# Patient Record
Sex: FEMALE | Race: WHITE | Employment: OTHER | URBAN - METROPOLITAN AREA
[De-identification: names, ages, dates, MRNs, and addresses within clinical notes are randomized per-mention and may not be internally consistent; named-entity substitution may affect disease eponyms.]

---

## 2017-02-27 ENCOUNTER — TELEPHONE (OUTPATIENT)
Dept: BEHAVIORAL/MENTAL HEALTH CLINIC | Age: 67
End: 2017-02-27

## 2017-02-27 ENCOUNTER — HOSPITAL ENCOUNTER (EMERGENCY)
Age: 67
Discharge: HOME | End: 2017-02-28
Attending: EMERGENCY MEDICINE | Admitting: EMERGENCY MEDICINE
Payer: MEDICARE

## 2017-02-27 VITALS
DIASTOLIC BLOOD PRESSURE: 78 MMHG | HEIGHT: 62 IN | WEIGHT: 196.21 LBS | BODY MASS INDEX: 36.11 KG/M2 | RESPIRATION RATE: 18 BRPM | SYSTOLIC BLOOD PRESSURE: 153 MMHG | TEMPERATURE: 98 F | OXYGEN SATURATION: 98 %

## 2017-02-27 DIAGNOSIS — F41.8 DEPRESSION WITH ANXIETY: Primary | ICD-10-CM

## 2017-02-27 DIAGNOSIS — F43.9 STRESS: ICD-10-CM

## 2017-02-27 LAB
ALBUMIN SERPL BCP-MCNC: 4 G/DL (ref 3.5–5)
ALBUMIN/GLOB SERPL: 1.3 {RATIO} (ref 1.1–2.2)
ALP SERPL-CCNC: 70 U/L (ref 45–117)
ALT SERPL-CCNC: 21 U/L (ref 12–78)
AMPHET UR QL SCN: NEGATIVE
ANION GAP BLD CALC-SCNC: 8 MMOL/L (ref 5–15)
APAP SERPL-MCNC: <2 UG/ML (ref 10–30)
APPEARANCE UR: CLEAR
AST SERPL W P-5'-P-CCNC: 16 U/L (ref 15–37)
BARBITURATES UR QL SCN: NEGATIVE
BASOPHILS # BLD AUTO: 0 K/UL (ref 0–0.1)
BASOPHILS # BLD: 0 % (ref 0–1)
BENZODIAZ UR QL: POSITIVE
BILIRUB SERPL-MCNC: 0.2 MG/DL (ref 0.2–1)
BILIRUB UR QL: NEGATIVE
BUN SERPL-MCNC: 23 MG/DL (ref 6–20)
BUN/CREAT SERPL: 27 (ref 12–20)
CALCIUM SERPL-MCNC: 9.1 MG/DL (ref 8.5–10.1)
CANNABINOIDS UR QL SCN: NEGATIVE
CHLORIDE SERPL-SCNC: 106 MMOL/L (ref 97–108)
CO2 SERPL-SCNC: 28 MMOL/L (ref 21–32)
COCAINE UR QL SCN: NEGATIVE
COLOR UR: NORMAL
CREAT SERPL-MCNC: 0.86 MG/DL (ref 0.55–1.02)
DRUG SCRN COMMENT,DRGCM: ABNORMAL
EOSINOPHIL # BLD: 0 K/UL (ref 0–0.4)
EOSINOPHIL NFR BLD: 0 % (ref 0–7)
ERYTHROCYTE [DISTWIDTH] IN BLOOD BY AUTOMATED COUNT: 13.5 % (ref 11.5–14.5)
ETHANOL SERPL-MCNC: <10 MG/DL
GLOBULIN SER CALC-MCNC: 3.1 G/DL (ref 2–4)
GLUCOSE SERPL-MCNC: 106 MG/DL (ref 65–100)
GLUCOSE UR STRIP.AUTO-MCNC: NEGATIVE MG/DL
HCT VFR BLD AUTO: 41.5 % (ref 35–47)
HGB BLD-MCNC: 13.6 G/DL (ref 11.5–16)
HGB UR QL STRIP: NEGATIVE
KETONES UR QL STRIP.AUTO: NEGATIVE MG/DL
LEUKOCYTE ESTERASE UR QL STRIP.AUTO: NEGATIVE
LYMPHOCYTES # BLD AUTO: 26 % (ref 12–49)
LYMPHOCYTES # BLD: 2.5 K/UL (ref 0.8–3.5)
MCH RBC QN AUTO: 30.2 PG (ref 26–34)
MCHC RBC AUTO-ENTMCNC: 32.8 G/DL (ref 30–36.5)
MCV RBC AUTO: 92 FL (ref 80–99)
METHADONE UR QL: NEGATIVE
MONOCYTES # BLD: 0.8 K/UL (ref 0–1)
MONOCYTES NFR BLD AUTO: 8 % (ref 5–13)
NEUTS SEG # BLD: 6.1 K/UL (ref 1.8–8)
NEUTS SEG NFR BLD AUTO: 66 % (ref 32–75)
NITRITE UR QL STRIP.AUTO: NEGATIVE
OPIATES UR QL: NEGATIVE
PCP UR QL: POSITIVE
PH UR STRIP: 7 [PH] (ref 5–8)
PLATELET # BLD AUTO: 209 K/UL (ref 150–400)
POTASSIUM SERPL-SCNC: 4.1 MMOL/L (ref 3.5–5.1)
PROT SERPL-MCNC: 7.1 G/DL (ref 6.4–8.2)
PROT UR STRIP-MCNC: NEGATIVE MG/DL
RBC # BLD AUTO: 4.51 M/UL (ref 3.8–5.2)
SALICYLATES SERPL-MCNC: <1.7 MG/DL (ref 2.8–20)
SODIUM SERPL-SCNC: 142 MMOL/L (ref 136–145)
SP GR UR REFRACTOMETRY: 1.02 (ref 1–1.03)
UROBILINOGEN UR QL STRIP.AUTO: 0.2 EU/DL (ref 0.2–1)
WBC # BLD AUTO: 9.4 K/UL (ref 3.6–11)

## 2017-02-27 PROCEDURE — 80307 DRUG TEST PRSMV CHEM ANLYZR: CPT | Performed by: EMERGENCY MEDICINE

## 2017-02-27 PROCEDURE — 80053 COMPREHEN METABOLIC PANEL: CPT | Performed by: EMERGENCY MEDICINE

## 2017-02-27 PROCEDURE — 90791 PSYCH DIAGNOSTIC EVALUATION: CPT

## 2017-02-27 PROCEDURE — 36415 COLL VENOUS BLD VENIPUNCTURE: CPT | Performed by: EMERGENCY MEDICINE

## 2017-02-27 PROCEDURE — 99284 EMERGENCY DEPT VISIT MOD MDM: CPT

## 2017-02-27 PROCEDURE — 85025 COMPLETE CBC W/AUTO DIFF WBC: CPT | Performed by: EMERGENCY MEDICINE

## 2017-02-27 PROCEDURE — 81003 URINALYSIS AUTO W/O SCOPE: CPT | Performed by: EMERGENCY MEDICINE

## 2017-02-27 NOTE — ED NOTES
Pt brought in ED voluntarily escorted by Panda Keyes from [employer]. Per Kati [place] staff at [employer] called for [employer]. Pt denies any SI upon arrival but agreed to come to ED to speak with [facility]. Pt states she said \"I'm worth more dead than alive. \" but states she didn't verbalize that to anyone. Pt states she had some SI yesterday but denies any today. Panda Keyes states they spoke with Monique Arroyo with [employer] and states [facility]

## 2017-02-27 NOTE — TELEPHONE ENCOUNTER
Pt called and her message was very hard to hear. Please call pt, all i could  Make out was depression.

## 2017-02-28 NOTE — ED NOTES
Pt undressed. Clothes placed in a bag. Door open and pt visible form the nursing station. Patient resting comfortably, side rails up, call bell w/in reach, no further needs expressed at this time, aware of POC.

## 2017-02-28 NOTE — ED NOTES
Patient discharged and given discharge instructions by Jewel Hanson MD. Patient had an opportunity to ask questions. Patient verbalized understanding of discharge instructions. Patient ambulatory from ED, discharge instructions and prescriptions in hand. Patient accompanied by yellow cab.
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
## 2017-02-28 NOTE — ED NOTES
Patient resting comfortably, side rails up, call bell w/in reach, no further needs expressed at this time, aware of POC. Tech at the bedside for one to one.

## 2017-02-28 NOTE — DISCHARGE INSTRUCTIONS
Anxiety Disorder: Care Instructions  Your Care Instructions  Anxiety is a normal reaction to stress. Difficult situations can cause you to have symptoms such as sweaty palms and a nervous feeling. In an anxiety disorder, the symptoms are far more severe. Constant worry, muscle tension, trouble sleeping, nausea and diarrhea, and other symptoms can make normal daily activities difficult or impossible. These symptoms may occur for no reason, and they can affect your work, school, or social life. Medicines, counseling, and self-care can all help. Follow-up care is a key part of your treatment and safety. Be sure to make and go to all appointments, and call your doctor if you are having problems. It's also a good idea to know your test results and keep a list of the medicines you take. How can you care for yourself at home? · Take medicines exactly as directed. Call your doctor if you think you are having a problem with your medicine. · Go to your counseling sessions and follow-up appointments. · Recognize and accept your anxiety. Then, when you are in a situation that makes you anxious, say to yourself, \"This is not an emergency. I feel uncomfortable, but I am not in danger. I can keep going even if I feel anxious. \"  · Be kind to your body:  ¨ Relieve tension with exercise or a massage. ¨ Get enough rest.  ¨ Avoid alcohol, caffeine, nicotine, and illegal drugs. They can increase your anxiety level and cause sleep problems. ¨ Learn and do relaxation techniques. See below for more about these techniques. · Engage your mind. Get out and do something you enjoy. Go to a funny movie, or take a walk or hike. Plan your day. Having too much or too little to do can make you anxious. · Keep a record of your symptoms. Discuss your fears with a good friend or family member, or join a support group for people with similar problems. Talking to others sometimes relieves stress.   · Get involved in social groups, or volunteer to help others. Being alone sometimes makes things seem worse than they are. · Get at least 30 minutes of exercise on most days of the week to relieve stress. Walking is a good choice. You also may want to do other activities, such as running, swimming, cycling, or playing tennis or team sports. Relaxation techniques  Do relaxation exercises 10 to 20 minutes a day. You can play soothing, relaxing music while you do them, if you wish. · Tell others in your house that you are going to do your relaxation exercises. Ask them not to disturb you. · Find a comfortable place, away from all distractions and noise. · Lie down on your back, or sit with your back straight. · Focus on your breathing. Make it slow and steady. · Breathe in through your nose. Breathe out through either your nose or mouth. · Breathe deeply, filling up the area between your navel and your rib cage. Breathe so that your belly goes up and down. · Do not hold your breath. · Breathe like this for 5 to 10 minutes. Notice the feeling of calmness throughout your whole body. As you continue to breathe slowly and deeply, relax by doing the following for another 5 to 10 minutes:  · Tighten and relax each muscle group in your body. You can begin at your toes and work your way up to your head. · Imagine your muscle groups relaxing and becoming heavy. · Empty your mind of all thoughts. · Let yourself relax more and more deeply. · Become aware of the state of calmness that surrounds you. · When your relaxation time is over, you can bring yourself back to alertness by moving your fingers and toes and then your hands and feet and then stretching and moving your entire body. Sometimes people fall asleep during relaxation, but they usually wake up shortly afterward. · Always give yourself time to return to full alertness before you drive a car or do anything that might cause an accident if you are not fully alert.  Never play a relaxation tape while you drive a car. When should you call for help? Call 911 anytime you think you may need emergency care. For example, call if:  · You feel you cannot stop from hurting yourself or someone else. Keep the numbers for these national suicide hotlines: 2-133-491-TALK (4-146.347.8721) and 3-993-MCYSQUK (4-239.435.4396). If you or someone you know talks about suicide or feeling hopeless, get help right away. Watch closely for changes in your health, and be sure to contact your doctor if:  · You have anxiety or fear that affects your life. · You have symptoms of anxiety that are new or different from those you had before. Where can you learn more? Go to http://wichoSocialcamben.info/. Enter P754 in the search box to learn more about \"Anxiety Disorder: Care Instructions. \"  Current as of: July 26, 2016  Content Version: 11.1  © 0462-8226 [employer]. Care instructions adapted under license by [employer] (which disclaims liability or warranty for this information). If you have questions about a medical condition or this instruction, always ask your healthcare professional. [address] any warranty or liability for your use of this information. Recovering From Depression: Care Instructions  Your Care Instructions  Taking good care of yourself is important as you recover from depression. In time, your symptoms will fade as your treatment takes hold. Do not give up. Instead, focus your energy on getting better. Your mood will improve. It just takes some time. Focus on things that can help you feel better, such as being with friends and family, eating well, and getting enough rest. But take things slowly. Do not do too much too soon. You will begin to feel better gradually. Follow-up care is a key part of your treatment and safety. Be sure to make and go to all appointments, and call your doctor if you are having problems.  It's also a good idea to know your test results and keep a list of the medicines you take. How can you care for yourself at home? Be realistic  · If you have a large task to do, break it up into smaller steps you can handle, and just do what you can. · You may want to put off important decisions until your depression has lifted. If you have plans that will have a major impact on your life, such as marriage, divorce, or a job change, try to wait a bit. Talk it over with friends and loved ones who can help you look at the overall picture first.  · Reaching out to people for help is important. Do not isolate yourself. Let your family and friends help you. Find someone you can trust and confide in, and talk to that person. · Be patient, and be kind to yourself. Remember that depression is not your fault and is not something you can overcome with willpower alone. Treatment is necessary for depression, just like for any other illness. Feeling better takes time, and your mood will improve little by little. Stay active  · Stay busy and get outside. Take a walk, or try some other light exercise. · Talk with your doctor about an exercise program. Exercise can help with mild depression. · Go to a movie or concert. Take part in a Confucianism activity or other social gathering. Go to a ball game. · Ask a friend to have dinner with you. Take care of yourself  · Eat a balanced diet with plenty of fresh fruits and vegetables, whole grains, and lean protein. If you have lost your appetite, eat small snacks rather than large meals. · Avoid drinking alcohol or using illegal drugs. Do not take medicines that have not been prescribed for you. They may interfere with medicines you may be taking for depression, or they may make your depression worse. · Take your medicines exactly as they are prescribed. You may start to feel better within 1 to 3 weeks of taking antidepressant medicine. But it can take as many as 6 to 8 weeks to see more improvement.  If you have questions or concerns about your medicines, or if you do not notice any improvement by 3 weeks, talk to your doctor. · If you have any side effects from your medicine, tell your doctor. Antidepressants can make you feel tired, dizzy, or nervous. Some people have dry mouth, constipation, headaches, sexual problems, or diarrhea. Many of these side effects are mild and will go away on their own after you have been taking the medicine for a few weeks. Some may last longer. Talk to your doctor if side effects are bothering you too much. You might be able to try a different medicine. · Get enough sleep. If you have problems sleeping:  ¨ Go to bed at the same time every night, and get up at the same time every morning. ¨ Keep your bedroom dark and quiet. ¨ Do not exercise after 5:00 p.m. ¨ Avoid drinks with caffeine after 5:00 p.m. · Avoid sleeping pills unless they are prescribed by the doctor treating your depression. Sleeping pills may make you groggy during the day, and they may interact with other medicine you are taking. · If you have any other illnesses, such as diabetes, heart disease, or high blood pressure, make sure to continue with your treatment. Tell your doctor about all of the medicines you take, including those with or without a prescription. · Keep the numbers for these national suicide hotlines: 7-377-283-TALK (3-890.382.7621) and 8-588-VRSIYKI (2-723.292.2891). If you or someone you know talks about suicide or feeling hopeless, get help right away. When should you call for help? Call 911 anytime you think you may need emergency care. For example, call if:  · You feel like hurting yourself or someone else. · Someone you know has depression and is about to attempt or is attempting suicide. Call your doctor now or seek immediate medical care if:  · You hear voices. · Someone you know has depression and:  ¨ Starts to give away his or her possessions.   ¨ Uses illegal drugs or drinks alcohol heavily. ¨ Talks or writes about death, including writing suicide notes or talking about guns, knives, or pills. ¨ Starts to spend a lot of time alone. ¨ Acts very aggressively or suddenly appears calm. Watch closely for changes in your health, and be sure to contact your doctor if:  · You do not get better as expected. Where can you learn more? Go to http://wicho-ben.info/. Enter X916 in the search box to learn more about \"Recovering From Depression: Care Instructions. \"  Current as of: July 26, 2016  Content Version: 11.1  © 9705-8972 [employer]. Care instructions adapted under license by [employer] (which disclaims liability or warranty for this information). If you have questions about a medical condition or this instruction, always ask your healthcare professional. [address] any warranty or liability for your use of this information.

## 2017-02-28 NOTE — ED NOTES
Patient presents to ED with C/O SI that started yesterday. The pt stated she told Cherellejose d Rons that [employer] of the world is on my shoulders\". The pt stated she \"just wants to see her mother and  who is in heaven\". The pt stated \"I swear on a stack of Bibles that I would never do anything to hurt myself\". The pt denies SI, HI, delusions and hallucionations. Patient is A&Ox3, side rails up, call bell w/in reach, and aware of plan of care. The patient is in NAD.

## 2017-03-02 ENCOUNTER — OFFICE VISIT (OUTPATIENT)
Dept: BEHAVIORAL/MENTAL HEALTH CLINIC | Age: 67
End: 2017-03-02

## 2017-03-02 VITALS
WEIGHT: 196 LBS | DIASTOLIC BLOOD PRESSURE: 93 MMHG | HEART RATE: 114 BPM | BODY MASS INDEX: 36.07 KG/M2 | HEIGHT: 62 IN | SYSTOLIC BLOOD PRESSURE: 130 MMHG

## 2017-03-02 DIAGNOSIS — F41.8 DEPRESSION WITH ANXIETY: Primary | ICD-10-CM

## 2017-03-02 DIAGNOSIS — F60.9 PERSONALITY DISORDER (HCC): ICD-10-CM

## 2017-03-02 RX ORDER — TRAZODONE HYDROCHLORIDE 150 MG/1
TABLET ORAL
Qty: 90 TAB | Refills: 1 | Status: SHIPPED | OUTPATIENT
Start: 2017-03-02 | End: 2017-09-10 | Stop reason: SDUPTHER

## 2017-03-02 RX ORDER — OXCARBAZEPINE 300 MG/1
TABLET, FILM COATED ORAL
Qty: 90 TAB | Refills: 1 | Status: SHIPPED | OUTPATIENT
Start: 2017-03-02 | End: 2017-08-01 | Stop reason: SDUPTHER

## 2017-03-02 RX ORDER — CLONAZEPAM 2 MG/1
TABLET ORAL
Qty: 180 TAB | Refills: 1 | Status: SHIPPED | OUTPATIENT
Start: 2017-03-02 | End: 2017-05-17 | Stop reason: SDUPTHER

## 2017-03-02 RX ORDER — VENLAFAXINE HYDROCHLORIDE 150 MG/1
CAPSULE, EXTENDED RELEASE ORAL
Qty: 270 CAP | Refills: 1 | Status: SHIPPED | OUTPATIENT
Start: 2017-03-02 | End: 2017-08-01 | Stop reason: SDUPTHER

## 2017-03-02 RX ORDER — CHOLECALCIFEROL (VITAMIN D3) 125 MCG
10 CAPSULE ORAL
COMMUNITY
End: 2019-04-16 | Stop reason: SDUPTHER

## 2017-03-02 NOTE — PROGRESS NOTES
Psychiatric Outpatient Progress Note    Account Number:  [id number]  Name: Melissa Porras    SUBJECTIVE:   CHIEF COMPLAINT:  Reza Razo is a 77 y.o. , White female and was seen today for follow-up of psychiatric condition and psychotropic medication management. HPI:    Judi Wyatt reports the following psychiatric symptoms:  depression, anxiety and poor sleep. The symptoms have been present for years and are of moderate severity. The symptoms occur daily. Pt reports that she feeling depressed and anxious. Acknowledged her visit to ED last month with increased anxiety but denies any SI or attempt. Continues to dwell on her financial problems. Very labile and anxious. She has increased cardiac issues and is recommended by her cardiologist to rest more. ED Note:( 2/27/17)  Reza Razo is a 77 y.o. female with pmhx significant for HTN, depression, mood swings, anxiety who presents with police to the ED for suicidal ideations. Pt states she was talking to a RN for [employer] last night and said \"I'm worth more dead than alive. \" Per pt, the RN wanted her to come in to the hospital last night, but she refused. She states she does not have any savings, was recently scammed online and had her credit card information stolen, and is now disputing several of these fraudulent cases to get her money back. Pt reports \"scammers can make up paperwork. I don't have any, but they can make it all up. \" Pt admits she has previously thought about suicide, noting a plan to \"step in front of a truck\" but \"it's too painful. \" She states \"I couldn't do it if I wanted to. \" Pt notes she has a follow-up appointment with her psychiatrist on 03/02/2017 and is followed by a psychologist. Pt states \"I am scared of the dark and a night owl. \" She states her  passed away in 2010 and gave any firearms in her home to the police. Pt also reports all of her family has passed away, she does not have any siblings or kids.  Pt lives alone at "[employer]. Per pt, \"Don't tell "[employer] I don't have any money-they'll throw me out. \" Per pt, family h/o cancer and cardiac problems. She specifically denies any SI, difficulty sleeping, appetite change or other physical symptoms. Contributing factors include: finances. Patient denies SI/HI/SIB. Side Effects:  none      Fam/Soc Hx (from [place] with updates): REVIEW OF SYSTEMS:  Psychiatric:  depression, anxiety  Appetite:good   Sleep: good       OBJECTIVE:                 Mental Status exam: WNL except for      Sensorium  oriented to time, place and person   Relations cooperative and passive    Eye Contact    appropriate   Appearance:  age appropriate and casually dressed   Motor Behavior/Gait:  gait unsteady and within normal limits   Speech:  normal pitch and normal volume   Thought Process: goal directed and logical   Thought Content free of delusions and free of hallucinations   Suicidal ideations none   Homicidal ideations none   Mood:  depressed   Affect:  anxious   Memory recent  adequate   Memory remote:  adequate   Concentration:  adequate   Abstraction:  concrete   Insight:  fair   Reliability fair   Judgment:  fair       MEDICAL DECISION MAKING  Data: pertinent labs, imaging, medical records and diagnostic tests reviewed and incorporated in diagnosis and treatment plan    Allergies   Allergen Reactions    Epinephrine Palpitations     \"Makes my heart  ' shudder'. \" Allergic to epinephrine with novocain. Plain epinephrine is ok.  Novocain [Procaine] Palpitations    Nsaids (Non-Steroidal Anti-Inflammatory Drug) Other (comments)     GASTRIC DISTRESS.  Pravastatin Myalgia    Aspirin Other (comments)     Gastric irritation    Ibuprofen Other (comments)     Gastric irritation        Current Outpatient Prescriptions   Medication Sig Dispense Refill    melatonin tab tablet Take 10 mg by mouth nightly.       venlafaxine-SR (EFFEXOR-XR) 150 mg capsule TAKE 2 CAPSULE AM,  AND 1 PO at 6 PM  Indications: major depressive disorder 270 Cap 1    clonazePAM (KLONOPIN) 2 mg tablet TAKE 1 TABLET BY MOUTH TWICE DAILY 180 Tab 1    traZODone (DESYREL) 150 mg tablet TAKE 1 TABLET NIGHTLY  Indications: major depressive disorder 90 Tab 1    OXcarbazepine (TRILEPTAL) 300 mg tablet TAKE 1 TABLET DAILY  Indications: mood stabilizer 90 Tab 1    multivitamin, tx-iron-ca-min (THERA-M W/ IRON) 9 mg iron-400 mcg tab tablet Take 1 Tab by mouth daily.  DEXILANT 60 mg CpDB Take 60 mg by mouth daily.  rosuvastatin (CRESTOR) 10 mg tablet Take 1 Tab by mouth nightly. 30 Tab 2    traMADol (ULTRAM) 50 mg tablet Take 50 mg by mouth every eight (8) hours as needed. 3    fluticasone (FLONASE) 50 mcg/actuation nasal spray       fexofenadine (ALLEGRA) 180 mg tablet Take 180 mg by mouth daily.  CRANBERRY EXTRACT (CRANBERRY PO) Take 2,000 mg by mouth daily. Triple strength.  GUAIFENESIN/DEXTROMETHORPHAN (MUCINEX DM PO) Take 1 Tab by mouth two (2) times daily as needed.  acetaminophen (ACETAMINOPHEN EXTRA STRENGTH) 500 mg tablet Take 1,500 mg by mouth two (2) times a day.  celecoxib (CELEBREX) 100 mg capsule Take 100 mg by mouth two (2) times a day.  Diclofenac Sodium (VOLTAREN) 1 % topical gel Apply  to affected area daily as needed.  cholecalciferol, vitamin D3, (VITAMIN D-3) 2,000 unit Tab Take 5,000 Units by mouth daily. Soft gel.  CYANOCOBALAMIN, VITAMIN B-12, (VITAMIN B-12 PO) Take 2,000 mcg by mouth daily. 2000MCG DAILY      CALCIUM CARBONATE/VITAMIN D3 (CALCIUM 500 WITH D PO) Take 2 Tabs by mouth daily.  Potassium Gluconate 595 (99) mg tablet Take 595 mg by mouth daily.  zinc 50 mg Tab Take 1 Tab by mouth daily.  almotriptan (AXERT) 12.5 mg tablet Take 12.5 mg by mouth once as needed.       CONTOUR NEXT STRIPS strip USE TO TEST BLOOD SUGAR BID  0    MICROLET LANCET misc USE BID  11    ascorbic acid (VITAMIN C) 500 mg tablet Take 500 mg by mouth daily. Visit Vitals    BP (!) 130/93    Pulse (!) 114    Ht 5' 2\" (1.575 m)    Wt 88.9 kg (196 lb)    BMI 35.85 kg/m2         Problems addressed today:    ICD-10-CM ICD-9-CM    1. Depression with anxiety F41.8 300.4    2. Personality disorder F60.9 301.9        Assessment:   Stevan Morales  is a 77 y.o.  female  is partially responding to treatment. Symptoms are unstable. Patient denies SI/HI/SIB. No evidence of AH/VH or delusions. Risk Scoring- chronic illnesses and prescription drug management    Treatment Plan:  1. Medications:          Medication Changes/Adjustments: increase Effxor, continue clonazepam, trazodone, trileptal and melatonin    Current Outpatient Prescriptions   Medication Sig Dispense Refill    melatonin tab tablet Take 10 mg by mouth nightly.  venlafaxine-SR (EFFEXOR-XR) 150 mg capsule TAKE 2 CAPSULE AM,  AND 1 PO at 6 PM  Indications: major depressive disorder 270 Cap 1    clonazePAM (KLONOPIN) 2 mg tablet TAKE 1 TABLET BY MOUTH TWICE DAILY 180 Tab 1    traZODone (DESYREL) 150 mg tablet TAKE 1 TABLET NIGHTLY  Indications: major depressive disorder 90 Tab 1    OXcarbazepine (TRILEPTAL) 300 mg tablet TAKE 1 TABLET DAILY  Indications: mood stabilizer 90 Tab 1    multivitamin, tx-iron-ca-min (THERA-M W/ IRON) 9 mg iron-400 mcg tab tablet Take 1 Tab by mouth daily.  DEXILANT 60 mg CpDB Take 60 mg by mouth daily.  rosuvastatin (CRESTOR) 10 mg tablet Take 1 Tab by mouth nightly. 30 Tab 2    traMADol (ULTRAM) 50 mg tablet Take 50 mg by mouth every eight (8) hours as needed. 3    fluticasone (FLONASE) 50 mcg/actuation nasal spray       fexofenadine (ALLEGRA) 180 mg tablet Take 180 mg by mouth daily.  CRANBERRY EXTRACT (CRANBERRY PO) Take 2,000 mg by mouth daily. Triple strength.  GUAIFENESIN/DEXTROMETHORPHAN (MUCINEX DM PO) Take 1 Tab by mouth two (2) times daily as needed.       acetaminophen (ACETAMINOPHEN EXTRA STRENGTH) 500 mg tablet Take 1,500 mg by mouth two (2) times a day.  celecoxib (CELEBREX) 100 mg capsule Take 100 mg by mouth two (2) times a day.  Diclofenac Sodium (VOLTAREN) 1 % topical gel Apply  to affected area daily as needed.  cholecalciferol, vitamin D3, (VITAMIN D-3) 2,000 unit Tab Take 5,000 Units by mouth daily. Soft gel.  CYANOCOBALAMIN, VITAMIN B-12, (VITAMIN B-12 PO) Take 2,000 mcg by mouth daily. 2000MCG DAILY      CALCIUM CARBONATE/VITAMIN D3 (CALCIUM 500 WITH D PO) Take 2 Tabs by mouth daily.  Potassium Gluconate 595 (99) mg tablet Take 595 mg by mouth daily.  zinc 50 mg Tab Take 1 Tab by mouth daily.  almotriptan (AXERT) 12.5 mg tablet Take 12.5 mg by mouth once as needed.  CONTOUR NEXT STRIPS strip USE TO TEST BLOOD SUGAR BID  0    MICROLET LANCET misc USE BID  11    ascorbic acid (VITAMIN C) 500 mg tablet Take 500 mg by mouth daily. The following regarding medications was addressed:    (The risks and benefits of the proposed medication; the potential medication side effects ie    dry mouth, weight gain, GI upset, headache; patient given opportunity to ask questions)       2. Counseling and coordination of care including instructions for treatment, risks/benefits, risk factor reduction and patient/family education. She agrees with the plan. Patient instructed to call with any side effects, questions or issues. 3.  Follow-up Disposition:  Return in about 4 weeks (around 3/30/2017). PSYCHOTHERAPY:  approx 20 minutes  Type:  Supportive/Solution Focused psychotherapy provided  Focus:     Current problems:   Housing issues- [facility]   Economic issues              Polypharmacy    Psychoeducation provided on polypharmacy    Treatment plan reviewed with patient-including diagnosis and medications      Jose Mai is not progressing.     Anabelle Shaffer MD  3/2/2017

## 2017-03-02 NOTE — MR AVS SNAPSHOT
Visit Information Date & Time Provider Department Dept. Phone Encounter #  
 3/2/2017  1:20 PM Juanita Chiang [phone] [id number] Follow-up Instructions Return in about 4 weeks (around 3/30/2017). Your Appointments 4/11/2017  2:00 PM  
ESTABLISHED PATIENT with MD Juanita Chiang [address] [facility]) Appt Note: 4 month f/u  
 [facility] [address] [address] [address] [place] [address] [facility] Upcoming Health Maintenance Date Due Hepatitis C Screening 1950 LIPID PANEL Q1 1950 FOOT EXAM Q1 11/2/1960 MICROALBUMIN Q1 11/2/1960 EYE EXAM RETINAL OR DILATED Q1 11/2/1960 DTaP/Tdap/Td series (1 - Tdap) 11/2/1971 FOBT Q 1 YEAR AGE 50-75 11/2/2000 ZOSTER VACCINE AGE 60> 11/2/2010 HEMOGLOBIN A1C Q6M 8/16/2012 GLAUCOMA SCREENING Q2Y 11/2/2015 INFLUENZA AGE 9 TO ADULT 8/1/2016 Pneumococcal 65+ Low/Medium Risk (2 of 2 - PPSV23) 9/1/2016 MEDICARE YEARLY EXAM 11/19/2016 BREAST CANCER SCRN MAMMOGRAM 8/1/2018 Allergies as of 3/2/2017  Review Complete On: 3/2/2017 By: Fuentes Levy MD  
  
 Severity Noted Reaction Type Reactions Epinephrine  07/27/2011   Systemic Palpitations \"Makes my heart  ' shudder'. \" Allergic to epinephrine with novocain. Plain epinephrine is ok. Novocain [Procaine]  05/01/2013    Palpitations Nsaids (Non-steroidal Anti-inflammatory Drug)  02/16/2012    Other (comments) GASTRIC DISTRESS. Pravastatin  12/02/2015    Myalgia Aspirin Low 07/27/2011   Side Effect Other (comments) Gastric irritation Ibuprofen Low 07/27/2011   Side Effect Other (comments) Gastric irritation Current Immunizations  Reviewed on 2/22/2012 Name Date Influenza Vaccine Whole 9/1/2011 Pneumococcal Vaccine (Unspecified Type) 9/1/2011 Not reviewed this visit You Were Diagnosed With   
  
 Codes Comments Depression with anxiety    -  Primary ICD-10-CM: F41.8 ICD-9-CM: 300.4 Personality disorder     ICD-10-CM: F60.9 ICD-9-CM: 301.9 Vitals BP  
  
  
  
  
  
 (!) 130/93 BMI and BSA Data Body Mass Index Body Surface Area  
 35.85 kg/m 2 1.97 m 2 Preferred Pharmacy Pharmacy Name Phone [address] [facility] [phone] Your Updated Medication List  
  
   
This list is accurate as of: 3/2/17  1:52 PM.  Always use your most recent med list.  
  
  
  
  
 ACETAMINOPHEN EXTRA STRENGTH 500 mg tablet Generic drug:  acetaminophen Take 1,500 mg by mouth two (2) times a day. ALLEGRA 180 mg tablet Generic drug:  fexofenadine Take 180 mg by mouth daily. AXERT 12.5 mg tablet Generic drug:  almotriptan Take 12.5 mg by mouth once as needed. CALCIUM 500 WITH D PO Take 2 Tabs by mouth daily. CeleBREX 100 mg capsule Generic drug:  celecoxib Take 100 mg by mouth two (2) times a day. clonazePAM 2 mg tablet Commonly known as:  KlonoPIN  
TAKE 1 TABLET BY MOUTH TWICE DAILY CONTOUR NEXT STRIPS strip Generic drug:  glucose blood VI test strips USE TO TEST BLOOD SUGAR BID CRANBERRY PO Take 2,000 mg by mouth daily. Triple strength. DEXILANT 60 mg Cpdb Generic drug:  Dexlansoprazole Take 60 mg by mouth daily. fluticasone 50 mcg/actuation nasal spray Commonly known as:  FLONASE  
  
 melatonin Tab tablet Take 10 mg by mouth nightly. [facility] Generic drug:  Lancets USE BID  
  
 MUCINEX DM PO Take 1 Tab by mouth two (2) times daily as needed. multivitamin, tx-iron-ca-min 9 mg iron-400 mcg Tab tablet Commonly known as:  THERA-M w/ IRON Take 1 Tab by mouth daily. OXcarbazepine 300 mg tablet Commonly known as:  TRILEPTAL  
 TAKE 1 TABLET DAILY  Indications: mood stabilizer Potassium Gluconate 595 mg (99 mg) tablet Take 595 mg by mouth daily. rosuvastatin 10 mg tablet Commonly known as:  CRESTOR Take 1 Tab by mouth nightly. traMADol 50 mg tablet Commonly known as:  ULTRAM  
Take 50 mg by mouth every eight (8) hours as needed. traZODone 150 mg tablet Commonly known as:  DESYREL  
TAKE 1 TABLET NIGHTLY  Indications: major depressive disorder  
  
 venlafaxine- mg capsule Commonly known as:  EFFEXOR-XR  
TAKE 2 CAPSULE AM,  AND 1 PO at 6 PM  Indications: major depressive disorder VITAMIN B-12 PO Take 2,000 mcg by mouth daily. 2000MCG DAILY  
  
 VITAMIN C 500 mg tablet Generic drug:  ascorbic acid (vitamin C) Take 500 mg by mouth daily. VITAMIN D3 2,000 unit Tab Generic drug:  cholecalciferol (vitamin D3) Take 5,000 Units by mouth daily. Soft gel. VOLTAREN 1 % Gel Generic drug:  diclofenac Apply  to affected area daily as needed. zinc 50 mg Tab tablet Take 1 Tab by mouth daily. Prescriptions Printed Refills  
 clonazePAM (KLONOPIN) 2 mg tablet 1 Sig: TAKE 1 TABLET BY MOUTH TWICE DAILY Class: Print Prescriptions Sent to Pharmacy Refills  
 venlafaxine-SR (EFFEXOR-XR) 150 mg capsule 1 Sig: TAKE 2 CAPSULE AM,  AND 1 PO at 6 PM  Indications: major depressive disorder Class: Normal  
 Pharmacy: [address], [address] Ph #: [phone]  
 traZODone (DESYREL) 150 mg tablet 1 Sig: TAKE 1 TABLET NIGHTLY  Indications: major depressive disorder Class: Normal  
 Pharmacy: [address], [address] Ph #: [phone] OXcarbazepine (TRILEPTAL) 300 mg tablet 1 Sig: TAKE 1 TABLET DAILY  Indications: mood stabilizer  Class: Normal  
 Pharmacy: [address], [address] [place] [address] #: [phone] Follow-up Instructions Return in about 4 weeks (around 3/30/2017). To-Do List   
 03/30/2017 1:00 PM  
  Appointment with DIABETES CLASS #[id number] at [facility] DIABETIC TREATMENT ([phone]) 08/01/2017 1:00 PM  
  Appointment with [facility] CATERINA 3 at [address] [place] [address] ([phone]) Shower or bathe using soap and water. Do not use deodorant, powder, perfumes, or lotion the day of your exam.  If your prior mammograms were not performed at a [facility] facility please bring films with you or forward prior images 2 days before your procedure. Please bring script from your physician on the day of the mammogram or have scripts faxed to: [facility]:  [phone] Introducing [facility]! [facility] introduces giftee patient portal. Now you can access parts of your medical record, email your doctor's office, and request medication refills online. 1. In your internet browser, go to [email or web address] 2. Click on the First Time User? Click Here link in the Sign In box. You will see the New Member Sign Up page. 3. Enter your giftee Access Code exactly as it appears below. You will not need to use this code after youve completed the sign-up process. If you do not sign up before the expiration date, you must request a new code. · giftee Access Code: [email or web address] Expires: 3/13/2017  1:39 PM 
 
4. Enter the last four digits of your Social Security Number (xxxx) and Date of Birth (mm/dd/yyyy) as indicated and click Submit. You will be taken to the next sign-up page. 5. Create a [employer] ID. This will be your giftee login ID and cannot be changed, so think of one that is secure and easy to remember. 6. Create a [employer] password. You can change your password at any time. 7. Enter your Password Reset Question and Answer. This can be used at a later time if you forget your password. 8. Enter your e-mail address. You will receive e-mail notification when new information is available in [address] 9. Click Sign Up. You can now view and download portions of your medical record. 10. Click the Download Summary menu link to download a portable copy of your medical information. If you have questions, please visit the Frequently Asked Questions section of the [employer] website. Remember, [employer] is NOT to be used for urgent needs. For medical emergencies, dial 911. Now available from your iPhone and Android! Please provide this summary of care documentation to your next provider. Your primary care clinician is listed as Asad Rm. If you have any questions after today's visit, please call [phone].

## 2017-03-15 ENCOUNTER — TELEPHONE (OUTPATIENT)
Dept: BEHAVIORAL/MENTAL HEALTH CLINIC | Age: 67
End: 2017-03-15

## 2017-03-15 NOTE — TELEPHONE ENCOUNTER
Pt called and said she is having bad effects to the medication increase. Hands shaking, developing stutter, and general shaking. Her sleeping is becoming effected also she has only been able to get atleast 2 hours of sleep and she has also tried reading to get her to bed. Please call her when you get a moment. I believe the effextor was giving her these problems. [phone] please call this number.

## 2017-03-30 ENCOUNTER — OFFICE VISIT (OUTPATIENT)
Dept: BEHAVIORAL/MENTAL HEALTH CLINIC | Age: 67
End: 2017-03-30

## 2017-03-30 VITALS
DIASTOLIC BLOOD PRESSURE: 74 MMHG | BODY MASS INDEX: 35.51 KG/M2 | HEART RATE: 98 BPM | SYSTOLIC BLOOD PRESSURE: 151 MMHG | WEIGHT: 193 LBS | HEIGHT: 62 IN

## 2017-03-30 DIAGNOSIS — F60.9 PERSONALITY DISORDER (HCC): ICD-10-CM

## 2017-03-30 DIAGNOSIS — F41.8 DEPRESSION WITH ANXIETY: Primary | ICD-10-CM

## 2017-03-30 NOTE — MR AVS SNAPSHOT
Visit Information Date & Time Provider Department Dept. Phone Encounter #  
 3/30/2017  2:00 PM Garrett Carlin, [address] [facility] [phone] [id number] Follow-up Instructions Return in about 2 months (around 5/30/2017). Your Appointments 4/11/2017  2:00 PM  
ESTABLISHED PATIENT with Garrett Cralin MD  
[address] [facility]) Appt Note: 4 month f/u  
 [facility] [address] [address]  
[address] [facility] [address] [facility] Upcoming Health Maintenance Date Due Hepatitis C Screening 1950 LIPID PANEL Q1 1950 FOOT EXAM Q1 11/2/1960 MICROALBUMIN Q1 11/2/1960 EYE EXAM RETINAL OR DILATED Q1 11/2/1960 DTaP/Tdap/Td series (1 - Tdap) 11/2/1971 FOBT Q 1 YEAR AGE 50-75 11/2/2000 ZOSTER VACCINE AGE 60> 11/2/2010 HEMOGLOBIN A1C Q6M 8/16/2012 GLAUCOMA SCREENING Q2Y 11/2/2015 INFLUENZA AGE 9 TO ADULT 8/1/2016 Pneumococcal 65+ Low/Medium Risk (2 of 2 - PPSV23) 9/1/2016 MEDICARE YEARLY EXAM 11/19/2016 BREAST CANCER SCRN MAMMOGRAM 8/1/2018 Allergies as of 3/30/2017  Review Complete On: 3/30/2017 By: Garrett Carlin MD  
  
 Severity Noted Reaction Type Reactions Epinephrine  07/27/2011   Systemic Palpitations \"Makes my heart  ' shudder'. \" Allergic to epinephrine with novocain. Plain epinephrine is ok. Novocain [Procaine]  05/01/2013    Palpitations Nsaids (Non-steroidal Anti-inflammatory Drug)  02/16/2012    Other (comments) GASTRIC DISTRESS. Pravastatin  12/02/2015    Myalgia Aspirin Low 07/27/2011   Side Effect Other (comments) Gastric irritation Ibuprofen Low 07/27/2011   Side Effect Other (comments) Gastric irritation Current Immunizations  Reviewed on 2/22/2012 Name Date Influenza Vaccine Whole 9/1/2011 Pneumococcal Vaccine (Unspecified Type) 9/1/2011 Not reviewed this visit You Were Diagnosed With   
  
 Codes Comments Depression with anxiety    -  Primary ICD-10-CM: F41.8 ICD-9-CM: 300.4 Personality disorder     ICD-10-CM: F60.9 ICD-9-CM: 301.9 Vitals BP Pulse Height(growth percentile) Weight(growth percentile) BMI OB Status 151/74 98 5' 2\" (1.575 m) 193 lb (87.5 kg) 35.3 kg/m2 Postmenopausal  
 Smoking Status Never Smoker BMI and BSA Data Body Mass Index Body Surface Area  
 35.3 kg/m 2 1.96 m 2 Preferred Pharmacy Pharmacy Name Phone [address] [facility] [phone] Your Updated Medication List  
  
   
This list is accurate as of: 3/30/17  2:33 PM.  Always use your most recent med list.  
  
  
  
  
 ACETAMINOPHEN EXTRA STRENGTH 500 mg tablet Generic drug:  acetaminophen Take 1,500 mg by mouth two (2) times a day. ALLEGRA 180 mg tablet Generic drug:  fexofenadine Take 180 mg by mouth daily. AXERT 12.5 mg tablet Generic drug:  almotriptan Take 12.5 mg by mouth once as needed. CALCIUM 500 WITH D PO Take 2 Tabs by mouth daily. CeleBREX 100 mg capsule Generic drug:  celecoxib Take 100 mg by mouth two (2) times a day. clonazePAM 2 mg tablet Commonly known as:  KlonoPIN  
TAKE 1 TABLET BY MOUTH TWICE DAILY CONTOUR NEXT STRIPS strip Generic drug:  glucose blood VI test strips USE TO TEST BLOOD SUGAR BID CRANBERRY PO Take 2,000 mg by mouth daily. Triple strength. DEXILANT 60 mg Cpdb Generic drug:  Dexlansoprazole Take 60 mg by mouth daily. fluticasone 50 mcg/actuation nasal spray Commonly known as:  FLONASE  
  
 melatonin Tab tablet Take 10 mg by mouth nightly. [facility] [place] [facility] Generic drug:  Lancets USE BID  
  
 MUCINEX DM PO Take 1 Tab by mouth two (2) times daily as needed. multivitamin, tx-iron-ca-min 9 mg iron-400 mcg Tab tablet Commonly known as:  THERA-M w/ IRON Take 1 Tab by mouth daily. OXcarbazepine 300 mg tablet Commonly known as:  TRILEPTAL  
TAKE 1 TABLET DAILY  Indications: mood stabilizer Potassium Gluconate 595 mg (99 mg) tablet Take 595 mg by mouth daily. rosuvastatin 10 mg tablet Commonly known as:  CRESTOR Take 1 Tab by mouth nightly. traMADol 50 mg tablet Commonly known as:  ULTRAM  
Take 50 mg by mouth every eight (8) hours as needed. traZODone 150 mg tablet Commonly known as:  DESYREL  
TAKE 1 TABLET NIGHTLY  Indications: major depressive disorder  
  
 venlafaxine- mg capsule Commonly known as:  EFFEXOR-XR  
TAKE 2 CAPSULE AM,  AND 1 PO at 6 PM  Indications: major depressive disorder VITAMIN B-12 PO Take 2,000 mcg by mouth daily. 2000MCG DAILY  
  
 VITAMIN C 500 mg tablet Generic drug:  ascorbic acid (vitamin C) Take 500 mg by mouth daily. VITAMIN D3 2,000 unit Tab Generic drug:  cholecalciferol (vitamin D3) Take 5,000 Units by mouth daily. Soft gel. VOLTAREN 1 % Gel Generic drug:  diclofenac Apply  to affected area daily as needed. zinc 50 mg Tab tablet Take 1 Tab by mouth daily. Follow-up Instructions Return in about 2 months (around 5/30/2017). To-Do List   
 06/22/2017 1:00 PM  
  Appointment with DIABETES CLASS #4MRM at [facility] DIABETIC TREATMENT ([phone]) 08/01/2017 1:00 PM  
  Appointment with [facility] CATERINA 3 at [address] [place] [address] ([phone]) Shower or bathe using soap and water. Do not use deodorant, powder, perfumes, or lotion the day of your exam.  If your prior mammograms were not performed at a [facility] please bring films with you or forward prior images 2 days before your procedure. Please bring script from your physician on the day of the mammogram or have scripts faxed to: [facility]:  [phone] Introducing [facility]! [employer] [facility] introduces [employer] patient portal. Now you can access parts of your medical record, email your doctor's office, and request medication refills online. 1. In your internet browser, go to [email or web address] 2. Click on the First Time User? Click Here link in the Sign In box. You will see the New Member Sign Up page. 3. Enter your [employer] Access Code exactly as it appears below. You will not need to use this code after youve completed the sign-up process. If you do not sign up before the expiration date, you must request a new code. · [employer] Access Code: [email or web address] Expires: 6/28/2017  2:33 PM 
 
4. Enter the last four digits of your Social Security Number (xxxx) and Date of Birth (mm/dd/yyyy) as indicated and click Submit. You will be taken to the next sign-up page. 5. Create a [employer] ID. This will be your [employer] login ID and cannot be changed, so think of one that is secure and easy to remember. 6. Create a [employer] password. You can change your password at any time. 7. Enter your Password Reset Question and Answer. This can be used at a later time if you forget your password. 8. Enter your e-mail address. You will receive e-mail notification when new information is available in [address] 9. Click Sign Up. You can now view and download portions of your medical record. 10. Click the Download Summary menu link to download a portable copy of your medical information. If you have questions, please visit the Frequently Asked Questions section of the [employer] website. Remember, [employer] is NOT to be used for urgent needs. For medical emergencies, dial 911. Now available from your iPhone and Android! Please provide this summary of care documentation to your next provider. Your primary care clinician is listed as Skyler Caruso. If you have any questions after today's visit, please call [phone].

## 2017-04-01 NOTE — PROGRESS NOTES
Psychiatric Outpatient Progress Note    Account Number:  [id number]  Name: Roman Porras    SUBJECTIVE:   CHIEF COMPLAINT:  Josiah Brooke is a 77 y. o. , White female and was seen today for follow-up of psychiatric condition and psychotropic medication management. HPI:    Flavio Chou reports the following psychiatric symptoms:  depression and anxiety. The symptoms have been present for years and are of mild severity. The symptoms occur daily. Pt reports that she is very happy about her living situation as she has found an apartment in the [facility] [employer] as half the cost of [facility]. She will be moving in there next week. Compliant with medications. This the most happy I have seen here so far. Contributing factors include: finances. Patient denies SI/HI/SIB. Side Effects:  none      Fam/Soc Hx (from [place] with updates): REVIEW OF SYSTEMS:  Psychiatric:  normal  Appetite:good   Sleep: good       OBJECTIVE:                 Mental Status exam: WNL except for      Sensorium  oriented to time, place and person   Relations cooperative    Eye Contact    appropriate   Appearance:  age appropriate and casually dressed   Motor Behavior/Gait:  within normal limits   Speech:  normal pitch and normal volume   Thought Process: goal directed and logical   Thought Content free of delusions and free of hallucinations   Suicidal ideations none   Homicidal ideations none   Mood:  euthymic   Affect:  anxious   Memory recent  adequate   Memory remote:  adequate   Concentration:  impaired   Abstraction:  concrete   Insight:  fair   Reliability fair   Judgment:  fair       MEDICAL DECISION MAKING  Data: pertinent labs, imaging, medical records and diagnostic tests reviewed and incorporated in diagnosis and treatment plan    Allergies   Allergen Reactions    Epinephrine Palpitations     \"Makes my heart  ' shudder'. \" Allergic to epinephrine with novocain. Plain epinephrine is ok.  Novocain [Procaine] Palpitations    Nsaids (Non-Steroidal Anti-Inflammatory Drug) Other (comments)     GASTRIC DISTRESS.  Pravastatin Myalgia    Aspirin Other (comments)     Gastric irritation    Ibuprofen Other (comments)     Gastric irritation        Current Outpatient Prescriptions   Medication Sig Dispense Refill    melatonin tab tablet Take 10 mg by mouth nightly.  venlafaxine-SR (EFFEXOR-XR) 150 mg capsule TAKE 2 CAPSULE AM,  AND 1 PO at 6 PM  Indications: major depressive disorder 270 Cap 1    clonazePAM (KLONOPIN) 2 mg tablet TAKE 1 TABLET BY MOUTH TWICE DAILY 180 Tab 1    traZODone (DESYREL) 150 mg tablet TAKE 1 TABLET NIGHTLY  Indications: major depressive disorder 90 Tab 1    OXcarbazepine (TRILEPTAL) 300 mg tablet TAKE 1 TABLET DAILY  Indications: mood stabilizer 90 Tab 1    multivitamin, tx-iron-ca-min (THERA-M W/ IRON) 9 mg iron-400 mcg tab tablet Take 1 Tab by mouth daily.  DEXILANT 60 mg CpDB Take 60 mg by mouth daily.  rosuvastatin (CRESTOR) 10 mg tablet Take 1 Tab by mouth nightly. 30 Tab 2    traMADol (ULTRAM) 50 mg tablet Take 50 mg by mouth every eight (8) hours as needed. 3    fluticasone (FLONASE) 50 mcg/actuation nasal spray       fexofenadine (ALLEGRA) 180 mg tablet Take 180 mg by mouth daily.  CRANBERRY EXTRACT (CRANBERRY PO) Take 2,000 mg by mouth daily. Triple strength.  GUAIFENESIN/DEXTROMETHORPHAN (MUCINEX DM PO) Take 1 Tab by mouth two (2) times daily as needed.  acetaminophen (ACETAMINOPHEN EXTRA STRENGTH) 500 mg tablet Take 1,500 mg by mouth two (2) times a day.  celecoxib (CELEBREX) 100 mg capsule Take 100 mg by mouth two (2) times a day.  Diclofenac Sodium (VOLTAREN) 1 % topical gel Apply  to affected area daily as needed.  cholecalciferol, vitamin D3, (VITAMIN D-3) 2,000 unit Tab Take 5,000 Units by mouth daily. Soft gel.  CYANOCOBALAMIN, VITAMIN B-12, (VITAMIN B-12 PO) Take 2,000 mcg by mouth daily.  2000MCG DAILY      ascorbic acid (VITAMIN C) 500 mg tablet Take 500 mg by mouth daily.  CALCIUM CARBONATE/VITAMIN D3 (CALCIUM 500 WITH D PO) Take 2 Tabs by mouth daily.  Potassium Gluconate 595 (99) mg tablet Take 595 mg by mouth daily.  zinc 50 mg Tab Take 1 Tab by mouth daily.  almotriptan (AXERT) 12.5 mg tablet Take 12.5 mg by mouth once as needed.  CONTOUR NEXT STRIPS strip USE TO TEST BLOOD SUGAR BID  0    MICROLET LANCET misc USE BID  11        Visit Vitals    /74    Pulse 98    Ht 5' 2\" (1.575 m)    Wt 87.5 kg (193 lb)    BMI 35.3 kg/m2         Problems addressed today:    ICD-10-CM ICD-9-CM    1. Depression with anxiety F41.8 300.4    2. Personality disorder F60.9 301.9        Assessment:   Aron Abarca  is a 77 y.o.  female  is responding to treatment. Symptoms are stable. Patient denies SI/HI/SIB. No evidence of AH/VH or delusions. Risk Scoring- chronic illnesses and prescription drug management    Treatment Plan:  1. Medications:          Medication Changes/Adjustments: Continue current combination of clonazepam, Trileptal, trazodone, Effexor and melatonin    Current Outpatient Prescriptions   Medication Sig Dispense Refill    melatonin tab tablet Take 10 mg by mouth nightly.  venlafaxine-SR (EFFEXOR-XR) 150 mg capsule TAKE 2 CAPSULE AM,  AND 1 PO at 6 PM  Indications: major depressive disorder 270 Cap 1    clonazePAM (KLONOPIN) 2 mg tablet TAKE 1 TABLET BY MOUTH TWICE DAILY 180 Tab 1    traZODone (DESYREL) 150 mg tablet TAKE 1 TABLET NIGHTLY  Indications: major depressive disorder 90 Tab 1    OXcarbazepine (TRILEPTAL) 300 mg tablet TAKE 1 TABLET DAILY  Indications: mood stabilizer 90 Tab 1    multivitamin, tx-iron-ca-min (THERA-M W/ IRON) 9 mg iron-400 mcg tab tablet Take 1 Tab by mouth daily.  DEXILANT 60 mg CpDB Take 60 mg by mouth daily.  rosuvastatin (CRESTOR) 10 mg tablet Take 1 Tab by mouth nightly.  30 Tab 2    traMADol (ULTRAM) 50 mg tablet Take 50 mg by mouth every eight (8) hours as needed. 3    fluticasone (FLONASE) 50 mcg/actuation nasal spray       fexofenadine (ALLEGRA) 180 mg tablet Take 180 mg by mouth daily.  CRANBERRY EXTRACT (CRANBERRY PO) Take 2,000 mg by mouth daily. Triple strength.  GUAIFENESIN/DEXTROMETHORPHAN (MUCINEX DM PO) Take 1 Tab by mouth two (2) times daily as needed.  acetaminophen (ACETAMINOPHEN EXTRA STRENGTH) 500 mg tablet Take 1,500 mg by mouth two (2) times a day.  celecoxib (CELEBREX) 100 mg capsule Take 100 mg by mouth two (2) times a day.  Diclofenac Sodium (VOLTAREN) 1 % topical gel Apply  to affected area daily as needed.  cholecalciferol, vitamin D3, (VITAMIN D-3) 2,000 unit Tab Take 5,000 Units by mouth daily. Soft gel.  CYANOCOBALAMIN, VITAMIN B-12, (VITAMIN B-12 PO) Take 2,000 mcg by mouth daily. 2000MCG DAILY      ascorbic acid (VITAMIN C) 500 mg tablet Take 500 mg by mouth daily.  CALCIUM CARBONATE/VITAMIN D3 (CALCIUM 500 WITH D PO) Take 2 Tabs by mouth daily.  Potassium Gluconate 595 (99) mg tablet Take 595 mg by mouth daily.  zinc 50 mg Tab Take 1 Tab by mouth daily.  almotriptan (AXERT) 12.5 mg tablet Take 12.5 mg by mouth once as needed.  CONTOUR NEXT STRIPS strip USE TO TEST BLOOD SUGAR BID  0    MICROLET LANCET misc USE BID  11                  The following regarding medications was addressed:    (The risks and benefits of the proposed medication; the potential medication side effects ie    dry mouth, weight gain, GI upset, headache; patient given opportunity to ask questions)       2. Counseling and coordination of care including instructions for treatment, risks/benefits, risk factor reduction and patient/family education. She agrees with the plan. Patient instructed to call with any side effects, questions or issues. 3.  Follow-up Disposition:  Return in about 2 months (around 5/30/2017).     PSYCHOTHERAPY:  approx 20 minutes  Type:  Supportive/Cognitive Behavioral/Solution Focused psychotherapy provided  Focus:     Current problems   Housing issues   Medical issues   Interpersonal conflicts    Psychoeducation provided on psych medications    Treatment plan reviewed with patient-including diagnosis and medications    Claudine Perea is progressing.     Briana Espana MD  4/1/2017

## 2017-05-18 RX ORDER — CLONAZEPAM 2 MG/1
TABLET ORAL
Qty: 180 TAB | Refills: 1 | Status: SHIPPED | OUTPATIENT
Start: 2017-05-18 | End: 2017-05-20 | Stop reason: SDUPTHER

## 2017-05-19 ENCOUNTER — APPOINTMENT (OUTPATIENT)
Dept: GENERAL RADIOLOGY | Age: 67
End: 2017-05-19
Attending: EMERGENCY MEDICINE
Payer: MEDICARE

## 2017-05-19 ENCOUNTER — HOSPITAL ENCOUNTER (EMERGENCY)
Age: 67
Discharge: HOME | End: 2017-05-19
Attending: EMERGENCY MEDICINE
Payer: MEDICARE

## 2017-05-19 VITALS
BODY MASS INDEX: 35.88 KG/M2 | TEMPERATURE: 98.1 F | OXYGEN SATURATION: 97 % | DIASTOLIC BLOOD PRESSURE: 79 MMHG | HEIGHT: 62 IN | HEART RATE: 87 BPM | SYSTOLIC BLOOD PRESSURE: 148 MMHG | WEIGHT: 195 LBS | RESPIRATION RATE: 16 BRPM

## 2017-05-19 DIAGNOSIS — F13.20 BENZODIAZEPINE DEPENDENCE (HCC): Primary | ICD-10-CM

## 2017-05-19 DIAGNOSIS — R07.89 ATYPICAL CHEST PAIN: ICD-10-CM

## 2017-05-19 LAB — TROPONIN I BLD-MCNC: <0.04 NG/ML (ref 0–0.08)

## 2017-05-19 PROCEDURE — 93005 ELECTROCARDIOGRAM TRACING: CPT

## 2017-05-19 PROCEDURE — 84484 ASSAY OF TROPONIN QUANT: CPT

## 2017-05-19 PROCEDURE — 99284 EMERGENCY DEPT VISIT MOD MDM: CPT

## 2017-05-19 PROCEDURE — 71020 XR CHEST PA LAT: CPT

## 2017-05-19 NOTE — ED PROVIDER NOTES
HPI Comments: 77 y.o. female with past medical history significant for HTN, chronic pain, arthritis, GERD, irregular heart beat, migraines, neuropathy, and left breast CA who presents from home via EMS for medication refill. Pt reports that she noted having only 4 clonazepam left 7 days ago, when she normally takes them BID. She called Dr. Hayder Bloom office and a new Rx was sent to the pharmacy and was ready yesterday. But has not been able to  the medication as she was told to not drive due. Pt. Says that she was frustrated that it was taking so long to wait on the cab. She said that she felt, \"instant\" diaphoresis, sore throat, abdominal pain, and her \"heart hurting\". She reports that she is currently experiencing this chest discomfort. She notes that these complaints are more severe than any previous presentation when she has been off of the clonazepam in the past, which she takes for anxiety. + Decreased appetite. Pt denies any fever, chills, rhinorrhea, cough or changes to urine or bowels. There are no other acute medical concerns at this time. Social hx: Never smoker. No alcohol use. PCP: Wyatt Sorto MD  Behavioral Health: Torrey Mendoza MD    Note written by Fransisco Villalobos, as dictated by Jairo Amador MD 7:10 PM     The history is provided by the patient. Past Medical History:   Diagnosis Date    Arthritis     GENERALIZED IN JOINTS.  Cancer ([facility] [address]     left breast - surgery/radiation    Chronic pain     RIGHT KNEE    GERD (gastroesophageal reflux disease)     Hypertension     CONTROLLED BY MEDS.  Irregular heart beat     Other ill-defined conditions     MIGRAINES    Other ill-defined conditions     neuropathy of lower legs and feet    Other ill-defined conditions     increased cholesterol    Other ill-defined conditions     bronchitis    Unspecified adverse effect of anesthesia     HEART PALPITATION.        Past Surgical History:   Procedure Laterality Date    BREAST SURGERY PROCEDURE UNLISTED  7/1995    DCIS /BIOPSIES MULTIPLE.  BREAST SURGERY PROCEDURE UNLISTED  1995    LUMPECTOMY WITH RADIATION INSITU    HX ORTHOPAEDIC  2006    LEFT KNEE ARTHROSCOPY    HX ORTHOPAEDIC  4/2010    RIGHT KNEE ARTHROSCOPY. Family History:   Problem Relation Age of Onset    Cancer Mother      BLADDER CA    Heart Disease Father     Lung Disease Father        Social History     Social History    Marital status:      Spouse name: N/A    Number of children: N/A    Years of education: N/A     Occupational History    Not on file. Social History Main Topics    Smoking status: Never Smoker    Smokeless tobacco: Never Used    Alcohol use No    Drug use: No    Sexual activity: No     Other Topics Concern    Not on file     Social History Narrative         ALLERGIES: Epinephrine; Novocain [procaine]; Nsaids (non-steroidal anti-inflammatory drug); Pravastatin; Aspirin; and Ibuprofen    Review of Systems   Constitutional: Positive for diaphoresis. Negative for chills and fever. HENT: Positive for sore throat. Negative for rhinorrhea. Respiratory: Negative for cough and shortness of breath. Cardiovascular: Positive for chest pain. Gastrointestinal: Positive for abdominal pain. Negative for diarrhea, nausea and vomiting. Genitourinary: Negative for dysuria and urgency. Musculoskeletal: Negative for arthralgias and back pain. Skin: Negative for rash. Neurological: Positive for dizziness. Negative for weakness and light-headedness. All other systems reviewed and are negative.       Vitals:    05/19/17 1835   BP: 155/86   Pulse: 99   Resp: 16   Temp: 98.5 °F (36.9 °C)   SpO2: 95%   Weight: 88.5 kg (195 lb)   Height: 5' 2\" (1.575 m)            Physical Exam     Const:  No acute distress, well developed, well nourished  Head:  Atraumatic, normocephalic  Eyes:  PERRL, conjunctiva normal, no scleral icterus  Neck:  Supple, trachea midline  Cardiovascular:  RRR, no murmurs, no gallops, no rubs  Resp:  No resp distress, no increased work of breathing, no wheezes, no rhonchi, no rales,  Abd:  Soft, non-tender, non-distended, no rebound, no guarding, no CVA tenderness  :  Deferred  MSK:  No pedal edema, normal ROM  Neuro:  Alert and oriented x3, no cranial nerve defect  Skin:  Warm, dry, intact  Psych: normal mood and affect, behavior is normal, judgement and thought content is normal    Note written by Fransisco Bain, as dictated by Antonia Aleman MD 7:18 PM      MDM  Number of Diagnoses or Management Options  Atypical chest pain:   Benzodiazepine dependence [facility]):      Amount and/or Complexity of Data Reviewed  Clinical lab tests: ordered and reviewed  Tests in the radiology section of CPT®: ordered and reviewed  Review and summarize past medical records: yes    Patient Progress  Patient progress: stable    ED Course     Pt. Presents to the ER with sx that are consistent with her previous clonazepam withdrawal.  Pt. Is well appearing in the ER. Negative cardiac enzymes. Normal xray. Pt. Has a script waiting for her at the pharmacy. Pt. To go to the pharmacy or return to the ER with worsening sx.       Procedures

## 2017-05-19 NOTE — ED TRIAGE NOTES
Per EMS pt was waiting on a cab to take her to the pharmacy. Pt stated to this RN that she didn't want to wait any longer for the cab so she called 911. Pt is here to get her Clonazepam refilled.

## 2017-05-20 NOTE — DISCHARGE INSTRUCTIONS
Chest Pain: Care Instructions  Your Care Instructions  There are many things that can cause chest pain. Some are not serious and will get better on their own in a few days. But some kinds of chest pain need more testing and treatment. Your doctor may have recommended a follow-up visit in the next 8 to 12 hours. If you are not getting better, you may need more tests or treatment. Even though your doctor has released you, you still need to watch for any problems. The doctor carefully checked you, but sometimes problems can develop later. If you have new symptoms or if your symptoms do not get better, get medical care right away. If you have worse or different chest pain or pressure that lasts more than 5 minutes or you passed out (lost consciousness), call 911 or seek other emergency help right away. A medical visit is only one step in your treatment. Even if you feel better, you still need to do what your doctor recommends, such as going to all suggested follow-up appointments and taking medicines exactly as directed. This will help you recover and help prevent future problems. How can you care for yourself at home? · Rest until you feel better. · Take your medicine exactly as prescribed. Call your doctor if you think you are having a problem with your medicine. · Do not drive after taking a prescription pain medicine. When should you call for help? Call 911 if:  · You passed out (lost consciousness). · You have severe difficulty breathing. · You have symptoms of a heart attack. These may include:  ¨ Chest pain or pressure, or a strange feeling in your chest.  ¨ Sweating. ¨ Shortness of breath. ¨ Nausea or vomiting. ¨ Pain, pressure, or a strange feeling in your back, neck, jaw, or upper belly or in one or both shoulders or arms. ¨ Lightheadedness or sudden weakness. ¨ A fast or irregular heartbeat.   After you call 911, the  may tell you to chew 1 adult-strength or 2 to 4 low-dose aspirin. Wait for an ambulance. Do not try to drive yourself. Call your doctor today if:  · You have any trouble breathing. · Your chest pain gets worse. · You are dizzy or lightheaded, or you feel like you may faint. · You are not getting better as expected. · You are having new or different chest pain. Where can you learn more? Go to http://wicho-ben.info/. Enter A120 in the search box to learn more about \"Chest Pain: Care Instructions. \"  Current as of: May 27, 2016  Content Version: 11.2  © 6230-7871 [employer]. Care instructions adapted under license by [employer] (which disclaims liability or warranty for this information). If you have questions about a medical condition or this instruction, always ask your healthcare professional. [address] any warranty or liability for your use of this information. We hope that we have addressed all of your medical concerns. The examination and treatment you received in the Emergency Department were for an emergent problem and were not intended as complete care. It is important that you follow up with your healthcare provider(s) for ongoing care. If your symptoms worsen or do not improve as expected, and you are unable to reach your usual health care provider(s), you should return to the Emergency Department. Today's healthcare is undergoing tremendous change, and patient satisfaction surveys are one of the many tools to assess the quality of medical care. You may receive a survey from the [employer] organization regarding your experience in the Emergency Department. I hope that your experience has been completely positive, particularly the medical care that I provided. As such, please participate in the survey; anything less than excellent does not meet my expectations or intentions.         [facility] and [facility] participate in nationally recognized quality of care measures. If your blood pressure is greater than 120/80, as reported below, we urge that you seek medical care to address the potential of high blood pressure, commonly known as hypertension. Hypertension can be hereditary or can be caused by certain medical conditions, pain, stress, or \"white coat syndrome. \"       Please make an appointment with your health care provider(s) for follow up of your Emergency Department visit. VITALS:   Patient Vitals for the past 8 hrs:   Temp Pulse Resp BP SpO2   05/19/17 2032 98.1 °F (36.7 °C) 87 16 148/79 97 %   05/19/17 1835 98.5 °F (36.9 °C) 99 16 155/86 95 %          Thank you for allowing us to provide you with medical care today. We realize that you have many choices for your emergency care needs. Please choose us in the future for any continued health care needs. Jackelyn Hyman Haylee Scale, [address] [facility] [address].   Office: [phone]            Recent Results (from the past 24 hour(s))   EKG, 12 LEAD, INITIAL    Collection Time: 05/19/17  7:32 PM   Result Value Ref Range    Ventricular Rate 90 BPM    Atrial Rate 90 BPM    P-R Interval 138 ms    QRS Duration 72 ms    Q-T Interval 342 ms    QTC Calculation (Bezet) 418 ms    Calculated P Axis 47 degrees    Calculated R Axis -11 degrees    Calculated T Axis 60 degrees    Diagnosis       ** Poor data quality, interpretation may be adversely affected  Normal sinus rhythm  Nonspecific ST abnormality  When compared with ECG of 16-FEB-2012 14:36,  No significant change was found     POC TROPONIN-I    Collection Time: 05/19/17  7:47 PM   Result Value Ref Range    Troponin-I (POC) <0.04 0.00 - 0.08 ng/mL       Xr Chest Pa Lat    Result Date: 5/19/2017  CLINICAL HISTORY: Chest pain INDICATION: Chest pain, palpitations, hypertension COMPARISON: 2005 FINDINGS: PA and lateral views of the chest are obtained.  The cardiopericardial silhouette is within normal limits. There is no pleural effusion, pneumothorax or focal consolidation present. IMPRESSION: No acute intrathoracic disease.

## 2017-05-21 LAB
ATRIAL RATE: 90 BPM
CALCULATED P AXIS, ECG09: 47 DEGREES
CALCULATED R AXIS, ECG10: -11 DEGREES
CALCULATED T AXIS, ECG11: 60 DEGREES
DIAGNOSIS, 93000: NORMAL
P-R INTERVAL, ECG05: 138 MS
Q-T INTERVAL, ECG07: 342 MS
QRS DURATION, ECG06: 72 MS
QTC CALCULATION (BEZET), ECG08: 418 MS
VENTRICULAR RATE, ECG03: 90 BPM

## 2017-05-21 RX ORDER — CLONAZEPAM 2 MG/1
TABLET ORAL
Qty: 28 TAB | Refills: 0 | Status: SHIPPED | OUTPATIENT
Start: 2017-05-21 | End: 2017-08-01 | Stop reason: SDUPTHER

## 2017-06-08 ENCOUNTER — OFFICE VISIT (OUTPATIENT)
Dept: BEHAVIORAL/MENTAL HEALTH CLINIC | Age: 67
End: 2017-06-08

## 2017-06-08 VITALS
DIASTOLIC BLOOD PRESSURE: 84 MMHG | BODY MASS INDEX: 33.49 KG/M2 | HEIGHT: 62 IN | SYSTOLIC BLOOD PRESSURE: 154 MMHG | WEIGHT: 182 LBS | HEART RATE: 107 BPM

## 2017-06-08 DIAGNOSIS — F41.8 DEPRESSION WITH ANXIETY: Primary | ICD-10-CM

## 2017-06-08 DIAGNOSIS — Z13.31 SCREENING FOR DEPRESSION: ICD-10-CM

## 2017-06-08 DIAGNOSIS — F60.9 PERSONALITY DISORDER (HCC): ICD-10-CM

## 2017-06-08 NOTE — MR AVS SNAPSHOT
Visit Information Date & Time Provider Department Dept. Phone Encounter #  
 6/8/2017  3:00 PM Juanita Farah [facility] [phone] [id number] Upcoming Health Maintenance Date Due Hepatitis C Screening 1950 LIPID PANEL Q1 1950 FOOT EXAM Q1 11/2/1960 MICROALBUMIN Q1 11/2/1960 EYE EXAM RETINAL OR DILATED Q1 11/2/1960 DTaP/Tdap/Td series (1 - Tdap) 11/2/1971 FOBT Q 1 YEAR AGE 50-75 11/2/2000 ZOSTER VACCINE AGE 60> 11/2/2010 HEMOGLOBIN A1C Q6M 8/16/2012 GLAUCOMA SCREENING Q2Y 11/2/2015 Pneumococcal 65+ Low/Medium Risk (2 of 2 - PPSV23) 9/1/2016 MEDICARE YEARLY EXAM 11/19/2016 INFLUENZA AGE 9 TO ADULT 8/1/2017 BREAST CANCER SCRN MAMMOGRAM 8/1/2018 Allergies as of 6/8/2017  Review Complete On: 6/8/2017 By: Leon Love Severity Noted Reaction Type Reactions Epinephrine  07/27/2011   Systemic Palpitations \"Makes my heart  ' shudder'. \" Allergic to epinephrine with novocain. Plain epinephrine is ok. Novocain [Procaine]  05/01/2013    Palpitations Nsaids (Non-steroidal Anti-inflammatory Drug)  02/16/2012    Other (comments) GASTRIC DISTRESS. Pravastatin  12/02/2015    Myalgia Aspirin Low 07/27/2011   Side Effect Other (comments) Gastric irritation Ibuprofen Low 07/27/2011   Side Effect Other (comments) Gastric irritation Current Immunizations  Reviewed on 2/22/2012 Name Date Influenza Vaccine Whole 9/1/2011 Pneumococcal Vaccine (Unspecified Type) 9/1/2011 Not reviewed this visit You Were Diagnosed With   
  
 Codes Comments Depression with anxiety    -  Primary ICD-10-CM: F41.8 ICD-9-CM: 300.4 Personality disorder     ICD-10-CM: F60.9 ICD-9-CM: 301.9 Screening for depression     ICD-10-CM: Z13.89 ICD-9-CM: V79.0 Vitals BP Pulse Height(growth percentile) Weight(growth percentile) BMI OB Status 154/84 (!) 107 5' 2\" (1.575 m) 182 lb (82.6 kg) 33.29 kg/m2 Postmenopausal  
 Smoking Status Never Smoker Vitals History BMI and BSA Data Body Mass Index Body Surface Area  
 33.29 kg/m 2 1.9 m 2 Preferred Pharmacy Pharmacy Name Phone [facility] [phone] - [address], [address] AT [place] [address] [phone] Your Updated Medication List  
  
   
This list is accurate as of: 6/8/17  3:27 PM.  Always use your most recent med list.  
  
  
  
  
 ACETAMINOPHEN EXTRA STRENGTH 500 mg tablet Generic drug:  acetaminophen Take 1,500 mg by mouth two (2) times a day. ALLEGRA 180 mg tablet Generic drug:  fexofenadine Take 180 mg by mouth daily. AXERT 12.5 mg tablet Generic drug:  almotriptan Take 12.5 mg by mouth once as needed. CALCIUM 500 WITH D PO Take 2 Tabs by mouth daily. CeleBREX 100 mg capsule Generic drug:  celecoxib Take 100 mg by mouth two (2) times a day. clonazePAM 2 mg tablet Commonly known as:  KlonoPIN  
TAKE 1 TABLET BY MOUTH TWICE DAILY CONTOUR NEXT STRIPS strip Generic drug:  glucose blood VI test strips USE TO TEST BLOOD SUGAR BID CRANBERRY PO Take 2,000 mg by mouth daily. Triple strength. DEXILANT 60 mg Cpdb Generic drug:  Dexlansoprazole Take 60 mg by mouth daily. fluticasone 50 mcg/actuation nasal spray Commonly known as:  FLONASE  
  
 melatonin Tab tablet Take 10 mg by mouth nightly. [facility] Generic drug:  Lancets USE BID  
  
 MUCINEX DM PO Take 1 Tab by mouth two (2) times daily as needed. multivitamin, tx-iron-ca-min 9 mg iron-400 mcg Tab tablet Commonly known as:  THERA-M w/ IRON Take 1 Tab by mouth daily. OXcarbazepine 300 mg tablet Commonly known as:  TRILEPTAL  
TAKE 1 TABLET DAILY  Indications: mood stabilizer Potassium Gluconate 595 mg (99 mg) tablet Take 595 mg by mouth daily. rosuvastatin 10 mg tablet Commonly known as:  CRESTOR Take 1 Tab by mouth nightly. traMADol 50 mg tablet Commonly known as:  ULTRAM  
Take 50 mg by mouth every eight (8) hours as needed. traZODone 150 mg tablet Commonly known as:  DESYREL  
TAKE 1 TABLET NIGHTLY  Indications: major depressive disorder  
  
 venlafaxine- mg capsule Commonly known as:  EFFEXOR-XR  
TAKE 2 CAPSULE AM,  AND 1 PO at 6 PM  Indications: major depressive disorder VITAMIN B-12 PO Take 2,000 mcg by mouth daily. 2000MCG DAILY  
  
 VITAMIN C 500 mg tablet Generic drug:  ascorbic acid (vitamin C) Take 500 mg by mouth daily. VITAMIN D3 2,000 unit Tab Generic drug:  cholecalciferol (vitamin D3) Take 5,000 Units by mouth daily. Soft gel. VOLTAREN 1 % Gel Generic drug:  diclofenac Apply  to affected area daily as needed. zinc 50 mg Tab tablet Take 1 Tab by mouth daily. We Performed the Following BEHAV ASSMT W/SCORE & DOCD/STAND INSTRUMENT [id number] CPT(R)] To-Do List   
 06/22/2017 1:00 PM  
  Appointment with DIABETES CLASS #[id number] at [facility] ([phone]) 08/01/2017 1:00 PM  
  Appointment with [facility] [address] at [address] [place] [address] ([phone]) Shower or bathe using soap and water. Do not use deodorant, powder, perfumes, or lotion the day of your exam.  If your prior mammograms were not performed at a [facility] facility please bring films with you or forward prior images 2 days before your procedure. Please bring script from your physician on the day of the mammogram or have scripts faxed to: [facility]:  [phone] Introducing [facility]! [employer] introduces [employer] patient portal. Now you can access parts of your medical record, email your doctor's office, and request medication refills online. 1. In your internet browser, go to [email or web address] 2. Click on the First Time User? Click Here link in the Sign In box. You will see the New Member Sign Up page. 3. Enter your [employer] Access Code exactly as it appears below. You will not need to use this code after youve completed the sign-up process. If you do not sign up before the expiration date, you must request a new code. · [employer] Access Code: [email or web address] Expires: 6/28/2017  2:33 PM 
 
4. Enter the last four digits of your Social Security Number (xxxx) and Date of Birth (mm/dd/yyyy) as indicated and click Submit. You will be taken to the next sign-up page. 5. Create a [employer] ID. This will be your [employer] login ID and cannot be changed, so think of one that is secure and easy to remember. 6. Create a [employer] password. You can change your password at any time. 7. Enter your Password Reset Question and Answer. This can be used at a later time if you forget your password. 8. Enter your e-mail address. You will receive e-mail notification when new information is available in [address] 9. Click Sign Up. You can now view and download portions of your medical record. 10. Click the Download Summary menu link to download a portable copy of your medical information. If you have questions, please visit the Frequently Asked Questions section of the [employer] website. Remember, [employer] is NOT to be used for urgent needs. For medical emergencies, dial 911. Now available from your iPhone and Android! Please provide this summary of care documentation to your next provider. Your primary care clinician is listed as Skyler Caruso. If you have any questions after today's visit, please call [phone].

## 2017-06-16 NOTE — PROGRESS NOTES
Psychiatric Outpatient Progress Note    Account Number:  [id number]  Name: Shiela Porras    SUBJECTIVE:   CHIEF COMPLAINT:  Christoper Scheuermann is a 77 y.o. , White female and was seen today for 3 month follow-up of psychiatric condition and psychotropic medication management. HPI:    Nadiya Cowan reports the following psychiatric symptoms:  depression and anxiety. The symptoms have been present for years and are of moderate severity. The symptoms occur daily. Pt reports that she is not doing that well and has been very anxious due to her paperwork for her driving license and [id number] not releasing her remaining money from her terminated contract. She was in ED on 5/19/17 with increase anxiety and CP. She has lost weight and has lost weight. BP is high. Reports compliance with medications. Mildly paranoid and irritable. Reports erratic sleep. GDS: 12/15    Contributing factors include: driving license, finances. Patient denies SI/HI/SIB. Side Effects:  none      Fam/Soc Hx (from [place] with updates):       REVIEW OF SYSTEMS:  Psychiatric:  depression, anxiety, paranoia  Appetite:poor - lost 11 lbs  Sleep: does not feel rested       OBJECTIVE:                 Mental Status exam: WNL except for      Sensorium  oriented to time, place and person   Relations cooperative and vague    Eye Contact    appropriate   Appearance:  age appropriate, casually dressed and overweight   Motor Behavior/Gait:  within normal limits   Speech:  normal pitch and normal volume   Thought Process: circumstantial and tangential   Thought Content delusions   Suicidal ideations none   Homicidal ideations none   Mood:  anxious and irritable   Affect:  anxious, constricted and labile   Memory recent  adequate   Memory remote:  adequate   Concentration:  impaired   Abstraction:  concrete   Insight:  fair   Reliability fair   Judgment:  fair       MEDICAL DECISION MAKING  Data: pertinent labs, imaging, medical records and diagnostic tests reviewed and incorporated in diagnosis and treatment plan    Allergies   Allergen Reactions    Epinephrine Palpitations     \"Makes my heart  ' shudder'. \" Allergic to epinephrine with novocain. Plain epinephrine is ok.  Novocain [Procaine] Palpitations    Nsaids (Non-Steroidal Anti-Inflammatory Drug) Other (comments)     GASTRIC DISTRESS.  Pravastatin Myalgia    Aspirin Other (comments)     Gastric irritation    Ibuprofen Other (comments)     Gastric irritation        Current Outpatient Prescriptions   Medication Sig Dispense Refill    clonazePAM (KLONOPIN) 2 mg tablet TAKE 1 TABLET BY MOUTH TWICE DAILY 28 Tab 0    melatonin tab tablet Take 10 mg by mouth nightly.  venlafaxine-SR (EFFEXOR-XR) 150 mg capsule TAKE 2 CAPSULE AM,  AND 1 PO at 6 PM  Indications: major depressive disorder 270 Cap 1    traZODone (DESYREL) 150 mg tablet TAKE 1 TABLET NIGHTLY  Indications: major depressive disorder 90 Tab 1    OXcarbazepine (TRILEPTAL) 300 mg tablet TAKE 1 TABLET DAILY  Indications: mood stabilizer 90 Tab 1    multivitamin, tx-iron-ca-min (THERA-M W/ IRON) 9 mg iron-400 mcg tab tablet Take 1 Tab by mouth daily.  DEXILANT 60 mg CpDB Take 60 mg by mouth daily.  rosuvastatin (CRESTOR) 10 mg tablet Take 1 Tab by mouth nightly. 30 Tab 2    traMADol (ULTRAM) 50 mg tablet Take 50 mg by mouth every eight (8) hours as needed. 3    fluticasone (FLONASE) 50 mcg/actuation nasal spray       fexofenadine (ALLEGRA) 180 mg tablet Take 180 mg by mouth daily.  CRANBERRY EXTRACT (CRANBERRY PO) Take 2,000 mg by mouth daily. Triple strength.  GUAIFENESIN/DEXTROMETHORPHAN (MUCINEX DM PO) Take 1 Tab by mouth two (2) times daily as needed.  acetaminophen (ACETAMINOPHEN EXTRA STRENGTH) 500 mg tablet Take 1,500 mg by mouth two (2) times a day.  celecoxib (CELEBREX) 100 mg capsule Take 100 mg by mouth two (2) times a day.       Diclofenac Sodium (VOLTAREN) 1 % topical gel Apply  to affected area daily as needed.  cholecalciferol, vitamin D3, (VITAMIN D-3) 2,000 unit Tab Take 5,000 Units by mouth daily. Soft gel.  CYANOCOBALAMIN, VITAMIN B-12, (VITAMIN B-12 PO) Take 2,000 mcg by mouth daily. 2000MCG DAILY      ascorbic acid (VITAMIN C) 500 mg tablet Take 500 mg by mouth daily.  CALCIUM CARBONATE/VITAMIN D3 (CALCIUM 500 WITH D PO) Take 2 Tabs by mouth daily.  Potassium Gluconate 595 (99) mg tablet Take 595 mg by mouth daily.  zinc 50 mg Tab Take 1 Tab by mouth daily.  almotriptan (AXERT) 12.5 mg tablet Take 12.5 mg by mouth once as needed.  CONTOUR NEXT STRIPS strip USE TO TEST BLOOD SUGAR BID  0    MICROLET LANCET misc USE BID  11        Visit Vitals    /84    Pulse (!) 107    Ht 5' 2\" (1.575 m)    Wt 82.6 kg (182 lb)    BMI 33.29 kg/m2         Problems addressed today:    ICD-10-CM ICD-9-CM    1. Depression with anxiety F41.8 300.4 BEHAV ASSMT W/SCORE & DOCD/STAND INSTRUMENT   2. Personality disorder F60.9 301.9 BEHAV ASSMT W/SCORE & DOCD/STAND INSTRUMENT   3. Screening for depression Z13.89 V79.0 BEHAV ASSMT W/SCORE & DOCD/STAND INSTRUMENT       Assessment:   Christoper Scheuermann  is a 77 y.o.  female  Is partially  responding to treatment. Symptoms are unstable. Patient denies SI/HI/SIB. No evidence of AH/VH or delusions. Risk Scoring- chronic illnesses and prescription drug management    Treatment Plan:  1. Medications:          Medication Changes/Adjustments: May take additional clonazepam in the day for increase anxiety and physical sx, continue trazodone, Effexor, Trileptal and Melatonin in the current dosages. Current Outpatient Prescriptions   Medication Sig Dispense Refill    clonazePAM (KLONOPIN) 2 mg tablet TAKE 1 TABLET BY MOUTH TWICE DAILY 28 Tab 0    melatonin tab tablet Take 10 mg by mouth nightly.       venlafaxine-SR (EFFEXOR-XR) 150 mg capsule TAKE 2 CAPSULE AM,  AND 1 PO at 6 PM  Indications: major depressive disorder 270 Cap 1  traZODone (DESYREL) 150 mg tablet TAKE 1 TABLET NIGHTLY  Indications: major depressive disorder 90 Tab 1    OXcarbazepine (TRILEPTAL) 300 mg tablet TAKE 1 TABLET DAILY  Indications: mood stabilizer 90 Tab 1    multivitamin, tx-iron-ca-min (THERA-M W/ IRON) 9 mg iron-400 mcg tab tablet Take 1 Tab by mouth daily.  DEXILANT 60 mg CpDB Take 60 mg by mouth daily.  rosuvastatin (CRESTOR) 10 mg tablet Take 1 Tab by mouth nightly. 30 Tab 2    traMADol (ULTRAM) 50 mg tablet Take 50 mg by mouth every eight (8) hours as needed. 3    fluticasone (FLONASE) 50 mcg/actuation nasal spray       fexofenadine (ALLEGRA) 180 mg tablet Take 180 mg by mouth daily.  CRANBERRY EXTRACT (CRANBERRY PO) Take 2,000 mg by mouth daily. Triple strength.  GUAIFENESIN/DEXTROMETHORPHAN (MUCINEX DM PO) Take 1 Tab by mouth two (2) times daily as needed.  acetaminophen (ACETAMINOPHEN EXTRA STRENGTH) 500 mg tablet Take 1,500 mg by mouth two (2) times a day.  celecoxib (CELEBREX) 100 mg capsule Take 100 mg by mouth two (2) times a day.  Diclofenac Sodium (VOLTAREN) 1 % topical gel Apply  to affected area daily as needed.  cholecalciferol, vitamin D3, (VITAMIN D-3) 2,000 unit Tab Take 5,000 Units by mouth daily. Soft gel.  CYANOCOBALAMIN, VITAMIN B-12, (VITAMIN B-12 PO) Take 2,000 mcg by mouth daily. 2000MCG DAILY      ascorbic acid (VITAMIN C) 500 mg tablet Take 500 mg by mouth daily.  CALCIUM CARBONATE/VITAMIN D3 (CALCIUM 500 WITH D PO) Take 2 Tabs by mouth daily.  Potassium Gluconate 595 (99) mg tablet Take 595 mg by mouth daily.  zinc 50 mg Tab Take 1 Tab by mouth daily.  almotriptan (AXERT) 12.5 mg tablet Take 12.5 mg by mouth once as needed.       CONTOUR NEXT STRIPS strip USE TO TEST BLOOD SUGAR BID  0    MICROLET LANCET misc USE BID  11                  The following regarding medications was addressed:    (The risks and benefits of the proposed medication; the potential medication side effects ie    dry mouth, weight gain, GI upset, headache; patient given opportunity to ask questions)       2. Counseling and coordination of care including instructions for treatment, risks/benefits, risk factor reduction and patient/family education. She agrees with the plan. Patient instructed to call with any side effects, questions or issues. 3.  Follow up : 3 months    4. DMV paperwork filled out. 5. Non pharmacological techniques of dealing with anxiety discussed. PSYCHOTHERAPY:  approx 20 minutes  Type:  Supportive/Cognitive Behavioral/Solution Focused psychotherapy provided  Focus:     Current problems:              Financial   Housing issues   Medical issues- weight loss     Psychoeducation provided on psych medications    Treatment plan reviewed with patient-including diagnosis and medications      Robyn Lilo is not progressing.     Abilio Craig MD  6/16/2017

## 2017-08-01 ENCOUNTER — OFFICE VISIT (OUTPATIENT)
Dept: BEHAVIORAL/MENTAL HEALTH CLINIC | Age: 67
End: 2017-08-01

## 2017-08-01 VITALS
WEIGHT: 182 LBS | HEIGHT: 62 IN | DIASTOLIC BLOOD PRESSURE: 83 MMHG | SYSTOLIC BLOOD PRESSURE: 141 MMHG | HEART RATE: 101 BPM | BODY MASS INDEX: 33.49 KG/M2

## 2017-08-01 DIAGNOSIS — F41.8 DEPRESSION WITH ANXIETY: Primary | ICD-10-CM

## 2017-08-01 DIAGNOSIS — F60.9 PERSONALITY DISORDER (HCC): ICD-10-CM

## 2017-08-01 RX ORDER — VENLAFAXINE HYDROCHLORIDE 150 MG/1
CAPSULE, EXTENDED RELEASE ORAL
Qty: 270 CAP | Refills: 1 | Status: SHIPPED | OUTPATIENT
Start: 2017-08-01 | End: 2017-08-31 | Stop reason: SDUPTHER

## 2017-08-01 RX ORDER — CLONAZEPAM 2 MG/1
TABLET ORAL
Qty: 60 TAB | Refills: 2 | Status: SHIPPED | OUTPATIENT
Start: 2017-08-01 | End: 2017-10-31 | Stop reason: SDUPTHER

## 2017-08-01 RX ORDER — OXCARBAZEPINE 300 MG/1
TABLET, FILM COATED ORAL
Qty: 90 TAB | Refills: 1 | Status: SHIPPED | OUTPATIENT
Start: 2017-08-01 | End: 2017-10-31 | Stop reason: SDUPTHER

## 2017-08-01 NOTE — PROGRESS NOTES
Psychiatric Outpatient Progress Note    Account Number:  [id number]  Name: Anabel Phoenix Pyron    SUBJECTIVE:    CHIEF COMPLAINT:  Gema Garcia is a 77 y.o. , White female and was seen today for 2 month follow-up of psychiatric condition and psychotropic medication management.      HPI:    Arin Willard reports the following psychiatric symptoms:  depression and anxiety. The symptoms have been present for years and are of mild severity. The symptoms occur daily. Pt reports that she is  doing well and has been less anxious as her money was released by  [employer]. She continues to loose weight and is happy abut it. BP is better. Reports compliance with medications. Today, she is well dressed with good make up on.      GDS: 12/15     Contributing factors include: driving license, finances. Patient denies SI/HI/SIB.      Side Effects:  none       Fam/Soc Hx (from [place] with updates):       REVIEW OF SYSTEMS:  Psychiatric:  depression, anxiety, paranoia  Appetite: good  Sleep: improved      OBJECTIVE:                 Mental Status exam: WNL except for      Sensorium  oriented to time, place and person   Relations cooperative    Eye Contact    appropriate   Appearance:  age appropriate, casually dressed and overweight   Motor Behavior/Gait:  within normal limits   Speech:  normal pitch and normal volume   Thought Process: goal directed and logical   Thought Content free of delusions and free of hallucinations   Suicidal ideations none   Homicidal ideations none   Mood:  euthymic   Affect:  full range   Memory recent  adequate   Memory remote:  adequate   Concentration:  adequate   Abstraction:  concrete   Insight:  fair   Reliability fair   Judgment:  fair       MEDICAL DECISION MAKING  Data: pertinent labs, imaging, medical records and diagnostic tests reviewed and incorporated in diagnosis and treatment plan    Allergies   Allergen Reactions    Epinephrine Palpitations     \"Makes my heart  ' shudder'. \" Allergic to epinephrine with novocain. Plain epinephrine is ok.  Novocain [Procaine] Palpitations    Nsaids (Non-Steroidal Anti-Inflammatory Drug) Other (comments)     GASTRIC DISTRESS.  Pravastatin Myalgia    Aspirin Other (comments)     Gastric irritation    Ibuprofen Other (comments)     Gastric irritation        Current Outpatient Prescriptions   Medication Sig Dispense Refill    clonazePAM (KLONOPIN) 2 mg tablet TAKE 1 TABLET BY MOUTH TWICE DAILY 60 Tab 2    OXcarbazepine (TRILEPTAL) 300 mg tablet TAKE 1 TABLET DAILY  Indications: mood stabilizer 90 Tab 1    venlafaxine-SR (EFFEXOR-XR) 150 mg capsule TAKE 2 CAPSULE AM,  AND 1 PO at 6 PM  Indications: major depressive disorder 270 Cap 1    melatonin tab tablet Take 10 mg by mouth nightly.  traZODone (DESYREL) 150 mg tablet TAKE 1 TABLET NIGHTLY  Indications: major depressive disorder 90 Tab 1    multivitamin, tx-iron-ca-min (THERA-M W/ IRON) 9 mg iron-400 mcg tab tablet Take 1 Tab by mouth daily.  CONTOUR NEXT STRIPS strip USE TO TEST BLOOD SUGAR BID  0    MICROLET LANCET misc USE BID  11    DEXILANT 60 mg CpDB Take 60 mg by mouth daily.  rosuvastatin (CRESTOR) 10 mg tablet Take 1 Tab by mouth nightly. 30 Tab 2    traMADol (ULTRAM) 50 mg tablet Take 50 mg by mouth every eight (8) hours as needed. 3    fluticasone (FLONASE) 50 mcg/actuation nasal spray       fexofenadine (ALLEGRA) 180 mg tablet Take 180 mg by mouth daily.  CRANBERRY EXTRACT (CRANBERRY PO) Take 2,000 mg by mouth daily. Triple strength.  GUAIFENESIN/DEXTROMETHORPHAN (MUCINEX DM PO) Take 1 Tab by mouth two (2) times daily as needed.  acetaminophen (ACETAMINOPHEN EXTRA STRENGTH) 500 mg tablet Take 1,500 mg by mouth two (2) times a day.  celecoxib (CELEBREX) 100 mg capsule Take 100 mg by mouth two (2) times a day.  Diclofenac Sodium (VOLTAREN) 1 % topical gel Apply  to affected area daily as needed.       cholecalciferol, vitamin D3, (VITAMIN D-3) 2,000 unit Tab Take 5,000 Units by mouth daily. Soft gel.  CYANOCOBALAMIN, VITAMIN B-12, (VITAMIN B-12 PO) Take 2,000 mcg by mouth daily. 2000MCG DAILY      ascorbic acid (VITAMIN C) 500 mg tablet Take 500 mg by mouth daily.  CALCIUM CARBONATE/VITAMIN D3 (CALCIUM 500 WITH D PO) Take 2 Tabs by mouth daily.  Potassium Gluconate 595 (99) mg tablet Take 595 mg by mouth daily.  zinc 50 mg Tab Take 1 Tab by mouth daily.  almotriptan (AXERT) 12.5 mg tablet Take 12.5 mg by mouth once as needed. Visit Vitals    /83    Pulse (!) 101    Ht 5' 2\" (1.575 m)    Wt 82.6 kg (182 lb)    BMI 33.29 kg/m2         Problems addressed today:    ICD-10-CM ICD-9-CM    1. Depression with anxiety F41.8 300.4    2. Personality disorder F60.9 301.9        Assessment:   Rosio Payton  is a 77 y.o.  female  is responding to treatment. Symptoms are improving. Patient denies SI/HI/SIB. No evidence of AH/VH or delusions. Risk Scoring- chronic illnesses and prescription drug management    Treatment Plan:  1. Medications:          Medication Changes/Adjustments: Continue current combination of Trileptal, clonazepam, Effexor, trazodone and melatonin. Current Outpatient Prescriptions   Medication Sig Dispense Refill    clonazePAM (KLONOPIN) 2 mg tablet TAKE 1 TABLET BY MOUTH TWICE DAILY 60 Tab 2    OXcarbazepine (TRILEPTAL) 300 mg tablet TAKE 1 TABLET DAILY  Indications: mood stabilizer 90 Tab 1    venlafaxine-SR (EFFEXOR-XR) 150 mg capsule TAKE 2 CAPSULE AM,  AND 1 PO at 6 PM  Indications: major depressive disorder 270 Cap 1    melatonin tab tablet Take 10 mg by mouth nightly.  traZODone (DESYREL) 150 mg tablet TAKE 1 TABLET NIGHTLY  Indications: major depressive disorder 90 Tab 1    multivitamin, tx-iron-ca-min (THERA-M W/ IRON) 9 mg iron-400 mcg tab tablet Take 1 Tab by mouth daily.       CONTOUR NEXT STRIPS strip USE TO TEST BLOOD SUGAR BID  0    MICROLET LANCET misc USE BID  11    DEXILANT 60 mg CpDB Take 60 mg by mouth daily.  rosuvastatin (CRESTOR) 10 mg tablet Take 1 Tab by mouth nightly. 30 Tab 2    traMADol (ULTRAM) 50 mg tablet Take 50 mg by mouth every eight (8) hours as needed. 3    fluticasone (FLONASE) 50 mcg/actuation nasal spray       fexofenadine (ALLEGRA) 180 mg tablet Take 180 mg by mouth daily.  CRANBERRY EXTRACT (CRANBERRY PO) Take 2,000 mg by mouth daily. Triple strength.  GUAIFENESIN/DEXTROMETHORPHAN (MUCINEX DM PO) Take 1 Tab by mouth two (2) times daily as needed.  acetaminophen (ACETAMINOPHEN EXTRA STRENGTH) 500 mg tablet Take 1,500 mg by mouth two (2) times a day.  celecoxib (CELEBREX) 100 mg capsule Take 100 mg by mouth two (2) times a day.  Diclofenac Sodium (VOLTAREN) 1 % topical gel Apply  to affected area daily as needed.  cholecalciferol, vitamin D3, (VITAMIN D-3) 2,000 unit Tab Take 5,000 Units by mouth daily. Soft gel.  CYANOCOBALAMIN, VITAMIN B-12, (VITAMIN B-12 PO) Take 2,000 mcg by mouth daily. 2000MCG DAILY      ascorbic acid (VITAMIN C) 500 mg tablet Take 500 mg by mouth daily.  CALCIUM CARBONATE/VITAMIN D3 (CALCIUM 500 WITH D PO) Take 2 Tabs by mouth daily.  Potassium Gluconate 595 (99) mg tablet Take 595 mg by mouth daily.  zinc 50 mg Tab Take 1 Tab by mouth daily.  almotriptan (AXERT) 12.5 mg tablet Take 12.5 mg by mouth once as needed. The following regarding medications was addressed:    (The risks and benefits of the proposed medication; the potential medication side effects ie    dry mouth, weight gain, GI upset, headache; patient given opportunity to ask questions)       2. Counseling and coordination of care including instructions for treatment, risks/benefits, risk factor reduction and patient/family education. She agrees with the plan. Patient instructed to call with any side effects, questions or issues.      3.  Follow-up Disposition:  Return in about 2 months (around 10/1/2017). 4. Pt was counseled on the complications of obesity. She was commended on loosing weight. PSYCHOTHERAPY:  approx 20 minutes  Type:  Supportive/Solution Focused psychotherapy provided  Focus:     Current problems:              Polypharmacy   Medical issues     Psychoeducation provided on her psych polypharmacy    Treatment plan reviewed with patient-including diagnosis and medications      Arin Willard is progressing.     Henrietta Mayer MD  8/1/2017

## 2017-08-01 NOTE — MR AVS SNAPSHOT
Visit Information Date & Time Provider Department Dept. Phone Encounter #  
 8/1/2017 11:00 AM Fouzia Peoples, [facility] 178 [phone] [id number] Follow-up Instructions Return in about 2 months (around 10/1/2017). Upcoming Health Maintenance Date Due Hepatitis C Screening 1950 LIPID PANEL Q1 1950 FOOT EXAM Q1 11/2/1960 MICROALBUMIN Q1 11/2/1960 EYE EXAM RETINAL OR DILATED Q1 11/2/1960 DTaP/Tdap/Td series (1 - Tdap) 11/2/1971 FOBT Q 1 YEAR AGE 50-75 11/2/2000 ZOSTER VACCINE AGE 60> 9/2/2010 HEMOGLOBIN A1C Q6M 8/16/2012 GLAUCOMA SCREENING Q2Y 11/2/2015 Pneumococcal 65+ Low/Medium Risk (2 of 2 - PPSV23) 9/1/2016 MEDICARE YEARLY EXAM 11/19/2016 INFLUENZA AGE 9 TO ADULT 8/1/2017 BREAST CANCER SCRN MAMMOGRAM 8/1/2018 Allergies as of 8/1/2017  Review Complete On: 8/1/2017 By: Fouzia Peoples MD  
  
 Severity Noted Reaction Type Reactions Epinephrine  07/27/2011   Systemic Palpitations \"Makes my heart  ' shudder'. \" Allergic to epinephrine with novocain. Plain epinephrine is ok. Novocain [Procaine]  05/01/2013    Palpitations Nsaids (Non-steroidal Anti-inflammatory Drug)  02/16/2012    Other (comments) GASTRIC DISTRESS. Pravastatin  12/02/2015    Myalgia Aspirin Low 07/27/2011   Side Effect Other (comments) Gastric irritation Ibuprofen Low 07/27/2011   Side Effect Other (comments) Gastric irritation Current Immunizations  Reviewed on 2/22/2012 Name Date Influenza Vaccine Whole 9/1/2011 ZZZ-RETIRED (DO NOT USE) Pneumococcal Vaccine (Unspecified Type) 9/1/2011 Not reviewed this visit You Were Diagnosed With   
  
 Codes Comments Depression with anxiety    -  Primary ICD-10-CM: F41.8 ICD-9-CM: 300.4 Personality disorder     ICD-10-CM: F60.9 ICD-9-CM: 301.9 Vitals BP Pulse Height(growth percentile) Weight(growth percentile) BMI OB Status 141/83 (!) 101 5' 2\" (1.575 m) 182 lb (82.6 kg) 33.29 kg/m2 Postmenopausal  
 Smoking Status Never Smoker Vitals History BMI and BSA Data Body Mass Index Body Surface Area  
 33.29 kg/m 2 1.9 m 2 Preferred Pharmacy Pharmacy Name Phone [facility] [phone] - [address], [address] AT [place] [address] [phone] Your Updated Medication List  
  
   
This list is accurate as of: 8/1/17 11:26 AM.  Always use your most recent med list.  
  
  
  
  
 ACETAMINOPHEN EXTRA STRENGTH 500 mg tablet Generic drug:  acetaminophen Take 1,500 mg by mouth two (2) times a day. ALLEGRA 180 mg tablet Generic drug:  fexofenadine Take 180 mg by mouth daily. AXERT 12.5 mg tablet Generic drug:  almotriptan Take 12.5 mg by mouth once as needed. CALCIUM 500 WITH D PO Take 2 Tabs by mouth daily. CeleBREX 100 mg capsule Generic drug:  celecoxib Take 100 mg by mouth two (2) times a day. clonazePAM 2 mg tablet Commonly known as:  KlonoPIN  
TAKE 1 TABLET BY MOUTH TWICE DAILY CONTOUR NEXT STRIPS strip Generic drug:  glucose blood VI test strips USE TO TEST BLOOD SUGAR BID CRANBERRY PO Take 2,000 mg by mouth daily. Triple strength. DEXILANT 60 mg Cpdb Generic drug:  Dexlansoprazole Take 60 mg by mouth daily. fluticasone 50 mcg/actuation nasal spray Commonly known as:  FLONASE  
  
 melatonin Tab tablet Take 10 mg by mouth nightly. [facility] Generic drug:  Lancets USE BID  
  
 MUCINEX DM PO Take 1 Tab by mouth two (2) times daily as needed. multivitamin, tx-iron-ca-min 9 mg iron-400 mcg Tab tablet Commonly known as:  THERA-M w/ IRON Take 1 Tab by mouth daily. OXcarbazepine 300 mg tablet Commonly known as:  TRILEPTAL  
TAKE 1 TABLET DAILY  Indications: mood stabilizer Potassium Gluconate 595 mg (99 mg) tablet Take 595 mg by mouth daily. rosuvastatin 10 mg tablet Commonly known as:  CRESTOR Take 1 Tab by mouth nightly. traMADol 50 mg tablet Commonly known as:  ULTRAM  
Take 50 mg by mouth every eight (8) hours as needed. traZODone 150 mg tablet Commonly known as:  DESYREL  
TAKE 1 TABLET NIGHTLY  Indications: major depressive disorder  
  
 venlafaxine- mg capsule Commonly known as:  EFFEXOR-XR  
TAKE 2 CAPSULE AM,  AND 1 PO at 6 PM  Indications: major depressive disorder VITAMIN B-12 PO Take 2,000 mcg by mouth daily. 2000MCG DAILY  
  
 VITAMIN C 500 mg tablet Generic drug:  ascorbic acid (vitamin C) Take 500 mg by mouth daily. VITAMIN D3 2,000 unit Tab Generic drug:  cholecalciferol (vitamin D3) Take 5,000 Units by mouth daily. Soft gel. VOLTAREN 1 % Gel Generic drug:  diclofenac Apply  to affected area daily as needed. zinc 50 mg Tab tablet Take 1 Tab by mouth daily. Prescriptions Printed Refills  
 clonazePAM (KLONOPIN) 2 mg tablet 2 Sig: TAKE 1 TABLET BY MOUTH TWICE DAILY Class: Print OXcarbazepine (TRILEPTAL) 300 mg tablet 1 Sig: TAKE 1 TABLET DAILY  Indications: mood stabilizer Class: Print  
 venlafaxine-SR (EFFEXOR-XR) 150 mg capsule 1 Sig: TAKE 2 CAPSULE AM,  AND 1 PO at 6 PM  Indications: major depressive disorder Class: Print Follow-up Instructions Return in about 2 months (around 10/1/2017). To-Do List   
 08/02/2017 2:00 PM  
  Appointment with [facility] CTAERINA 1 at [address] [place] [address] ([phone]) Shower or bathe using soap and water. Do not use deodorant, powder, perfumes, or lotion the day of your exam.  If your prior mammograms were not performed at [facility] 6 please bring films with you or forward prior images 2 days before your procedure.   Check in at registration 15min before your appointment time unless you were instructed to do otherwise. A script is not necessary, but if you have one, please bring it on the day of the mammogram or have it faxed to the department. [facility] [phone] [facility]  [phone] [facility] Rianna [address]  [phone] [facility] [phone] [place] [id number] [facility] Marcia Karen [phone] Introducing [facility] SERVICES! Avinash Zamora introduces [employer] patient portal. Now you can access parts of your medical record, email your doctor's office, and request medication refills online. 1. In your internet browser, go to [email or web address] 2. Click on the First Time User? Click Here link in the Sign In box. You will see the New Member Sign Up page. 3. Enter your Explorra Access Code exactly as it appears below. You will not need to use this code after youve completed the sign-up process. If you do not sign up before the expiration date, you must request a new code. · [employer] Access Code: [email or web address] Expires: 10/30/2017 11:26 AM 
 
4. Enter the last four digits of your Social Security Number (xxxx) and Date of Birth (mm/dd/yyyy) as indicated and click Submit. You will be taken to the next sign-up page. 5. Create a Explorra ID. This will be your Explorra login ID and cannot be changed, so think of one that is secure and easy to remember. 6. Create a Expl[employer]ra password. You can change your password at any time. 7. Enter your Password Reset Question and Answer. This can be used at a later time if you forget your password. 8. Enter your e-mail address. You will receive e-mail notification when new information is available in [address] 9. Click Sign Up. You can now view and download portions of your medical record. 10. Click the Download Summary menu link to download a portable copy of your medical information.  
 
If you have questions, please visit the Frequently Asked Questions section of the [employer] App Store. Remember, [employer] is NOT to be used for urgent needs. For medical emergencies, dial 911. Now available from your iPhone and Android! Please provide this summary of care documentation to your next provider. Your primary care clinician is listed as Faraz Amos. If you have any questions after today's visit, please call [phone].

## 2017-08-31 RX ORDER — VENLAFAXINE HYDROCHLORIDE 150 MG/1
CAPSULE, EXTENDED RELEASE ORAL
Qty: 270 CAP | Refills: 1 | Status: SHIPPED | OUTPATIENT
Start: 2017-08-31 | End: 2018-03-07 | Stop reason: SDUPTHER

## 2017-09-11 RX ORDER — TRAZODONE HYDROCHLORIDE 150 MG/1
TABLET ORAL
Qty: 90 TAB | Refills: 1 | Status: SHIPPED | OUTPATIENT
Start: 2017-09-11 | End: 2018-03-10 | Stop reason: SDUPTHER

## 2017-09-13 ENCOUNTER — HOSPITAL ENCOUNTER (OUTPATIENT)
Dept: MAMMOGRAPHY | Age: 67
Discharge: HOME | End: 2017-09-13
Attending: OBSTETRICS & GYNECOLOGY
Payer: MEDICARE

## 2017-09-13 DIAGNOSIS — Z12.31 VISIT FOR SCREENING MAMMOGRAM: ICD-10-CM

## 2017-09-13 PROCEDURE — 77063 BREAST TOMOSYNTHESIS BI: CPT

## 2017-10-31 ENCOUNTER — OFFICE VISIT (OUTPATIENT)
Dept: BEHAVIORAL/MENTAL HEALTH CLINIC | Age: 67
End: 2017-10-31

## 2017-10-31 VITALS
BODY MASS INDEX: 34.23 KG/M2 | WEIGHT: 186 LBS | DIASTOLIC BLOOD PRESSURE: 96 MMHG | SYSTOLIC BLOOD PRESSURE: 155 MMHG | HEART RATE: 97 BPM | HEIGHT: 62 IN

## 2017-10-31 DIAGNOSIS — F41.8 DEPRESSION WITH ANXIETY: Primary | ICD-10-CM

## 2017-10-31 DIAGNOSIS — F60.9 PERSONALITY DISORDER (HCC): ICD-10-CM

## 2017-10-31 RX ORDER — CLONAZEPAM 2 MG/1
TABLET ORAL
Qty: 60 TAB | Refills: 2 | Status: SHIPPED | OUTPATIENT
Start: 2017-10-31 | End: 2017-12-11 | Stop reason: SDUPTHER

## 2017-10-31 RX ORDER — OXCARBAZEPINE 300 MG/1
TABLET, FILM COATED ORAL
Qty: 90 TAB | Refills: 2 | Status: SHIPPED | OUTPATIENT
Start: 2017-10-31 | End: 2018-04-17 | Stop reason: SDUPTHER

## 2017-10-31 NOTE — MR AVS SNAPSHOT
Visit Information Date & Time Provider Department Dept. Phone Encounter #  
 10/31/2017 12:00 PM Quintin Muir, [address] [phone] [id number] Follow-up Instructions Return in about 3 months (around 1/31/2018). Your Appointments 11/14/2017 11:40 AM  
ESTABLISHED PATIENT with Ariane Looney MD  
[facility], [facility] ([address]) Appt Note: breast f/u; r/s from 10/10/17; per pcp, insurance is ppo; r/s from 10/17/17  
 [address], [address] [facility] [address] [phone]  
[address], [address] [facility] [address] [phone] Upcoming Health Maintenance Date Due Hepatitis C Screening 1950 DTaP/Tdap/Td series (1 - Tdap) 11/2/1971 FOBT Q 1 YEAR AGE 50-75 11/2/2000 ZOSTER VACCINE AGE 60> 9/2/2010 GLAUCOMA SCREENING Q2Y 11/2/2015 Pneumococcal 65+ Low/Medium Risk (2 of 2 - PPSV23) 9/1/2016 MEDICARE YEARLY EXAM 11/19/2016 INFLUENZA AGE 9 TO ADULT 8/1/2017 BREAST CANCER SCRN MAMMOGRAM 9/13/2019 Allergies as of 10/31/2017  Review Complete On: 10/31/2017 By: Quintin Muir MD  
  
 Severity Noted Reaction Type Reactions Epinephrine  07/27/2011   Systemic Palpitations \"Makes my heart  ' shudder'. \" Allergic to epinephrine with novocain. Plain epinephrine is ok. Novocain [Procaine]  05/01/2013    Palpitations Nsaids (Non-steroidal Anti-inflammatory Drug)  02/16/2012    Other (comments) GASTRIC DISTRESS. Pravastatin  12/02/2015    Myalgia Aspirin Low 07/27/2011   Side Effect Other (comments) Gastric irritation Ibuprofen Low 07/27/2011   Side Effect Other (comments) Gastric irritation Current Immunizations  Reviewed on 2/22/2012 Name Date Influenza Vaccine Whole 9/1/2011 ZZZ-RETIRED (DO NOT USE) Pneumococcal Vaccine (Unspecified Type) 9/1/2011 Not reviewed this visit You Were Diagnosed With   
  
 Codes Comments Depression with anxiety    -  Primary ICD-10-CM: F41.8 ICD-9-CM: 300.4 Personality disorder     ICD-10-CM: F60.9 ICD-9-CM: 301.9 Vitals BP Pulse Height(growth percentile) Weight(growth percentile) BMI OB Status (!) 155/96 97 5' 2\" (1.575 m) 186 lb (84.4 kg) 34.02 kg/m2 Postmenopausal  
 Smoking Status Never Smoker Vitals History BMI and BSA Data Body Mass Index Body Surface Area 34.02 kg/m 2 1.92 m 2 Preferred Pharmacy Pharmacy Name Phone [address], [facility] [phone] Your Updated Medication List  
  
   
This list is accurate as of: 10/31/17 12:22 PM.  Always use your most recent med list.  
  
  
  
  
 ACETAMINOPHEN EXTRA STRENGTH 500 mg tablet Generic drug:  acetaminophen Take 1,500 mg by mouth two (2) times a day. ALLEGRA 180 mg tablet Generic drug:  fexofenadine Take 180 mg by mouth daily. AXERT 12.5 mg tablet Generic drug:  almotriptan Take 12.5 mg by mouth once as needed. CALCIUM 500 WITH D PO Take 2 Tabs by mouth daily. CeleBREX 100 mg capsule Generic drug:  celecoxib Take 100 mg by mouth two (2) times a day. clonazePAM 2 mg tablet Commonly known as:  KlonoPIN  
TAKE 1 TABLET BY MOUTH TWICE DAILY CONTOUR NEXT STRIPS strip Generic drug:  glucose blood VI test strips USE TO TEST BLOOD SUGAR BID CRANBERRY PO Take 2,000 mg by mouth daily. Triple strength. DEXILANT 60 mg Cpdb Generic drug:  Dexlansoprazole Take 60 mg by mouth daily. fluticasone 50 mcg/actuation nasal spray Commonly known as:  FLONASE  
  
 melatonin Tab tablet Take 10 mg by mouth nightly. [facility] Generic drug:  Lancets USE BID  
  
 MUCINEX DM PO Take 1 Tab by mouth two (2) times daily as needed. multivitamin, tx-iron-ca-min 9 mg iron-400 mcg Tab tablet Commonly known as:  THERA-M w/ IRON Take 1 Tab by mouth daily. OXcarbazepine 300 mg tablet Commonly known as:  TRILEPTAL  
TAKE 1 TABLET DAILY  Indications: mood stabilizer Potassium Gluconate 595 mg (99 mg) tablet Take 595 mg by mouth daily. rosuvastatin 10 mg tablet Commonly known as:  CRESTOR Take 1 Tab by mouth nightly. traMADol 50 mg tablet Commonly known as:  ULTRAM  
Take 50 mg by mouth every eight (8) hours as needed. traZODone 150 mg tablet Commonly known as:  DESYREL  
TAKE 1 TABLET NIGHTLY FOR MAJOR DEPRESSIVE DISORDER  
  
 venlafaxine- mg capsule Commonly known as:  EFFEXOR-XR  
TAKE 2 CAPSULE AM,  AND 1 PO at 6 PM  Indications: major depressive disorder VITAMIN B-12 PO Take 2,000 mcg by mouth daily. 2000MCG DAILY  
  
 VITAMIN C 500 mg tablet Generic drug:  ascorbic acid (vitamin C) Take 500 mg by mouth daily. VITAMIN D3 2,000 unit Tab Generic drug:  cholecalciferol (vitamin D3) Take 5,000 Units by mouth daily. Soft gel. VITAMIN K1  
by Does Not Apply route. VOLTAREN 1 % Gel Generic drug:  diclofenac Apply  to affected area daily as needed. zinc 50 mg Tab tablet Take 1 Tab by mouth daily. Prescriptions Printed Refills  
 clonazePAM (KLONOPIN) 2 mg tablet 2 Sig: TAKE 1 TABLET BY MOUTH TWICE DAILY Class: Print Prescriptions Sent to Pharmacy Refills OXcarbazepine (TRILEPTAL) 300 mg tablet 2 Sig: TAKE 1 TABLET DAILY  Indications: mood stabilizer Class: Normal  
 Pharmacy: [address], [address] [place] [address] #: [phone] Follow-up Instructions Return in about 3 months (around 1/31/2018). Introducing [facility]!    
 [facility] introduces [employer] patient portal. Now you can access parts of your medical record, email your doctor's office, and request medication refills online. 1. In your internet browser, go to [email or web address] 2. Click on the First Time User? Click Here link in the Sign In box. You will see the New Member Sign Up page. 3. Enter your HealthLoop Access Code exactly as it appears below. You will not need to use this code after youve completed the sign-up process. If you do not sign up before the expiration date, you must request a new code. · HealthLoop Access Code: [email or web address] Expires: 1/29/2018 12:22 PM 
 
4. Enter the last four digits of your Social Security Number (xxxx) and Date of Birth (mm/dd/yyyy) as indicated and click Submit. You will be taken to the next sign-up page. 5. Create a HealthLoop ID. This will be your HealthLoop login ID and cannot be changed, so think of one that is secure and easy to remember. 6. Create a HealthLoop password. You can change your password at any time. 7. Enter your Password Reset Question and Answer. This can be used at a later time if you forget your password. 8. Enter your e-mail address. You will receive e-mail notification when new information is available in [address] 9. Click Sign Up. You can now view and download portions of your medical record. 10. Click the Download Summary menu link to download a portable copy of your medical information. If you have questions, please visit the Frequently Asked Questions section of the HealthLoop website. Remember, HealthLoop is NOT to be used for urgent needs. For medical emergencies, dial 911. Now available from your iPhone and Android! Please provide this summary of care documentation to your next provider. Your primary care clinician is listed as Otoniel Cohn. If you have any questions after today's visit, please call [phone].

## 2017-11-02 NOTE — PROGRESS NOTES
Psychiatric Outpatient Progress Note    Account Number:  [id number]  Name: Mary Jane Mayberry    SUBJECTIVE:   CHIEF COMPLAINT:  Stanley Porras is a 77 y.o. , White female and was seen today for 2 month follow-up of psychiatric condition and psychotropic medication management.       HPI:    Saba reports the following psychiatric symptoms:  depression and anxiety.  The symptoms have been present for years and are of mild severity. The symptoms occur daily.      Pt reports that she is not doing well and has been more anxious as she lost $ 19, 000.00, 3 months ago to a scam ?? She is very vague about all the money lost and scam she gets involved in. Reported that her therapist, Dr. Carolina Espinoza was very upset with her last visit about her impulsive behaviors and loosing money to all the scams. He has not sent any message to me with his concerns. She has gained weight and is now eating more. BMI = 34. BP is better. Reports compliance with medications. Today,her self care was marginal.        GDS: 12/15      Contributing factors include: driving license, finances.    Patient denies SI/HI/SIB.        Side Effects:  none        Fam/Soc Hx (from [facility] Eval with updates):        REVIEW OF SYSTEMS:  Psychiatric:  depression, anxiety, paranoia  Appetite: good  Sleep: improved      OBJECTIVE:                 Mental Status exam: WNL except for      Sensorium  oriented to time, place and person   Relations cooperative and passive    Eye Contact    appropriate   Appearance:  age appropriate, casually dressed and disheveled   Motor Behavior/Gait:  within normal limits   Speech:  normal pitch and normal volume   Thought Process: circumstantial and tangential   Thought Content free of delusions and free of hallucinations   Suicidal ideations none   Homicidal ideations none   Mood:  anxious and depressed   Affect:  anxious and labile   Memory recent  adequate   Memory remote:  adequate   Concentration:  adequate   Abstraction:  concrete Insight:  fair   Reliability fair   Judgment:  fair       MEDICAL DECISION MAKING  Data: pertinent labs, imaging, medical records and diagnostic tests reviewed and incorporated in diagnosis and treatment plan    Allergies   Allergen Reactions    Epinephrine Palpitations     \"Makes my heart  ' shudder'. \" Allergic to epinephrine with novocain. Plain epinephrine is ok.  Novocain [Procaine] Palpitations    Nsaids (Non-Steroidal Anti-Inflammatory Drug) Other (comments)     GASTRIC DISTRESS.  Pravastatin Myalgia    Aspirin Other (comments)     Gastric irritation    Ibuprofen Other (comments)     Gastric irritation        Current Outpatient Prescriptions   Medication Sig Dispense Refill    PHYTONADIONE, VIT K1, (VITAMIN K1) by Does Not Apply route.  clonazePAM (KLONOPIN) 2 mg tablet TAKE 1 TABLET BY MOUTH TWICE DAILY 60 Tab 2    OXcarbazepine (TRILEPTAL) 300 mg tablet TAKE 1 TABLET DAILY  Indications: mood stabilizer 90 Tab 2    traZODone (DESYREL) 150 mg tablet TAKE 1 TABLET NIGHTLY FOR MAJOR DEPRESSIVE DISORDER 90 Tab 1    venlafaxine-SR (EFFEXOR-XR) 150 mg capsule TAKE 2 CAPSULE AM,  AND 1 PO at 6 PM  Indications: major depressive disorder 270 Cap 1    multivitamin, tx-iron-ca-min (THERA-M W/ IRON) 9 mg iron-400 mcg tab tablet Take 1 Tab by mouth daily.  DEXILANT 60 mg CpDB Take 60 mg by mouth daily.  rosuvastatin (CRESTOR) 10 mg tablet Take 1 Tab by mouth nightly. 30 Tab 2    traMADol (ULTRAM) 50 mg tablet Take 50 mg by mouth every eight (8) hours as needed. 3    fluticasone (FLONASE) 50 mcg/actuation nasal spray       fexofenadine (ALLEGRA) 180 mg tablet Take 180 mg by mouth daily.  CRANBERRY EXTRACT (CRANBERRY PO) Take 2,000 mg by mouth daily. Triple strength.  GUAIFENESIN/DEXTROMETHORPHAN (MUCINEX DM PO) Take 1 Tab by mouth two (2) times daily as needed.  acetaminophen (ACETAMINOPHEN EXTRA STRENGTH) 500 mg tablet Take 1,500 mg by mouth two (2) times a day.       celecoxib (CELEBREX) 100 mg capsule Take 100 mg by mouth two (2) times a day.  Diclofenac Sodium (VOLTAREN) 1 % topical gel Apply  to affected area daily as needed.  cholecalciferol, vitamin D3, (VITAMIN D-3) 2,000 unit Tab Take 5,000 Units by mouth daily. Soft gel.  CYANOCOBALAMIN, VITAMIN B-12, (VITAMIN B-12 PO) Take 2,000 mcg by mouth daily. 2000MCG DAILY      ascorbic acid (VITAMIN C) 500 mg tablet Take 500 mg by mouth daily.  CALCIUM CARBONATE/VITAMIN D3 (CALCIUM 500 WITH D PO) Take 2 Tabs by mouth daily.  Potassium Gluconate 595 (99) mg tablet Take 595 mg by mouth daily.  zinc 50 mg Tab Take 1 Tab by mouth daily.  almotriptan (AXERT) 12.5 mg tablet Take 12.5 mg by mouth once as needed.  melatonin tab tablet Take 10 mg by mouth nightly.  CONTOUR NEXT STRIPS strip USE TO TEST BLOOD SUGAR BID  0    MICROLET LANCET misc USE BID  11        Visit Vitals    BP (!) 155/96    Pulse 97    Ht 5' 2\" (1.575 m)    Wt 84.4 kg (186 lb)    BMI 34.02 kg/m2         Problems addressed today:    ICD-10-CM ICD-9-CM    1. Depression with anxiety F41.8 300.4    2. Personality disorder F60.9 301.9        Assessment:   Matilda Spaer  is a 79 y.o.  female  is not responding to treatment. Symptoms are unstable. Patient denies SI/HI/SIB. No evidence of AH/VH or delusions. Risk Scoring- chronic illnesses and prescription drug management    Treatment Plan:  1. Medications:          Medication Changes/Adjustments: Pt is not willing to change her medications. Continue current medication combination of Trileptal, clonazepam, Effexor, trazodone, and melatonin. Current Outpatient Prescriptions   Medication Sig Dispense Refill    PHYTONADIONE, VIT K1, (VITAMIN K1) by Does Not Apply route.       clonazePAM (KLONOPIN) 2 mg tablet TAKE 1 TABLET BY MOUTH TWICE DAILY 60 Tab 2    OXcarbazepine (TRILEPTAL) 300 mg tablet TAKE 1 TABLET DAILY  Indications: mood stabilizer 90 Tab 2    traZODone (DESYREL) 150 mg tablet TAKE 1 TABLET NIGHTLY FOR MAJOR DEPRESSIVE DISORDER 90 Tab 1    venlafaxine-SR (EFFEXOR-XR) 150 mg capsule TAKE 2 CAPSULE AM,  AND 1 PO at 6 PM  Indications: major depressive disorder 270 Cap 1    multivitamin, tx-iron-ca-min (THERA-M W/ IRON) 9 mg iron-400 mcg tab tablet Take 1 Tab by mouth daily.  DEXILANT 60 mg CpDB Take 60 mg by mouth daily.  rosuvastatin (CRESTOR) 10 mg tablet Take 1 Tab by mouth nightly. 30 Tab 2    traMADol (ULTRAM) 50 mg tablet Take 50 mg by mouth every eight (8) hours as needed. 3    fluticasone (FLONASE) 50 mcg/actuation nasal spray       fexofenadine (ALLEGRA) 180 mg tablet Take 180 mg by mouth daily.  CRANBERRY EXTRACT (CRANBERRY PO) Take 2,000 mg by mouth daily. Triple strength.  GUAIFENESIN/DEXTROMETHORPHAN (MUCINEX DM PO) Take 1 Tab by mouth two (2) times daily as needed.  acetaminophen (ACETAMINOPHEN EXTRA STRENGTH) 500 mg tablet Take 1,500 mg by mouth two (2) times a day.  celecoxib (CELEBREX) 100 mg capsule Take 100 mg by mouth two (2) times a day.  Diclofenac Sodium (VOLTAREN) 1 % topical gel Apply  to affected area daily as needed.  cholecalciferol, vitamin D3, (VITAMIN D-3) 2,000 unit Tab Take 5,000 Units by mouth daily. Soft gel.  CYANOCOBALAMIN, VITAMIN B-12, (VITAMIN B-12 PO) Take 2,000 mcg by mouth daily. 2000MCG DAILY      ascorbic acid (VITAMIN C) 500 mg tablet Take 500 mg by mouth daily.  CALCIUM CARBONATE/VITAMIN D3 (CALCIUM 500 WITH D PO) Take 2 Tabs by mouth daily.  Potassium Gluconate 595 (99) mg tablet Take 595 mg by mouth daily.  zinc 50 mg Tab Take 1 Tab by mouth daily.  almotriptan (AXERT) 12.5 mg tablet Take 12.5 mg by mouth once as needed.  melatonin tab tablet Take 10 mg by mouth nightly.       CONTOUR NEXT STRIPS strip USE TO TEST BLOOD SUGAR BID  0    MICROLET LANCET misc USE BID  11                  The following regarding medications was addressed:    (The risks and benefits of the proposed medication; the potential medication side effects ie    dry mouth, weight gain, GI upset, headache; patient given opportunity to ask questions)       2. Counseling and coordination of care including instructions for treatment, risks/benefits, risk factor reduction and patient/family education. She agrees with the plan. Patient instructed to call with any side effects, questions or issues. 3.  Follow-up Disposition:  Return in about 3 months (around 1/31/2018). 4. Pt was provided supportive/ CBT on her stress of loosing so much money to the scam. She was gently confronted on her impulsive behaviors/ poor decision making,  on getting involved in quick getting rich scams. 5. Continue psychotherapy with Dr. Satish Martinez     PSYCHOTHERAPY:  approx 20 minutes  Type:  Supportive/Cognitive Behavioral/Solution Focused psychotherapy provided  Focus:     Current problems:   Economic issues   Medical issues     Psychoeducation provided on psych medications    Treatment plan reviewed with patient-including diagnosis and medications    Shelli Fee is not progressing.     Vivek Alvarez MD  11/2/2017

## 2017-12-11 RX ORDER — CLONAZEPAM 2 MG/1
TABLET ORAL
Qty: 60 TAB | Refills: 2 | Status: SHIPPED | OUTPATIENT
Start: 2017-12-11 | End: 2018-04-16 | Stop reason: SDUPTHER

## 2018-01-15 ENCOUNTER — TELEPHONE (OUTPATIENT)
Dept: SURGERY | Age: 68
End: 2018-01-15

## 2018-01-16 ENCOUNTER — OFFICE VISIT (OUTPATIENT)
Dept: BEHAVIORAL/MENTAL HEALTH CLINIC | Age: 68
End: 2018-01-16

## 2018-01-16 VITALS
RESPIRATION RATE: 18 BRPM | WEIGHT: 186 LBS | SYSTOLIC BLOOD PRESSURE: 133 MMHG | BODY MASS INDEX: 34.23 KG/M2 | DIASTOLIC BLOOD PRESSURE: 79 MMHG | HEART RATE: 94 BPM | HEIGHT: 62 IN

## 2018-01-16 DIAGNOSIS — F60.9 PERSONALITY DISORDER (HCC): ICD-10-CM

## 2018-01-16 DIAGNOSIS — F41.8 DEPRESSION WITH ANXIETY: Primary | ICD-10-CM

## 2018-01-16 NOTE — PROGRESS NOTES
Chief Complaint   Patient presents with    Medication Management     Visit Vitals    /79 (BP 1 Location: Left arm, BP Patient Position: Sitting)    Pulse 94    Resp 18    Ht 5' 2\" (1.575 m)    Wt 84.4 kg (186 lb)    BMI 34.02 kg/m2     1. Have you been to the ER, urgent care clinic since your last visit? Hospitalized since your last visit? No    2. Have you seen or consulted any other health care providers outside of the [address] [place] [address] since your last visit? Include any pap smears or colon screening.  No

## 2018-01-16 NOTE — PROGRESS NOTES
Psychiatric Outpatient Progress Note    Account Number:  [id number]  Name: Charline Najera    SUBJECTIVE:   CHIEF COMPLAINT:  Tera Porras is a 77 y.o. , White female and was seen today for 2 month follow-up of psychiatric condition and psychotropic medication management.       HPI:    Saba reports the following psychiatric symptoms:  depression and anxiety.  The symptoms have been present for years and are of mild severity. The symptoms occur daily.       Pt reports that she is doing [id number] Her Keila and New Year was lonely. She still does not have her license back as she has not paid her fines yet. Reported that she has no savings. She continues to cohen on line with this fantasy of winning big !!! She has fallen for many scams in the past with no lessons learnt.      Her weight is stably high. Frankie Amador BMI = 34.  BP is better. Reports compliance with medications. Today,her self care was marginal.        GDS: 12/15      Contributing factors include: driving license, finances.    Patient denies SI/HI/SIB.        Side Effects:  none        Fam/Soc Hx (from [facility] with updates):      REVIEW OF SYSTEMS:  Constitutional: positive for fatigue  Eyes: positive for contacts/glasses and visual disturbance  Ears, nose, mouth, throat, and face: positive for hearing loss  Musculoskeletal:positive for myalgias and arthralgias  Neurological: positive for memory problems  Behavioral/Psych: positive for anxiety and depression, negative for SI or HI  Appetite: good  Sleep: improved    Visit Vitals    /79 (BP 1 Location: Left arm, BP Patient Position: Sitting)    Pulse 94    Resp 18    Ht 5' 2\" (1.575 m)    Wt 84.4 kg (186 lb)    BMI 34.02 kg/m2       OBJECTIVE:                 Mental Status exam: WNL except for      Sensorium  oriented to time, place and person   Relations cooperative and passive    Eye Contact    appropriate   Appearance:  age appropriate, casually dressed and overweight   Motor Behavior/Gait:  within normal limits   Speech:  normal pitch and normal volume   Thought Process: goal directed and logical   Thought Content free of delusions and free of hallucinations   Suicidal ideations none   Homicidal ideations none   Mood:  euthymic   Affect:  anxious   Memory recent  adequate   Memory remote:  adequate   Concentration:  impaired   Abstraction:  concrete   Insight:  fair   Reliability fair   Judgment:  fair       MEDICAL DECISION MAKING  Data: pertinent labs, imaging, medical records and diagnostic tests reviewed and incorporated in diagnosis and treatment plan    Allergies   Allergen Reactions    Epinephrine Palpitations     \"Makes my heart  ' shudder'. \" Allergic to epinephrine with novocain. Plain epinephrine is ok.  Novocain [Procaine] Palpitations    Nsaids (Non-Steroidal Anti-Inflammatory Drug) Other (comments)     GASTRIC DISTRESS.  Pravastatin Myalgia    Aspirin Other (comments)     Gastric irritation    Ibuprofen Other (comments)     Gastric irritation        Current Outpatient Prescriptions   Medication Sig Dispense Refill    clonazePAM (KLONOPIN) 2 mg tablet TAKE 1 TABLET BY MOUTH TWICE DAILY 60 Tab 2    PHYTONADIONE, VIT K1, (VITAMIN K1) by Does Not Apply route.  OXcarbazepine (TRILEPTAL) 300 mg tablet TAKE 1 TABLET DAILY  Indications: mood stabilizer 90 Tab 2    traZODone (DESYREL) 150 mg tablet TAKE 1 TABLET NIGHTLY FOR MAJOR DEPRESSIVE DISORDER 90 Tab 1    venlafaxine-SR (EFFEXOR-XR) 150 mg capsule TAKE 2 CAPSULE AM,  AND 1 PO at 6 PM  Indications: major depressive disorder 270 Cap 1    melatonin tab tablet Take 10 mg by mouth nightly.  multivitamin, tx-iron-ca-min (THERA-M W/ IRON) 9 mg iron-400 mcg tab tablet Take 1 Tab by mouth daily.  CONTOUR NEXT STRIPS strip USE TO TEST BLOOD SUGAR BID  0    MICROLET LANCET misc USE BID  11    DEXILANT 60 mg CpDB Take 60 mg by mouth daily.  rosuvastatin (CRESTOR) 10 mg tablet Take 1 Tab by mouth nightly.  30 Tab 2    traMADol (ULTRAM) 50 mg tablet Take 50 mg by mouth every eight (8) hours as needed. 3    fluticasone (FLONASE) 50 mcg/actuation nasal spray       fexofenadine (ALLEGRA) 180 mg tablet Take 180 mg by mouth daily.  CRANBERRY EXTRACT (CRANBERRY PO) Take 2,000 mg by mouth daily. Triple strength.  GUAIFENESIN/DEXTROMETHORPHAN (MUCINEX DM PO) Take 1 Tab by mouth two (2) times daily as needed.  acetaminophen (ACETAMINOPHEN EXTRA STRENGTH) 500 mg tablet Take 1,500 mg by mouth two (2) times a day.  celecoxib (CELEBREX) 100 mg capsule Take 100 mg by mouth two (2) times a day.  Diclofenac Sodium (VOLTAREN) 1 % topical gel Apply  to affected area daily as needed.  CYANOCOBALAMIN, VITAMIN B-12, (VITAMIN B-12 PO) Take 2,000 mcg by mouth daily. 2000MCG DAILY      ascorbic acid (VITAMIN C) 500 mg tablet Take 500 mg by mouth daily.  CALCIUM CARBONATE/VITAMIN D3 (CALCIUM 500 WITH D PO) Take 2 Tabs by mouth daily.  Potassium Gluconate 595 (99) mg tablet Take 595 mg by mouth daily.  zinc 50 mg Tab Take 1 Tab by mouth daily.  almotriptan (AXERT) 12.5 mg tablet Take 12.5 mg by mouth once as needed.  cholecalciferol, vitamin D3, (VITAMIN D-3) 2,000 unit Tab Take 5,000 Units by mouth daily. Soft gel. Problems addressed today:    ICD-10-CM ICD-9-CM    1. Depression with anxiety F41.8 300.4    2. Personality disorder F60.9 301.9        Assessment:   Miley Alcala  is a 79 y.o.  female  is responding to treatment. Symptoms are stable. Patient denies SI/HI/SIB. No evidence of AH/VH or delusions. Risk Scoring- chronic illnesses and prescription drug management    Treatment Plan:  1. Medications:          Medication Changes/Adjustments: Continue current combination of Effexor, Trileptal, clonazepam, trazodone and melatonin.      Current Outpatient Prescriptions   Medication Sig Dispense Refill    clonazePAM (KLONOPIN) 2 mg tablet TAKE 1 TABLET BY MOUTH TWICE DAILY 60 Tab 2    PHYTONADIONE, VIT K1, (VITAMIN K1) by Does Not Apply route.  OXcarbazepine (TRILEPTAL) 300 mg tablet TAKE 1 TABLET DAILY  Indications: mood stabilizer 90 Tab 2    traZODone (DESYREL) 150 mg tablet TAKE 1 TABLET NIGHTLY FOR MAJOR DEPRESSIVE DISORDER 90 Tab 1    venlafaxine-SR (EFFEXOR-XR) 150 mg capsule TAKE 2 CAPSULE AM,  AND 1 PO at 6 PM  Indications: major depressive disorder 270 Cap 1    melatonin tab tablet Take 10 mg by mouth nightly.  multivitamin, tx-iron-ca-min (THERA-M W/ IRON) 9 mg iron-400 mcg tab tablet Take 1 Tab by mouth daily.  CONTOUR NEXT STRIPS strip USE TO TEST BLOOD SUGAR BID  0    MICROLET LANCET misc USE BID  11    DEXILANT 60 mg CpDB Take 60 mg by mouth daily.  rosuvastatin (CRESTOR) 10 mg tablet Take 1 Tab by mouth nightly. 30 Tab 2    traMADol (ULTRAM) 50 mg tablet Take 50 mg by mouth every eight (8) hours as needed. 3    fluticasone (FLONASE) 50 mcg/actuation nasal spray       fexofenadine (ALLEGRA) 180 mg tablet Take 180 mg by mouth daily.  CRANBERRY EXTRACT (CRANBERRY PO) Take 2,000 mg by mouth daily. Triple strength.  GUAIFENESIN/DEXTROMETHORPHAN (MUCINEX DM PO) Take 1 Tab by mouth two (2) times daily as needed.  acetaminophen (ACETAMINOPHEN EXTRA STRENGTH) 500 mg tablet Take 1,500 mg by mouth two (2) times a day.  celecoxib (CELEBREX) 100 mg capsule Take 100 mg by mouth two (2) times a day.  Diclofenac Sodium (VOLTAREN) 1 % topical gel Apply  to affected area daily as needed.  CYANOCOBALAMIN, VITAMIN B-12, (VITAMIN B-12 PO) Take 2,000 mcg by mouth daily. 2000MCG DAILY      ascorbic acid (VITAMIN C) 500 mg tablet Take 500 mg by mouth daily.  CALCIUM CARBONATE/VITAMIN D3 (CALCIUM 500 WITH D PO) Take 2 Tabs by mouth daily.  Potassium Gluconate 595 (99) mg tablet Take 595 mg by mouth daily.  zinc 50 mg Tab Take 1 Tab by mouth daily.  almotriptan (AXERT) 12.5 mg tablet Take 12.5 mg by mouth once as needed.  cholecalciferol, vitamin D3, (VITAMIN D-3) 2,000 unit Tab Take 5,000 Units by mouth daily. Soft gel. The following regarding medications was addressed:    (The risks and benefits of the proposed medication; the potential medication side effects ie    dry mouth, weight gain, GI upset, headache; patient given opportunity to ask questions)       2. Counseling and coordination of care including instructions for treatment, risks/benefits, risk factor reduction and patient/family education. She agrees with the plan. Patient instructed to call with any side effects, questions or issues. 3. Pt was counseled and challenged on her on line gambling activities and not paying her fines to get her driving license back. She was cautioned about the financial ruins she can get into if she chooses to continue these activities. PSYCHOTHERAPY:  approx 20 minutes  Type:  Supportive/Cognitive Behavioral/Solution Focused psychotherapy provided  Focus:     Current problems:              Financial issues   Housing issues   Medical issues    Psychoeducation provided:  Psych medications    Treatment plan reviewed with patient-including diagnosis and medications    Lelo Subramanian is stable. Follow-up Disposition:  Return in about 3 months (around 4/16/2018).       Cheng Nettles MD  1/16/2018

## 2018-03-07 RX ORDER — VENLAFAXINE HYDROCHLORIDE 150 MG/1
CAPSULE, EXTENDED RELEASE ORAL
Qty: 270 CAP | Refills: 1 | Status: SHIPPED | OUTPATIENT
Start: 2018-03-07 | End: 2018-04-17 | Stop reason: SDUPTHER

## 2018-03-08 ENCOUNTER — TELEPHONE (OUTPATIENT)
Dept: BEHAVIORAL/MENTAL HEALTH CLINIC | Age: 68
End: 2018-03-08

## 2018-03-08 NOTE — TELEPHONE ENCOUNTER
Letter printed to excuse patient from jury duty.     Letter will be sent to [address] [facility]    Phone: [phone]  Fax: [phone]

## 2018-04-09 ENCOUNTER — OFFICE VISIT (OUTPATIENT)
Dept: SURGERY | Age: 68
End: 2018-04-09

## 2018-04-09 VITALS
HEART RATE: 92 BPM | SYSTOLIC BLOOD PRESSURE: 132 MMHG | HEIGHT: 62 IN | OXYGEN SATURATION: 96 % | BODY MASS INDEX: 33.86 KG/M2 | DIASTOLIC BLOOD PRESSURE: 74 MMHG | WEIGHT: 184 LBS

## 2018-04-09 DIAGNOSIS — Z86.000 HX OF CARCINOMA IN SITU OF BREAST: Primary | ICD-10-CM
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
## 2018-04-09 NOTE — MR AVS SNAPSHOT
[address] [address], [address] [place] [address], [address] [place] [address] [facility] 
[phone] Patient: Pauline Olson MRN: [id number] FDH:[birth year] Visit Information Date & Time Provider Department Dept. Phone Encounter #  
 4/9/2018 11:20 AM Judit Cruz MD [facility] [address] [phone] [id number] Your Appointments 4/17/2018 11:30 AM  
ESTABLISHED PATIENT with Darren Llanes MD  
[address] [facility] Note: 3 MONTH F/U  
 [address] [facility] [address] [phone] [address] [facility] Upcoming Health Maintenance Date Due Hepatitis C Screening 1950 DTaP/Tdap/Td series (1 - Tdap) 11/2/1971 FOBT Q 1 YEAR AGE 50-75 11/2/2000 ZOSTER VACCINE AGE 60> 9/2/2010 GLAUCOMA SCREENING Q2Y 11/2/2015 Pneumococcal 65+ Low/Medium Risk (2 of 2 - PPSV23) 9/1/2016 Influenza Age 5 to Adult 8/1/2017 MEDICARE YEARLY EXAM 3/28/2018 BREAST CANCER SCRN MAMMOGRAM 9/13/2019 Allergies as of 4/9/2018  Review Complete On: 4/9/2018 By: Judit Cruz MD  
  
 Severity Noted Reaction Type Reactions Epinephrine  07/27/2011   Systemic Palpitations \"Makes my heart  ' shudder'. \" Allergic to epinephrine with novocain. Plain epinephrine is ok. Novocain [Procaine]  05/01/2013    Palpitations Nsaids (Non-steroidal Anti-inflammatory Drug)  02/16/2012    Other (comments) GASTRIC DISTRESS. Pravastatin  12/02/2015    Myalgia Aspirin Low 07/27/2011   Side Effect Other (comments) Gastric irritation Ibuprofen Low 07/27/2011   Side Effect Other (comments) Gastric irritation Current Immunizations  Reviewed on 2/22/2012 Name Date Influenza Vaccine Whole 9/1/2011 ZZZ-RETIRED (DO NOT USE) Pneumococcal Vaccine (Unspecified Type) 9/1/2011 Not reviewed this visit You Were Diagnosed With   
  
 Codes Comments Hx of carcinoma in situ of breast    -  Primary ICD-10-CM: Z86.000 ICD-9-CM: V13.89 Vitals BP Pulse Height(growth percentile) Weight(growth percentile) SpO2 BMI  
 132/74 (BP 1 Location: Right arm, BP Patient Position: Sitting) 92 5' 2\" (1.575 m) 184 lb (83.5 kg) 96% 33.65 kg/m2 OB Status Smoking Status Postmenopausal Never Smoker Vitals History BMI and BSA Data Body Mass Index Body Surface Area  
 33.65 kg/m 2 1.91 m 2 Preferred Pharmacy Pharmacy Name Phone Diane Joy, [facility] [phone] Your Updated Medication List  
  
   
This list is accurate as of 4/9/18 11:43 AM.  Always use your most recent med list.  
  
  
  
  
 ACETAMINOPHEN EXTRA STRENGTH 500 mg tablet Generic drug:  acetaminophen Take 1,500 mg by mouth two (2) times a day. ALLEGRA 180 mg tablet Generic drug:  fexofenadine Take 180 mg by mouth daily. AXERT 12.5 mg tablet Generic drug:  almotriptan Take 12.5 mg by mouth once as needed. CALCIUM 500 WITH D PO Take 2 Tabs by mouth daily. CeleBREX 100 mg capsule Generic drug:  celecoxib Take 100 mg by mouth two (2) times a day. clonazePAM 2 mg tablet Commonly known as:  KlonoPIN  
TAKE 1 TABLET BY MOUTH TWICE DAILY CONTOUR NEXT STRIPS strip Generic drug:  glucose blood VI test strips USE TO TEST BLOOD SUGAR BID CRANBERRY PO Take 2,000 mg by mouth daily. Triple strength. DEXILANT 60 mg Cpdb Generic drug:  Dexlansoprazole Take 60 mg by mouth daily. fluticasone 50 mcg/actuation nasal spray Commonly known as:  FLONASE  
  
 melatonin Tab tablet Take 10 mg by mouth nightly. [facility] [place] Generic drug:  Lancets USE BID  
  
 MUCINEX DM PO Take 1 Tab by mouth two (2) times daily as needed. multivitamin, tx-iron-ca-min 9 mg iron-400 mcg Tab tablet Commonly known as:  THERA-M w/ IRON Take 1 Tab by mouth daily. OXcarbazepine 300 mg tablet Commonly known as:  TRILEPTAL  
TAKE 1 TABLET DAILY  Indications: mood stabilizer Potassium Gluconate 595 mg (99 mg) tablet Take 595 mg by mouth daily. rosuvastatin 10 mg tablet Commonly known as:  CRESTOR Take 1 Tab by mouth nightly. traMADol 50 mg tablet Commonly known as:  ULTRAM  
Take 50 mg by mouth every eight (8) hours as needed. traZODone 150 mg tablet Commonly known as:  DESYREL  
TAKE 1 TABLET NIGHTLY FOR MAJOR DEPRESSIVE DISORDER  
  
 venlafaxine- mg capsule Commonly known as:  EFFEXOR-XR  
TAKE 2 CAPSULES IN THE MORNING AND TAKE 1 CAPSULE AT 6PM. VITAMIN B-12 PO Take 2,000 mcg by mouth daily. 2000MCG DAILY  
  
 VITAMIN C 500 mg tablet Generic drug:  ascorbic acid (vitamin C) Take 500 mg by mouth daily. VITAMIN D3 2,000 unit Tab Generic drug:  cholecalciferol (vitamin D3) Take 5,000 Units by mouth daily. Soft gel. VITAMIN K1  
by Does Not Apply route. VOLTAREN 1 % Gel Generic drug:  diclofenac Apply  to affected area daily as needed. zinc 50 mg Tab tablet Take 1 Tab by mouth daily. Introducing [facility] & HEALTH SERVICES! Stanley Garcia introduces WikiBrains patient portal. Now you can access parts of your medical record, email your doctor's office, and request medication refills online. 1. In your internet browser, go to [email or web address] 2. Click on the First Time User? Click Here link in the Sign In box. You will see the New Member Sign Up page. 3. Enter your WikiBrains Access Code exactly as it appears below. You will not need to use this code after youve completed the sign-up process. If you do not sign up before the expiration date, you must request a new code.  
 
· WikiBrains Access Code: [email or web address] 
 Expires: 7/8/2018 11:43 AM 
 
4. Enter the last four digits of your Social Security Number (xxxx) and Date of Birth (mm/dd/yyyy) as indicated and click Submit. You will be taken to the next sign-up page. 5. Create a WheresTheBus ID. This will be your WheresTheBus login ID and cannot be changed, so think of one that is secure and easy to remember. 6. Create a WheresTheBus password. You can change your password at any time. 7. Enter your Password Reset Question and Answer. This can be used at a later time if you forget your password. 8. Enter your e-mail address. You will receive e-mail notification when new information is available in [address] 9. Click Sign Up. You can now view and download portions of your medical record. 10. Click the Download Summary menu link to download a portable copy of your medical information. If you have questions, please visit the Frequently Asked Questions section of the WheresTheBus website. Remember, WheresTheBus is NOT to be used for urgent needs. For medical emergencies, dial 911. Now available from your iPhone and Android! Please provide this summary of care documentation to your next provider. Your primary care clinician is listed as Alisha Mott. If you have any questions after today's visit, please call [phone].

## 2018-04-16 ENCOUNTER — TELEPHONE (OUTPATIENT)
Dept: BEHAVIORAL/MENTAL HEALTH CLINIC | Age: 68
End: 2018-04-16

## 2018-04-16 DIAGNOSIS — F41.9 ANXIETY: Primary | ICD-10-CM

## 2018-04-16 RX ORDER — CLONAZEPAM 2 MG/1
TABLET ORAL
Qty: 60 TAB | Refills: 2 | Status: SHIPPED | OUTPATIENT
Start: 2018-04-16 | End: 2018-04-17 | Stop reason: SDUPTHER

## 2018-04-16 NOTE — TELEPHONE ENCOUNTER
Patient calls to report she has been out of clonazepam for 3 days. Reports N/V, clails she may have other possible side effects of withdrawal? Per , she fills #60 about every 6 weeks. Patient did not request refills. Follow up scheduled 4/17. Patient says she \"may or may not\" attend due to driving restrictions. Suggested if N/V continues, she go to the emergency room.

## 2018-04-17 ENCOUNTER — OFFICE VISIT (OUTPATIENT)
Dept: BEHAVIORAL/MENTAL HEALTH CLINIC | Age: 68
End: 2018-04-17

## 2018-04-17 VITALS
DIASTOLIC BLOOD PRESSURE: 85 MMHG | SYSTOLIC BLOOD PRESSURE: 146 MMHG | WEIGHT: 187 LBS | BODY MASS INDEX: 34.41 KG/M2 | HEIGHT: 62 IN | HEART RATE: 104 BPM

## 2018-04-17 DIAGNOSIS — F41.9 ANXIETY: ICD-10-CM

## 2018-04-17 DIAGNOSIS — F60.9 PERSONALITY DISORDER (HCC): ICD-10-CM

## 2018-04-17 DIAGNOSIS — F41.8 DEPRESSION WITH ANXIETY: Primary | ICD-10-CM

## 2018-04-17 RX ORDER — CLONAZEPAM 2 MG/1
2 TABLET ORAL 2 TIMES DAILY
Qty: 180 TAB | Refills: 2 | Status: SHIPPED | OUTPATIENT
Start: 2018-05-16 | End: 2018-07-03 | Stop reason: SDUPTHER

## 2018-04-17 RX ORDER — TRAZODONE HYDROCHLORIDE 150 MG/1
150 TABLET ORAL
Qty: 90 TAB | Refills: 1 | Status: SHIPPED | OUTPATIENT
Start: 2018-06-12 | End: 2018-07-03 | Stop reason: SDUPTHER

## 2018-04-17 RX ORDER — OXCARBAZEPINE 300 MG/1
TABLET, FILM COATED ORAL
Qty: 90 TAB | Refills: 2 | Status: SHIPPED | OUTPATIENT
Start: 2018-04-17 | End: 2018-07-03 | Stop reason: SDUPTHER

## 2018-04-17 RX ORDER — VENLAFAXINE HYDROCHLORIDE 150 MG/1
150 CAPSULE, EXTENDED RELEASE ORAL DAILY
Qty: 270 CAP | Refills: 1 | Status: SHIPPED | OUTPATIENT
Start: 2018-06-07 | End: 2018-04-19 | Stop reason: SDUPTHER

## 2018-04-17 NOTE — MR AVS SNAPSHOT
[address] [facility]. 
[phone] Patient: Geeta Payne MRN: [id number] XBB:[birth year] Visit Information Date & Time Provider Department Dept. Phone Encounter #  
 4/17/2018 11:30 AM Arnlodo Mclean, [address] [facility] [phone] [id number] Follow-up Instructions Return in about 3 months (around 7/17/2018). Your Appointments 4/9/2019 11:40 AM  
ESTABLISHED PATIENT with Mak Marques MD  
[facility] [address] [facility] ([address]) Appt Note: 1 yr breast f/u  
 [address] [facility] [address], [address] [facility], [address] [address]  
[address] [facility], [address] [facility] [address] [phone] Upcoming Health Maintenance Date Due Hepatitis C Screening 1950 DTaP/Tdap/Td series (1 - Tdap) 11/2/1971 FOBT Q 1 YEAR AGE 50-75 11/2/2000 ZOSTER VACCINE AGE 60> 9/2/2010 GLAUCOMA SCREENING Q2Y 11/2/2015 Pneumococcal 65+ Low/Medium Risk (2 of 2 - PPSV23) 9/1/2016 Influenza Age 5 to Adult 8/1/2017 MEDICARE YEARLY EXAM 3/28/2018 BREAST CANCER SCRN MAMMOGRAM 9/13/2019 Allergies as of 4/17/2018  Review Complete On: 4/17/2018 By: Arnoldo Mclean MD  
  
 Severity Noted Reaction Type Reactions Epinephrine  07/27/2011   Systemic Palpitations \"Makes my heart  ' shudder'. \" Allergic to epinephrine with novocain. Plain epinephrine is ok. Novocain [Procaine]  05/01/2013    Palpitations Nsaids (Non-steroidal Anti-inflammatory Drug)  02/16/2012    Other (comments) GASTRIC DISTRESS. Pravastatin  12/02/2015    Myalgia Aspirin Low 07/27/2011   Side Effect Other (comments) Gastric irritation Ibuprofen Low 07/27/2011   Side Effect Other (comments) Gastric irritation Current Immunizations  Reviewed on 2/22/2012 Name Date Influenza Vaccine Whole 9/1/2011 ZZZ-RETIRED (DO NOT USE) Pneumococcal Vaccine (Unspecified Type) 9/1/2011 Not reviewed this visit You Were Diagnosed With   
  
 Codes Comments Depression with anxiety    -  Primary ICD-10-CM: F41.8 ICD-9-CM: 300.4 Personality disorder     ICD-10-CM: F60.9 ICD-9-CM: 301.9 Anxiety     ICD-10-CM: F41.9 ICD-9-CM: 300.00 Vitals BP Pulse Height(growth percentile) Weight(growth percentile) BMI OB Status 146/85 (!) 104 5' 2\" (1.575 m) 187 lb (84.8 kg) 34.2 kg/m2 Postmenopausal  
 Smoking Status Never Smoker BMI and BSA Data Body Mass Index Body Surface Area  
 34.2 kg/m 2 1.93 m 2 Preferred Pharmacy Pharmacy Name Phone [address], [facility] [phone] Your Updated Medication List  
  
   
This list is accurate as of 4/17/18 11:33 AM.  Always use your most recent med list.  
  
  
  
  
 ACETAMINOPHEN EXTRA STRENGTH 500 mg tablet Generic drug:  acetaminophen Take 1,500 mg by mouth two (2) times a day. ALLEGRA 180 mg tablet Generic drug:  fexofenadine Take 180 mg by mouth daily. AXERT 12.5 mg tablet Generic drug:  almotriptan Take 12.5 mg by mouth once as needed. CALCIUM 500 WITH D PO Take 2 Tabs by mouth daily. CeleBREX 100 mg capsule Generic drug:  celecoxib Take 100 mg by mouth two (2) times a day. clonazePAM 2 mg tablet Commonly known as:  KlonoPIN  
TAKE 1 TABLET BY MOUTH TWICE A DAY. (SAME AS KLONOPIN) CONTOUR NEXT STRIPS strip Generic drug:  glucose blood VI test strips USE TO TEST BLOOD SUGAR BID CRANBERRY PO Take 2,000 mg by mouth daily. Triple strength. DEXILANT 60 mg Cpdb Generic drug:  Dexlansoprazole Take 60 mg by mouth daily. fluticasone 50 mcg/actuation nasal spray Commonly known as:  FLONASE  
  
 melatonin Tab tablet Take 10 mg by mouth nightly. [facility] Generic drug:  Lancets USE BID  
  
 MUCINEX DM PO Take 1 Tab by mouth two (2) times daily as needed. multivitamin, tx-iron-ca-min 9 mg iron-400 mcg Tab tablet Commonly known as:  THERA-M w/ IRON Take 1 Tab by mouth daily. OXcarbazepine 300 mg tablet Commonly known as:  TRILEPTAL  
TAKE 1 TABLET DAILY  Indications: mood stabilizer Potassium Gluconate 595 mg (99 mg) tablet Take 595 mg by mouth daily. rosuvastatin 10 mg tablet Commonly known as:  CRESTOR Take 1 Tab by mouth nightly. traMADol 50 mg tablet Commonly known as:  ULTRAM  
Take 50 mg by mouth every eight (8) hours as needed. traZODone 150 mg tablet Commonly known as:  DESYREL  
TAKE 1 TABLET NIGHTLY FOR MAJOR DEPRESSIVE DISORDER  
  
 venlafaxine- mg capsule Commonly known as:  EFFEXOR-XR  
TAKE 2 CAPSULES IN THE MORNING AND TAKE 1 CAPSULE AT 6PM. VITAMIN B-12 PO Take 2,000 mcg by mouth daily. 2000MCG DAILY  
  
 VITAMIN C 500 mg tablet Generic drug:  ascorbic acid (vitamin C) Take 500 mg by mouth daily. VITAMIN D3 2,000 unit Tab Generic drug:  cholecalciferol (vitamin D3) Take 5,000 Units by mouth daily. Soft gel. VITAMIN K1  
by Does Not Apply route. VOLTAREN 1 % Gel Generic drug:  diclofenac Apply  to affected area daily as needed. zinc 50 mg Tab tablet Take 1 Tab by mouth daily. Follow-up Instructions Return in about 3 months (around 7/17/2018). Introducing [facility] & HEALTH SERVICES! [facility] introduces Neul patient portal. Now you can access parts of your medical record, email your doctor's office, and request medication refills online. 1. In your internet browser, go to [email or web address] 2. Click on the First Time User? Click Here link in the Sign In box. You will see the New Member Sign Up page. 3. Enter your Neul Access Code exactly as it appears below.  You will not need to use this code after youve completed the sign-up process. If you do not sign up before the expiration date, you must request a new code. · Waveborn Access Code: [email or web address] Expires: 7/8/2018 11:43 AM 
 
4. Enter the last four digits of your Social Security Number (xxxx) and Date of Birth (mm/dd/yyyy) as indicated and click Submit. You will be taken to the next sign-up page. 5. Create a Waveborn ID. This will be your Waveborn login ID and cannot be changed, so think of one that is secure and easy to remember. 6. Create a Waveborn password. You can change your password at any time. 7. Enter your Password Reset Question and Answer. This can be used at a later time if you forget your password. 8. Enter your e-mail address. You will receive e-mail notification when new information is available in [address] 9. Click Sign Up. You can now view and download portions of your medical record. 10. Click the Download Summary menu link to download a portable copy of your medical information. If you have questions, please visit the Frequently Asked Questions section of the Waveborn website. Remember, Waveborn is NOT to be used for urgent needs. For medical emergencies, dial 911. Now available from your iPhone and Android! Please provide this summary of care documentation to your next provider. Your primary care clinician is listed as Chris Robledo. If you have any questions after today's visit, please call [phone].

## 2018-04-17 NOTE — PROGRESS NOTES
Psychiatric Outpatient Progress Note    Account Number:  [id number]  Name: Geeta Payne    SUBJECTIVE:   CHIEF COMPLAINT:  Tru Porras is a 79 y.o. , White female and was seen today for 3 month follow-up of psychiatric condition and psychotropic medication management.       HPI:    Saba reports the following psychiatric symptoms:  depression and anxiety.  The symptoms have been present for years and are of moderate severity. The symptoms occur daily. Patient called [facility]  to report she has been out of clonazepam for 3 days. Reports N/V, clails she may have other possible side effects of withdrawal? Per , she fills #60 about every 6 weeks. Patient did not request refills. Follow up scheduled 4/17. Patient says she \"may or may not\" attend due to driving restrictions. Suggested if N/V continues, she go to the emergency room.      Today, she reports that she is doing okay. When continued to give multiple somatic complaints including muscle jerking of her legs at night which interferes with her sleep. She also reported that she feels very weak and is stays in pain all the time. Patient is on multiple pain medication including tramadol and high doses, Celebrex and high doses and also gets Voltron gel to apply locally. She reported that this is giving her minimal relief. She still does not have her license back as she has not paid her fines yet. Reported that she has no savings. She denies gambling  on line as she is broke !! She has fallen for many scams in the past with no lessons learnt. She denies any psychosis or sergey. Reports compliance with her medications. She was requesting to sign all her prescriptions to express a prescription as she is changing her pharmacy.     Her weight is stably high. Deepti Kim BMI = 34.  BP is better. Reports compliance with medications. Today,her self care is better.      Patient has a stopped seeing her therapist, Dr. Beck Hicks, as he was putting a lot of pressure on her to stop gambling. She has not seen him since August of last year. She was requesting a referral to another therapist today.      Contributing factors include: driving license, finances.      Patient denies SI/HI/SIB.     Side Effects:  none        Fam/Soc Hx (from [facility] with updates):       REVIEW OF SYSTEMS:  Constitutional: positive for fatigue  Eyes: positive for contacts/glasses and visual disturbance  Ears, nose, mouth, throat, and face: positive for hearing loss  Musculoskeletal:positive for myalgias and arthralgias  Neurological: positive for memory problems  Behavioral/Psych: positive for anxiety and depression, negative for SI or HI  Appetite: good  Sleep: fair    Visit Vitals    /85    Pulse (!) 104    Ht 5' 2\" (1.575 m)    Wt 84.8 kg (187 lb)    BMI 34.2 kg/m2       OBJECTIVE:                 Mental Status exam: WNL except for      Sensorium  oriented to time, place and person   Relations cooperative and passive    Eye Contact    appropriate   Appearance:  age appropriate, casually dressed and overweight   Motor Behavior/Gait:  within normal limits   Speech:  normal pitch and normal volume   Thought Process: goal directed and logical   Thought Content free of delusions and free of hallucinations   Suicidal ideations none   Homicidal ideations none   Mood:  anxious and depressed   Affect:  constricted   Memory recent  impaired   Memory remote:  adequate   Concentration:  adequate   Abstraction:  concrete   Insight:  fair   Reliability poor   Judgment:  limited       MEDICAL DECISION MAKING  Data: pertinent labs, imaging, medical records and diagnostic tests reviewed and incorporated in diagnosis and treatment plan    Allergies   Allergen Reactions    Epinephrine Palpitations     \"Makes my heart  ' shudder'. \" Allergic to epinephrine with novocain. Plain epinephrine is ok.     Novocain [Procaine] Palpitations    Nsaids (Non-Steroidal Anti-Inflammatory Drug) Other (comments)     GASTRIC DISTRESS.  Pravastatin Myalgia    Aspirin Other (comments)     Gastric irritation    Ibuprofen Other (comments)     Gastric irritation        Current Outpatient Prescriptions   Medication Sig Dispense Refill    [START ON 5/16/2018] clonazePAM (KLONOPIN) 2 mg tablet Take 1 Tab by mouth two (2) times a day. Max Daily Amount: 4 mg. 180 Tab 2    [START ON 6/12/2018] traZODone (DESYREL) 150 mg tablet Take 1 Tab by mouth nightly. 90 Tab 1    [START ON 6/7/2018] venlafaxine-SR (EFFEXOR-XR) 150 mg capsule Take 1 Cap by mouth daily. 270 Cap 1    OXcarbazepine (TRILEPTAL) 300 mg tablet TAKE 1 TABLET DAILY  Indications: mood stabilizer 90 Tab 2    melatonin tab tablet Take 10 mg by mouth nightly.  DEXILANT 60 mg CpDB Take 60 mg by mouth daily.  rosuvastatin (CRESTOR) 10 mg tablet Take 1 Tab by mouth nightly. 30 Tab 2    traMADol (ULTRAM) 50 mg tablet Take 50 mg by mouth every eight (8) hours as needed. 3    fluticasone (FLONASE) 50 mcg/actuation nasal spray       fexofenadine (ALLEGRA) 180 mg tablet Take 180 mg by mouth daily.  GUAIFENESIN/DEXTROMETHORPHAN (MUCINEX DM PO) Take 1 Tab by mouth two (2) times daily as needed.  acetaminophen (ACETAMINOPHEN EXTRA STRENGTH) 500 mg tablet Take 1,500 mg by mouth two (2) times a day.  celecoxib (CELEBREX) 100 mg capsule Take 100 mg by mouth two (2) times a day.  Diclofenac Sodium (VOLTAREN) 1 % topical gel Apply  to affected area daily as needed.  ascorbic acid (VITAMIN C) 500 mg tablet Take 500 mg by mouth daily.  CALCIUM CARBONATE/VITAMIN D3 (CALCIUM 500 WITH D PO) Take 2 Tabs by mouth daily.  zinc 50 mg Tab Take 1 Tab by mouth daily.  almotriptan (AXERT) 12.5 mg tablet Take 12.5 mg by mouth once as needed.  PHYTONADIONE, VIT K1, (VITAMIN K1) by Does Not Apply route.  multivitamin, tx-iron-ca-min (THERA-M W/ IRON) 9 mg iron-400 mcg tab tablet Take 1 Tab by mouth daily.       CONTOUR NEXT STRIPS strip USE TO TEST BLOOD SUGAR BID  0    MICROLET LANCET misc USE BID  11    CRANBERRY EXTRACT (CRANBERRY PO) Take 2,000 mg by mouth daily. Triple strength.  cholecalciferol, vitamin D3, (VITAMIN D-3) 2,000 unit Tab Take 5,000 Units by mouth daily. Soft gel.  CYANOCOBALAMIN, VITAMIN B-12, (VITAMIN B-12 PO) Take 2,000 mcg by mouth daily. 2000MCG DAILY      Potassium Gluconate 595 (99) mg tablet Take 595 mg by mouth daily. Problems addressed today:    ICD-10-CM ICD-9-CM    1. Depression with anxiety F41.8 300.4    2. Personality disorder F60.9 301.9    3. Anxiety F41.9 300.00 clonazePAM (KLONOPIN) 2 mg tablet       Assessment:   Sheryle Moccasin  is a 79 y.o.  female  Is partially responding to treatment. Symptoms are fairly stable. Patient denies SI/HI/SIB. No evidence of AH/VH or delusions. Risk Scoring- chronic illnesses and prescription drug management    Treatment Plan:  1. Medications:          Medication Changes/Adjustments: Continue current combination of Trileptal, Effexor, clonazepam, trazodone and melatonin in the current dosages. Patient was educated on her polypharmacy and was strongly recommended to consider decreasing the dose of clonazepam to help her with declining memory function. Patient is not ready for that yet. Current Outpatient Prescriptions   Medication Sig Dispense Refill    [START ON 5/16/2018] clonazePAM (KLONOPIN) 2 mg tablet Take 1 Tab by mouth two (2) times a day. Max Daily Amount: 4 mg. 180 Tab 2    [START ON 6/12/2018] traZODone (DESYREL) 150 mg tablet Take 1 Tab by mouth nightly. 90 Tab 1    [START ON 6/7/2018] venlafaxine-SR (EFFEXOR-XR) 150 mg capsule Take 1 Cap by mouth daily. 270 Cap 1    OXcarbazepine (TRILEPTAL) 300 mg tablet TAKE 1 TABLET DAILY  Indications: mood stabilizer 90 Tab 2    melatonin tab tablet Take 10 mg by mouth nightly.  DEXILANT 60 mg CpDB Take 60 mg by mouth daily.  rosuvastatin (CRESTOR) 10 mg tablet Take 1 Tab by mouth nightly.  27 Tab 2    traMADol (ULTRAM) 50 mg tablet Take 50 mg by mouth every eight (8) hours as needed. 3    fluticasone (FLONASE) 50 mcg/actuation nasal spray       fexofenadine (ALLEGRA) 180 mg tablet Take 180 mg by mouth daily.  GUAIFENESIN/DEXTROMETHORPHAN (MUCINEX DM PO) Take 1 Tab by mouth two (2) times daily as needed.  acetaminophen (ACETAMINOPHEN EXTRA STRENGTH) 500 mg tablet Take 1,500 mg by mouth two (2) times a day.  celecoxib (CELEBREX) 100 mg capsule Take 100 mg by mouth two (2) times a day.  Diclofenac Sodium (VOLTAREN) 1 % topical gel Apply  to affected area daily as needed.  ascorbic acid (VITAMIN C) 500 mg tablet Take 500 mg by mouth daily.  CALCIUM CARBONATE/VITAMIN D3 (CALCIUM 500 WITH D PO) Take 2 Tabs by mouth daily.  zinc 50 mg Tab Take 1 Tab by mouth daily.  almotriptan (AXERT) 12.5 mg tablet Take 12.5 mg by mouth once as needed.  PHYTONADIONE, VIT K1, (VITAMIN K1) by Does Not Apply route.  multivitamin, tx-iron-ca-min (THERA-M W/ IRON) 9 mg iron-400 mcg tab tablet Take 1 Tab by mouth daily.  CONTOUR NEXT STRIPS strip USE TO TEST BLOOD SUGAR BID  0    MICROLET LANCET misc USE BID  11    CRANBERRY EXTRACT (CRANBERRY PO) Take 2,000 mg by mouth daily. Triple strength.  cholecalciferol, vitamin D3, (VITAMIN D-3) 2,000 unit Tab Take 5,000 Units by mouth daily. Soft gel.  CYANOCOBALAMIN, VITAMIN B-12, (VITAMIN B-12 PO) Take 2,000 mcg by mouth daily. 2000MCG DAILY      Potassium Gluconate 595 (99) mg tablet Take 595 mg by mouth daily. The following regarding medications was addressed:    (The risks and benefits of the proposed medication; the potential medication side effects ie    dry mouth, weight gain, GI upset, headache; patient given opportunity to ask questions)       2. Counseling and coordination of care including instructions for treatment, risks/benefits, risk factor reduction and patient/family education.  She agrees with the plan. Patient instructed to call with any side effects, questions or issues. 3.  Patient was provided supportive counseling for her stress of poor financial condition, polypharmacy and chronic medical illnesses with her chronic mental illness. Patient has a poor understanding of her polypharmacy and continues to dwell on getting more medications for anxiety and chronic pain. PSYCHOTHERAPY:  approx 20 minutes  Type:  Supportive/Cognitive Behavioral/Solution Focused psychotherapy provided  Focus:     Current problems:   Housing issues   Occupational issues   Academic issues   Legal issues   Medical issues   Interpersonal conflicts    Psychoeducation provided: psych medications    Treatment plan reviewed with patient-including diagnosis and medications    Worked on issues of denial & effects of medications dependency/use    Avinash Pacheco is not progressing. Follow-up Disposition:  Return in about 3 months (around 7/17/2018).       Berkeley Kussmaul, MD  4/17/2018

## 2018-04-19 RX ORDER — VENLAFAXINE HYDROCHLORIDE 150 MG/1
CAPSULE, EXTENDED RELEASE ORAL
Qty: 270 CAP | Refills: 1 | Status: SHIPPED | OUTPATIENT
Start: 2018-06-06 | End: 2018-07-03 | Stop reason: SDUPTHER

## 2018-05-29 ENCOUNTER — OFFICE VISIT (OUTPATIENT)
Dept: SURGERY | Age: 68
End: 2018-05-29

## 2018-05-29 VITALS
SYSTOLIC BLOOD PRESSURE: 146 MMHG | DIASTOLIC BLOOD PRESSURE: 81 MMHG | OXYGEN SATURATION: 94 % | HEART RATE: 93 BPM | BODY MASS INDEX: 34.04 KG/M2 | WEIGHT: 185 LBS | HEIGHT: 62 IN

## 2018-05-29 DIAGNOSIS — N63.12 MASS OF UPPER INNER QUADRANT OF RIGHT BREAST: Primary | ICD-10-CM
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
## 2018-05-29 NOTE — MR AVS SNAPSHOT
[address], [address] [facility], [address] [place] [address] [facility] 
[phone] Patient: Nicole Crowe MRN: [id number] VWZ:30/7/2042 Visit Information Date & Time Provider Department Dept. Phone Encounter #  
 5/29/2018 11:20 AM Shwetha Muir MD [facility] [place] [address] [id number] Your Appointments 7/3/2018 11:30 AM  
ESTABLISHED PATIENT with Perla Ward MD  
[address] [facility] [address] [place] [address]) Appt Note: 3 mo fu  
 [address] [address]  
[address] [place] [address] Drive [address] [address]  
  
    
 7/17/2018 11:40 AM  
ESTABLISHED PATIENT with Shwetha Muir MD  
[facility] ([address] [facility]) Appt Note: 6 week follow up  
 200 [facility], [address] [facility], [address] [address]  
[address], [address] [facility], [address] [place] [address] [address] [address]  
  
    
 4/9/2019 11:40 AM  
ESTABLISHED PATIENT with Shwetha Muir MD  
[facility] ([address]) Appt Note: 1 yr breast f/u  
 200 [facility], [address] [facility], [address] 2305 [facility]  
[phone] Upcoming Health Maintenance Date Due Hepatitis C Screening 1950 DTaP/Tdap/Td series (1 - Tdap) 11/2/1971 FOBT Q 1 YEAR AGE 50-75 11/2/2000 ZOSTER VACCINE AGE 60> 9/2/2010 GLAUCOMA SCREENING Q2Y 11/2/2015 Pneumococcal 65+ Low/Medium Risk (2 of 2 - PPSV23) 9/1/2016 MEDICARE YEARLY EXAM 3/28/2018 Influenza Age 5 to Adult 8/1/2018 BREAST CANCER SCRN MAMMOGRAM 9/13/2019 Allergies as of 5/29/2018  Review Complete On: 5/29/2018 By: Shwetha Muir MD  
  
 Severity Noted Reaction Type Reactions Epinephrine  07/27/2011   Systemic Palpitations \"Makes my heart  ' shudder'. \" Allergic to epinephrine with novocain. Plain epinephrine is ok. Novocain [Procaine]  05/01/2013    Palpitations Nsaids (Non-steroidal Anti-inflammatory Drug)  02/16/2012    Other (comments) GASTRIC DISTRESS. Pravastatin  12/02/2015    Myalgia Aspirin Low 07/27/2011   Side Effect Other (comments) Gastric irritation Ibuprofen Low 07/27/2011   Side Effect Other (comments) Gastric irritation Current Immunizations  Reviewed on 2/22/2012 Name Date Influenza Vaccine Whole 9/1/2011 ZZZ-RETIRED (DO NOT USE) Pneumococcal Vaccine (Unspecified Type) 9/1/2011 Not reviewed this visit You Were Diagnosed With   
  
 Codes Comments Mass of upper inner quadrant of right breast    -  Primary ICD-10-CM: W24.15 
ICD-9-CM: 611.72 Vitals BP Pulse Height(growth percentile) Weight(growth percentile) SpO2 BMI  
 146/81 (BP 1 Location: Right arm, BP Patient Position: Sitting) 93 5' 2\" (1.575 m) 185 lb (83.9 kg) 94% 33.84 kg/m2 OB Status Smoking Status Postmenopausal Never Smoker Vitals History BMI and BSA Data Body Mass Index Body Surface Area  
 33.84 kg/m 2 1.92 m 2 Preferred Pharmacy Pharmacy Name Phone [address], [facility] [phone] Your Updated Medication List  
  
   
This list is accurate as of 5/29/18 11:48 AM.  Always use your most recent med list.  
  
  
  
  
 ACETAMINOPHEN EXTRA STRENGTH 500 mg tablet Generic drug:  acetaminophen Take 1,500 mg by mouth two (2) times a day. ALLEGRA 180 mg tablet Generic drug:  fexofenadine Take 180 mg by mouth daily. AXERT 12.5 mg tablet Generic drug:  almotriptan Take 12.5 mg by mouth once as needed. CALCIUM 500 WITH D PO Take 2 Tabs by mouth daily. CeleBREX 100 mg capsule Generic drug:  celecoxib Take 100 mg by mouth two (2) times a day. clonazePAM 2 mg tablet Commonly known as:  Monique Adler  
 Take 1 Tab by mouth two (2) times a day. Max Daily Amount: 4 mg. CONTOUR NEXT TEST STRIPS strip Generic drug:  glucose blood VI test strips USE TO TEST BLOOD SUGAR BID CRANBERRY PO Take 2,000 mg by mouth daily. Triple strength. DEXILANT 60 mg Cpdb Generic drug:  Dexlansoprazole Take 60 mg by mouth daily. fluticasone 50 mcg/actuation nasal spray Commonly known as:  FLONASE  
  
 melatonin Tab tablet Take 10 mg by mouth nightly. [facility] Generic drug:  Lancets USE BID  
  
 MUCINEX DM PO Take 1 Tab by mouth two (2) times daily as needed. multivitamin, tx-iron-ca-min 9 mg iron-400 mcg Tab tablet Commonly known as:  THERA-M w/ IRON Take 1 Tab by mouth daily. OXcarbazepine 300 mg tablet Commonly known as:  TRILEPTAL  
TAKE 1 TABLET DAILY  Indications: mood stabilizer Potassium Gluconate 595 mg (99 mg) tablet Take 595 mg by mouth daily. rosuvastatin 10 mg tablet Commonly known as:  CRESTOR Take 1 Tab by mouth nightly. traMADol 50 mg tablet Commonly known as:  ULTRAM  
Take 50 mg by mouth every eight (8) hours as needed. traZODone 150 mg tablet Commonly known as:  Kim Norah Take 1 Tab by mouth nightly. Start taking on:  6/12/2018  
  
 venlafaxine- mg capsule Commonly known as:  EFFEXOR-XR  
2 caps qam, 1 cap qhs  
Start taking on:  6/6/2018 VITAMIN B-12 PO Take 2,000 mcg by mouth daily. 2000MCG DAILY  
  
 VITAMIN C 500 mg tablet Generic drug:  ascorbic acid (vitamin C) Take 500 mg by mouth daily. VITAMIN D3 2,000 unit Tab Generic drug:  cholecalciferol (vitamin D3) Take 5,000 Units by mouth daily. Soft gel. VITAMIN K1  
by Does Not Apply route. VOLTAREN 1 % Gel Generic drug:  diclofenac Apply  to affected area daily as needed. zinc 50 mg Tab tablet Take 1 Tab by mouth daily. To-Do List   
 05/29/2018 Imaging:  [facility] 3D MARY W MAMMO RT DX INCL CAD   
  
 05/29/2018 Imaging:  US BREAST RT LIMITED=<3 QUAD   
  
 06/05/2018 1:00 PM  
  Appointment with [address] [address] at [address] [place] [address] ([phone]) Shower or bathe using soap and water. Do not use deodorant, powder, perfumes, or lotion the day of your exam.  If your prior mammograms were not performed at [place] [address] please bring films with you or forward prior images 2 days before your procedure. Check in at registration 15min before your appointment time unless you were instructed to do otherwise. A script is not necessary for screening. If you have one, please bring it on the day of the mammogram or have it faxed to the department. [facility]  [phone] [facility] [phone] [facility] [phone] [facility] [phone]  
  
 06/05/2018 1:30 PM  
  Appointment with Precious Jones 1 at [facility] ([phone]) Shower or bathe using soap and water. Do not use deodorant, powder, perfumes, or lotion. If your prior films were not performed at a local [facility] facility please bring or forward prior images 2 days before procedure. Introducing \[facility] [place] [facility]\"" & HEALTH SERVICES! [facility] introduces [employer] patient portal. Now you can access parts of your medical record, email your doctor's office, and request medication refills online. 1. In your internet browser, go to [email or web address] 2. Click on the First Time User? Click Here link in the Sign In box. You will see the New Member Sign Up page. 3. Enter your [employer] Access Code exactly as it appears below. You will not need to use this code after youve completed the sign-up process. If you do not sign up before the expiration date, you must request a new code. · [employer] Access Code: [email or web address] Expires: 7/8/2018 11:43 AM 
 
4.  Enter the last four digits of your Social Security Number (xxxx) and Date of Birth (mm/dd/yyyy) as indicated and click Submit. You will be taken to the next sign-up page. 5. Create a "[employer] ID. This will be your "[employer] login ID and cannot be changed, so think of one that is secure and easy to remember. 6. Create a "[employer] password. You can change your password at any time. 7. Enter your Password Reset Question and Answer. This can be used at a later time if you forget your password. 8. Enter your e-mail address. You will receive e-mail notification when new information is available in [address] 9. Click Sign Up. You can now view and download portions of your medical record. 10. Click the Download Summary menu link to download a portable copy of your medical information. If you have questions, please visit the Frequently Asked Questions section of the "[employer] website. Remember, "[employer] is NOT to be used for urgent needs. For medical emergencies, dial 911. Now available from your iPhone and Android! Please provide this summary of care documentation to your next provider. Your primary care clinician is listed as Tim Pritchard. If you have any questions after today's visit, please call [phone].

## 2018-06-05 ENCOUNTER — HOSPITAL ENCOUNTER (OUTPATIENT)
Dept: MAMMOGRAPHY | Age: 68
Discharge: HOME | End: 2018-06-05
Attending: SURGERY
Payer: MEDICARE

## 2018-06-05 ENCOUNTER — HOSPITAL ENCOUNTER (OUTPATIENT)
Dept: ULTRASOUND IMAGING | Age: 68
Discharge: HOME | End: 2018-06-05
Attending: SURGERY
Payer: MEDICARE

## 2018-06-05 DIAGNOSIS — N63.12 MASS OF UPPER INNER QUADRANT OF RIGHT BREAST: ICD-10-CM

## 2018-06-05 PROCEDURE — 76642 ULTRASOUND BREAST LIMITED: CPT

## 2018-06-05 PROCEDURE — 77061 BREAST TOMOSYNTHESIS UNI: CPT

## 2018-07-03 ENCOUNTER — OFFICE VISIT (OUTPATIENT)
Dept: BEHAVIORAL/MENTAL HEALTH CLINIC | Age: 68
End: 2018-07-03

## 2018-07-03 VITALS
WEIGHT: 192 LBS | BODY MASS INDEX: 35.33 KG/M2 | SYSTOLIC BLOOD PRESSURE: 118 MMHG | HEIGHT: 62 IN | HEART RATE: 108 BPM | DIASTOLIC BLOOD PRESSURE: 90 MMHG

## 2018-07-03 DIAGNOSIS — F60.9 PERSONALITY DISORDER (HCC): ICD-10-CM

## 2018-07-03 DIAGNOSIS — F41.8 DEPRESSION WITH ANXIETY: Primary | ICD-10-CM

## 2018-07-03 DIAGNOSIS — F41.9 ANXIETY: ICD-10-CM

## 2018-07-03 PROBLEM — E66.01 SEVERE OBESITY (BMI 35.0-39.9): Status: ACTIVE | Noted: 2018-07-03

## 2018-07-03 RX ORDER — TRAZODONE HYDROCHLORIDE 150 MG/1
150 TABLET ORAL
Qty: 90 TAB | Refills: 1 | Status: SHIPPED | OUTPATIENT
Start: 2018-07-03 | End: 2019-01-08 | Stop reason: SDUPTHER

## 2018-07-03 RX ORDER — VENLAFAXINE HYDROCHLORIDE 150 MG/1
CAPSULE, EXTENDED RELEASE ORAL
Qty: 270 CAP | Refills: 1 | Status: SHIPPED | OUTPATIENT
Start: 2018-07-03 | End: 2019-01-08 | Stop reason: SDUPTHER

## 2018-07-03 RX ORDER — OXCARBAZEPINE 300 MG/1
TABLET, FILM COATED ORAL
Qty: 90 TAB | Refills: 2 | Status: SHIPPED | OUTPATIENT
Start: 2018-07-03 | End: 2019-01-08 | Stop reason: SDUPTHER

## 2018-07-03 RX ORDER — RANITIDINE 300 MG/1
300 TABLET ORAL DAILY
COMMUNITY
End: 2019-08-06

## 2018-07-03 RX ORDER — CLONAZEPAM 2 MG/1
2 TABLET ORAL 2 TIMES DAILY
Qty: 180 TAB | Refills: 2 | Status: SHIPPED | OUTPATIENT
Start: 2018-07-03 | End: 2018-10-26 | Stop reason: SDUPTHER

## 2018-07-03 NOTE — MR AVS SNAPSHOT
[address] [facility] 
[phone] Patient: Briana Pope MRN: [id number] TNM:55/6/[birth year] Visit Information Date & Time Provider Department Dept. Phone Encounter #  
 7/3/2018 11:30 AM Eben Duffy, [facility] [facility] [phone] [id number] Follow-up Instructions Return in about 3 months (around 10/3/2018). Your Appointments 7/3/2018 11:30 AM  
ESTABLISHED PATIENT with Eben Duffy MD  
75[address] [facility]) Appt Note: 3 mo fu; 6/29 lm2con [address] [facility] [address] [address]  
[address] [place] [address] [address] [address]  
  
    
 7/17/2018 11:40 AM  
ESTABLISHED PATIENT with Humberto Gonzalez MD  
[facility] [address] [facility] S[facility] ([facility]) Appt Note: 6 week follow up  
 [address], [address] [facility], [address] [address]  
[address], [address] [facility], 280 [facility] [address] [address]  
  
    
 7/23/2018  2:00 PM  
New Patient with Gilberto Webber MD  
[facility]) Appt Note: neuropathy in legs and feet cluster headaches $15CP yd 05/29/18 pt declined \"first avaialble appt\"; neuropathy in legs and feet cluster headaches $15CP yd 05/29/18 pt declined \"first available appt\"; pt confirmed sth 6/15/18; r/s from 6/18, AB, 06/18/18; R/s  
 [address], 
[id number], [address] [facility]  
[phone]  
  
   
 [address], [address] [facility], [address] [place] [address] [address] [address]  
  
    
 4/9/2019 11:40 AM  
ESTABLISHED PATIENT with Humberto Gonzalez MD  
[facility] [address] ([facility]) Appt Note: 1 yr breast f/u  
 [address], [address] [facility], [address] [facility]  
[phone] Upcoming Health Maintenance Date Due Hepatitis C Screening 1950 DTaP/Tdap/Td series (1 - Tdap) 11/2/1971 FOBT Q 1 YEAR AGE 50-75 11/2/2000 ZOSTER VACCINE AGE 60> 9/2/2010 GLAUCOMA SCREENING Q2Y 11/2/2015 Pneumococcal 65+ Low/Medium Risk (2 of 2 - PPSV23) 9/1/2016 Influenza Age 5 to Adult 8/1/2018 BREAST CANCER SCRN MAMMOGRAM 6/5/2020 Allergies as of 7/3/2018  Review Complete On: 7/3/2018 By: Kaitlynn Cruz Severity Noted Reaction Type Reactions Epinephrine  07/27/2011   Systemic Palpitations \"Makes my heart  ' shudder'. \" Allergic to epinephrine with novocain. Plain epinephrine is ok. Novocain [Procaine]  05/01/2013    Palpitations Nsaids (Non-steroidal Anti-inflammatory Drug)  02/16/2012    Other (comments) GASTRIC DISTRESS. Pravastatin  12/02/2015    Myalgia Aspirin Low 07/27/2011   Side Effect Other (comments) Gastric irritation Ibuprofen Low 07/27/2011   Side Effect Other (comments) Gastric irritation Current Immunizations  Reviewed on 2/22/2012 Name Date Influenza Vaccine Whole 9/1/2011 ZZZ-RETIRED (DO NOT USE) Pneumococcal Vaccine (Unspecified Type) 9/1/2011 Not reviewed this visit You Were Diagnosed With   
  
 Codes Comments Depression with anxiety    -  Primary ICD-10-CM: F41.8 ICD-9-CM: 300.4 Personality disorder     ICD-10-CM: F60.9 ICD-9-CM: 301.9 Anxiety     ICD-10-CM: F41.9 ICD-9-CM: 300.00 Vitals BP Pulse Height(growth percentile) Weight(growth percentile) BMI OB Status 118/90 (!) 108 5' 2\" (1.575 m) 192 lb (87.1 kg) 35.12 kg/m2 Postmenopausal  
 Smoking Status Never Smoker Vitals History BMI and BSA Data Body Mass Index Body Surface Area  
 35.12 kg/m 2 1.95 m 2 Preferred Pharmacy Pharmacy Name Phone [facility], [facility] [phone] Your Updated Medication List  
  
 This list is accurate as of 7/3/18 11:08 AM.  Always use your most recent med list.  
  
  
  
  
 ACETAMINOPHEN EXTRA STRENGTH 500 mg tablet Generic drug:  acetaminophen Take 1,500 mg by mouth two (2) times a day. ALLEGRA 180 mg tablet Generic drug:  fexofenadine Take 180 mg by mouth daily. AXERT 12.5 mg tablet Generic drug:  almotriptan Take 12.5 mg by mouth once as needed. CALCIUM 500 WITH D PO Take 2 Tabs by mouth daily. CeleBREX 100 mg capsule Generic drug:  celecoxib Take 100 mg by mouth two (2) times a day. clonazePAM 2 mg tablet Commonly known as:  Willeen Narrow Take 1 Tab by mouth two (2) times a day. Max Daily Amount: 4 mg. CONTOUR NEXT TEST STRIPS strip Generic drug:  glucose blood VI test strips USE TO TEST BLOOD SUGAR BID CRANBERRY PO Take 2,000 mg by mouth daily. Triple strength. DEXILANT 60 mg Cpdb Generic drug:  Dexlansoprazole Take 60 mg by mouth daily. fluticasone 50 mcg/actuation nasal spray Commonly known as:  FLONASE  
  
 melatonin Tab tablet Take 10 mg by mouth nightly. [facility] Generic drug:  Lancets USE BID  
  
 MUCINEX DM PO Take 1 Tab by mouth two (2) times daily as needed. multivitamin, tx-iron-ca-min 9 mg iron-400 mcg Tab tablet Commonly known as:  THERA-M w/ IRON Take 1 Tab by mouth daily. OXcarbazepine 300 mg tablet Commonly known as:  TRILEPTAL  
TAKE 1 TABLET DAILY  Indications: mood stabilizer Potassium Gluconate 595 mg (99 mg) tablet Take 595 mg by mouth daily. raNITIdine 300 mg tablet Commonly known as:  ZANTAC Take 300 mg by mouth daily. rosuvastatin 10 mg tablet Commonly known as:  CRESTOR Take 1 Tab by mouth nightly. traMADol 50 mg tablet Commonly known as:  ULTRAM  
Take 50 mg by mouth every eight (8) hours as needed. traZODone 150 mg tablet Commonly known as:  Tk Kellerton Take 1 Tab by mouth nightly. venlafaxine- mg capsule Commonly known as:  EFFEXOR-XR  
2 caps qam, 1 cap qhs  
  
 VITAMIN B-12 PO Take 2,000 mcg by mouth daily. 2000MCG DAILY  
  
 VITAMIN C 500 mg tablet Generic drug:  ascorbic acid (vitamin C) Take 500 mg by mouth daily. VITAMIN D3 2,000 unit Tab Generic drug:  cholecalciferol (vitamin D3) Take 5,000 Units by mouth daily. Soft gel. VITAMIN K1  
by Does Not Apply route. VOLTAREN 1 % Gel Generic drug:  diclofenac Apply  to affected area daily as needed. zinc 50 mg Tab tablet Take 1 Tab by mouth daily. Prescriptions Printed Refills  
 clonazePAM (KLONOPIN) 2 mg tablet 2 Sig: Take 1 Tab by mouth two (2) times a day. Max Daily Amount: 4 mg. Class: Print Route: Oral  
  
Prescriptions Sent to Pharmacy Refills  
 venlafaxine-SR (EFFEXOR-XR) 150 mg capsule 1 Si caps qam, 1 cap qhs  
 Class: Normal  
 Pharmacy: [address] [place] [address] Ph #: [phone]  
 traZODone (DESYREL) 150 mg tablet 1 Sig: Take 1 Tab by mouth nightly. Class: Normal  
 Pharmacy: [address] [place] [address] Ph #: [phone] Route: Oral  
 OXcarbazepine (TRILEPTAL) 300 mg tablet 2 Sig: TAKE 1 TABLET DAILY  Indications: mood stabilizer Class: Normal  
 Pharmacy: [address] [place] [address] Ph #: [phone] Follow-up Instructions Return in about 3 months (around 10/3/2018). Introducing [facility] & HEALTH SERVICES! Romayne Duster introduces [employer] patient portal. Now you can access parts of your medical record, email your doctor's office, and request medication refills online. 1. In your internet browser, go to [email or web address] 2. Click on the First Time User? Click Here link in the Sign In box. You will see the New Member Sign Up page. 3. Enter your [employer] Access Code exactly as it appears below. You will not need to use this code after youve completed the sign-up process. If you do not sign up before the expiration date, you must request a new code. · [employer] Access Code: [email or web address] Expires: 9/30/2018  5:19 AM 
 
4. Enter the last four digits of your Social Security Number (xxxx) and Date of Birth (mm/dd/yyyy) as indicated and click Submit. You will be taken to the next sign-up page. 5. Create a [employer] ID. This will be your [employer] login ID and cannot be changed, so think of one that is secure and easy to remember. 6. Create a [employer] password. You can change your password at any time. 7. Enter your Password Reset Question and Answer. This can be used at a later time if you forget your password. 8. Enter your e-mail address. You will receive e-mail notification when new information is available in [address] 9. Click Sign Up. You can now view and download portions of your medical record. 10. Click the Download Summary menu link to download a portable copy of your medical information. If you have questions, please visit the Frequently Asked Questions section of the [employer] website. Remember, [employer] is NOT to be used for urgent needs. For medical emergencies, dial 911. Now available from your iPhone and Android! Please provide this summary of care documentation to your next provider. Your primary care clinician is listed as Bharath Smith. If you have any questions after today's visit, please call [phone].

## 2018-07-03 NOTE — PROGRESS NOTES
Psychiatric Outpatient Progress Note    Account Number:  [id number]  Name: Charlie Reece    SUBJECTIVE:   CHIEF COMPLAINT:  Magdalene Porras is a 79 y.o. , White female and was seen today for 3 month follow-up of psychiatric condition and psychotropic medication management.       HPI:    Saba reports the following psychiatric symptoms:  depression and anxiety.  The symptoms have been present for years and are of moderate severity. The symptoms occur daily.          Today, patient reported that she dropped a 15year-old dog on the floor few days ago in the poor dog had a pelvic fracture at 2 places. Dog is getting treated by the vet currently in the hospital.  She misses her dog a lot and is worried about the expenses associated with treating the dog at this time. She continues to complain about her poor finances and not able to get good meals and continues to eat sandwiches and soup. She denies any psychosis or sergey. Reports compliance with her medications. She continues to dwell on her family not calling or visiting her and plays victim role pretty well.       Patient has gained 5 more pounds. Pegdonnaann Ganlenny BMI = 35.  BP is better. Reports compliance with medications. Today,her self care is better. She has gained weight and continues to be  inactive physically. She continued to give multiple somatic complaints including muscle jerking of her legs at night which interferes with her sleep. She also reported that she feels very weak and is stays in pain all the time. Patient is on multiple pain medication including tramadol and high doses, Celebrex and high doses and also gets Voltron gel to apply locally.     Patient has a stopped seeing her therapist, Dr. Andrews July last August  as he was putting a lot of pressure on her to stop gambling. She has not seen him since August of last year. She is looking for another therapist.      Contributing factors include: dog with #, finances.       Patient denies SI/HI/SIB.        Side Effects:  none        Fam/Soc Hx (from [facility] Eval with updates):        REVIEW OF SYSTEMS:  Constitutional: positive for fatigue, weight gain  Eyes: positive for contacts/glasses and visual disturbance  Ears, nose, mouth, throat, and face: positive for hearing loss  Musculoskeletal:positive for myalgias and arthralgias, left knee pain  Neurological: positive for memory problems  Behavioral/Psych: positive for anxiety and depression, negative for SI or HI  Appetite: good  Sleep: fair    Visit Vitals    /90    Pulse (!) 108    Ht 5' 2\" (1.575 m)    Wt 87.1 kg (192 lb)    BMI 35.12 kg/m2       OBJECTIVE:                 Mental Status exam: WNL except for      Sensorium  oriented to time, place and person   Relations cooperative and passive    Eye Contact    appropriate   Appearance:  age appropriate, casually dressed and overweight   Motor Behavior/Gait:  within normal limits   Speech:  normal pitch and normal volume   Thought Process: circumstantial and tangential   Thought Content free of delusions and free of hallucinations   Suicidal ideations none   Homicidal ideations none   Mood:  euthymic   Affect:  anxious   Memory recent  adequate   Memory remote:  adequate   Concentration:  adequate   Abstraction:  concrete   Insight:  fair   Reliability fair   Judgment:  fair       MEDICAL DECISION MAKING  Data: pertinent labs, imaging, medical records and diagnostic tests reviewed and incorporated in diagnosis and treatment plan    Allergies   Allergen Reactions    Epinephrine Palpitations     \"Makes my heart  ' shudder'. \" Allergic to epinephrine with novocain. Plain epinephrine is ok.  Novocain [Procaine] Palpitations    Nsaids (Non-Steroidal Anti-Inflammatory Drug) Other (comments)     GASTRIC DISTRESS.     Pravastatin Myalgia    Aspirin Other (comments)     Gastric irritation    Ibuprofen Other (comments)     Gastric irritation        Current Outpatient Prescriptions   Medication Sig Dispense Refill  raNITIdine (ZANTAC) 300 mg tablet Take 300 mg by mouth daily.  venlafaxine-SR (EFFEXOR-XR) 150 mg capsule 2 caps qam, 1 cap qhs 270 Cap 1    clonazePAM (KLONOPIN) 2 mg tablet Take 1 Tab by mouth two (2) times a day. Max Daily Amount: 4 mg. 180 Tab 2    traZODone (DESYREL) 150 mg tablet Take 1 Tab by mouth nightly. 90 Tab 1    OXcarbazepine (TRILEPTAL) 300 mg tablet TAKE 1 TABLET DAILY  Indications: mood stabilizer 90 Tab 2    PHYTONADIONE, VIT K1, (VITAMIN K1) by Does Not Apply route.  melatonin tab tablet Take 10 mg by mouth nightly.  multivitamin, tx-iron-ca-min (THERA-M W/ IRON) 9 mg iron-400 mcg tab tablet Take 1 Tab by mouth daily.  CONTOUR NEXT STRIPS strip USE TO TEST BLOOD SUGAR BID  0    MICROLET LANCET misc USE BID  11    DEXILANT 60 mg CpDB Take 60 mg by mouth daily.  rosuvastatin (CRESTOR) 10 mg tablet Take 1 Tab by mouth nightly. 30 Tab 2    traMADol (ULTRAM) 50 mg tablet Take 50 mg by mouth every eight (8) hours as needed. 3    fluticasone (FLONASE) 50 mcg/actuation nasal spray       CRANBERRY EXTRACT (CRANBERRY PO) Take 2,000 mg by mouth daily. Triple strength.  GUAIFENESIN/DEXTROMETHORPHAN (MUCINEX DM PO) Take 1 Tab by mouth two (2) times daily as needed.  acetaminophen (ACETAMINOPHEN EXTRA STRENGTH) 500 mg tablet Take 1,500 mg by mouth two (2) times a day.  celecoxib (CELEBREX) 100 mg capsule Take 100 mg by mouth two (2) times a day.  cholecalciferol, vitamin D3, (VITAMIN D-3) 2,000 unit Tab Take 5,000 Units by mouth daily. Soft gel.  CYANOCOBALAMIN, VITAMIN B-12, (VITAMIN B-12 PO) Take 2,000 mcg by mouth daily. 2000MCG DAILY      ascorbic acid (VITAMIN C) 500 mg tablet Take 500 mg by mouth daily.  CALCIUM CARBONATE/VITAMIN D3 (CALCIUM 500 WITH D PO) Take 2 Tabs by mouth daily.  Potassium Gluconate 595 (99) mg tablet Take 595 mg by mouth daily.  zinc 50 mg Tab Take 1 Tab by mouth daily.       almotriptan (AXERT) 12.5 mg tablet Take 12.5 mg by mouth once as needed.  fexofenadine (ALLEGRA) 180 mg tablet Take 180 mg by mouth daily.  Diclofenac Sodium (VOLTAREN) 1 % topical gel Apply  to affected area daily as needed. Problems addressed today:    ICD-10-CM ICD-9-CM    1. Depression with anxiety F41.8 300.4    2. Personality disorder F60.9 301.9    3. Anxiety F41.9 300.00 clonazePAM (KLONOPIN) 2 mg tablet       Assessment:   Val Zacarias  is a 79 y.o.  female  is responding to treatment. Symptoms are fairly stable. Patient denies SI/HI/SIB. No evidence of AH/VH or delusions. Risk Scoring- chronic illnesses and prescription drug management    Treatment Plan:  1. Medications:          Medication Changes/Adjustments: Continue combination of Effexor, Trileptal, trazodone and clonazepam in the current dosages. Current Outpatient Prescriptions   Medication Sig Dispense Refill    raNITIdine (ZANTAC) 300 mg tablet Take 300 mg by mouth daily.  venlafaxine-SR (EFFEXOR-XR) 150 mg capsule 2 caps qam, 1 cap qhs 270 Cap 1    clonazePAM (KLONOPIN) 2 mg tablet Take 1 Tab by mouth two (2) times a day. Max Daily Amount: 4 mg. 180 Tab 2    traZODone (DESYREL) 150 mg tablet Take 1 Tab by mouth nightly. 90 Tab 1    OXcarbazepine (TRILEPTAL) 300 mg tablet TAKE 1 TABLET DAILY  Indications: mood stabilizer 90 Tab 2    PHYTONADIONE, VIT K1, (VITAMIN K1) by Does Not Apply route.  melatonin tab tablet Take 10 mg by mouth nightly.  multivitamin, tx-iron-ca-min (THERA-M W/ IRON) 9 mg iron-400 mcg tab tablet Take 1 Tab by mouth daily.  CONTOUR NEXT STRIPS strip USE TO TEST BLOOD SUGAR BID  0    MICROLET LANCET misc USE BID  11    DEXILANT 60 mg CpDB Take 60 mg by mouth daily.  rosuvastatin (CRESTOR) 10 mg tablet Take 1 Tab by mouth nightly. 30 Tab 2    traMADol (ULTRAM) 50 mg tablet Take 50 mg by mouth every eight (8) hours as needed.   3    fluticasone (FLONASE) 50 mcg/actuation nasal spray       CRANBERRY EXTRACT (CRANBERRY PO) Take 2,000 mg by mouth daily. Triple strength.  GUAIFENESIN/DEXTROMETHORPHAN (MUCINEX DM PO) Take 1 Tab by mouth two (2) times daily as needed.  acetaminophen (ACETAMINOPHEN EXTRA STRENGTH) 500 mg tablet Take 1,500 mg by mouth two (2) times a day.  celecoxib (CELEBREX) 100 mg capsule Take 100 mg by mouth two (2) times a day.  cholecalciferol, vitamin D3, (VITAMIN D-3) 2,000 unit Tab Take 5,000 Units by mouth daily. Soft gel.  CYANOCOBALAMIN, VITAMIN B-12, (VITAMIN B-12 PO) Take 2,000 mcg by mouth daily. 2000MCG DAILY      ascorbic acid (VITAMIN C) 500 mg tablet Take 500 mg by mouth daily.  CALCIUM CARBONATE/VITAMIN D3 (CALCIUM 500 WITH D PO) Take 2 Tabs by mouth daily.  Potassium Gluconate 595 (99) mg tablet Take 595 mg by mouth daily.  zinc 50 mg Tab Take 1 Tab by mouth daily.  almotriptan (AXERT) 12.5 mg tablet Take 12.5 mg by mouth once as needed.  fexofenadine (ALLEGRA) 180 mg tablet Take 180 mg by mouth daily.  Diclofenac Sodium (VOLTAREN) 1 % topical gel Apply  to affected area daily as needed. The following regarding medications was addressed:    (The risks and benefits of the proposed medication; the potential medication side effects ie    dry mouth, weight gain, GI upset, headache; patient given opportunity to ask questions)       2. Counseling and coordination of care including instructions for treatment, risks/benefits, risk factor reduction and patient/family education. She agrees with the plan. Patient instructed to call with any side effects, questions or issues. 3.  Patient was provided supportive counseling for her stress of poor financial condition, polypharmacy and chronic medical illnesses with her chronic mental illness. Patient has a poor understanding of her polypharmacy and continues to dwell on getting more medications for anxiety and chronic pain.     PSYCHOTHERAPY:  approx 20 minutes  Type:  Supportive/Cognitive Behavioral/Solution Focused psychotherapy provided  Focus:     Current problems:   Financial issues   Medical issues   Interpersonal conflicts- family    Psychoeducation provided:  Psych medications    Treatment plan reviewed with patient-including diagnosis and medications    Worked on issues of denial & effects of substance dependency/use    Valdez Presume is stable. Follow-up Disposition:  Return in about 3 months (around 10/3/2018).       Juan M Pacheco MD  7/3/2018

## 2018-10-02 ENCOUNTER — OFFICE VISIT (OUTPATIENT)
Dept: BEHAVIORAL/MENTAL HEALTH CLINIC | Age: 68
End: 2018-10-02

## 2018-10-02 VITALS
HEART RATE: 103 BPM | BODY MASS INDEX: 36.25 KG/M2 | HEIGHT: 62 IN | SYSTOLIC BLOOD PRESSURE: 146 MMHG | WEIGHT: 197 LBS | DIASTOLIC BLOOD PRESSURE: 89 MMHG

## 2018-10-02 DIAGNOSIS — E66.01 SEVERE OBESITY WITH BODY MASS INDEX (BMI) OF 35.0 TO 39.9 WITH SERIOUS COMORBIDITY (HCC): ICD-10-CM

## 2018-10-02 DIAGNOSIS — F41.8 DEPRESSION WITH ANXIETY: Primary | ICD-10-CM

## 2018-10-02 DIAGNOSIS — F60.9 PERSONALITY DISORDER (HCC): ICD-10-CM

## 2018-10-02 NOTE — MR AVS SNAPSHOT
[address] [facility] 
[phone] Patient: Stepan Tinajero MRN: [id number] QWP:[birth year] Visit Information Date & Time Provider Department Dept. Phone Encounter #  
 10/2/2018 11:30 AM Baron Hayes, [facility] [phone] [id number] Follow-up Instructions Return in about 3 months (around 1/2/2019). Your Appointments 10/2/2018 11:30 AM  
ESTABLISHED PATIENT with Baron Abigail MD  
[facility]) Appt Note: 3 month f/u; left vm to confirm 9/28/18/LJ  
 [facility] [address] [phone]  
[address] [place] [address] [address] [phone]  
  
    
 4/9/2019 11:40 AM  
ESTABLISHED PATIENT with Mykel Londono MD  
[facility] ([address]) Appt Note: 1 yr breast f/u  
 [address], [address] [facility], [address] [phone]  
[address] [facility], [address] [facility] [address] [phone] Upcoming Health Maintenance Date Due Hepatitis C Screening 1950 DTaP/Tdap/Td series (1 - Tdap) 11/2/1971 Shingrix Vaccine Age 50> (1 of 2) 11/2/2000 FOBT Q 1 YEAR AGE 50-75 11/2/2000 GLAUCOMA SCREENING Q2Y 11/2/2015 Pneumococcal 65+ Low/Medium Risk (2 of 2 - PPSV23) 9/1/2016 Influenza Age 5 to Adult 8/1/2018 MEDICARE YEARLY EXAM 9/27/2018 BREAST CANCER SCRN MAMMOGRAM 6/5/2020 Allergies as of 10/2/2018  Review Complete On: 10/2/2018 By: Baron Abigail MD  
  
 Severity Noted Reaction Type Reactions Epinephrine  07/27/2011   Systemic Palpitations \"Makes my heart  ' shudder'. \" Allergic to epinephrine with novocain. Plain epinephrine is ok. Novocain [Procaine]  05/01/2013    Palpitations Nsaids (Non-steroidal Anti-inflammatory Drug)  02/16/2012    Other (comments) GASTRIC DISTRESS. Pravastatin  12/02/2015    Myalgia Aspirin Low 07/27/2011   Side Effect Other (comments) Gastric irritation Ibuprofen Low 07/27/2011   Side Effect Other (comments) Gastric irritation Current Immunizations  Reviewed on 2/22/2012 Name Date Influenza Vaccine Whole 9/1/2011 ZZZ-RETIRED (DO NOT USE) Pneumococcal Vaccine (Unspecified Type) 9/1/2011 Not reviewed this visit You Were Diagnosed With   
  
 Codes Comments Depression with anxiety    -  Primary ICD-10-CM: F41.8 ICD-9-CM: 300.4 Personality disorder ([facility] [address]     ICD-10-CM: F60.9 ICD-9-CM: 301.9 Severe obesity with body mass index (BMI) of 35.0 to 39.9 with serious comorbidity (HCC)     ICD-10-CM: E66.01 
ICD-9-CM: 278.01 Vitals BP Pulse Height(growth percentile) Weight(growth percentile) BMI OB Status 146/89 (!) 103 5' 2\" (1.575 m) 197 lb (89.4 kg) 36.03 kg/m2 Postmenopausal  
 Smoking Status Never Smoker Vitals History BMI and BSA Data Body Mass Index Body Surface Area 36.03 kg/m 2 1.98 m 2 Preferred Pharmacy Pharmacy Name Phone Diane Joy, [facility] [phone] Your Updated Medication List  
  
   
This list is accurate as of 10/2/18 11:21 AM.  Always use your most recent med list.  
  
  
  
  
 ACETAMINOPHEN EXTRA STRENGTH 500 mg tablet Generic drug:  acetaminophen Take 1,500 mg by mouth two (2) times a day. ALLEGRA 180 mg tablet Generic drug:  fexofenadine Take 180 mg by mouth daily. AXERT 12.5 mg tablet Generic drug:  almotriptan Take 12.5 mg by mouth once as needed. CALCIUM 500 WITH D PO Take 2 Tabs by mouth daily. CeleBREX 100 mg capsule Generic drug:  celecoxib Take 100 mg by mouth two (2) times a day. clonazePAM 2 mg tablet Commonly known as:  Yee Aguilar  
 Take 1 Tab by mouth two (2) times a day. Max Daily Amount: 4 mg. CONTOUR NEXT TEST STRIPS strip Generic drug:  glucose blood VI test strips USE TO TEST BLOOD SUGAR BID CRANBERRY PO Take 2,000 mg by mouth daily. Triple strength. DEXILANT 60 mg Cpdb Generic drug:  Dexlansoprazole Take 60 mg by mouth daily. fluticasone 50 mcg/actuation nasal spray Commonly known as:  FLONASE  
  
 melatonin Tab tablet Take 10 mg by mouth nightly. [facility] Generic drug:  Lancets USE BID  
  
 MUCINEX DM PO Take 1 Tab by mouth two (2) times daily as needed. multivitamin, tx-iron-ca-min 9 mg iron-400 mcg Tab tablet Commonly known as:  THERA-M w/ IRON Take 1 Tab by mouth daily. OXcarbazepine 300 mg tablet Commonly known as:  TRILEPTAL  
TAKE 1 TABLET DAILY  Indications: mood stabilizer Potassium Gluconate 595 mg (99 mg) tablet Take 595 mg by mouth daily. raNITIdine 300 mg Tab Commonly known as:  ZANTAC Take 300 mg by mouth daily. rosuvastatin 10 mg tablet Commonly known as:  CRESTOR Take 1 Tab by mouth nightly. traMADol 50 mg tablet Commonly known as:  ULTRAM  
Take 50 mg by mouth every eight (8) hours as needed. traZODone 150 mg tablet Commonly known as:  [place] Poet Take 1 Tab by mouth nightly. venlafaxine- mg capsule Commonly known as:  EFFEXOR-XR  
2 caps qam, 1 cap qhs  
  
 VITAMIN B-12 PO Take 2,000 mcg by mouth daily. 2000MCG DAILY  
  
 VITAMIN C 500 mg tablet Generic drug:  ascorbic acid (vitamin C) Take 500 mg by mouth daily. VITAMIN D3 2,000 unit Tab Generic drug:  cholecalciferol (vitamin D3) Take 5,000 Units by mouth daily. Soft gel. VITAMIN K1  
by Does Not Apply route. VOLTAREN 1 % Gel Generic drug:  diclofenac Apply  to affected area daily as needed. zinc 50 mg Tab tablet Take 1 Tab by mouth daily. Follow-up Instructions Return in about 3 months (around 1/2/2019). Introducing [facility]! [employer] introduces First Coverage patient portal. Now you can access parts of your medical record, email your doctor's office, and request medication refills online. 1. In your internet browser, go to [email or web address] 2. Click on the First Time User? Click Here link in the Sign In box. You will see the New Member Sign Up page. 3. Enter your First Coverage Access Code exactly as it appears below. You will not need to use this code after youve completed the sign-up process. If you do not sign up before the expiration date, you must request a new code. · First Coverage Access Code: [email or web address] Expires: 12/31/2018 11:21 AM 
 
4. Enter the last four digits of your Social Security Number (xxxx) and Date of Birth (mm/dd/yyyy) as indicated and click Submit. You will be taken to the next sign-up page. 5. Create a First Coverage ID. This will be your First Coverage login ID and cannot be changed, so think of one that is secure and easy to remember. 6. Create a First Coverage password. You can change your password at any time. 7. Enter your Password Reset Question and Answer. This can be used at a later time if you forget your password. 8. Enter your e-mail address. You will receive e-mail notification when new information is available in [address] 9. Click Sign Up. You can now view and download portions of your medical record. 10. Click the Download Summary menu link to download a portable copy of your medical information. If you have questions, please visit the Frequently Asked Questions section of the First Coverage website. Remember, First Coverage is NOT to be used for urgent needs. For medical emergencies, dial 911. Now available from your iPhone and Android! Please provide this summary of care documentation to your next provider. Your primary care clinician is listed as Leona Peralta.  If you have any questions after today's visit, please call [phone].

## 2018-10-02 NOTE — PROGRESS NOTES
Psychiatric Outpatient Progress Note    Account Number:  [id number]  Name: Jamee Mcgee    SUBJECTIVE:   CHIEF COMPLAINT:  Martha Porras is a 67 y.o. , White female and was seen today for 3 month follow-up of psychiatric condition and psychotropic medication management.       HPI:    Saba reports the following psychiatric symptoms:  depression and anxiety.  The symptoms have been present for years and are of moderate severity. The symptoms occur daily.         Today, patient was seen alone. She is pretty well dressed and has good jewlary and make up on. Reported feeling Ok. Continues to choose to be isolative and not trusting people at [place]. She denies any psychosis or sergey.  Reports compliance with her medications. She continues to dwell on her family not calling or visiting her and plays victim role pretty well.       Patient has gained 5 more pounds. Fredy Lucks BMI = 36.  BP is better. HR is high- asymptomatic.  Reports compliance with medications. She is  inactive physically. She continued to be somatic. She also reported that she feels very weak and is stays in pain all the time.  Patient is on multiple pain medications.       Contributing factors include: dog with #, finances.        Patient denies SI/HI/SIB.        Side Effects:  none        Fam/Soc Hx (from [facility] Eval with updates):        REVIEW OF SYSTEMS:  Constitutional: positive for fatigue, weight gain  Eyes: positive for contacts/glasses and visual disturbance  Ears, nose, mouth, throat, and face: positive for hearing loss  Musculoskeletal:positive for myalgias and arthralgias, left knee pain  Neurological: positive for memory problems  Behavioral/Psych: positive for anxiety and depression, negative for SI or HI  Appetite: good  Sleep: fair    Visit Vitals    /89    Pulse (!) 103    Ht 5' 2\" (1.575 m)    Wt 89.4 kg (197 lb)    BMI 36.03 kg/m2       OBJECTIVE:                 Mental Status exam: WNL except for      Sensorium  oriented to time, place and person   Relations cooperative and passive    Eye Contact    appropriate   Appearance:  age appropriate, casually dressed and overweight   Motor Behavior/Gait:  within normal limits   Speech:  normal pitch and normal volume   Thought Process: goal directed and logical   Thought Content free of delusions and free of hallucinations   Suicidal ideations none   Homicidal ideations none   Mood:  euthymic   Affect:  anxious   Memory recent  adequate   Memory remote:  adequate   Concentration:  adequate   Abstraction:  concrete   Insight:  fair   Reliability fair   Judgment:  fair       MEDICAL DECISION MAKING  Data: pertinent labs, imaging, medical records and diagnostic tests reviewed and incorporated in diagnosis and treatment plan    Allergies   Allergen Reactions    Epinephrine Palpitations     \"Makes my heart  ' shudder'. \" Allergic to epinephrine with novocain. Plain epinephrine is ok.  Novocain [Procaine] Palpitations    Nsaids (Non-Steroidal Anti-Inflammatory Drug) Other (comments)     GASTRIC DISTRESS.  Pravastatin Myalgia    Aspirin Other (comments)     Gastric irritation    Ibuprofen Other (comments)     Gastric irritation        Current Outpatient Prescriptions   Medication Sig Dispense Refill    raNITIdine (ZANTAC) 300 mg tablet Take 300 mg by mouth daily.  venlafaxine-SR (EFFEXOR-XR) 150 mg capsule 2 caps qam, 1 cap qhs 270 Cap 1    clonazePAM (KLONOPIN) 2 mg tablet Take 1 Tab by mouth two (2) times a day. Max Daily Amount: 4 mg. 180 Tab 2    traZODone (DESYREL) 150 mg tablet Take 1 Tab by mouth nightly. 90 Tab 1    OXcarbazepine (TRILEPTAL) 300 mg tablet TAKE 1 TABLET DAILY  Indications: mood stabilizer 90 Tab 2    PHYTONADIONE, VIT K1, (VITAMIN K1) by Does Not Apply route.  melatonin tab tablet Take 10 mg by mouth nightly.  multivitamin, tx-iron-ca-min (THERA-M W/ IRON) 9 mg iron-400 mcg tab tablet Take 1 Tab by mouth daily.       CONTOUR NEXT STRIPS strip USE TO TEST BLOOD SUGAR BID  0    MICROLET LANCET misc USE BID  11    DEXILANT 60 mg CpDB Take 60 mg by mouth daily.  rosuvastatin (CRESTOR) 10 mg tablet Take 1 Tab by mouth nightly. 30 Tab 2    traMADol (ULTRAM) 50 mg tablet Take 50 mg by mouth every eight (8) hours as needed. 3    fluticasone (FLONASE) 50 mcg/actuation nasal spray       fexofenadine (ALLEGRA) 180 mg tablet Take 180 mg by mouth daily.  CRANBERRY EXTRACT (CRANBERRY PO) Take 2,000 mg by mouth daily. Triple strength.  GUAIFENESIN/DEXTROMETHORPHAN (MUCINEX DM PO) Take 1 Tab by mouth two (2) times daily as needed.  acetaminophen (ACETAMINOPHEN EXTRA STRENGTH) 500 mg tablet Take 1,500 mg by mouth two (2) times a day.  celecoxib (CELEBREX) 100 mg capsule Take 100 mg by mouth two (2) times a day.  Diclofenac Sodium (VOLTAREN) 1 % topical gel Apply  to affected area daily as needed.  cholecalciferol, vitamin D3, (VITAMIN D-3) 2,000 unit Tab Take 5,000 Units by mouth daily. Soft gel.  CYANOCOBALAMIN, VITAMIN B-12, (VITAMIN B-12 PO) Take 2,000 mcg by mouth daily. 2000MCG DAILY      ascorbic acid (VITAMIN C) 500 mg tablet Take 500 mg by mouth daily.  CALCIUM CARBONATE/VITAMIN D3 (CALCIUM 500 WITH D PO) Take 2 Tabs by mouth daily.  Potassium Gluconate 595 (99) mg tablet Take 595 mg by mouth daily.  zinc 50 mg Tab Take 1 Tab by mouth daily.  almotriptan (AXERT) 12.5 mg tablet Take 12.5 mg by mouth once as needed. Problems addressed today:    ICD-10-CM ICD-9-CM    1. Depression with anxiety F41.8 300.4    2. Personality disorder ([facility] [address] F60.9 301.9    3. Severe obesity with body mass index (BMI) of 35.0 to 39.9 with serious comorbidity (HCC) E66.01 278.01        Assessment:   Medina Armando  is a 79 y.o.  female  is responding to treatment. Symptoms are stable. Patient denies SI/HI/SIB. No evidence of AH/VH or delusions.     Risk Scoring- chronic illnesses and prescription drug management    Treatment Plan:  1. Medications:          Medication Changes/Adjustments: Continue combination of Effexor, Trileptal, trazodone and clonazepam in the current dosages. Current Outpatient Prescriptions   Medication Sig Dispense Refill    raNITIdine (ZANTAC) 300 mg tablet Take 300 mg by mouth daily.  venlafaxine-SR (EFFEXOR-XR) 150 mg capsule 2 caps qam, 1 cap qhs 270 Cap 1    clonazePAM (KLONOPIN) 2 mg tablet Take 1 Tab by mouth two (2) times a day. Max Daily Amount: 4 mg. 180 Tab 2    traZODone (DESYREL) 150 mg tablet Take 1 Tab by mouth nightly. 90 Tab 1    OXcarbazepine (TRILEPTAL) 300 mg tablet TAKE 1 TABLET DAILY  Indications: mood stabilizer 90 Tab 2    PHYTONADIONE, VIT K1, (VITAMIN K1) by Does Not Apply route.  melatonin tab tablet Take 10 mg by mouth nightly.  multivitamin, tx-iron-ca-min (THERA-M W/ IRON) 9 mg iron-400 mcg tab tablet Take 1 Tab by mouth daily.  CONTOUR NEXT STRIPS strip USE TO TEST BLOOD SUGAR BID  0    MICROLET LANCET misc USE BID  11    DEXILANT 60 mg CpDB Take 60 mg by mouth daily.  rosuvastatin (CRESTOR) 10 mg tablet Take 1 Tab by mouth nightly. 30 Tab 2    traMADol (ULTRAM) 50 mg tablet Take 50 mg by mouth every eight (8) hours as needed. 3    fluticasone (FLONASE) 50 mcg/actuation nasal spray       fexofenadine (ALLEGRA) 180 mg tablet Take 180 mg by mouth daily.  CRANBERRY EXTRACT (CRANBERRY PO) Take 2,000 mg by mouth daily. Triple strength.  GUAIFENESIN/DEXTROMETHORPHAN (MUCINEX DM PO) Take 1 Tab by mouth two (2) times daily as needed.  acetaminophen (ACETAMINOPHEN EXTRA STRENGTH) 500 mg tablet Take 1,500 mg by mouth two (2) times a day.  celecoxib (CELEBREX) 100 mg capsule Take 100 mg by mouth two (2) times a day.  Diclofenac Sodium (VOLTAREN) 1 % topical gel Apply  to affected area daily as needed.  cholecalciferol, vitamin D3, (VITAMIN D-3) 2,000 unit Tab Take 5,000 Units by mouth daily. Soft gel.  CYANOCOBALAMIN, VITAMIN B-12, (VITAMIN B-12 PO) Take 2,000 mcg by mouth daily. 2000MCG DAILY      ascorbic acid (VITAMIN C) 500 mg tablet Take 500 mg by mouth daily.  CALCIUM CARBONATE/VITAMIN D3 (CALCIUM 500 WITH D PO) Take 2 Tabs by mouth daily.  Potassium Gluconate 595 (99) mg tablet Take 595 mg by mouth daily.  zinc 50 mg Tab Take 1 Tab by mouth daily.  almotriptan (AXERT) 12.5 mg tablet Take 12.5 mg by mouth once as needed. The following regarding medications was addressed:    (The risks and benefits of the proposed medication; the potential medication side effects ie    dry mouth, weight gain, GI upset, headache; patient given opportunity to ask questions)       2. Counseling and coordination of care including instructions for treatment, risks/benefits, risk factor reduction and patient/family education. She agrees with the plan. Patient instructed to call with any side effects, questions or issues. 3.  Pt was provides supportive counseling for her stress of loneliness, polypharmacy and chronic pain. PSYCHOTHERAPY:  approx 20 minutes  Type:  Supportive/Solution Focused psychotherapy provided  Focus:     Current problems:              Loneliness   Housing issues   Financial issues   Medical issues     Psychoeducation provided: psych medications    Treatment plan reviewed with patient-including diagnosis and medications    Hany Lomeli is stable. Follow-up Disposition:  Return in about 3 months (around 1/2/2019).       Brunilda Boateng MD  10/2/2018

## 2018-10-26 DIAGNOSIS — F41.9 ANXIETY: ICD-10-CM

## 2018-10-27 RX ORDER — CLONAZEPAM 2 MG/1
2 TABLET ORAL 2 TIMES DAILY
Qty: 180 TAB | Refills: 2 | Status: SHIPPED | OUTPATIENT
Start: 2018-10-27 | End: 2018-11-27 | Stop reason: SDUPTHER

## 2018-10-30 ENCOUNTER — HOSPITAL ENCOUNTER (OUTPATIENT)
Dept: PREADMISSION TESTING | Age: 68
Discharge: HOME | End: 2018-10-30
Attending: OBSTETRICS & GYNECOLOGY
Payer: MEDICARE

## 2018-10-30 ENCOUNTER — HOSPITAL ENCOUNTER (OUTPATIENT)
Dept: GENERAL RADIOLOGY | Age: 68
Discharge: HOME | End: 2018-10-30
Attending: OBSTETRICS & GYNECOLOGY
Payer: MEDICARE

## 2018-10-30 VITALS
BODY MASS INDEX: 36.8 KG/M2 | HEIGHT: 62 IN | DIASTOLIC BLOOD PRESSURE: 77 MMHG | SYSTOLIC BLOOD PRESSURE: 157 MMHG | TEMPERATURE: 98.5 F | HEART RATE: 106 BPM | OXYGEN SATURATION: 95 % | WEIGHT: 199.96 LBS | RESPIRATION RATE: 18 BRPM

## 2018-10-30 LAB
ANION GAP SERPL CALC-SCNC: 7 MMOL/L (ref 5–15)
APPEARANCE UR: CLEAR
ATRIAL RATE: 101 BPM
BACTERIA URNS QL MICRO: ABNORMAL /HPF
BILIRUB UR QL: NEGATIVE
BUN SERPL-MCNC: 25 MG/DL (ref 6–20)
BUN/CREAT SERPL: 24 (ref 12–20)
CALCIUM SERPL-MCNC: 8.9 MG/DL (ref 8.5–10.1)
CALCULATED P AXIS, ECG09: 27 DEGREES
CALCULATED R AXIS, ECG10: -11 DEGREES
CALCULATED T AXIS, ECG11: 89 DEGREES
CHLORIDE SERPL-SCNC: 105 MMOL/L (ref 97–108)
CO2 SERPL-SCNC: 27 MMOL/L (ref 21–32)
COLOR UR: ABNORMAL
CREAT SERPL-MCNC: 1.04 MG/DL (ref 0.55–1.02)
DIAGNOSIS, 93000: NORMAL
EPITH CASTS URNS QL MICRO: ABNORMAL /LPF
ERYTHROCYTE [DISTWIDTH] IN BLOOD BY AUTOMATED COUNT: 13 % (ref 11.5–14.5)
GLUCOSE SERPL-MCNC: 102 MG/DL (ref 65–100)
GLUCOSE UR STRIP.AUTO-MCNC: NEGATIVE MG/DL
HCT VFR BLD AUTO: 41.3 % (ref 35–47)
HGB BLD-MCNC: 13.4 G/DL (ref 11.5–16)
HGB UR QL STRIP: NEGATIVE
KETONES UR QL STRIP.AUTO: NEGATIVE MG/DL
LEUKOCYTE ESTERASE UR QL STRIP.AUTO: ABNORMAL
MCH RBC QN AUTO: 30.2 PG (ref 26–34)
MCHC RBC AUTO-ENTMCNC: 32.4 G/DL (ref 30–36.5)
MCV RBC AUTO: 93 FL (ref 80–99)
NITRITE UR QL STRIP.AUTO: NEGATIVE
NRBC # BLD: 0 K/UL (ref 0–0.01)
NRBC BLD-RTO: 0 PER 100 WBC
P-R INTERVAL, ECG05: 140 MS
PH UR STRIP: 7 [PH] (ref 5–8)
PLATELET # BLD AUTO: 230 K/UL (ref 150–400)
PMV BLD AUTO: 10.2 FL (ref 8.9–12.9)
POTASSIUM SERPL-SCNC: 4.5 MMOL/L (ref 3.5–5.1)
PROT UR STRIP-MCNC: NEGATIVE MG/DL
Q-T INTERVAL, ECG07: 328 MS
QRS DURATION, ECG06: 76 MS
QTC CALCULATION (BEZET), ECG08: 425 MS
RBC # BLD AUTO: 4.44 M/UL (ref 3.8–5.2)
RBC #/AREA URNS HPF: ABNORMAL /HPF (ref 0–5)
SODIUM SERPL-SCNC: 139 MMOL/L (ref 136–145)
SP GR UR REFRACTOMETRY: 1.03 (ref 1–1.03)
UA: UC IF INDICATED,UAUC: ABNORMAL
UROBILINOGEN UR QL STRIP.AUTO: 0.2 EU/DL (ref 0.2–1)
VENTRICULAR RATE, ECG03: 101 BPM
WBC # BLD AUTO: 10.9 K/UL (ref 3.6–11)
WBC URNS QL MICRO: ABNORMAL /HPF (ref 0–4)

## 2018-10-30 PROCEDURE — 81001 URINALYSIS AUTO W/SCOPE: CPT | Performed by: OBSTETRICS & GYNECOLOGY

## 2018-10-30 PROCEDURE — 85027 COMPLETE CBC AUTOMATED: CPT | Performed by: OBSTETRICS & GYNECOLOGY

## 2018-10-30 PROCEDURE — 71046 X-RAY EXAM CHEST 2 VIEWS: CPT

## 2018-10-30 PROCEDURE — 93005 ELECTROCARDIOGRAM TRACING: CPT

## 2018-10-30 PROCEDURE — 87086 URINE CULTURE/COLONY COUNT: CPT | Performed by: OBSTETRICS & GYNECOLOGY

## 2018-10-30 PROCEDURE — 36415 COLL VENOUS BLD VENIPUNCTURE: CPT | Performed by: OBSTETRICS & GYNECOLOGY

## 2018-10-30 PROCEDURE — 80048 BASIC METABOLIC PNL TOTAL CA: CPT | Performed by: OBSTETRICS & GYNECOLOGY

## 2018-10-30 RX ORDER — DIPHENHYDRAMINE HCL 25 MG
25 CAPSULE ORAL
COMMUNITY
End: 2019-08-23

## 2018-10-30 RX ORDER — ASPIRIN 81 MG/1
81 TABLET ORAL EVERY EVENING
Status: ON HOLD | COMMUNITY
End: 2019-08-23 | Stop reason: SDUPTHER

## 2018-10-30 RX ORDER — LOPERAMIDE HYDROCHLORIDE 2 MG/1
CAPSULE ORAL AS NEEDED
COMMUNITY
End: 2019-08-23

## 2018-10-30 RX ORDER — BISACODYL 5 MG
5 TABLET, DELAYED RELEASE (ENTERIC COATED) ORAL DAILY PRN
COMMUNITY
End: 2019-08-23

## 2018-10-30 RX ORDER — BACLOFEN 20 MG
TABLET ORAL EVERY EVENING
Status: ON HOLD | COMMUNITY
End: 2019-08-01 | Stop reason: CLARIF

## 2018-10-30 NOTE — PERIOP NOTES
[facility] Preoperative Instructions Surgery Date 11/5/2018         Time of Arrival 7:30 AM 
 
1. On the day of your surgery, please report to the Surgical Services Registration Desk and sign in at your designated time. The Surgery Center is located to the right of the Emergency Room. 2. You must have someone with you to drive you home. You should not drive a car for 24 hours following surgery. Please make arrangements for a friend or family member to stay with you for the first 24 hours after your surgery. 3. Do not have anything to eat or drink (including water, gum, mints, coffee, juice) after midnight 11/4/2018?? Loverobertoa Blaze ? This may not apply to medications prescribed by your physician. ?(Please note below the special instructions with medications to take the morning of your procedure.) 4. We recommend you do not drink any alcoholic beverages for 24 hours before and after your surgery. 5. Contact your surgeons office for instructions on the following medications: non-steroidal anti-inflammatory drugs (i.e. Advil, Aleve), vitamins, and supplements. (Some surgeons will want you to stop these medications prior to surgery and others may allow you to take them) **If you are currently taking Plavix, Coumadin, Aspirin and/or other blood-thinning agents, contact your surgeon for instructions. ** Your surgeon will partner with the physician prescribing these medications to determine if it is safe to stop or if you need to continue taking. Please do not stop taking these medications without instructions from your surgeon 6. Wear comfortable clothes. Wear glasses instead of contacts. Do not bring any money or jewelry. Please bring picture ID, insurance card, and any prearranged co-payment or hospital payment. Do not wear make-up, particularly mascara the morning of your surgery. Do not wear nail polish, particularly if you are having foot /hand surgery.   Wear your hair loose or down, no ponytails, buns, anival pins or clips. All body piercings must be removed. Please shower with antibacterial soap for three consecutive days before and on the morning of surgery, but do not apply any lotions, powders or deodorants after the shower on the day of surgery. Please use a fresh towels after each shower. Please sleep in clean clothes and change bed linens the night before surgery. Please do not shave for 48 hours prior to surgery. Shaving of the face is acceptable. 7. You should understand that if you do not follow these instructions your surgery may be cancelled. If your physical condition changes (I.e. fever, cold or flu) please contact your surgeon as soon as possible. 8. It is important that you be on time. If a situation occurs where you may be late, please call [phone] (OR Holding Area). 9. If you have any questions and or problems, please call [phone] (Pre-admission Testing). 10. Your surgery time may be subject to change. You will receive a phone call the evening prior if your time changes. 11.  If having outpatient surgery, you must have someone to drive you here, stay with you during the duration of your stay, and to drive you home at time of discharge. 12.   In an effort to improve the efficiency, privacy, and safety for all of our Pre-op patients visitors are not allowed in the Holding area. Once you arrive and are registered your family/visitors will be asked to remain in the waiting room. The Pre-op staff will get you from the Surgical Waiting Area and will explain to you and your family/visitors that the Pre-op phase is beginning. The staff will answer any questions and provide instructions for tracking of the patient, by use of the existing tracking number and color-coded status board in the waiting room.   At this time the staff will also ask for your designated spokesperson information in the event that the physician or staff need to provide an update or obtain any pertinent information. The designated spokesperson will be notified if the physician needs to speak to family during the pre-operative phase. If at any time your family/visitors has questions or concerns they may approach the volunteer desk in the waiting area for assistance. Special Instructions:Confirm with surgeon when to stop Vitamin Supplements, Aspirin and Celebrex and Diclofenac gel MEDICATIONS TO TAKE THE MORNING OF SURGERY WITH A SIP OF WATER: 
None I understand a pre-operative phone call will be made to verify my surgery time. In the event that I am not available, I give permission for a message to be left on my answering service and/or with another person? Yes [phone] 
 
 
 
 ___________________      __________   _________ 
  (Signature of Patient)             (Witness)                (Date and Time)

## 2018-11-01 LAB
BACTERIA SPEC CULT: NORMAL
CC UR VC: NORMAL
SERVICE CMNT-IMP: NORMAL

## 2018-11-01 NOTE — PERIOP NOTES
Pre-op labs faxed to Dr Fabby Jenkins for review. Confirmation fax received.  Violette Petersen RN

## 2018-11-15 DIAGNOSIS — F41.9 ANXIETY: ICD-10-CM

## 2018-11-15 NOTE — TELEPHONE ENCOUNTER
Pt left  saying we refused the refill req for clonazepam.    She said she only has enough to last her til Monday and she doesn't want to go through withdrawls. Pt called from [phone] and said she \"wants it done TODAY and to not wait til Monday when shes out\"    She said she now needs a local supply to [facility] her.

## 2018-11-15 NOTE — TELEPHONE ENCOUNTER
Attempted to call patient, no answer. Left detailed message: no request has been received so there was no opportunity to deny. What local pharmacy does she want the medication called into? [facility] 10/[facility] .. Did she request a fill?

## 2018-11-20 NOTE — ADVANCED PRACTICE NURSE
EKG from 10/30/18 reviewed by Dr. Howard Liu, anesthesiologist and compared with previous EKGs from 5/19/17, 2/16/12. Planned procedure, PMHx reviewed. OK to proceed with planned surgery per Dr. Howard Liu.

## 2018-11-21 NOTE — PERIOP NOTES
LM for Emely / Dr Emily Santamaria nurse advising PAT has not been able to contact pt to review new pre-op instructions for new surgery date. Office does not have any additional phone numbers for pt as well. Emely called PAT and advised she has not been able to reach pt either and if pt calls office they will give pt a message to call PAT. Pt called, LVM with final time of arrival. LVM reminding pt to be NPO after midnight and requirement for caregiver/ride to bring, stay and take home.

## 2018-11-26 ENCOUNTER — HOSPITAL ENCOUNTER (OUTPATIENT)
Age: 68
Setting detail: OUTPATIENT SURGERY
Discharge: HOME | End: 2018-11-26
Attending: OBSTETRICS & GYNECOLOGY | Admitting: OBSTETRICS & GYNECOLOGY
Payer: MEDICARE

## 2018-11-26 ENCOUNTER — ANESTHESIA EVENT (OUTPATIENT)
Dept: SURGERY | Age: 68
End: 2018-11-26
Payer: MEDICARE

## 2018-11-26 ENCOUNTER — ANESTHESIA (OUTPATIENT)
Dept: SURGERY | Age: 68
End: 2018-11-26
Payer: MEDICARE

## 2018-11-26 VITALS
RESPIRATION RATE: 16 BRPM | OXYGEN SATURATION: 100 % | DIASTOLIC BLOOD PRESSURE: 64 MMHG | BODY MASS INDEX: 36.59 KG/M2 | TEMPERATURE: 98.1 F | WEIGHT: 198.85 LBS | HEART RATE: 95 BPM | HEIGHT: 62 IN | SYSTOLIC BLOOD PRESSURE: 142 MMHG

## 2018-11-26 DIAGNOSIS — R87.619 ATYPICAL GLANDULAR CELLS OF UNDETERMINED SIGNIFICANCE (AGUS) ON CERVICAL PAP SMEAR: Primary | ICD-10-CM

## 2018-11-26 LAB
GLUCOSE BLD STRIP.AUTO-MCNC: 124 MG/DL (ref 65–100)
SERVICE CMNT-IMP: ABNORMAL

## 2018-11-26 PROCEDURE — 77030003666 HC NDL SPINAL BD -A: Performed by: OBSTETRICS & GYNECOLOGY

## 2018-11-26 PROCEDURE — 74011250636 HC RX REV CODE- 250/636: Performed by: ANESTHESIOLOGY

## 2018-11-26 PROCEDURE — 74011250636 HC RX REV CODE- 250/636: Performed by: OBSTETRICS & GYNECOLOGY

## 2018-11-26 PROCEDURE — 82962 GLUCOSE BLOOD TEST: CPT

## 2018-11-26 PROCEDURE — 74011250636 HC RX REV CODE- 250/636

## 2018-11-26 PROCEDURE — 77030020143 HC AIRWY LARYN INTUB CGAS -A: Performed by: ANESTHESIOLOGY

## 2018-11-26 PROCEDURE — 77030033137 HC TBNG OUTFLO AQUILEX ST HOLO -B: Performed by: OBSTETRICS & GYNECOLOGY

## 2018-11-26 PROCEDURE — 88305 TISSUE EXAM BY PATHOLOGIST: CPT

## 2018-11-26 PROCEDURE — 76060000032 HC ANESTHESIA 0.5 TO 1 HR: Performed by: OBSTETRICS & GYNECOLOGY

## 2018-11-26 PROCEDURE — 77030033650 HC DEV TISS RMVL MYOSUR HOLO -F: Performed by: OBSTETRICS & GYNECOLOGY

## 2018-11-26 PROCEDURE — 77030005537 HC CATH URETH BARD -A: Performed by: OBSTETRICS & GYNECOLOGY

## 2018-11-26 PROCEDURE — 76010000138 HC OR TIME 0.5 TO 1 HR: Performed by: OBSTETRICS & GYNECOLOGY

## 2018-11-26 PROCEDURE — 77030033136 HC TBNG INFLO AQUILEX ST HOLO -C: Performed by: OBSTETRICS & GYNECOLOGY

## 2018-11-26 PROCEDURE — 77030020782 HC GWN BAIR PAWS FLX 3M -B

## 2018-11-26 PROCEDURE — 74011000250 HC RX REV CODE- 250: Performed by: OBSTETRICS & GYNECOLOGY

## 2018-11-26 PROCEDURE — 77030018836 HC SOL IRR NACL ICUM -A: Performed by: OBSTETRICS & GYNECOLOGY

## 2018-11-26 PROCEDURE — 76210000006 HC OR PH I REC 0.5 TO 1 HR: Performed by: OBSTETRICS & GYNECOLOGY

## 2018-11-26 PROCEDURE — 76210000020 HC REC RM PH II FIRST 0.5 HR: Performed by: OBSTETRICS & GYNECOLOGY

## 2018-11-26 RX ORDER — HYDROCODONE BITARTRATE AND ACETAMINOPHEN 5; 325 MG/1; MG/1
1 TABLET ORAL
Qty: 20 TAB | Refills: 0 | Status: ON HOLD | OUTPATIENT
Start: 2018-11-26 | End: 2019-08-01 | Stop reason: CLARIF

## 2018-11-26 RX ORDER — DEXAMETHASONE SODIUM PHOSPHATE 4 MG/ML
INJECTION, SOLUTION INTRA-ARTICULAR; INTRALESIONAL; INTRAMUSCULAR; INTRAVENOUS; SOFT TISSUE AS NEEDED
Status: DISCONTINUED | OUTPATIENT
Start: 2018-11-26 | End: 2018-11-26 | Stop reason: HOSPADM

## 2018-11-26 RX ORDER — PROPOFOL 10 MG/ML
INJECTION, EMULSION INTRAVENOUS AS NEEDED
Status: DISCONTINUED | OUTPATIENT
Start: 2018-11-26 | End: 2018-11-26 | Stop reason: HOSPADM

## 2018-11-26 RX ORDER — LIDOCAINE HYDROCHLORIDE 20 MG/ML
INJECTION, SOLUTION EPIDURAL; INFILTRATION; INTRACAUDAL; PERINEURAL AS NEEDED
Status: DISCONTINUED | OUTPATIENT
Start: 2018-11-26 | End: 2018-11-26 | Stop reason: HOSPADM

## 2018-11-26 RX ORDER — MIDAZOLAM HYDROCHLORIDE 1 MG/ML
INJECTION, SOLUTION INTRAMUSCULAR; INTRAVENOUS AS NEEDED
Status: DISCONTINUED | OUTPATIENT
Start: 2018-11-26 | End: 2018-11-26 | Stop reason: HOSPADM

## 2018-11-26 RX ORDER — ONDANSETRON 2 MG/ML
INJECTION INTRAMUSCULAR; INTRAVENOUS AS NEEDED
Status: DISCONTINUED | OUTPATIENT
Start: 2018-11-26 | End: 2018-11-26 | Stop reason: HOSPADM

## 2018-11-26 RX ORDER — LIDOCAINE HYDROCHLORIDE 10 MG/ML
0.1 INJECTION, SOLUTION EPIDURAL; INFILTRATION; INTRACAUDAL; PERINEURAL AS NEEDED
Status: DISCONTINUED | OUTPATIENT
Start: 2018-11-26 | End: 2018-11-26 | Stop reason: HOSPADM

## 2018-11-26 RX ORDER — ONDANSETRON 2 MG/ML
4 INJECTION INTRAMUSCULAR; INTRAVENOUS AS NEEDED
Status: DISCONTINUED | OUTPATIENT
Start: 2018-11-26 | End: 2018-11-26 | Stop reason: HOSPADM

## 2018-11-26 RX ORDER — SODIUM CHLORIDE 0.9 % (FLUSH) 0.9 %
5-10 SYRINGE (ML) INJECTION AS NEEDED
Status: DISCONTINUED | OUTPATIENT
Start: 2018-11-26 | End: 2018-11-26 | Stop reason: HOSPADM

## 2018-11-26 RX ORDER — ACETAMINOPHEN 10 MG/ML
INJECTION, SOLUTION INTRAVENOUS AS NEEDED
Status: DISCONTINUED | OUTPATIENT
Start: 2018-11-26 | End: 2018-11-26 | Stop reason: HOSPADM

## 2018-11-26 RX ORDER — FENTANYL CITRATE 50 UG/ML
INJECTION, SOLUTION INTRAMUSCULAR; INTRAVENOUS AS NEEDED
Status: DISCONTINUED | OUTPATIENT
Start: 2018-11-26 | End: 2018-11-26 | Stop reason: HOSPADM

## 2018-11-26 RX ORDER — BUPIVACAINE HYDROCHLORIDE 5 MG/ML
INJECTION, SOLUTION EPIDURAL; INTRACAUDAL AS NEEDED
Status: DISCONTINUED | OUTPATIENT
Start: 2018-11-26 | End: 2018-11-26 | Stop reason: HOSPADM

## 2018-11-26 RX ORDER — FENTANYL CITRATE 50 UG/ML
25 INJECTION, SOLUTION INTRAMUSCULAR; INTRAVENOUS
Status: DISCONTINUED | OUTPATIENT
Start: 2018-11-26 | End: 2018-11-26 | Stop reason: HOSPADM

## 2018-11-26 RX ORDER — CEFAZOLIN SODIUM/WATER 2 G/20 ML
2 SYRINGE (ML) INTRAVENOUS ONCE
Status: COMPLETED | OUTPATIENT
Start: 2018-11-26 | End: 2018-11-26

## 2018-11-26 RX ORDER — DIPHENHYDRAMINE HYDROCHLORIDE 50 MG/ML
12.5 INJECTION, SOLUTION INTRAMUSCULAR; INTRAVENOUS AS NEEDED
Status: DISCONTINUED | OUTPATIENT
Start: 2018-11-26 | End: 2018-11-26 | Stop reason: HOSPADM

## 2018-11-26 RX ORDER — HYDROMORPHONE HYDROCHLORIDE 1 MG/ML
.2-.5 INJECTION, SOLUTION INTRAMUSCULAR; INTRAVENOUS; SUBCUTANEOUS
Status: DISCONTINUED | OUTPATIENT
Start: 2018-11-26 | End: 2018-11-26 | Stop reason: HOSPADM

## 2018-11-26 RX ORDER — PHENYLEPHRINE HCL IN 0.9% NACL 0.4MG/10ML
SYRINGE (ML) INTRAVENOUS AS NEEDED
Status: DISCONTINUED | OUTPATIENT
Start: 2018-11-26 | End: 2018-11-26 | Stop reason: HOSPADM

## 2018-11-26 RX ORDER — MIDAZOLAM HYDROCHLORIDE 1 MG/ML
0.5 INJECTION, SOLUTION INTRAMUSCULAR; INTRAVENOUS
Status: DISCONTINUED | OUTPATIENT
Start: 2018-11-26 | End: 2018-11-26 | Stop reason: HOSPADM

## 2018-11-26 RX ORDER — SODIUM CHLORIDE, SODIUM LACTATE, POTASSIUM CHLORIDE, CALCIUM CHLORIDE 600; 310; 30; 20 MG/100ML; MG/100ML; MG/100ML; MG/100ML
25 INJECTION, SOLUTION INTRAVENOUS CONTINUOUS
Status: DISCONTINUED | OUTPATIENT
Start: 2018-11-26 | End: 2018-11-26 | Stop reason: HOSPADM

## 2018-11-26 RX ORDER — MORPHINE SULFATE 10 MG/ML
2 INJECTION, SOLUTION INTRAMUSCULAR; INTRAVENOUS
Status: DISCONTINUED | OUTPATIENT
Start: 2018-11-26 | End: 2018-11-26 | Stop reason: HOSPADM

## 2018-11-26 RX ORDER — FENTANYL CITRATE 50 UG/ML
50 INJECTION, SOLUTION INTRAMUSCULAR; INTRAVENOUS AS NEEDED
Status: DISCONTINUED | OUTPATIENT
Start: 2018-11-26 | End: 2018-11-26 | Stop reason: HOSPADM

## 2018-11-26 RX ADMIN — Medication 80 MCG: at 09:10

## 2018-11-26 RX ADMIN — MIDAZOLAM HYDROCHLORIDE 2 MG: 1 INJECTION, SOLUTION INTRAMUSCULAR; INTRAVENOUS at 08:42

## 2018-11-26 RX ADMIN — FENTANYL CITRATE 25 MCG: 50 INJECTION, SOLUTION INTRAMUSCULAR; INTRAVENOUS at 08:45

## 2018-11-26 RX ADMIN — Medication 2 G: at 08:55

## 2018-11-26 RX ADMIN — ACETAMINOPHEN 1000 MG: 10 INJECTION, SOLUTION INTRAVENOUS at 09:02

## 2018-11-26 RX ADMIN — LIDOCAINE HYDROCHLORIDE 30 MG: 20 INJECTION, SOLUTION EPIDURAL; INFILTRATION; INTRACAUDAL; PERINEURAL at 09:01

## 2018-11-26 RX ADMIN — MIDAZOLAM HYDROCHLORIDE 1 MG: 1 INJECTION, SOLUTION INTRAMUSCULAR; INTRAVENOUS at 08:51

## 2018-11-26 RX ADMIN — SODIUM CHLORIDE, SODIUM LACTATE, POTASSIUM CHLORIDE, AND CALCIUM CHLORIDE 25 ML/HR: 600; 310; 30; 20 INJECTION, SOLUTION INTRAVENOUS at 08:15

## 2018-11-26 RX ADMIN — FENTANYL CITRATE 25 MCG: 50 INJECTION, SOLUTION INTRAMUSCULAR; INTRAVENOUS at 08:47

## 2018-11-26 RX ADMIN — ONDANSETRON 4 MG: 2 INJECTION INTRAMUSCULAR; INTRAVENOUS at 09:08

## 2018-11-26 RX ADMIN — PROPOFOL 20 MG: 10 INJECTION, EMULSION INTRAVENOUS at 09:08

## 2018-11-26 RX ADMIN — MIDAZOLAM HYDROCHLORIDE 2 MG: 1 INJECTION, SOLUTION INTRAMUSCULAR; INTRAVENOUS at 08:45

## 2018-11-26 RX ADMIN — PROPOFOL 10 MG: 10 INJECTION, EMULSION INTRAVENOUS at 09:18

## 2018-11-26 RX ADMIN — Medication 80 MCG: at 09:15

## 2018-11-26 RX ADMIN — DEXAMETHASONE SODIUM PHOSPHATE 8 MG: 4 INJECTION, SOLUTION INTRA-ARTICULAR; INTRALESIONAL; INTRAMUSCULAR; INTRAVENOUS; SOFT TISSUE at 09:08

## 2018-11-26 RX ADMIN — PROPOFOL 10 MG: 10 INJECTION, EMULSION INTRAVENOUS at 09:16

## 2018-11-26 RX ADMIN — PROPOFOL 200 MG: 10 INJECTION, EMULSION INTRAVENOUS at 08:51

## 2018-11-26 NOTE — DISCHARGE INSTRUCTIONS
General Discharge Instructions    Patient ID:  Daisy Roche  [id number]  76 y.o.  1950    Patient Instructions    Nothing in vagina    The following personal items were collected during your admission and were returned to you. Take Home Medications           What to do at Home    Recommended diet: Clear liquids, advance as tolerated,     Recommended activity: Activity as tolerated and no driving for today,     If you experience any of the following symptoms Fever, please follow up with Dr Ludy Ritchie. Follow-up with Dr Ludy Ritchie in 2 weeks. DISCHARGE SUMMARY from Nurse    PATIENT INSTRUCTIONS:    After general anesthesia or intravenous sedation, for 24 hours or while taking prescription Narcotics:  · Limit your activities  · Do not drive and operate hazardous machinery  · Do not make important personal or business decisions  · Do  not drink alcoholic beverages  · If you have not urinated within 8 hours after discharge, please contact your surgeon on call. Report the following to your surgeon:  · Excessive pain, swelling, redness or odor of or around the surgical area  · Temperature over 100.5  · Nausea and vomiting lasting longer than 4 hours or if unable to take medications  · Any signs of decreased circulation or nerve impairment to extremity: change in color, persistent  numbness, tingling, coldness or increase pain  · Any questions    What to do at Home:    *  Please give a list of your current medications to your Primary Care Provider. *  Please update this list whenever your medications are discontinued, doses are      changed, or new medications (including over-the-counter products) are added. *  Please carry medication information at all times in case of emergency situations.     These are general instructions for a healthy lifestyle:    No smoking/ No tobacco products/ Avoid exposure to second hand smoke  Surgeon General's Warning:  Quitting smoking now greatly reduces serious risk to your health. Obesity, smoking, and sedentary lifestyle greatly increases your risk for illness    A healthy diet, regular physical exercise & weight monitoring are important for maintaining a healthy lifestyle    You may be retaining fluid if you have a history of heart failure or if you experience any of the following symptoms:  Weight gain of 3 pounds or more overnight or 5 pounds in a week, increased swelling in our hands or feet or shortness of breath while lying flat in bed. Please call your doctor as soon as you notice any of these symptoms; do not wait until your next office visit. Recognize signs and symptoms of STROKE:    F-face looks uneven    A-arms unable to move or move unevenly    S-speech slurred or non-existent    T-time-call 911 as soon as signs and symptoms begin-DO NOT go       Back to bed or wait to see if you get better-TIME IS BRAIN. Warning Signs of HEART ATTACK     Call 911 if you have these symptoms:   Chest discomfort. Most heart attacks involve discomfort in the center of the chest that lasts more than a few minutes, or that goes away and comes back. It can feel like uncomfortable pressure, squeezing, fullness, or pain.  Discomfort in other areas of the upper body. Symptoms can include pain or discomfort in one or both arms, the back, neck, jaw, or stomach.  Shortness of breath with or without chest discomfort.  Other signs may include breaking out in a cold sweat, nausea, or lightheadedness. Don't wait more than five minutes to call 911 - MINUTES MATTER! Fast action can save your life. Calling 911 is almost always the fastest way to get lifesaving treatment. Emergency Medical Services staff can begin treatment when they arrive -- up to an hour sooner than if someone gets to the hospital by car. The discharge information has been reviewed with the patient and caregiver. The patient and caregiver verbalized understanding.   Discharge medications reviewed with the patient and caregiver and appropriate educational materials and side effects teaching were provided. ___________________________________________________________________________________________________________________________________    A common side effect of anesthesia following surgery is nausea and/or vomiting. In order to decrease symptoms, it is wise to avoid foods that are high in fat, greasy foods, milk products, and spicy foods for the first 24 hours. Acceptable foods for the first 24 hours following surgery include but are not limited to:     soup   broth    toast    crackers    applesauce    bananas    mashed potatoes,   soft or scrambled eggs   oatmeal    jello    It is important to eat when taking your pain medication. This will help to prevent nausea. If possible, please try to time your meals with your medications. It is very important to stay hydrated following surgery. Sip fluids frequently while awake. Avoid acidic drinks such as citrus juices and soda for 24 hours. Carbonated beverages may cause bloating and gas. Acceptable fluids include:    - water (flavor packets may add variety)  - coffee or tea (in moderation)  - Gatorade  - Иван-aid  - apple juice  - cranberry juice    You are encouraged to cough and deep breathe every hour when awake. This will help to prevent respiratory complications following anesthesia. You may want to hug a pillow when coughing and sneezing to add additional support to the surgical area and to decrease discomfort if you had abdominal or chest surgery. If you are discharged home with support stockings, you may remove them after 24 hours. Support stockings are used to help prevent blood clots in the legs following surgery. Please take time to review all of your Home Care Instructions and Medication Information sheets provided in your discharge packet. If you have any questions, please contact your surgeons office. Thank you. Narcotic-Analgesic/Acetaminophen (Percocet, Norco, Lorcet HD, Lortab 10/325) - (By mouth)   Why this medicine is used:   Relieves pain. Contact a nurse or doctor right away if you have:  · Extreme weakness, shallow breathing, slow heartbeat  · Severe confusion, lightheadedness, dizziness, fainting  · Yellow skin or eyes, dark urine or pale stools  · Severe constipation, severe stomach pain, nausea, vomiting, loss of appetite  · Sweating or cold, clammy skin     Common side effects:  · Mild constipation, nausea, vomiting  · Sleepiness, tiredness  · Itching, rash  © 2017 [employer] Information is for End User's use only and may not be sold, redistributed or otherwise used for commercial purposes.

## 2018-11-26 NOTE — BRIEF OP NOTE
BRIEF OPERATIVE NOTE Date of Procedure: 11/26/2018 Preoperative Diagnosis: ABNORMAL PAP Postoperative Diagnosis: ABNORMAL PAP Procedure(s): HYSTEROSCOPY, DILATION AND CURETTAGE HYSTEROSCOPY, DILATION AND CURETTAGE Surgeon(s) and Role: Kasandra Covington MD - Primary Surgical Assistant: staff Surgical Staff: 
Circ-1: Tex Lima RN Scrub Tech-1: Ilir Loaiza Float Staff: Marco Gowers, RN Event Time In Time Out Incision Start 4373 Incision Close 6806 Anesthesia: General  
Estimated Blood Loss: 20cc Specimens:  
ID Type Source Tests Collected by Time Destination 1 : Endometiral [place]attage Hoa Lewis MD 11/26/2018 0308 Pathology 2 : cervical curettings Preservative Uterus  Maribel Thompson MD 11/26/2018 0142 Pathology Findings:endometrial polyps Complications: none known Implants: * No implants in log *

## 2018-11-26 NOTE — OP NOTES
[facility]  OPERATIVE REPORT    Jocelyn Pierce  MR#: [id number]  : 1950  ACCOUNT #: [de-identified]   DATE OF SERVICE: 2018    PREOPERATIVE DIAGNOSIS:  Atypical Pap smear with glandular atypia. POSTOPERATIVE DIAGNOSIS:  Atypical Pap smear with glandular atypia. PROCEDURE PERFORMED:  A 2-part MyoSure sampling of glandular tissue with hysteroscopy and D and C.    SURGEON:  Tanvir Madden MD    ASSISTANT:  staff    ANESTHESIA:  General with LMA. SPECIMENS REMOVED:    1.  Endometrial curettings. 2.  Endocervical curettings. COMPLICATIONS:  None. IMPLANTS:  None. ESTIMATED BLOOD LOSS:   20 mL. FLUID DEFICIT:  250. PRESENTATION:  The patient is a 70-year-old with a Pap smear suggesting glandular atypia. She was taken to the operating room where she was prepped and draped in a sterile fashion. The cervix was dilated  and MyoSure system inserted, seeing some polypoid tissue in the lower uterine segment of the anterior portion. This was removed with the MyoSure system and samples of the other endometrium throughout were also taken. The scope was withdrawn to the endocervix and circumferential sampling for endocervical glandular tissue was obtained. She tolerated the procedure well and returned to the recovery in good condition. These 2 separate specimens were sent to pathology.       Elvira Barrett MD       BCR / LN  D: 2018 11:08     T: 2018 14:44  JOB #: [id number]

## 2018-11-26 NOTE — PERIOP NOTES
Handoff Report from Operating Room to PACU Report received from [address] [facility] and [address] [place] [address] regarding Albino Chase. Surgeon(s): 
Rafael Infante MD  And Procedure(s) (LRB): 
HYSTEROSCOPY, DILATION AND CURETTAGE (N/A) HYSTEROSCOPY, DILATION AND CURETTAGE (N/A)  confirmed  
with allergies discussed. Anesthesia type, drugs, patient history, complications, estimated blood loss, vital signs, intake and output, and lines and temperature were reviewed.

## 2018-11-26 NOTE — H&P
Gynecology History and Physical 
 
Name: Doc Officer MRN: [id number] SSN: [id number] YOB: 1950  Age: 76 y.o. Sex: female Subjective: Chief complaint:  BASILIO Griggs is a 76 y.o.  female with a history of BASILIO. No previous workup. Previous treatment measures included none. She is admitted for Procedure(s) (LRB): LOOP ELECTRODE EXCISION PROCEDURE CONE, HYSTEROSCOPY, DILATION AND CURETTAGE (N/A) HYSTEROSCOPY, DILATION AND CURETTAGE (N/A). The current method of family planning is post menopausal status. OB History No data available Past Medical History:  
Diagnosis Date  Arthritis GENERALIZED IN JOINTS.  Breast cancer ([facility] [address] Left Breast Cancer 1995  Cancer [facility])   
 left breast - surgery/radiation  Chronic pain Back pain  Depression Mood Swings  GERD (gastroesophageal reflux disease) Hiatal Hernia  Headache  Hypercholesterolemia  Hypertension CONTROLLED BY MEDS., Pt states she is no liong er on BP med's  Ill-defined condition Neuropathy bilat feet  Irregular heart beat  Other ill-defined conditions(799.89) MIGRAINES  
 Other ill-defined conditions(799.89)   
 neuropathy of lower legs and feet  Other ill-defined conditions(799.89)   
 increased cholesterol  Other ill-defined conditions(799.89) bronchitis  Psychotic disorder ([facility] [address]  S/P radiation therapy 1995 Left Breast  
 Unspecified adverse effect of anesthesia HEART PALPITATION. Past Surgical History:  
Procedure Laterality Date  BREAST SURGERY PROCEDURE UNLISTED  7/1995 DCIS /BIOPSIES MULTIPLE. [facility] LUMPECTOMY WITH RADIATION INSITU  
 HX BREAST LUMPECTOMY Left   
 1995 - POSITIVE  
 HX ORTHOPAEDIC  2006 LEFT KNEE ARTHROSCOPY  
 HX ORTHOPAEDIC  4/2010 RIGHT KNEE ARTHROSCOPY, concrete nail placed  HX ORTHOPAEDIC Olvin , doesnt know which foot  HX ORTHOPAEDIC Small growth removed from between toes but doesnt know which foot Social History Occupational History  Not on file Tobacco Use  Smoking status: Never Smoker  Smokeless tobacco: Never Used Substance and Sexual Activity  Alcohol use: No  
 Drug use: No  
 Sexual activity: No  
 
Family History Problem Relation Age of Onset  Cancer Mother BLADDER CA  
 Heart Disease Father  Lung Disease Father Allergies Allergen Reactions  Epinephrine Palpitations \"Makes my heart  ' shudder'. \" Allergic to epinephrine with novocain. Plain epinephrine is ok.  Novocain [Procaine] Palpitations  Nsaids (Non-Steroidal Anti-Inflammatory Drug) Other (comments) GASTRIC DISTRESS.  Pravastatin Myalgia  Aspirin Other (comments) Gastric irritation  Ibuprofen Other (comments) Gastric irritation Prior to Admission medications Medication Sig Start Date End Date Taking? Authorizing Provider  
krill/om-3/dha/epa/phospho/ast (MEGARED OMEGA-3 KRILL OIL PO) Take  by mouth every evening. Yes Provider, Historical  
fluticasone (FLONASE) 50 mcg/actuation nasal spray  2/16/15  Yes Provider, Historical  
acetaminophen (ACETAMINOPHEN EXTRA STRENGTH) 500 mg tablet Take 1,500 mg by mouth every six (6) hours as needed. Yes Provider, Historical  
Diclofenac Sodium (VOLTAREN) 1 % topical gel Apply  to affected area daily as needed. Yes Provider, Historical  
magnesium oxide 500 mg tab Take  by mouth every evening. Provider, Historical  
aspirin delayed-release 81 mg tablet Take 81 mg by mouth every evening. Provider, Historical  
loperamide (IMODIUM) 2 mg capsule Take  by mouth as needed for Diarrhea. Provider, Historical  
bisacodyl (DULCOLAX, BISACODYL,) 5 mg EC tablet Take 5 mg by mouth daily as needed for Constipation.     Provider, Historical  
diphenhydrAMINE (BENADRYL) 25 mg capsule Take 25 mg by mouth every six (6) hours as needed. Provider, Historical  
clonazePAM (KLONOPIN) 2 mg tablet Take 1 Tab by mouth two (2) times a day. Max Daily Amount: 4 mg. Patient taking differently: Take 2 mg by mouth two (2) times a day. 10/27/18   Kurtis Burch MD  
raNITIdine (ZANTAC) 300 mg tablet Take 300 mg by mouth daily. Provider, Historical  
venlafaxine-SR (EFFEXOR-XR) 150 mg capsule 2 caps qam, 1 cap qhs 
Patient taking differently: Take 150 mg by mouth two (2) times a day. 2 caps qam, 1 cap qhs 7/3/18   Kurtis Burch MD  
traZODone (DESYREL) 150 mg tablet Take 1 Tab by mouth nightly. 7/3/18   Kurtis Burch MD  
OXcarbazepine (TRILEPTAL) 300 mg tablet TAKE 1 TABLET DAILY  Indications: mood stabilizer Patient taking differently: Take 300 mg by mouth nightly as needed. TAKE 1 TABLET DAILY 7/3/18   Kurtis Burch MD  
PHYTONADIONE, VIT K1, (VITAMIN K1) by Does Not Apply route. Provider, Historical  
melatonin tab tablet Take 10 mg by mouth nightly. Provider, Historical  
multivitamin, tx-iron-ca-min (THERA-M W/ IRON) 9 mg iron-400 mcg tab tablet Take 1 Tab by mouth daily. Other, MD Liliana  
CONTOUR NEXT STRIPS strip USE TO TEST BLOOD SUGAR BID 8/22/16   Provider, Historical  
MICROLET LANCET misc USE BID 8/16/16   Provider, Historical  
DEXILANT 60 mg CpDB Take 60 mg by mouth every evening. 8/12/16   Provider, Historical  
rosuvastatin (CRESTOR) 10 mg tablet Take 1 Tab by mouth nightly. 12/2/15   Chris Mayen MD  
traMADol Evins Darlene) 50 mg tablet Take 50 mg by mouth every eight (8) hours as needed. 6/8/15   Provider, Historical  
CRANBERRY EXTRACT (CRANBERRY PO) Take 2,000 mg by mouth every evening. Triple strength. Provider, Historical  
GUAIFENESIN/DEXTROMETHORPHAN (MUCINEX DM PO) Take 1 Tab by mouth two (2) times daily as needed. Provider, Historical  
celecoxib (CELEBREX) 100 mg capsule Take 100 mg by mouth two (2) times a day.     Provider, Historical  
 cholecalciferol, vitamin D3, (VITAMIN D-3) 2,000 unit Tab Take 5,000 Units by mouth daily. Soft gel. Provider, Historical  
CYANOCOBALAMIN, VITAMIN B-12, (VITAMIN B-12 PO) Take 2,000 mcg by mouth daily. 2000MCG DAILY    Provider, Historical  
CALCIUM CARBONATE/VITAMIN D3 (CALCIUM 500 WITH D PO) Take 2 Tabs by mouth daily. Provider, Historical  
Potassium Gluconate 595 (99) mg tablet Take 595 mg by mouth daily. Provider, Historical  
almotriptan (AXERT) 12.5 mg tablet Take 12.5 mg by mouth once as needed. Provider, Historical  
  
 
Review of Systems: A comprehensive review of systems was negative except for that written in the History of Present Illness. Objective:  
 
Vitals:  
 11/26/18 0746 BP: 156/79 Pulse: (!) 110 Resp: 22 Temp: 98.3 °F (36.8 °C) SpO2: 97% Weight: 90.2 kg (198 lb 13.7 oz) Height: 5' 2\" (1.575 m) Physical Exam: 
Heart: Regular rate and rhythm Lung: clear to auscultation throughout lung fields, no wheezes, no rales, no rhonchi and normal respiratory effort Back: costovertebral angle tenderness absent Abdomen: soft, nontender External Genitalia: normal general appearance Urinary system: urethral meatus normal 
Vagina: normal mucosa without prolapse or lesions Cervix: normal appearance Adnexa: normal bimanual exam 
Uterus: normal single, nontender Assessment:  
 
Active Problems: * No active hospital problems. * 
  
BASILIO with BASILIO Plan:  
 
Procedure(s) (LRB): LOOP ELECTRODE EXCISION PROCEDURE CONE, HYSTEROSCOPY, DILATION AND CURETTAGE (N/A) HYSTEROSCOPY, DILATION AND CURETTAGE (N/A) Discussed the risks of surgery including the risks of bleeding, infection, deep vein thrombosis, and surgical injuries to internal organs including but not limited to the bowels, bladder, rectum, and female reproductive organs. The patient understands the risks; any and all questions were answered to the patient's satisfaction.  
 
Signed By:  Julia Schmidt MD   
 November 26, 2018

## 2018-11-26 NOTE — ANESTHESIA PREPROCEDURE EVALUATION
Anesthetic History No history of anesthetic complications Review of Systems / Medical History Patient summary reviewed, nursing notes reviewed and pertinent labs reviewed Pulmonary Comments: Hx bronchitis Neuro/Psych Headaches and psychiatric history Comments: neuropathy of lower legs and feet Personality disorder Cardiovascular Hypertension Hyperlipidemia Exercise tolerance: >4 METS Comments: Hx PALPITATIONS 
  
GI/Hepatic/Renal 
  
GERD Hiatal hernia Endo/Other Obesity and arthritis Other Findings Comments: Hx Left Breast Cancer Chronic back pain Physical Exam 
 
Airway Mallampati: III Neck ROM: normal range of motion Mouth opening: Normal 
 
 Cardiovascular Regular rate and rhythm,  S1 and S2 normal,  no murmur, click, rub, or gallop Dental 
 
Dentition: Caps/crowns Pulmonary Breath sounds clear to auscultation Abdominal 
GI exam deferred Other Findings Anesthetic Plan ASA: 3 Anesthesia type: general 
 
Monitoring Plan: BIS Induction: Intravenous Anesthetic plan and risks discussed with: Patient

## 2018-11-26 NOTE — PERIOP NOTES
TRANSFER - OUT REPORT: 
 
Verbal report given to PREMA Valles(name) on Harrie Litten  being transferred to Phase II(unit) for routine post - op Report consisted of patients Situation, Background, Assessment and  
Recommendations(SBAR). Information from the following report(s) SBAR, Kardex, OR Summary, Procedure Summary and MAR was reviewed with the receiving nurse. Lines:  
Peripheral IV 11/26/18 Anterior; Inferior; Lower;Right Arm (Active) Site Assessment Clean, dry, & intact 11/26/2018 10:11 AM  
Phlebitis Assessment 0 11/26/2018 10:11 AM  
Infiltration Assessment 0 11/26/2018 10:11 AM  
Dressing Status Clean, dry, & intact 11/26/2018 10:11 AM  
Dressing Type Transparent 11/26/2018 10:11 AM  
Hub Color/Line Status Pink;Capped 11/26/2018 10:11 AM  
  
 
Opportunity for questions and clarification was provided. Patient transported with: 
 Registered Nurse

## 2018-11-26 NOTE — ANESTHESIA POSTPROCEDURE EVALUATION
Procedure(s): HYSTEROSCOPY, DILATION AND CURETTAGE HYSTEROSCOPY, DILATION AND CURETTAGE. Anesthesia Post Evaluation Patient location during evaluation: PACU Note status: Adequate. Level of consciousness: responsive to verbal stimuli and sleepy but conscious Pain management: satisfactory to patient Airway patency: patent Anesthetic complications: no 
Cardiovascular status: acceptable Respiratory status: acceptable Hydration status: acceptable Comments: +Post-Anesthesia Evaluation and Assessment Patient: Armani Briscoe MRN: [id number]  SSN: [id number] YOB: 1950  Age: 76 y.o. Sex: female Cardiovascular Function/Vital Signs /68   Pulse 100   Temp 36.9 °C (98.5 °F)   Resp 23   Ht 5' 2\" (1.575 m)   Wt 90.2 kg (198 lb 13.7 oz)   SpO2 100%   BMI 36.37 kg/m² Patient is status post Procedure(s): HYSTEROSCOPY, DILATION AND CURETTAGE HYSTEROSCOPY, DILATION AND CURETTAGE. Nausea/Vomiting: Controlled. Postoperative hydration reviewed and adequate. Pain: 
Pain Scale 1: Numeric (0 - 10) (11/26/18 1005) Pain Intensity 1: 0 (11/26/18 1005) Managed. Neurological Status:  
Neuro (WDL): Exceptions to WDL (11/26/18 0935) At baseline. Mental Status and Level of Consciousness: Arousable. Pulmonary Status:  
O2 Device: Nasal cannula (11/26/18 1005) Adequate oxygenation and airway patent. Complications related to anesthesia: None Post-anesthesia assessment completed. No concerns. Signed By: Kortney Cordero MD  
 11/26/2018 Post anesthesia nausea and vomiting:  controlled Visit Vitals /68 Pulse 100 Temp 36.9 °C (98.5 °F) Resp 23 Ht 5' 2\" (1.575 m) Wt 90.2 kg (198 lb 13.7 oz) SpO2 100% BMI 36.37 kg/m²

## 2018-11-26 NOTE — PERIOP NOTES
Pt. For d/c today. Vss, pt. denies pain & nausea. Ambulates well & sitting up in chair. Reviewed d/c instructions, Rx., & F /U care. IV d/c'd and pt. wheeled to car for d/c home at [facility].

## 2018-11-26 NOTE — PERIOP NOTES
Updated patient's friend Eliana Hagan via phone as he is not currently in the hospital, he has no questions at this time and will return to the hospital soon. He can be reached at [phone]

## 2018-11-27 RX ORDER — CLONAZEPAM 2 MG/1
2 TABLET ORAL 2 TIMES DAILY
Qty: 60 TAB | Refills: 0 | Status: SHIPPED | OUTPATIENT
Start: 2018-11-27 | End: 2019-01-08 | Stop reason: SDUPTHER

## 2018-11-27 NOTE — TELEPHONE ENCOUNTER
Patient called back upset because she hadn't heard anything. Office explained we had tried to reach her and had been in contact with her pharmacy. He is requesting 30 day supply go to [facility] on [address] and 90 day to [facility].

## 2019-01-04 ENCOUNTER — TELEPHONE (OUTPATIENT)
Dept: BEHAVIORAL/MENTAL HEALTH CLINIC | Age: 69
End: 2019-01-04

## 2019-01-04 NOTE — TELEPHONE ENCOUNTER
Patient calls frustrated because her clonazepam has not been delivered yet. She states this is the 4th time this year with [facility]. Suggested she start using a local pharmacy that delivers.  obtained, patient should have enough per previous fills to last her until January 11. She stated she is out.

## 2019-01-06 ENCOUNTER — HOSPITAL ENCOUNTER (EMERGENCY)
Age: 69
Discharge: HOME | End: 2019-01-06
Attending: EMERGENCY MEDICINE
Payer: MEDICARE

## 2019-01-06 VITALS
TEMPERATURE: 98.6 F | DIASTOLIC BLOOD PRESSURE: 73 MMHG | SYSTOLIC BLOOD PRESSURE: 141 MMHG | WEIGHT: 212.74 LBS | HEIGHT: 60 IN | RESPIRATION RATE: 17 BRPM | OXYGEN SATURATION: 98 % | BODY MASS INDEX: 41.77 KG/M2 | HEART RATE: 90 BPM

## 2019-01-06 DIAGNOSIS — M94.0 COSTOCHONDRITIS: ICD-10-CM

## 2019-01-06 DIAGNOSIS — F41.0 PANIC ATTACK: Primary | ICD-10-CM

## 2019-01-06 LAB
ALBUMIN SERPL-MCNC: 4.2 G/DL (ref 3.5–5)
ALBUMIN/GLOB SERPL: 1.2 {RATIO} (ref 1.1–2.2)
ALP SERPL-CCNC: 78 U/L (ref 45–117)
ALT SERPL-CCNC: 19 U/L (ref 12–78)
ANION GAP SERPL CALC-SCNC: 10 MMOL/L (ref 5–15)
AST SERPL-CCNC: 13 U/L (ref 15–37)
BASOPHILS # BLD: 0.1 K/UL (ref 0–0.1)
BASOPHILS NFR BLD: 1 % (ref 0–1)
BILIRUB SERPL-MCNC: 0.3 MG/DL (ref 0.2–1)
BUN SERPL-MCNC: 26 MG/DL (ref 6–20)
BUN/CREAT SERPL: 28 (ref 12–20)
CALCIUM SERPL-MCNC: 9.3 MG/DL (ref 8.5–10.1)
CHLORIDE SERPL-SCNC: 105 MMOL/L (ref 97–108)
CK SERPL-CCNC: 52 U/L (ref 26–192)
CO2 SERPL-SCNC: 27 MMOL/L (ref 21–32)
CREAT SERPL-MCNC: 0.94 MG/DL (ref 0.55–1.02)
DIFFERENTIAL METHOD BLD: NORMAL
EOSINOPHIL # BLD: 0.1 K/UL (ref 0–0.4)
EOSINOPHIL NFR BLD: 1 % (ref 0–7)
ERYTHROCYTE [DISTWIDTH] IN BLOOD BY AUTOMATED COUNT: 13.4 % (ref 11.5–14.5)
GLOBULIN SER CALC-MCNC: 3.6 G/DL (ref 2–4)
GLUCOSE SERPL-MCNC: 90 MG/DL (ref 65–100)
HCT VFR BLD AUTO: 41.8 % (ref 35–47)
HGB BLD-MCNC: 13.4 G/DL (ref 11.5–16)
IMM GRANULOCYTES # BLD: 0 K/UL (ref 0–0.04)
IMM GRANULOCYTES NFR BLD AUTO: 0 % (ref 0–0.5)
LYMPHOCYTES # BLD: 2.1 K/UL (ref 0.8–3.5)
LYMPHOCYTES NFR BLD: 23 % (ref 12–49)
MCH RBC QN AUTO: 30 PG (ref 26–34)
MCHC RBC AUTO-ENTMCNC: 32.1 G/DL (ref 30–36.5)
MCV RBC AUTO: 93.5 FL (ref 80–99)
MONOCYTES # BLD: 0.9 K/UL (ref 0–1)
MONOCYTES NFR BLD: 10 % (ref 5–13)
NEUTS SEG # BLD: 6.1 K/UL (ref 1.8–8)
NEUTS SEG NFR BLD: 66 % (ref 32–75)
NRBC # BLD: 0 K/UL (ref 0–0.01)
NRBC BLD-RTO: 0 PER 100 WBC
PLATELET # BLD AUTO: 184 K/UL (ref 150–400)
PMV BLD AUTO: 10.2 FL (ref 8.9–12.9)
POTASSIUM SERPL-SCNC: 3.9 MMOL/L (ref 3.5–5.1)
PROT SERPL-MCNC: 7.8 G/DL (ref 6.4–8.2)
RBC # BLD AUTO: 4.47 M/UL (ref 3.8–5.2)
SODIUM SERPL-SCNC: 142 MMOL/L (ref 136–145)
TROPONIN I SERPL-MCNC: <0.05 NG/ML
WBC # BLD AUTO: 9.4 K/UL (ref 3.6–11)

## 2019-01-06 PROCEDURE — 36415 COLL VENOUS BLD VENIPUNCTURE: CPT

## 2019-01-06 PROCEDURE — 84484 ASSAY OF TROPONIN QUANT: CPT

## 2019-01-06 PROCEDURE — 82550 ASSAY OF CK (CPK): CPT

## 2019-01-06 PROCEDURE — 93005 ELECTROCARDIOGRAM TRACING: CPT

## 2019-01-06 PROCEDURE — 96374 THER/PROPH/DIAG INJ IV PUSH: CPT

## 2019-01-06 PROCEDURE — 85025 COMPLETE CBC W/AUTO DIFF WBC: CPT

## 2019-01-06 PROCEDURE — 99284 EMERGENCY DEPT VISIT MOD MDM: CPT

## 2019-01-06 PROCEDURE — 80053 COMPREHEN METABOLIC PANEL: CPT

## 2019-01-06 PROCEDURE — 74011250636 HC RX REV CODE- 250/636: Performed by: EMERGENCY MEDICINE

## 2019-01-06 RX ORDER — HYDROXYZINE 50 MG/1
50 TABLET, FILM COATED ORAL
Qty: 20 TAB | Refills: 0 | Status: SHIPPED | OUTPATIENT
Start: 2019-01-06 | End: 2019-01-16

## 2019-01-06 RX ORDER — LORAZEPAM 2 MG/ML
1 INJECTION INTRAMUSCULAR
Status: COMPLETED | OUTPATIENT
Start: 2019-01-06 | End: 2019-01-06

## 2019-01-06 RX ADMIN — LORAZEPAM 1 MG: 2 INJECTION INTRAMUSCULAR; INTRAVENOUS at 10:16

## 2019-01-06 NOTE — ED NOTES
Pt ambulatory to and from bathroom with steady gait. Labs and iv started. Medicated per md orders. Pt states she has no one to call so she will call a cab upon discharged. Updated on plan of care. Lights dimmed

## 2019-01-06 NOTE — PROGRESS NOTES
Mrs. Breanna Nettles present to ED via EMS, evaluated/treated for dx withdrawal. CM consulted for counseling/community resources for Patient. CM reviewed medical record and spoke w/ referring Provider. CM met w/ Patient, introduced self/explained role. Patient lives at [facility], independent living at [facility], address/contact information confirmed on chart. Patient confirmed PCP information, also has psychiatrist she sees every 3 months, f/u this week. Patient reported she used to see a psychologist, but stopped appointments a while back. Patient verbalized openness to additional counseling, other community resources. Patient was talkative, forthcoming with information, remained verbally engaged. As requested, CM provided Patient w/ listing of local clinical psychologists/counseling options accepting her ins, and also provided education/resource information re: [facility]. No additional questions/concerns reported at this time. CM provided emotional support, encouragement. Care Management Interventions PCP Verified by CM: Yes Mode of Transport at Discharge: Other (see comment) Transition of Care Consult (CM Consult): Discharge Planning, Other Discharge Durable Medical Equipment: No 
Physical Therapy Consult: No 
Occupational Therapy Consult: No 
Speech Therapy Consult: No 
Current Support Network: Lives Alone, Other([facility], independent/senior living at [facility]) Confirm Follow Up Transport: Other (see comment)(facility transport [facility], private pay taxi/hired car) Plan discussed with Pt/Family/Caregiver: Yes Discharge Location Discharge Placement: Home Lorre Gates, LCSW Merlinda Siva

## 2019-01-06 NOTE — DISCHARGE INSTRUCTIONS
Patient Education        Costochondritis: Care Instructions  Your Care Instructions  You have chest pain because the cartilage of your rib cage is inflamed. This problem is called costochondritis. This type of chest wall pain may last from days to weeks. It is not a heart problem. Sometimes costochondritis occurs with a cold or the flu, and other times the exact cause is not known. Follow-up care is a key part of your treatment and safety. Be sure to make and go to all appointments, and call your doctor if you are having problems. It's also a good idea to know your test results and keep a list of the medicines you take. How can you care for yourself at home? · Take medicines for pain and inflammation exactly as directed. ? If the doctor gave you a prescription medicine, take it as prescribed. ? If you are not taking a prescription pain medicine, ask your doctor if you can take an over-the-counter medicine. ? Do not take two or more pain medicines at the same time unless the doctor told you to. Many pain medicines have acetaminophen, which is Tylenol. Too much acetaminophen (Tylenol) can be harmful. · It may help to use a warm compress or heating pad (set on low) on your chest. You can also try alternating heat and ice. Put ice or a cold pack on the area for 10 to 20 minutes at a time. Put a thin cloth between the ice and your skin. · Avoid any activity that strains the chest area. As your pain gets better, you can slowly return to your normal activities. · Do not use tape, an elastic bandage, a \"rib belt,\" or anything else that restricts your chest wall motion. When should you call for help? Call 911 anytime you think you may need emergency care. For example, call if:    · You have new or different chest pain or pressure. This may occur with:  ? Sweating. ? Shortness of breath. ? Nausea or vomiting. ? Pain that spreads from the chest to the neck, jaw, or one or both shoulders or arms.   ? Dizziness or lightheadedness. ? A fast or uneven pulse. After calling 911, chew 1 adult-strength aspirin. Wait for an ambulance. Do not try to drive yourself.     · You have severe trouble breathing.    Call your doctor now or seek immediate medical care if:    · You have a fever or cough.     · You have any trouble breathing.     · Your chest pain gets worse.    Watch closely for changes in your health, and be sure to contact your doctor if:    · Your chest pain continues even though you are taking anti-inflammatory medicine.     · Your chest wall pain has not improved after 5 to 7 days. Where can you learn more? Go to http://wicho-ben.info/. Enter E103 in the search box to learn more about \"Costochondritis: Care Instructions. \"  Current as of: November 20, 2017  Content Version: 11.8  © 5078-9259 [employer]. Care instructions adapted under license by [employer] (which disclaims liability or warranty for this information). If you have questions about a medical condition or this instruction, always ask your healthcare professional. [place] [address] any warranty or liability for your use of this information. Patient Education        Panic Attacks: Care Instructions  Your Care Instructions    During a panic attack, you may have a feeling of intense fear or terror, trouble breathing, chest pain or tightness, heartbeat changes, dizziness, sweating, and shaking. A panic attack starts suddenly and usually lasts from 5 to 20 minutes but may last even longer. You have the most anxiety about 10 minutes after the attack starts. An attack can begin with a stressful event, or it can happen without a cause. Although panic attacks can cause scary symptoms, you can learn to manage them with self-care, counseling, and medicine. Follow-up care is a key part of your treatment and safety.  Be sure to make and go to all appointments, and call your doctor if you are having problems. It's also a good idea to know your test results and keep a list of the medicines you take. How can you care for yourself at home? · Take your medicine exactly as directed. Call your doctor if you think you are having a problem with your medicine. · Go to your counseling sessions and follow-up appointments. · Recognize and accept your anxiety. Then, when you are in a situation that makes you anxious, say to yourself, \"This is not an emergency. I feel uncomfortable, but I am not in danger. I can keep going even if I feel anxious. \"  · Be kind to your body:  ? Relieve tension with exercise or a massage. ? Get enough rest.  ? Avoid alcohol, caffeine, nicotine, and illegal drugs. They can increase your anxiety level, cause sleep problems, or trigger a panic attack. ? Learn and do relaxation techniques. See below for more about these techniques. · Engage your mind. Get out and do something you enjoy. Go to a funny movie, or take a walk or hike. Plan your day. Having too much or too little to do can make you anxious. · Keep a record of your symptoms. Discuss your fears with a good friend or family member, or join a support group for people with similar problems. Talking to others sometimes relieves stress. · Get involved in social groups, or volunteer to help others. Being alone sometimes makes things seem worse than they are. · Get at least 30 minutes of exercise on most days of the week to relieve stress. Walking is a good choice. You also may want to do other activities, such as running, swimming, cycling, or playing tennis or team sports. Relaxation techniques  Do relaxation exercises for 10 to 20 minutes a day. You can play soothing, relaxing music while you do them, if you wish. · Tell others in your house that you are going to do your relaxation exercises. Ask them not to disturb you. · Find a comfortable place, away from all distractions and noise.   · Lie down on your back, or sit with your back straight. · Focus on your breathing. Make it slow and steady. · Breathe in through your nose. Breathe out through either your nose or mouth. · Breathe deeply, filling up the area between your navel and your rib cage. Breathe so that your belly goes up and down. · Do not hold your breath. · Breathe like this for 5 to 10 minutes. Notice the feeling of calmness throughout your whole body. As you continue to breathe slowly and deeply, relax by doing the following for another 5 to 10 minutes:  · Tighten and relax each muscle group in your body. You can begin at your toes and work your way up to your head. · Imagine your muscle groups relaxing and becoming heavy. · Empty your mind of all thoughts. · Let yourself relax more and more deeply. · Become aware of the state of calmness that surrounds you. · When your relaxation time is over, you can bring yourself back to alertness by moving your fingers and toes and then your hands and feet and then stretching and moving your entire body. Sometimes people fall asleep during relaxation, but they usually wake up shortly afterward. · Always give yourself time to return to full alertness before you drive a car or do anything that might cause an accident if you are not fully alert. Never play a relaxation tape while driving a car. When should you call for help? Call 911 anytime you think you may need emergency care. For example, call if:    · You feel you cannot stop from hurting yourself or someone else.    Watch closely for changes in your health, and be sure to contact your doctor if:    · Your panic attacks get worse.     · You have new or different anxiety.     · You are not getting better as expected. Where can you learn more? Go to http://wicho-ben.info/. Enter H601 in the search box to learn more about \"Panic Attacks: Care Instructions. \"  Current as of: December 7, 2017  Content Version: 11.8  © 2536-9615 [employer]. Care instructions adapted under license by [employer] (which disclaims liability or warranty for this information). If you have questions about a medical condition or this instruction, always ask your healthcare professional. [address] any warranty or liability for your use of this information.

## 2019-01-06 NOTE — ED NOTES
Assumed care of patient. Pt resting in position of comfort. Call bell within reach. Pt arrives via ems with reports of \"muscle spasms from my toes to the top of my head\" pt reports that she has been taking klonopin for several years and reports that she ran out of her prescription exactly 1 week ago. has an appt with prescribing MD on Tuesday, Denies n/v at this time.  Pt placed on monitor x 3

## 2019-01-06 NOTE — ED PROVIDER NOTES
EMERGENCY DEPARTMENT HISTORY AND PHYSICAL EXAM 
 
 
Date: 1/6/2019 Patient Name: Teresa Rush History of Presenting Illness Chief Complaint Patient presents with  Withdrawal  
  Pt arrives via ems with muscle aches, tearful. reports that she has been taking klonopin for several years and reports that she ran out of her prescription exactly 1 week ago. has an appt with prescribing MD on Tuesday History Provided By: Patient HPI: Teresa Rush, 76 y.o. female with PMHx significant for anxiety, severe depression, mood swings, arthritis, HTN, hypercholesterolemia, GERD, presents via EMS to the ED with cc of gradual onset Klonopin withdrawal sxs, onset ~1 week ago when pt reports she ran out of her prescription medications. She reports she takes Klonopin due to her anxiety and associated strenuous family situation. Pt reports associated sxs of general muscle weakness, migraines, numbness in extremities and face, SOB, mid sternal chest tightness, increased anxiety, and cough with no phlegm due to withdrawal.  She reports she has been taking Klonopin for several years. She states she has an appointment with the prescribing physician on 01/08/2019. Pt reports mid sternal chest tightness and cough exacerbated when pt lays flat on back, and reports pain 8/10 on pain scale. Pt denies nausea, vomiting, bilateral leg pain, smoking, drinking, or SI. There are no other complaints, changes, or physical findings at this time. PCP: Rick Reyes MD 
 
No current facility-administered medications on file prior to encounter. Current Outpatient Medications on File Prior to Encounter Medication Sig Dispense Refill  clonazePAM (KLONOPIN) 2 mg tablet Take 1 Tab by mouth two (2) times a day. Max Daily Amount: 4 mg. 60 Tab 0  
 HYDROcodone-acetaminophen (NORCO) 5-325 mg per tablet Take 1 Tab by mouth every four (4) hours as needed for Pain (1-2 tabs). Max Daily Amount: 6 Tabs.  20 Tab 0  
  krill/om-3/dha/epa/phospho/ast (MEGARED OMEGA-3 KRILL OIL PO) Take  by mouth every evening.  magnesium oxide 500 mg tab Take  by mouth every evening.  aspirin delayed-release 81 mg tablet Take 81 mg by mouth every evening.  loperamide (IMODIUM) 2 mg capsule Take  by mouth as needed for Diarrhea.  bisacodyl (DULCOLAX, BISACODYL,) 5 mg EC tablet Take 5 mg by mouth daily as needed for Constipation.  diphenhydrAMINE (BENADRYL) 25 mg capsule Take 25 mg by mouth every six (6) hours as needed.  raNITIdine (ZANTAC) 300 mg tablet Take 300 mg by mouth daily.  venlafaxine-SR (EFFEXOR-XR) 150 mg capsule 2 caps qam, 1 cap qhs (Patient taking differently: Take 150 mg by mouth two (2) times a day. 2 caps qam, 1 cap qhs) 270 Cap 1  
 traZODone (DESYREL) 150 mg tablet Take 1 Tab by mouth nightly. 90 Tab 1  
 OXcarbazepine (TRILEPTAL) 300 mg tablet TAKE 1 TABLET DAILY  Indications: mood stabilizer (Patient taking differently: Take 300 mg by mouth nightly as needed. TAKE 1 TABLET DAILY) 90 Tab 2  
 PHYTONADIONE, VIT K1, (VITAMIN K1) by Does Not Apply route.  melatonin tab tablet Take 10 mg by mouth nightly.  multivitamin, tx-iron-ca-min (THERA-M W/ IRON) 9 mg iron-400 mcg tab tablet Take 1 Tab by mouth daily.  CONTOUR NEXT STRIPS strip USE TO TEST BLOOD SUGAR BID  0  
 MICROLET LANCET misc USE BID  11  
 DEXILANT 60 mg CpDB Take 60 mg by mouth every evening.  rosuvastatin (CRESTOR) 10 mg tablet Take 1 Tab by mouth nightly. 30 Tab 2  
 traMADol (ULTRAM) 50 mg tablet Take 50 mg by mouth every eight (8) hours as needed. 3  
 fluticasone (FLONASE) 50 mcg/actuation nasal spray  CRANBERRY EXTRACT (CRANBERRY PO) Take 2,000 mg by mouth every evening. Triple strength.  GUAIFENESIN/DEXTROMETHORPHAN (MUCINEX DM PO) Take 1 Tab by mouth two (2) times daily as needed.     
 acetaminophen (ACETAMINOPHEN EXTRA STRENGTH) 500 mg tablet Take 1,500 mg by mouth every six (6) hours as needed.  celecoxib (CELEBREX) 100 mg capsule Take 100 mg by mouth two (2) times a day.  Diclofenac Sodium (VOLTAREN) 1 % topical gel Apply  to affected area daily as needed.  cholecalciferol, vitamin D3, (VITAMIN D-3) 2,000 unit Tab Take 5,000 Units by mouth daily. Soft gel.  CYANOCOBALAMIN, VITAMIN B-12, (VITAMIN B-12 PO) Take 2,000 mcg by mouth daily. 2000MCG DAILY  CALCIUM CARBONATE/VITAMIN D3 (CALCIUM 500 WITH D PO) Take 2 Tabs by mouth daily.  Potassium Gluconate 595 (99) mg tablet Take 595 mg by mouth daily.  almotriptan (AXERT) 12.5 mg tablet Take 12.5 mg by mouth once as needed. Past History Past Medical History: 
Past Medical History:  
Diagnosis Date  Arthritis GENERALIZED IN JOINTS.  Breast cancer ([facility] [address] Left Breast Cancer 1995  Cancer [facility])   
 left breast - surgery/radiation  Chronic pain Back pain  Depression Mood Swings  GERD (gastroesophageal reflux disease) Hiatal Hernia  Headache  Hypercholesterolemia  Hypertension CONTROLLED BY MEDS., Pt states she is no liong er on BP med's  Ill-defined condition Neuropathy bilat feet  Irregular heart beat  Other ill-defined conditions(799.89) MIGRAINES  
 Other ill-defined conditions(799.89)   
 neuropathy of lower legs and feet  Other ill-defined conditions(799.89)   
 increased cholesterol  Other ill-defined conditions(799.89) bronchitis  Psychotic disorder ([facility] [address]  S/P radiation therapy 1995 Left Breast  
 Unspecified adverse effect of anesthesia HEART PALPITATION. Past Surgical History: 
Past Surgical History:  
Procedure Laterality Date  BREAST SURGERY PROCEDURE UNLISTED  7/1995 DCIS /BIOPSIES MULTIPLE. [facility] LUMPECTOMY WITH RADIATION INSITU  
 HX BREAST LUMPECTOMY Left   
 1995 - POSITIVE  
 HX ORTHOPAEDIC  2006 LEFT KNEE ARTHROSCOPY  
 HX ORTHOPAEDIC  4/2010 RIGHT KNEE ARTHROSCOPY, concrete nail placed  HX ORTHOPAEDIC Hammertoe , doesnt know which foot  HX ORTHOPAEDIC Small growth removed from between toes but doesnt know which foot Family History: 
Family History Problem Relation Age of Onset  Cancer Mother BLADDER CA  
 Heart Disease Father  Lung Disease Father Social History: 
Social History Tobacco Use  Smoking status: Never Smoker  Smokeless tobacco: Never Used Substance Use Topics  Alcohol use: No  
 Drug use: No  
 
 
Allergies: Allergies Allergen Reactions  Epinephrine Palpitations \"Makes my heart  ' shudder'. \" Allergic to epinephrine with novocain. Plain epinephrine is ok.  Novocain [Procaine] Palpitations  Nsaids (Non-Steroidal Anti-Inflammatory Drug) Other (comments) GASTRIC DISTRESS.  Pravastatin Myalgia  Aspirin Other (comments) Gastric irritation  Ibuprofen Other (comments) Gastric irritation Review of Systems Review of Systems Constitutional: Negative for fever. HENT: Negative for congestion. Eyes: Negative. Respiratory: Positive for cough, chest tightness and shortness of breath. Cardiovascular: Negative for chest pain. Gastrointestinal: Negative for abdominal pain, nausea and vomiting. Endocrine: Negative for heat intolerance. Genitourinary: Negative for dysuria. Musculoskeletal: Negative for back pain. Skin: Negative for rash. Allergic/Immunologic: Negative for immunocompromised state. Neurological: Positive for weakness, numbness and headaches. Hematological: Does not bruise/bleed easily. Psychiatric/Behavioral: Negative for self-injury and suicidal ideas. The patient is nervous/anxious. All other systems reviewed and are negative. Physical Exam  
Physical Exam  
Constitutional: She is oriented to person, place, and time.  She appears well-developed and well-nourished. No distress. Initially no acute distress; elevated BMI HENT:  
Head: Normocephalic and atraumatic. Eyes: EOM are normal. Pupils are equal, round, and reactive to light. Neck: Normal range of motion. Neck supple. Cardiovascular: Normal rate, regular rhythm and normal heart sounds. Pulmonary/Chest: Effort normal and breath sounds normal. No respiratory distress. Reproducible chest wall tenderness Abdominal: Soft. Bowel sounds are normal. She exhibits no mass. There is no tenderness. Musculoskeletal: Normal range of motion. She exhibits no edema. Neurological: She is alert and oriented to person, place, and time. Coordination normal.  
Sensation intact Skin: Skin is warm and dry. Psychiatric: She has a normal mood and affect. Her behavior is normal.  
Nursing note and vitals reviewed. Diagnostic Study Results Labs - Recent Results (from the past 12 hour(s)) EKG, 12 LEAD, INITIAL Collection Time: 01/06/19  9:48 AM  
Result Value Ref Range Ventricular Rate 88 BPM  
 Atrial Rate 88 BPM  
 P-R Interval 154 ms QRS Duration 80 ms  
 Q-T Interval 362 ms QTC Calculation (Bezet) 438 ms Calculated P Axis 32 degrees Calculated R Axis -2 degrees Calculated T Axis 88 degrees Diagnosis Normal sinus rhythm Minimal voltage criteria for LVH, may be normal variant Septal infarct , age undetermined When compared with ECG of 30-OCT-2018 11:36, No significant change was found CBC WITH AUTOMATED DIFF Collection Time: 01/06/19 10:14 AM  
Result Value Ref Range WBC 9.4 3.6 - 11.0 K/uL  
 RBC 4.47 3.80 - 5.20 M/uL  
 HGB 13.4 11.5 - 16.0 g/dL HCT 41.8 35.0 - 47.0 % MCV 93.5 80.0 - 99.0 FL  
 MCH 30.0 26.0 - 34.0 PG  
 MCHC 32.1 30.0 - 36.5 g/dL  
 RDW 13.4 11.5 - 14.5 % PLATELET 521 373 - 562 K/uL MPV 10.2 8.9 - 12.9 FL  
 NRBC 0.0 0  WBC ABSOLUTE NRBC 0.00 0.00 - 0.01 K/uL NEUTROPHILS 66 32 - 75 % LYMPHOCYTES 23 12 - 49 % MONOCYTES 10 5 - 13 % EOSINOPHILS 1 0 - 7 % BASOPHILS 1 0 - 1 % IMMATURE GRANULOCYTES 0 0.0 - 0.5 % ABS. NEUTROPHILS 6.1 1.8 - 8.0 K/UL  
 ABS. LYMPHOCYTES 2.1 0.8 - 3.5 K/UL  
 ABS. MONOCYTES 0.9 0.0 - 1.0 K/UL  
 ABS. EOSINOPHILS 0.1 0.0 - 0.4 K/UL  
 ABS. BASOPHILS 0.1 0.0 - 0.1 K/UL  
 ABS. IMM. GRANS. 0.0 0.00 - 0.04 K/UL  
 DF AUTOMATED METABOLIC PANEL, COMPREHENSIVE Collection Time: 01/06/19 10:14 AM  
Result Value Ref Range Sodium 142 136 - 145 mmol/L Potassium 3.9 3.5 - 5.1 mmol/L Chloride 105 97 - 108 mmol/L  
 CO2 27 21 - 32 mmol/L Anion gap 10 5 - 15 mmol/L Glucose 90 65 - 100 mg/dL BUN 26 (H) 6 - 20 MG/DL Creatinine 0.94 0.55 - 1.02 MG/DL  
 BUN/Creatinine ratio 28 (H) 12 - 20 GFR est AA >60 >60 ml/min/1.73m2 GFR est non-AA 59 (L) >60 ml/min/1.73m2 Calcium 9.3 8.5 - 10.1 MG/DL Bilirubin, total 0.3 0.2 - 1.0 MG/DL  
 ALT (SGPT) 19 12 - 78 U/L  
 AST (SGOT) 13 (L) 15 - 37 U/L Alk. phosphatase 78 45 - 117 U/L Protein, total 7.8 6.4 - 8.2 g/dL Albumin 4.2 3.5 - 5.0 g/dL Globulin 3.6 2.0 - 4.0 g/dL A-G Ratio 1.2 1.1 - 2.2 CK Collection Time: 01/06/19 10:14 AM  
Result Value Ref Range CK 52 26 - 192 U/L  
TROPONIN I Collection Time: 01/06/19 10:14 AM  
Result Value Ref Range Troponin-I, Qt. <0.05 <0.05 ng/mL Medical Decision Making I am the first provider for this patient. I reviewed the vital signs, available nursing notes, past medical history, past surgical history, family history and social history. Vital Signs-Reviewed the patient's vital signs. Patient Vitals for the past 12 hrs: 
 Temp Pulse Resp BP SpO2  
01/06/19 1030  90 17 141/73 98 % 01/06/19 1019  89 16 147/73 98 % 01/06/19 0916 98.6 °F (37 °C) 96 18 149/78 97 % Pulse Oximetry Analysis - 97% on RA Cardiac Monitor:  
Rate: 96 bpm 
Rhythm: Normal Sinus Rhythm 0916 EKG interpretation: (Preliminary) 2409 Rhythm: normal sinus rhythm; and regular . Rate (approx.): 88; Axis: normal; IL interval: normal; QRS interval: normal ; ST/T wave: normal; Other findings: septal infarct, age undetermined. Written by Sherryle Clara Irungu, ED Scribe, as dictated by Christa Walter MD. Records Reviewed: Nursing Notes, Old Medical Records and Ambulance Run Sheet Provider Notes (Medical Decision Making): DDx: withdrawal, panic attack, anxiety, depression, costochondritis, CAD, electrolyte abnormality ED Course:  
Initial assessment performed. The patients presenting problems have been discussed, and they are in agreement with the care plan formulated and outlined with them. I have encouraged them to ask questions as they arise throughout their visit. Progress Note: 
9:39 AM 
Case management will come to ED to evaluate pt. Consult Note: 
10:52 AM 
Christa Walter MD spoke with Ramón Madden, Specialty: Case Management Discussed pt's hx, disposition, and available diagnostic and imaging results. Reviewed care plans. Consultant agrees with plans as outlined. Ramón Madden will provide pt with appropriate materials for discharge. Progress Note: 
11:04 AM 
Pt is feeling better and will be discharged. Disposition: 
Discharge Note: 
11:12 AM 
The pt is ready for discharge. The pt's signs, symptoms, diagnosis, and discharge instructions have been discussed and pt has conveyed their understanding. The pt is to follow up as recommended or return to ER should their symptoms worsen. Plan has been discussed and pt is in agreement. PLAN: 
1. Current Discharge Medication List  
  
START taking these medications Details  
hydrOXYzine HCl (ATARAX) 50 mg tablet Take 1 Tab by mouth every six (6) hours as needed for Itching for up to 10 days. Qty: 20 Tab, Refills: 0  
  
  
 
2. Follow-up Information Follow up With Specialties Details Why Contact Info Amarjit Hansen MD Family Practice In 1 day  [address] [facility] 
[phone] [facility] EMERGENCY DEPT Emergency Medicine  If symptoms worsen [address] [facility] 
[phone] Call your Counselor  Call in 1 day Return to ED if worse Diagnosis Clinical Impression: 1. Panic attack 2. Costochondritis Attestations: This note is prepared by Mary Ricci, acting as Scribe for MD Patrick Alba MD: The scribe's documentation has been prepared under my direction and personally reviewed by me in its entirety. I confirm that the note above accurately reflects all work, treatment, procedures, and medical decision making performed by me.

## 2019-01-06 NOTE — ED NOTES
Pt reports she is feeling better. Dr Chucho Tripp at bedside to speak with her.   
 
Bedside report given to Vi Unger

## 2019-01-07 ENCOUNTER — TELEPHONE (OUTPATIENT)
Dept: BEHAVIORAL/MENTAL HEALTH CLINIC | Age: 69
End: 2019-01-07

## 2019-01-07 LAB
ATRIAL RATE: 88 BPM
CALCULATED P AXIS, ECG09: 32 DEGREES
CALCULATED R AXIS, ECG10: -2 DEGREES
CALCULATED T AXIS, ECG11: 88 DEGREES
DIAGNOSIS, 93000: NORMAL
P-R INTERVAL, ECG05: 154 MS
Q-T INTERVAL, ECG07: 362 MS
QRS DURATION, ECG06: 80 MS
QTC CALCULATION (BEZET), ECG08: 438 MS
VENTRICULAR RATE, ECG03: 88 BPM

## 2019-01-07 NOTE — TELEPHONE ENCOUNTER
Pt called back again to let us know the hospital gave her hydroxyzine yesterday bc of her withdrawals.

## 2019-01-07 NOTE — TELEPHONE ENCOUNTER
Pt is out of of klonopin. Shaking from head to toe, freezing, having bombs go off in her brain like a battlefield, sweating, withdrawals. Said she want us to send a supply to [facility] on [address] [facility] said [facility] working [facility] it since last Monday- they had put meds in mail. She is having severe anxiety and withdrawals.

## 2019-01-07 NOTE — TELEPHONE ENCOUNTER
Attempted to call patient, no answer. LVM reminding her of her appointment tomorrow and that Dr. Tamara Lopez wants to see her to discuss medication issues, said she can call back if needed.

## 2019-01-07 NOTE — TELEPHONE ENCOUNTER
Pt called back to say [facility] said they would send 14 day emergency day supply To last her til she receives her 3 month supply. And that they need us to fax a req. Asked pt for fax number, she did not have one. She said she had a phone number of [phone] [facility]. .. Pt repeatedly asked for refill TODAY per cant keep feeling like this. She has a fu tomorrow morning.

## 2019-01-08 ENCOUNTER — OFFICE VISIT (OUTPATIENT)
Dept: BEHAVIORAL/MENTAL HEALTH CLINIC | Age: 69
End: 2019-01-08

## 2019-01-08 VITALS
HEIGHT: 60 IN | HEART RATE: 106 BPM | BODY MASS INDEX: 37.69 KG/M2 | WEIGHT: 192 LBS | DIASTOLIC BLOOD PRESSURE: 83 MMHG | SYSTOLIC BLOOD PRESSURE: 152 MMHG

## 2019-01-08 DIAGNOSIS — F41.8 DEPRESSION WITH ANXIETY: Primary | ICD-10-CM

## 2019-01-08 DIAGNOSIS — E66.01 SEVERE OBESITY WITH BODY MASS INDEX (BMI) OF 35.0 TO 39.9 WITH SERIOUS COMORBIDITY (HCC): ICD-10-CM

## 2019-01-08 DIAGNOSIS — F60.9 PERSONALITY DISORDER (HCC): ICD-10-CM

## 2019-01-08 DIAGNOSIS — F41.9 ANXIETY: ICD-10-CM

## 2019-01-08 RX ORDER — VENLAFAXINE HYDROCHLORIDE 150 MG/1
CAPSULE, EXTENDED RELEASE ORAL
Qty: 270 CAP | Refills: 1 | Status: SHIPPED | OUTPATIENT
Start: 2019-01-08 | End: 2019-04-16 | Stop reason: SDUPTHER

## 2019-01-08 RX ORDER — CLONAZEPAM 1 MG/1
1 TABLET ORAL 3 TIMES DAILY
Qty: 90 TAB | Refills: 2 | Status: SHIPPED | OUTPATIENT
Start: 2019-01-08 | End: 2019-02-08 | Stop reason: SDUPTHER

## 2019-01-08 RX ORDER — TRAZODONE HYDROCHLORIDE 150 MG/1
150 TABLET ORAL
Qty: 90 TAB | Refills: 1 | Status: SHIPPED | OUTPATIENT
Start: 2019-01-08 | End: 2019-04-16 | Stop reason: SDUPTHER

## 2019-01-08 RX ORDER — OXCARBAZEPINE 300 MG/1
TABLET, FILM COATED ORAL
Qty: 90 TAB | Refills: 2 | Status: SHIPPED | OUTPATIENT
Start: 2019-01-08 | End: 2019-04-16 | Stop reason: SDUPTHER
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
## 2019-01-22 ENCOUNTER — HOSPITAL ENCOUNTER (OUTPATIENT)
Dept: MAMMOGRAPHY | Age: 69
Discharge: HOME | End: 2019-01-22
Attending: SURGERY
Payer: MEDICARE

## 2019-01-22 DIAGNOSIS — Z12.31 VISIT FOR SCREENING MAMMOGRAM: ICD-10-CM

## 2019-01-22 PROCEDURE — 77063 BREAST TOMOSYNTHESIS BI: CPT

## 2019-02-08 DIAGNOSIS — F41.9 ANXIETY: ICD-10-CM

## 2019-02-08 RX ORDER — CLONAZEPAM 1 MG/1
1 TABLET ORAL 3 TIMES DAILY
Qty: 90 TAB | Refills: 2 | Status: SHIPPED | OUTPATIENT
Start: 2019-02-08 | End: 2019-04-16 | Stop reason: SDUPTHER

## 2019-02-08 NOTE — TELEPHONE ENCOUNTER
[facility] Insurance will no longer allow her to fill scripts at [facility] and she states there is no [facility] near her.

## 2019-04-09 ENCOUNTER — OFFICE VISIT (OUTPATIENT)
Dept: SURGERY | Age: 69
End: 2019-04-09

## 2019-04-09 VITALS
HEART RATE: 103 BPM | HEIGHT: 60 IN | OXYGEN SATURATION: 96 % | BODY MASS INDEX: 38.85 KG/M2 | TEMPERATURE: 97.9 F | RESPIRATION RATE: 15 BRPM | DIASTOLIC BLOOD PRESSURE: 74 MMHG | WEIGHT: 197.9 LBS | SYSTOLIC BLOOD PRESSURE: 125 MMHG

## 2019-04-09 DIAGNOSIS — Z86.000 HX OF CARCINOMA IN SITU OF BREAST: ICD-10-CM

## 2019-04-09 DIAGNOSIS — Z12.31 SCREENING MAMMOGRAM FOR HIGH-RISK PATIENT: Primary | ICD-10-CM

## 2019-04-09 RX ORDER — LIDOCAINE 50 MG/G
OINTMENT TOPICAL
Status: ON HOLD | COMMUNITY
Start: 2019-01-15 | End: 2019-08-01 | Stop reason: CLARIF

## 2019-04-09 RX ORDER — CEPHALEXIN 500 MG/1
CAPSULE ORAL
Refills: 2 | Status: ON HOLD | COMMUNITY
Start: 2019-02-18 | End: 2019-08-01 | Stop reason: CLARIF
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
## 2019-04-09 NOTE — PROGRESS NOTES
Chief Complaint   Patient presents with    Breast Cancer     1 year breast check     1. Have you been to the ER, urgent care clinic since your last visit? Hospitalized since your last visit? No    2. Have you seen or consulted any other health care providers outside of the [address] [place] [address] since your last visit? Include any pap smears or colon screening.  No

## 2019-04-16 ENCOUNTER — OFFICE VISIT (OUTPATIENT)
Dept: BEHAVIORAL/MENTAL HEALTH CLINIC | Age: 69
End: 2019-04-16

## 2019-04-16 VITALS
HEIGHT: 60 IN | BODY MASS INDEX: 39.07 KG/M2 | WEIGHT: 199 LBS | HEART RATE: 101 BPM | SYSTOLIC BLOOD PRESSURE: 112 MMHG | DIASTOLIC BLOOD PRESSURE: 80 MMHG

## 2019-04-16 DIAGNOSIS — F41.9 ANXIETY: ICD-10-CM

## 2019-04-16 DIAGNOSIS — F41.8 DEPRESSION WITH ANXIETY: Primary | ICD-10-CM

## 2019-04-16 DIAGNOSIS — F60.9 PERSONALITY DISORDER (HCC): ICD-10-CM

## 2019-04-16 RX ORDER — TRAZODONE HYDROCHLORIDE 150 MG/1
150 TABLET ORAL
Qty: 90 TAB | Refills: 1 | Status: ON HOLD | OUTPATIENT
Start: 2019-04-16 | End: 2019-08-06 | Stop reason: SDUPTHER

## 2019-04-16 RX ORDER — VENLAFAXINE HYDROCHLORIDE 150 MG/1
CAPSULE, EXTENDED RELEASE ORAL
Qty: 270 CAP | Refills: 1 | Status: SHIPPED | OUTPATIENT
Start: 2019-04-16 | End: 2019-07-16 | Stop reason: SDUPTHER

## 2019-04-16 RX ORDER — OXCARBAZEPINE 300 MG/1
TABLET, FILM COATED ORAL
Qty: 90 TAB | Refills: 3 | Status: SHIPPED | OUTPATIENT
Start: 2019-04-16 | End: 2019-08-06

## 2019-04-16 RX ORDER — CHOLECALCIFEROL (VITAMIN D3) 125 MCG
10 CAPSULE ORAL
Qty: 90 TAB | Refills: 3 | Status: ON HOLD | OUTPATIENT
Start: 2019-04-16 | End: 2019-08-01 | Stop reason: CLARIF

## 2019-04-16 RX ORDER — CLONAZEPAM 1 MG/1
1 TABLET ORAL 3 TIMES DAILY
Qty: 90 TAB | Refills: 3 | Status: ON HOLD | OUTPATIENT
Start: 2019-04-16 | End: 2019-08-01 | Stop reason: CLARIF

## 2019-04-16 NOTE — PROGRESS NOTES
Psychiatric Outpatient Progress Note    Account Number:  [id number]  Name: Michelle Angeles    SUBJECTIVE:   CHIEF COMPLAINT:  Elder Robby Porras is a 68 y.o. , White female and was seen today for 3 month follow-up of psychiatric condition and psychotropic medication management.       HPI:    Saba reports the following psychiatric symptoms:  depression and anxiety.  The symptoms have been present for years and are of moderate severity. The symptoms occur daily.         Pt reported doing ok. She continues to struggle with her chronic pain. Not taking any narcotic pain medications. Currently on Celebrex and Tramadol for pain. She is pretty well dressed. Reported feeling Ok. She denies any psychosis or sergey.  Reports compliance with her medications. She is not very active physically.       Patient has gained 7  more pounds. Roldan Connors BMI = 39. BP is WNL. HR is high- asymptomatic.  Reports compliance with medications.  She continued to be somatic.      Contributing factors include: dog with #, finances.        Patient denies SI/HI/SIB.        Side Effects:  none        Fam/Soc Hx (from [facility] Eval with updates):        REVIEW OF SYSTEMS:  Constitutional: positive for fatigue, weight gain  Eyes: positive for contacts/glasses and visual disturbance  Ears, nose, mouth, throat, and face: positive for hearing loss  Musculoskeletal:positive for myalgias and arthralgias, left knee pain  Neurological: positive for memory problems  Behavioral/Psych: positive for anxiety and depression, negative for SI or HI  Appetite: good  Sleep: fair      Visit Vitals  /80   Pulse (!) 101   Ht 5' (1.524 m)   Wt 90.3 kg (199 lb)   BMI 38.86 kg/m²       OBJECTIVE:                 Mental Status exam: WNL except for      Sensorium  oriented to time, place and person   Relations cooperative and passive    Eye Contact    appropriate   Appearance:  age appropriate, casually dressed and overweight   Motor Behavior/Gait:  within normal limits   Speech: normal pitch and normal volume   Thought Process: goal directed and logical   Thought Content free of delusions and free of hallucinations   Suicidal ideations none   Homicidal ideations none   Mood:  euthymic   Affect:  constricted   Memory recent  adequate   Memory remote:  adequate   Concentration:  adequate   Abstraction:  concrete   Insight:  fair   Reliability fair   Judgment:  fair       MEDICAL DECISION MAKING  Data: pertinent labs, imaging, medical records and diagnostic tests reviewed and incorporated in diagnosis and treatment plan    Allergies   Allergen Reactions    Epinephrine Palpitations     \"Makes my heart  ' shudder'. \" Allergic to epinephrine with novocain. Plain epinephrine is ok.  Novocain [Procaine] Palpitations    Nsaids (Non-Steroidal Anti-Inflammatory Drug) Other (comments)     GASTRIC DISTRESS.  Pravastatin Myalgia    Aspirin Other (comments)     Gastric irritation    Ibuprofen Other (comments)     Gastric irritation        Current Outpatient Medications   Medication Sig Dispense Refill    clonazePAM (KLONOPIN) 1 mg tablet Take 1 Tab by mouth three (3) times daily. Max Daily Amount: 3 mg. 90 Tab 3    venlafaxine-SR (EFFEXOR-XR) 150 mg capsule 2 caps qam, 1 cap qhs 270 Cap 1    traZODone (DESYREL) 150 mg tablet Take 1 Tab by mouth nightly. 90 Tab 1    OXcarbazepine (TRILEPTAL) 300 mg tablet TAKE 1 TABLET DAILY  Indications: mood stabilizer 90 Tab 3    melatonin tab tablet Take 2 Tabs by mouth nightly. 90 Tab 3    lidocaine (XYLOCAINE) 5 % ointment       cephALEXin (KEFLEX) 500 mg capsule TAKE 4 CAPSULES BY MOUTH 1 HOUR PRIOR TO DENTAL APPOINTMENT  2    krill/om-3/dha/epa/phospho/ast (MEGARED OMEGA-3 KRILL OIL PO) Take  by mouth every evening.  magnesium oxide 500 mg tab Take  by mouth every evening.  aspirin delayed-release 81 mg tablet Take 81 mg by mouth every evening.  loperamide (IMODIUM) 2 mg capsule Take  by mouth as needed for Diarrhea.       bisacodyl (DULCOLAX, BISACODYL,) 5 mg EC tablet Take 5 mg by mouth daily as needed for Constipation.  diphenhydrAMINE (BENADRYL) 25 mg capsule Take 25 mg by mouth every six (6) hours as needed.  raNITIdine (ZANTAC) 300 mg tablet Take 300 mg by mouth daily.  PHYTONADIONE, VIT K1, (VITAMIN K1) by Does Not Apply route.  multivitamin, tx-iron-ca-min (THERA-M W/ IRON) 9 mg iron-400 mcg tab tablet Take 1 Tab by mouth daily.  CONTOUR NEXT STRIPS strip USE TO TEST BLOOD SUGAR BID  0    MICROLET LANCET misc USE BID  11    DEXILANT 60 mg CpDB Take 60 mg by mouth every evening.  rosuvastatin (CRESTOR) 10 mg tablet Take 1 Tab by mouth nightly. 30 Tab 2    traMADol (ULTRAM) 50 mg tablet Take 50 mg by mouth every eight (8) hours as needed. 3    fluticasone (FLONASE) 50 mcg/actuation nasal spray       CRANBERRY EXTRACT (CRANBERRY PO) Take 2,000 mg by mouth every evening. Triple strength.  GUAIFENESIN/DEXTROMETHORPHAN (MUCINEX DM PO) Take 1 Tab by mouth two (2) times daily as needed.  acetaminophen (ACETAMINOPHEN EXTRA STRENGTH) 500 mg tablet Take 1,500 mg by mouth every six (6) hours as needed.  celecoxib (CELEBREX) 100 mg capsule Take 100 mg by mouth two (2) times a day.  Diclofenac Sodium (VOLTAREN) 1 % topical gel Apply  to affected area daily as needed.  cholecalciferol, vitamin D3, (VITAMIN D-3) 2,000 unit Tab Take 5,000 Units by mouth daily. Soft gel.  CYANOCOBALAMIN, VITAMIN B-12, (VITAMIN B-12 PO) Take 2,000 mcg by mouth daily. 2000MCG DAILY      CALCIUM CARBONATE/VITAMIN D3 (CALCIUM 500 WITH D PO) Take 2 Tabs by mouth daily.  Potassium Gluconate 595 (99) mg tablet Take 595 mg by mouth daily.  almotriptan (AXERT) 12.5 mg tablet Take 12.5 mg by mouth once as needed.  HYDROcodone-acetaminophen (NORCO) 5-325 mg per tablet Take 1 Tab by mouth every four (4) hours as needed for Pain (1-2 tabs). Max Daily Amount: 6 Tabs.  20 Tab 0          Problems addressed today:    ICD-10-CM ICD-9-CM    1. Depression with anxiety F41.8 300.4    2. Personality disorder ([facility] F60.9 301.9    3. Anxiety F41.9 300.00 clonazePAM (KLONOPIN) 1 mg tablet       Assessment:   Nadine Davenport  is a 76 y.o.  female  is responding to treatment. Symptoms are stable. Patient denies SI/HI/SIB. No evidence of AH/VH or delusions. Risk Scoring- chronic illnesses and prescription drug management    Treatment Plan:  1. Medications:          Medication Changes/Adjustments: Continue combination of clonazepam, Effexor, Trileptal, trazodone and melatonin in the current dosages. Current Outpatient Medications   Medication Sig Dispense Refill    clonazePAM (KLONOPIN) 1 mg tablet Take 1 Tab by mouth three (3) times daily. Max Daily Amount: 3 mg. 90 Tab 3    venlafaxine-SR (EFFEXOR-XR) 150 mg capsule 2 caps qam, 1 cap qhs 270 Cap 1    traZODone (DESYREL) 150 mg tablet Take 1 Tab by mouth nightly. 90 Tab 1    OXcarbazepine (TRILEPTAL) 300 mg tablet TAKE 1 TABLET DAILY  Indications: mood stabilizer 90 Tab 3    melatonin tab tablet Take 2 Tabs by mouth nightly. 90 Tab 3    lidocaine (XYLOCAINE) 5 % ointment       cephALEXin (KEFLEX) 500 mg capsule TAKE 4 CAPSULES BY MOUTH 1 HOUR PRIOR TO DENTAL APPOINTMENT  2    krill/om-3/dha/epa/phospho/ast (MEGARED OMEGA-3 KRILL OIL PO) Take  by mouth every evening.  magnesium oxide 500 mg tab Take  by mouth every evening.  aspirin delayed-release 81 mg tablet Take 81 mg by mouth every evening.  loperamide (IMODIUM) 2 mg capsule Take  by mouth as needed for Diarrhea.  bisacodyl (DULCOLAX, BISACODYL,) 5 mg EC tablet Take 5 mg by mouth daily as needed for Constipation.  diphenhydrAMINE (BENADRYL) 25 mg capsule Take 25 mg by mouth every six (6) hours as needed.  raNITIdine (ZANTAC) 300 mg tablet Take 300 mg by mouth daily.  PHYTONADIONE, VIT K1, (VITAMIN K1) by Does Not Apply route.       multivitamin, tx-iron-ca-min (THERA-M W/ IRON) 9 mg iron-400 mcg tab tablet Take 1 Tab by mouth daily.  CONTOUR NEXT STRIPS strip USE TO TEST BLOOD SUGAR BID  0    MICROLET LANCET misc USE BID  11    DEXILANT 60 mg CpDB Take 60 mg by mouth every evening.  rosuvastatin (CRESTOR) 10 mg tablet Take 1 Tab by mouth nightly. 30 Tab 2    traMADol (ULTRAM) 50 mg tablet Take 50 mg by mouth every eight (8) hours as needed. 3    fluticasone (FLONASE) 50 mcg/actuation nasal spray       CRANBERRY EXTRACT (CRANBERRY PO) Take 2,000 mg by mouth every evening. Triple strength.  GUAIFENESIN/DEXTROMETHORPHAN (MUCINEX DM PO) Take 1 Tab by mouth two (2) times daily as needed.  acetaminophen (ACETAMINOPHEN EXTRA STRENGTH) 500 mg tablet Take 1,500 mg by mouth every six (6) hours as needed.  celecoxib (CELEBREX) 100 mg capsule Take 100 mg by mouth two (2) times a day.  Diclofenac Sodium (VOLTAREN) 1 % topical gel Apply  to affected area daily as needed.  cholecalciferol, vitamin D3, (VITAMIN D-3) 2,000 unit Tab Take 5,000 Units by mouth daily. Soft gel.  CYANOCOBALAMIN, VITAMIN B-12, (VITAMIN B-12 PO) Take 2,000 mcg by mouth daily. 2000MCG DAILY      CALCIUM CARBONATE/VITAMIN D3 (CALCIUM 500 WITH D PO) Take 2 Tabs by mouth daily.  Potassium Gluconate 595 (99) mg tablet Take 595 mg by mouth daily.  almotriptan (AXERT) 12.5 mg tablet Take 12.5 mg by mouth once as needed.  HYDROcodone-acetaminophen (NORCO) 5-325 mg per tablet Take 1 Tab by mouth every four (4) hours as needed for Pain (1-2 tabs). Max Daily Amount: 6 Tabs. 20 Tab 0                  The following regarding medications was addressed:    (The risks and benefits of the proposed medication; the potential medication side effects ie    dry mouth, weight gain, GI upset, headache; patient given opportunity to ask questions)       2.   Counseling and coordination of care including instructions for treatment, risks/benefits, risk factor reduction and patient/family education. She agrees with the plan. Patient instructed to call with any side effects, questions or issues. 3.  She was educated on the complications of obesity. She was strongly encouraged to walk daily for 30 minutes and monitor her portion of meals and adopt healthy eating. She was provided supportive counseling for her chronic pain issues. PSYCHOTHERAPY:  approx 20 minutes  Type:  Supportive/Solution Focused psychotherapy provided  Focus:     Current problems:   Medical issues    Psychoeducation provided: psych medications. Treatment plan reviewed with patient-including diagnosis and medications    Octavio Runner is stable. Follow-up and Dispositions    · Return in about 4 months (around 8/16/2019).            Bam Webb MD  4/16/2019

## 2019-06-10 ENCOUNTER — TELEPHONE (OUTPATIENT)
Dept: BEHAVIORAL/MENTAL HEALTH CLINIC | Age: 69
End: 2019-06-10

## 2019-06-10 DIAGNOSIS — F41.9 ANXIETY: Primary | ICD-10-CM

## 2019-06-10 RX ORDER — CLONAZEPAM 1 MG/1
1 TABLET ORAL 3 TIMES DAILY
Qty: 21 TAB | Refills: 0 | Status: SHIPPED | OUTPATIENT
Start: 2019-06-10 | End: 2019-06-11

## 2019-06-10 NOTE — TELEPHONE ENCOUNTER
Patient called Friday saying she hasn't received her clonazepam from [facility] yet, she only has #9 left. Informed her provider is out of the office Friday afternoons, a message would be sent on Monday. Requesting refill be sent to [facility] on [address]. Patient has called first thing Monday morning, says her last dose was Thursday and she went through [facility]" all weekend. Office staff instructed her to go to [facility] [place] to be evaluated. She did not contact [facility] over the weekend to request a refill.

## 2019-06-10 NOTE — TELEPHONE ENCOUNTER
Patient did not place [facility] time to be delivered, now states she is out. It will take 5 days for her to receive the medication. Requesting a week supply so patient can continue current medication regiment.

## 2019-06-10 NOTE — TELEPHONE ENCOUNTER
Patient calls, says the \"ambulance people\" are there and told her the waiting rooms are packed and she'd be waiting for hours, and after they evaluated her, she decided to not go. She will wait until her appointment on July 2.

## 2019-06-11 ENCOUNTER — HOSPITAL ENCOUNTER (EMERGENCY)
Age: 69
Discharge: HOME | End: 2019-06-11
Attending: STUDENT IN AN ORGANIZED HEALTH CARE EDUCATION/TRAINING PROGRAM
Payer: MEDICARE

## 2019-06-11 VITALS
TEMPERATURE: 98.4 F | RESPIRATION RATE: 17 BRPM | SYSTOLIC BLOOD PRESSURE: 143 MMHG | HEIGHT: 62 IN | HEART RATE: 82 BPM | OXYGEN SATURATION: 98 % | DIASTOLIC BLOOD PRESSURE: 76 MMHG | WEIGHT: 193 LBS | BODY MASS INDEX: 35.51 KG/M2

## 2019-06-11 DIAGNOSIS — F41.9 ANXIETY: ICD-10-CM

## 2019-06-11 DIAGNOSIS — Z76.0 MEDICATION REFILL: Primary | ICD-10-CM

## 2019-06-11 PROCEDURE — 99284 EMERGENCY DEPT VISIT MOD MDM: CPT

## 2019-06-11 RX ORDER — CLONAZEPAM 1 MG/1
1 TABLET ORAL 3 TIMES DAILY
Qty: 21 TAB | Refills: 0 | Status: SHIPPED | OUTPATIENT
Start: 2019-06-11 | End: 2019-07-23 | Stop reason: SDUPTHER

## 2019-06-11 NOTE — ED TRIAGE NOTES
Pt is from [place] [address] in [facility] and is out of her Klonopin since Thursday night and states that her pharmacy would not fill it. Pt states that the pharmacist has the prescription and \"will not give it to here\" Pt states that she has been on the phone with the pharmacy all day and is feeling very anxious and SOB.

## 2019-06-11 NOTE — ED PROVIDER NOTES
76 y.o. female with past medical history significant for anxiety who presents via EMS from home with chief complaint of a medication refill. Patient arrives requesting a medication refill after being out of her klonopin prescription x5 days - states her pharmacist will not fill it d/t a corporate policy on controlled substances. Patient states she has been taking this x10-15 years and has had this problem multiple times before (4 times in the last year). Patient is prescribed to take 1mg TID - last refill 4/17/19 (90 day supply). There are no other acute medical concerns at this time. Social hx: Never tobacco smoker; Denies EtOH use; Denies illicit drug use  PCP: Cherie Cardozo MD    Note written by Fransisco Fortune, as dictated by Kendrick Ballard MD 5:45 PM           Past Medical History:   Diagnosis Date    Arthritis     GENERALIZED IN JOINTS.  Breast cancer ([facility]     Left Breast Cancer 1995    Cancer [facility])     left breast - surgery/radiation    Chronic pain     Back pain    Depression     Mood Swings, anxiety    GERD (gastroesophageal reflux disease)     Hiatal Hernia    Headache     Hypercholesterolemia     Hypertension     CONTROLLED BY MEDS., Pt states she is no liong er on BP med's    Ill-defined condition     Neuropathy bilat feet    Irregular heart beat     Other ill-defined conditions(799.89)     MIGRAINES    Other ill-defined conditions(799.89)     neuropathy of lower legs and feet    Other ill-defined conditions(799.89)     increased cholesterol    Other ill-defined conditions(799.89)     bronchitis    Psychotic disorder (HCC)     S/P radiation therapy     1995 Left Breast    Unspecified adverse effect of anesthesia     HEART PALPITATION. Past Surgical History:   Procedure Laterality Date    BREAST SURGERY PROCEDURE UNLISTED  7/1995    DCIS /BIOPSIES MULTIPLE.     BREAST SURGERY PROCEDURE UNLISTED  1995    LUMPECTOMY WITH RADIATION INSITU    HX BREAST LUMPECTOMY Left     1995 - POSITIVE    HX ORTHOPAEDIC  2006    LEFT KNEE ARTHROSCOPY    HX ORTHOPAEDIC  4/2010    RIGHT KNEE ARTHROSCOPY, concrete nail placed    HX ORTHOPAEDIC      Hammertoe , doesnt know which foot    HX ORTHOPAEDIC      Small growth removed from between toes but doesnt know which foot         Family History:   Problem Relation Age of Onset    Cancer Mother         BLADDER CA    Heart Disease Father     Lung Disease Father        Social History     Socioeconomic History    Marital status:      Spouse name: Not on file    Number of children: Not on file    Years of education: Not on file    Highest education level: Not on file   Occupational History    Not on file   Social Needs    Financial resource strain: Not on file    Food insecurity:     Worry: Not on file     Inability: Not on file    Transportation needs:     Medical: Not on file     Non-medical: Not on file   Tobacco Use    Smoking status: Never Smoker    Smokeless tobacco: Never Used   Substance and Sexual Activity    Alcohol use: No    Drug use: No    Sexual activity: Never   Lifestyle    Physical activity:     Days per week: Not on file     Minutes per session: Not on file    Stress: Not on file   Relationships    Social connections:     Talks on phone: Not on file     Gets together: Not on file     Attends Denominational service: Not on file     Active member of club or organization: Not on file     Attends meetings of clubs or organizations: Not on file     Relationship status: Not on file    Intimate partner violence:     Fear of current or ex partner: Not on file     Emotionally abused: Not on file     Physically abused: Not on file     Forced sexual activity: Not on file   Other Topics Concern    Not on file   Social History Narrative    Not on file         ALLERGIES: Epinephrine; Novocain [procaine]; Nsaids (non-steroidal anti-inflammatory drug);  Pravastatin; Aspirin; and Ibuprofen    Review of Systems Constitutional: Negative for activity change, diaphoresis, fatigue and fever. HENT: Negative for congestion and sore throat. Eyes: Negative for photophobia and visual disturbance. Respiratory: Negative for chest tightness and shortness of breath. Cardiovascular: Negative for chest pain, palpitations and leg swelling. Gastrointestinal: Negative for abdominal pain, blood in stool, constipation, diarrhea, nausea and vomiting. Genitourinary: Negative for difficulty urinating, dysuria, flank pain, frequency and hematuria. Musculoskeletal: Negative for back pain. Neurological: Negative for dizziness, syncope, numbness and headaches. All other systems reviewed and are negative. Vitals:    06/11/19 1703 06/11/19 1715 06/11/19 1730   BP: 146/81 147/71 146/71   Pulse: 93     Resp: 16     Temp: (!) 94 °F (34.4 °C)     SpO2: 98% 96%    Weight: 87.5 kg (193 lb)     Height: 5' 2\" (1.575 m)              Physical Exam   Constitutional: She is oriented to person, place, and time. She appears well-developed and well-nourished. No distress. Anxious appearing. HENT:   Head: Normocephalic and atraumatic. Nose: Nose normal.   Mouth/Throat: Oropharynx is clear and moist. No oropharyngeal exudate. Eyes: Conjunctivae and EOM are normal. Right eye exhibits no discharge. Left eye exhibits no discharge. No scleral icterus. Neck: Normal range of motion. Neck supple. No JVD present. No tracheal deviation present. No thyromegaly present. Cardiovascular: Normal rate, regular rhythm, normal heart sounds and intact distal pulses. Exam reveals no gallop and no friction rub. No murmur heard. Pulmonary/Chest: Effort normal and breath sounds normal. No stridor. No respiratory distress. She has no wheezes. She has no rales. She exhibits no tenderness. Abdominal: Bowel sounds are normal. She exhibits no distension and no mass. There is no tenderness. There is no rebound.    Musculoskeletal: Normal range of motion. She exhibits no edema or tenderness. Lymphadenopathy:     She has no cervical adenopathy. Neurological: She is alert and oriented to person, place, and time. No cranial nerve deficit. Coordination normal.   Skin: Skin is warm and dry. No rash noted. She is not diaphoretic. No erythema. No pallor. Psychiatric: Her behavior is normal. Judgment and thought content normal. Her affect is angry and blunt. Angry. Blunt. Nursing note and vitals reviewed.     Note written by Fransisco Harrell, as dictated by Martha Reynoso MD 5:45 PM     MDM  Number of Diagnoses or Management Options  Medication refill:      Amount and/or Complexity of Data Reviewed  Review and summarize past medical records: yes    Risk of Complications, Morbidity, and/or Mortality  Presenting problems: moderate  Diagnostic procedures: moderate  Management options: moderate    Patient Progress  Patient progress: stable         Procedures

## 2019-06-11 NOTE — DISCHARGE INSTRUCTIONS

## 2019-06-11 NOTE — ED NOTES
Patient received discharge instructions by MD. Patient verbalized understanding of medication use and other discharge instructions. Updated vitals, and patient wheeled out by EMS, showing no signs of distress. Pt reports relief of most intense pain. All questions answered.      No interventions done by discharging, Selina Smiley

## 2019-06-11 NOTE — ED NOTES
Spoke with Kathy Hebert from [facility] to arrange a ride back to [facility] with an ETA of 1 hour.

## 2019-07-02 ENCOUNTER — OFFICE VISIT (OUTPATIENT)
Dept: BEHAVIORAL/MENTAL HEALTH CLINIC | Age: 69
End: 2019-07-02

## 2019-07-02 VITALS
WEIGHT: 186 LBS | HEART RATE: 93 BPM | SYSTOLIC BLOOD PRESSURE: 134 MMHG | DIASTOLIC BLOOD PRESSURE: 86 MMHG | HEIGHT: 62 IN | BODY MASS INDEX: 34.23 KG/M2

## 2019-07-02 DIAGNOSIS — F60.9 PERSONALITY DISORDER (HCC): ICD-10-CM

## 2019-07-02 DIAGNOSIS — F41.8 DEPRESSION WITH ANXIETY: Primary | ICD-10-CM

## 2019-07-02 NOTE — PROGRESS NOTES
Psychiatric Outpatient Progress Note    Account Number:  [id number]  Name: Zia Woodall    SUBJECTIVE:   CHIEF COMPLAINT:  Lizbeth Porras is a 68 y.o. , White female and was seen today for 3 month follow-up of psychiatric condition and psychotropic medication management.       HPI:    Saba reports the following psychiatric symptoms:  depression and anxiety.  The symptoms have been present for years and are of moderate severity. The symptoms occur daily.         Pt reported doing ok. She is not very happy about my departure from this practice. She was in ED on 6/11/2019 with ? Benzo withdrawals. She had run out of her clonazepam due some discrepancy in filling her prescription from [facility] and mail order pharmacy. She continues to struggle with her chronic pain. Not taking any narcotic pain medications. Currently on Celebrex and Tramadol for pain. Reported feeling Ok. She denies any psychosis or sergey.  Reports compliance with her medications. She is not very active physically. Not eating well and has lost weight.       Patient has lost 13 lbs  more pounds. Lala Dina BMI = 34. BP is WNL. HR is high- asymptomatic.  Reports compliance with medications.  She continued to be somatic.      Contributing factors include: finances.        Patient denies SI/HI/SIB.        Side Effects:  none        Fam/Soc Hx (from [facility] Eval with updates):        REVIEW OF SYSTEMS:  Constitutional: positive for fatigue, weight loss  Eyes: positive for contacts/glasses and visual disturbance  Ears, nose, mouth, throat, and face: positive for hearing loss  Musculoskeletal:positive for myalgias and arthralgias, left knee pain  Neurological: positive for memory problems  Behavioral/Psych: positive for anxiety and depression, negative for SI or HI  Appetite: poor  Sleep: fair      Visit Vitals  /86   Pulse 93   Ht 5' 2\" (1.575 m)   Wt 84.4 kg (186 lb)   BMI 34.02 kg/m²       OBJECTIVE:                 Mental Status exam: WNL except for Sensorium  oriented to time, place and person   Relations cooperative and passive    Eye Contact    poor   Appearance:  age appropriate, casually dressed, overweight and poor hygiene   Motor Behavior/Gait:  hypoactive   Speech:  normal pitch and normal volume   Thought Process: goal directed and logical   Thought Content free of delusions and free of hallucinations   Suicidal ideations none   Homicidal ideations none   Mood:  depressed   Affect:  anxious   Memory recent  adequate   Memory remote:  adequate   Concentration:  adequate   Abstraction:  concrete   Insight:  fair   Reliability fair   Judgment:  fair       MEDICAL DECISION MAKING  Data: pertinent labs, imaging, medical records and diagnostic tests reviewed and incorporated in diagnosis and treatment plan    Allergies   Allergen Reactions    Epinephrine Palpitations     \"Makes my heart  ' shudder'. \" Allergic to epinephrine with novocain. Plain epinephrine is ok.  Novocain [Procaine] Palpitations    Nsaids (Non-Steroidal Anti-Inflammatory Drug) Other (comments)     GASTRIC DISTRESS.  Pravastatin Myalgia    Aspirin Other (comments)     Gastric irritation    Ibuprofen Other (comments)     Gastric irritation        Current Outpatient Medications   Medication Sig Dispense Refill    clonazePAM (KLONOPIN) 1 mg tablet Take 1 Tab by mouth three (3) times daily for 7 days. Max Daily Amount: 3 mg. 21 Tab 0    clonazePAM (KLONOPIN) 1 mg tablet Take 1 Tab by mouth three (3) times daily. Max Daily Amount: 3 mg. 90 Tab 3    venlafaxine-SR (EFFEXOR-XR) 150 mg capsule 2 caps qam, 1 cap qhs 270 Cap 1    traZODone (DESYREL) 150 mg tablet Take 1 Tab by mouth nightly. 90 Tab 1    OXcarbazepine (TRILEPTAL) 300 mg tablet TAKE 1 TABLET DAILY  Indications: mood stabilizer 90 Tab 3    melatonin tab tablet Take 2 Tabs by mouth nightly.  90 Tab 3    lidocaine (XYLOCAINE) 5 % ointment       cephALEXin (KEFLEX) 500 mg capsule TAKE 4 CAPSULES BY MOUTH 1 HOUR PRIOR TO DENTAL APPOINTMENT  2    krill/om-3/dha/epa/phospho/ast (MEGARED OMEGA-3 KRILL OIL PO) Take  by mouth every evening.  magnesium oxide 500 mg tab Take  by mouth every evening.  aspirin delayed-release 81 mg tablet Take 81 mg by mouth every evening.  loperamide (IMODIUM) 2 mg capsule Take  by mouth as needed for Diarrhea.  bisacodyl (DULCOLAX, BISACODYL,) 5 mg EC tablet Take 5 mg by mouth daily as needed for Constipation.  diphenhydrAMINE (BENADRYL) 25 mg capsule Take 25 mg by mouth every six (6) hours as needed.  raNITIdine (ZANTAC) 300 mg tablet Take 300 mg by mouth daily.  PHYTONADIONE, VIT K1, (VITAMIN K1) by Does Not Apply route.  multivitamin, tx-iron-ca-min (THERA-M W/ IRON) 9 mg iron-400 mcg tab tablet Take 1 Tab by mouth daily.  CONTOUR NEXT STRIPS strip USE TO TEST BLOOD SUGAR BID  0    MICROLET LANCET misc USE BID  11    DEXILANT 60 mg CpDB Take 60 mg by mouth every evening.  rosuvastatin (CRESTOR) 10 mg tablet Take 1 Tab by mouth nightly. 30 Tab 2    traMADol (ULTRAM) 50 mg tablet Take 50 mg by mouth every eight (8) hours as needed. 3    fluticasone (FLONASE) 50 mcg/actuation nasal spray       CRANBERRY EXTRACT (CRANBERRY PO) Take 2,000 mg by mouth every evening. Triple strength.  GUAIFENESIN/DEXTROMETHORPHAN (MUCINEX DM PO) Take 1 Tab by mouth two (2) times daily as needed.  acetaminophen (ACETAMINOPHEN EXTRA STRENGTH) 500 mg tablet Take 1,500 mg by mouth every six (6) hours as needed.  celecoxib (CELEBREX) 100 mg capsule Take 100 mg by mouth two (2) times a day.  Diclofenac Sodium (VOLTAREN) 1 % topical gel Apply  to affected area daily as needed.  cholecalciferol, vitamin D3, (VITAMIN D-3) 2,000 unit Tab Take 5,000 Units by mouth daily. Soft gel.  CYANOCOBALAMIN, VITAMIN B-12, (VITAMIN B-12 PO) Take 2,000 mcg by mouth daily.  2000MCG DAILY      CALCIUM CARBONATE/VITAMIN D3 (CALCIUM 500 WITH D PO) Take 2 Tabs by mouth daily.      Potassium Gluconate 595 (99) mg tablet Take 595 mg by mouth daily.  almotriptan (AXERT) 12.5 mg tablet Take 12.5 mg by mouth once as needed.  HYDROcodone-acetaminophen (NORCO) 5-325 mg per tablet Take 1 Tab by mouth every four (4) hours as needed for Pain (1-2 tabs). Max Daily Amount: 6 Tabs. 20 Tab 0          Problems addressed today:    ICD-10-CM ICD-9-CM    1. Depression with anxiety F41.8 300.4    2. Personality disorder ([facility] [address] F60.9 301.9        Assessment:   Leonard Baldwin  is a 76 y.o.  female  Is partially responding to treatment. Symptoms are unstable. Patient denies SI/HI/SIB. No evidence of AH/VH or delusions. Risk Scoring- chronic illnesses and prescription drug management    Treatment Plan:  1. Medications:          Medication Changes/Adjustments: Cotinue combination of Effexor, Trileptal, clonazepam, trazodone and melatonin in the current dosages. Current Outpatient Medications   Medication Sig Dispense Refill    clonazePAM (KLONOPIN) 1 mg tablet Take 1 Tab by mouth three (3) times daily for 7 days. Max Daily Amount: 3 mg. 21 Tab 0    clonazePAM (KLONOPIN) 1 mg tablet Take 1 Tab by mouth three (3) times daily. Max Daily Amount: 3 mg. 90 Tab 3    venlafaxine-SR (EFFEXOR-XR) 150 mg capsule 2 caps qam, 1 cap qhs 270 Cap 1    traZODone (DESYREL) 150 mg tablet Take 1 Tab by mouth nightly. 90 Tab 1    OXcarbazepine (TRILEPTAL) 300 mg tablet TAKE 1 TABLET DAILY  Indications: mood stabilizer 90 Tab 3    melatonin tab tablet Take 2 Tabs by mouth nightly. 90 Tab 3    lidocaine (XYLOCAINE) 5 % ointment       cephALEXin (KEFLEX) 500 mg capsule TAKE 4 CAPSULES BY MOUTH 1 HOUR PRIOR TO DENTAL APPOINTMENT  2    krill/om-3/dha/epa/phospho/ast (MEGARED OMEGA-3 KRILL OIL PO) Take  by mouth every evening.  magnesium oxide 500 mg tab Take  by mouth every evening.  aspirin delayed-release 81 mg tablet Take 81 mg by mouth every evening.       loperamide (IMODIUM) 2 mg capsule Take  by mouth as needed for Diarrhea.  bisacodyl (DULCOLAX, BISACODYL,) 5 mg EC tablet Take 5 mg by mouth daily as needed for Constipation.  diphenhydrAMINE (BENADRYL) 25 mg capsule Take 25 mg by mouth every six (6) hours as needed.  raNITIdine (ZANTAC) 300 mg tablet Take 300 mg by mouth daily.  PHYTONADIONE, VIT K1, (VITAMIN K1) by Does Not Apply route.  multivitamin, tx-iron-ca-min (THERA-M W/ IRON) 9 mg iron-400 mcg tab tablet Take 1 Tab by mouth daily.  CONTOUR NEXT STRIPS strip USE TO TEST BLOOD SUGAR BID  0    MICROLET LANCET misc USE BID  11    DEXILANT 60 mg CpDB Take 60 mg by mouth every evening.  rosuvastatin (CRESTOR) 10 mg tablet Take 1 Tab by mouth nightly. 30 Tab 2    traMADol (ULTRAM) 50 mg tablet Take 50 mg by mouth every eight (8) hours as needed. 3    fluticasone (FLONASE) 50 mcg/actuation nasal spray       CRANBERRY EXTRACT (CRANBERRY PO) Take 2,000 mg by mouth every evening. Triple strength.  GUAIFENESIN/DEXTROMETHORPHAN (MUCINEX DM PO) Take 1 Tab by mouth two (2) times daily as needed.  acetaminophen (ACETAMINOPHEN EXTRA STRENGTH) 500 mg tablet Take 1,500 mg by mouth every six (6) hours as needed.  celecoxib (CELEBREX) 100 mg capsule Take 100 mg by mouth two (2) times a day.  Diclofenac Sodium (VOLTAREN) 1 % topical gel Apply  to affected area daily as needed.  cholecalciferol, vitamin D3, (VITAMIN D-3) 2,000 unit Tab Take 5,000 Units by mouth daily. Soft gel.  CYANOCOBALAMIN, VITAMIN B-12, (VITAMIN B-12 PO) Take 2,000 mcg by mouth daily. 2000MCG DAILY      CALCIUM CARBONATE/VITAMIN D3 (CALCIUM 500 WITH D PO) Take 2 Tabs by mouth daily.  Potassium Gluconate 595 (99) mg tablet Take 595 mg by mouth daily.  almotriptan (AXERT) 12.5 mg tablet Take 12.5 mg by mouth once as needed.  HYDROcodone-acetaminophen (NORCO) 5-325 mg per tablet Take 1 Tab by mouth every four (4) hours as needed for Pain (1-2 tabs). Max Daily Amount: 6 Tabs. 20 Tab 0                  The following regarding medications was addressed:    (The risks and benefits of the proposed medication; the potential medication side effects ie    dry mouth, weight gain, GI upset, headache; patient given opportunity to ask questions)       2. Counseling and coordination of care including instructions for treatment, risks/benefits, risk factor reduction and patient/family education. She agrees with the plan. Patient instructed to call with any side effects, questions or issues. 3.  Pt was aware of my departure from this practice. She agreed to FU with Dr. Agata Pruett, in this practice. PSYCHOTHERAPY:  approx 20 minutes  Type:  Supportive/Solution Focused psychotherapy provided  Focus:     Current problems:              [address] [place] [address] issues   Medical issues    Psychoeducation provided:  Psych medications. Treatment plan reviewed with patient-including diagnosis and medications    Jessy Otto is not progressing. Follow-up and Dispositions    · Return if symptoms worsen or fail to improve.        Cirilo Thao MD  7/2/2019

## 2019-07-16 RX ORDER — VENLAFAXINE HYDROCHLORIDE 150 MG/1
CAPSULE, EXTENDED RELEASE ORAL
Qty: 270 CAP | Refills: 1 | Status: SHIPPED | OUTPATIENT
Start: 2019-07-16 | End: 2019-08-06

## 2019-07-23 DIAGNOSIS — F41.9 ANXIETY: ICD-10-CM

## 2019-07-23 RX ORDER — CLONAZEPAM 1 MG/1
1 TABLET ORAL 3 TIMES DAILY
Qty: 90 TAB | Refills: 3 | Status: CANCELLED | OUTPATIENT
Start: 2019-07-23

## 2019-07-23 NOTE — TELEPHONE ENCOUNTER
Patient requesting refill to [facility]. Called her because we thought she was using mail order. She says she is. Asked why does she need a script at a local pharmacy? Explained we can't have multiple scripts at multiple pharmacies. She states [facility].

## 2019-07-31 ENCOUNTER — HOSPITAL ENCOUNTER (INPATIENT)
Age: 69
LOS: 5 days | Discharge: HOME | DRG: 885 | End: 2019-08-06
Attending: EMERGENCY MEDICINE | Admitting: PSYCHIATRY & NEUROLOGY
Payer: MEDICARE

## 2019-07-31 DIAGNOSIS — F22 DELUSIONAL DISORDER (HCC): Primary | ICD-10-CM

## 2019-07-31 LAB
ALBUMIN SERPL-MCNC: 3.1 G/DL (ref 3.5–5)
ALBUMIN/GLOB SERPL: 0.9 {RATIO} (ref 1.1–2.2)
ALP SERPL-CCNC: 122 U/L (ref 45–117)
ALT SERPL-CCNC: 118 U/L (ref 12–78)
AMPHET UR QL SCN: NEGATIVE
ANION GAP SERPL CALC-SCNC: 6 MMOL/L (ref 5–15)
APPEARANCE UR: CLEAR
AST SERPL-CCNC: 26 U/L (ref 15–37)
BACTERIA URNS QL MICRO: NEGATIVE /HPF
BARBITURATES UR QL SCN: NEGATIVE
BENZODIAZ UR QL: NEGATIVE
BILIRUB SERPL-MCNC: 0.8 MG/DL (ref 0.2–1)
BILIRUB UR QL CFM: NEGATIVE
BUN SERPL-MCNC: 17 MG/DL (ref 6–20)
BUN/CREAT SERPL: 21 (ref 12–20)
CALCIUM SERPL-MCNC: 9.3 MG/DL (ref 8.5–10.1)
CANNABINOIDS UR QL SCN: NEGATIVE
CHLORIDE SERPL-SCNC: 110 MMOL/L (ref 97–108)
CO2 SERPL-SCNC: 25 MMOL/L (ref 21–32)
COCAINE UR QL SCN: NEGATIVE
COLOR UR: ABNORMAL
COMMENT, HOLDF: NORMAL
CREAT SERPL-MCNC: 0.81 MG/DL (ref 0.55–1.02)
DRUG SCRN COMMENT,DRGCM: NORMAL
EPITH CASTS URNS QL MICRO: ABNORMAL /LPF
ERYTHROCYTE [DISTWIDTH] IN BLOOD BY AUTOMATED COUNT: 15.8 % (ref 11.5–14.5)
GLOBULIN SER CALC-MCNC: 3.3 G/DL (ref 2–4)
GLUCOSE SERPL-MCNC: 116 MG/DL (ref 65–100)
GLUCOSE UR STRIP.AUTO-MCNC: NEGATIVE MG/DL
HCT VFR BLD AUTO: 39.5 % (ref 35–47)
HGB BLD-MCNC: 12 G/DL (ref 11.5–16)
HGB UR QL STRIP: NEGATIVE
HYALINE CASTS URNS QL MICRO: ABNORMAL /LPF (ref 0–5)
KETONES UR QL STRIP.AUTO: ABNORMAL MG/DL
LEUKOCYTE ESTERASE UR QL STRIP.AUTO: NEGATIVE
MCH RBC QN AUTO: 27 PG (ref 26–34)
MCHC RBC AUTO-ENTMCNC: 30.4 G/DL (ref 30–36.5)
MCV RBC AUTO: 89 FL (ref 80–99)
METHADONE UR QL: NEGATIVE
NITRITE UR QL STRIP.AUTO: NEGATIVE
NRBC # BLD: 0 K/UL (ref 0–0.01)
NRBC BLD-RTO: 0 PER 100 WBC
OPIATES UR QL: NEGATIVE
PCP UR QL: NEGATIVE
PH UR STRIP: 5.5 [PH] (ref 5–8)
PLATELET # BLD AUTO: 225 K/UL (ref 150–400)
PMV BLD AUTO: 11 FL (ref 8.9–12.9)
POTASSIUM SERPL-SCNC: 4.1 MMOL/L (ref 3.5–5.1)
PROT SERPL-MCNC: 6.4 G/DL (ref 6.4–8.2)
PROT UR STRIP-MCNC: ABNORMAL MG/DL
RBC # BLD AUTO: 4.44 M/UL (ref 3.8–5.2)
RBC #/AREA URNS HPF: ABNORMAL /HPF (ref 0–5)
SAMPLES BEING HELD,HOLD: NORMAL
SODIUM SERPL-SCNC: 141 MMOL/L (ref 136–145)
SP GR UR REFRACTOMETRY: 1.02 (ref 1–1.03)
UR CULT HOLD, URHOLD: NORMAL
UROBILINOGEN UR QL STRIP.AUTO: 1 EU/DL (ref 0.2–1)
WBC # BLD AUTO: 11.6 K/UL (ref 3.6–11)
WBC URNS QL MICRO: ABNORMAL /HPF (ref 0–4)

## 2019-07-31 PROCEDURE — 36415 COLL VENOUS BLD VENIPUNCTURE: CPT

## 2019-07-31 PROCEDURE — 85027 COMPLETE CBC AUTOMATED: CPT

## 2019-07-31 PROCEDURE — 84443 ASSAY THYROID STIM HORMONE: CPT

## 2019-07-31 PROCEDURE — 99285 EMERGENCY DEPT VISIT HI MDM: CPT

## 2019-07-31 PROCEDURE — 81001 URINALYSIS AUTO W/SCOPE: CPT

## 2019-07-31 PROCEDURE — 80307 DRUG TEST PRSMV CHEM ANLYZR: CPT

## 2019-07-31 PROCEDURE — 80053 COMPREHEN METABOLIC PANEL: CPT

## 2019-07-31 PROCEDURE — 90791 PSYCH DIAGNOSTIC EVALUATION: CPT

## 2019-07-31 NOTE — ED TRIAGE NOTES
Comes by EMS for bed bug bites. EMS says she showered before they arrived and they did not see any bedbugs. She was taken to the [place] showers when she arrived. No bugs were found.

## 2019-07-31 NOTE — ED TRIAGE NOTES
Pt states \"I've got bed bugs inside of me! I bought an electric blanket from [employer] 4 months ago. In small print on the box, it said the blanket was made of bed bugs. Do not return. \"

## 2019-08-01 LAB
APAP SERPL-MCNC: <2 UG/ML (ref 10–30)
ETHANOL SERPL-MCNC: <10 MG/DL
SALICYLATES SERPL-MCNC: <1.7 MG/DL (ref 2.8–20)
TSH SERPL DL<=0.05 MIU/L-ACNC: 1.84 UIU/ML (ref 0.36–3.74)

## 2019-08-01 PROCEDURE — 65220000003 HC RM SEMIPRIVATE PSYCH

## 2019-08-01 PROCEDURE — 74011250637 HC RX REV CODE- 250/637: Performed by: NURSE PRACTITIONER

## 2019-08-01 PROCEDURE — 74011250637 HC RX REV CODE- 250/637: Performed by: PSYCHIATRY & NEUROLOGY

## 2019-08-01 RX ORDER — PHENOBARBITAL 32.4 MG/1
32.4 TABLET ORAL
Status: DISCONTINUED | OUTPATIENT
Start: 2019-08-01 | End: 2019-08-05

## 2019-08-01 RX ORDER — ADHESIVE BANDAGE
30 BANDAGE TOPICAL DAILY PRN
Status: DISCONTINUED | OUTPATIENT
Start: 2019-08-01 | End: 2019-08-06 | Stop reason: HOSPADM

## 2019-08-01 RX ORDER — ONDANSETRON 4 MG/1
4 TABLET, ORALLY DISINTEGRATING ORAL
Status: DISCONTINUED | OUTPATIENT
Start: 2019-08-01 | End: 2019-08-06 | Stop reason: HOSPADM

## 2019-08-01 RX ORDER — DIAPER,BRIEF,ADULT, DISPOSABLE
2 EACH MISCELLANEOUS
COMMUNITY
End: 2019-08-06

## 2019-08-01 RX ORDER — GARLIC 1000 MG
2 CAPSULE ORAL 2 TIMES DAILY WITH MEALS
COMMUNITY
End: 2019-08-06

## 2019-08-01 RX ORDER — POTASSIUM CHLORIDE 750 MG/1
40 TABLET, FILM COATED, EXTENDED RELEASE ORAL
Status: DISCONTINUED | OUTPATIENT
Start: 2019-08-01 | End: 2019-08-01

## 2019-08-01 RX ORDER — HYDROXYZINE 50 MG/1
50 TABLET, FILM COATED ORAL
Status: DISCONTINUED | OUTPATIENT
Start: 2019-08-01 | End: 2019-08-06 | Stop reason: HOSPADM

## 2019-08-01 RX ORDER — BENZTROPINE MESYLATE 1 MG/ML
1 INJECTION INTRAMUSCULAR; INTRAVENOUS
Status: DISCONTINUED | OUTPATIENT
Start: 2019-08-01 | End: 2019-08-06 | Stop reason: HOSPADM

## 2019-08-01 RX ORDER — HYDROXYZINE 25 MG/1
25 TABLET, FILM COATED ORAL
Status: ON HOLD | COMMUNITY
End: 2019-08-06 | Stop reason: SDUPTHER

## 2019-08-01 RX ORDER — ACETAMINOPHEN/DIPHENHYDRAMINE 500MG-25MG
2 TABLET ORAL
COMMUNITY
End: 2019-08-23

## 2019-08-01 RX ORDER — BENZTROPINE MESYLATE 1 MG/1
1 TABLET ORAL
Status: DISCONTINUED | OUTPATIENT
Start: 2019-08-01 | End: 2019-08-06 | Stop reason: HOSPADM

## 2019-08-01 RX ORDER — ACETAMINOPHEN 325 MG/1
650 TABLET ORAL
Status: DISCONTINUED | OUTPATIENT
Start: 2019-08-01 | End: 2019-08-06 | Stop reason: HOSPADM

## 2019-08-01 RX ORDER — ALMOTRIPTAN 12.5 MG/1
12.5 TABLET, FILM COATED ORAL
COMMUNITY
End: 2019-08-23

## 2019-08-01 RX ORDER — CLONAZEPAM 1 MG/1
1 TABLET ORAL 3 TIMES DAILY
Status: ON HOLD | COMMUNITY
End: 2019-08-02

## 2019-08-01 RX ORDER — CHOLECALCIFEROL (VITAMIN D3) 125 MCG
10 CAPSULE ORAL
COMMUNITY
End: 2019-08-23

## 2019-08-01 RX ORDER — TRAZODONE HYDROCHLORIDE 100 MG/1
100 TABLET ORAL
Status: DISCONTINUED | OUTPATIENT
Start: 2019-08-01 | End: 2019-08-04

## 2019-08-01 RX ORDER — OLANZAPINE 2.5 MG/1
2.5 TABLET ORAL
Status: DISCONTINUED | OUTPATIENT
Start: 2019-08-01 | End: 2019-08-06 | Stop reason: HOSPADM

## 2019-08-01 RX ADMIN — TRAZODONE HYDROCHLORIDE 100 MG: 100 TABLET ORAL at 21:08

## 2019-08-01 RX ADMIN — HYDROXYZINE HYDROCHLORIDE 50 MG: 50 TABLET, FILM COATED ORAL at 10:15

## 2019-08-01 RX ADMIN — PHENOBARBITAL 32.4 MG: 32.4 TABLET ORAL at 04:33

## 2019-08-01 RX ADMIN — HYDROXYZINE HYDROCHLORIDE 50 MG: 50 TABLET, FILM COATED ORAL at 20:46

## 2019-08-01 RX ADMIN — OLANZAPINE 2.5 MG: 2.5 TABLET, FILM COATED ORAL at 04:33

## 2019-08-01 RX ADMIN — TRAZODONE HYDROCHLORIDE 100 MG: 100 TABLET ORAL at 02:49

## 2019-08-01 RX ADMIN — ONDANSETRON 4 MG: 4 TABLET, ORALLY DISINTEGRATING ORAL at 10:16

## 2019-08-01 RX ADMIN — ACETAMINOPHEN 650 MG: 325 TABLET ORAL at 20:46

## 2019-08-01 NOTE — BH NOTES
TRANSFER - IN REPORT:    Verbal report received from Jos Reinoso  on [facility]  being received from [facility]/ED  for routine progression of care      Report consisted of patients Situation, Background, Assessment and   Recommendations(SBAR). Information from the following report(s) SBAR, Kardex, ED Summary, [facility] and Recent Results was reviewed with the receiving nurse. Opportunity for questions and clarification was provided. Assessment completed upon patients arrival to unit and care assumed.

## 2019-08-01 NOTE — ED NOTES
Pt signed out to me by Phoebe Denny; Dr Matthew Blair then took over care; I was not involved in pt care.  CARLOS Carpenter

## 2019-08-01 NOTE — PROGRESS NOTES
Problem: Discharge Planning  Goal: *Discharge to safe environment  Outcome: Progressing Towards Goal  Note:   She will return to her apartment at [facility]   She has established patient care   Goal: *Knowledge of medication management  Outcome: Progressing Towards Goal  Note:   She is willing to take medications   Goal: *Knowledge of discharge instructions  Outcome: Progressing Towards Goal  Note:   She is able to verbalize discharge instructions

## 2019-08-01 NOTE — BSMART NOTE
Comprehensive Assessment Form Part 1 Section I - Disposition Axis I - Psychosis Unspecified; Depressive Disorder with Anxiety by history Axis II - Personality Disorder by history Axis III - Past Medical History:  
Diagnosis Date  Arthritis GENERALIZED IN JOINTS.  Breast cancer ([facility] [address] Left Breast Cancer 1995  Cancer [facility])   
 left breast - surgery/radiation  Chronic pain Back pain  Depression Mood Swings, anxiety  GERD (gastroesophageal reflux disease) Hiatal Hernia  Headache  Hypercholesterolemia  Hypertension CONTROLLED BY MEDS., Pt states she is no liong er on BP med's  Ill-defined condition Neuropathy bilat feet  Irregular heart beat  Other ill-defined conditions(799.89) MIGRAINES  
 Other ill-defined conditions(799.89)   
 neuropathy of lower legs and feet  Other ill-defined conditions(799.89)   
 increased cholesterol  Other ill-defined conditions(799.89) bronchitis  [facility]  S/P radiation therapy 1995 Left Breast  
 Unspecified adverse effect of anesthesia HEART PALPITATION. Axis IV - lack of supports [place] [facility] The Medical Doctor to Psychiatrist conference was not completed. The Medical Doctor is in agreement with Psychiatrist disposition because of (reason) patient meets inpatient criteria. The plan is to hospitalize the patient on the psychiatric unit. The on-call Psychiatrist consulted was Dr. Sonal Peraza. The admitting Psychiatrist will be Dr. Sonal Peraza. The admitting Diagnosis is Psychosis Unspecified. The Payor source is Medicare. Section II - Integrated Summary Summary:  Patient presents in the ED complaining of chest pain related to anxiety and bed bugs. The patient reports that the bed bugs are inside her body and mostly in her vagina. He states that the bed bugs are also inside her dog so the vet has agreed to keep him for a few days for observation. Patient reports that she ordered a blanket on [address] that came with bed bugs and reports that the box stated the blanket was made of bed bugs. She states that the words were printed on the box with other information and were not written on there. She state her senior living complex treated her apartment for bed bugs but did not find any. She states that \"everyone thinks I'm crazy, I know you think I'm crazy but I'm not. \" Notable the patient was treated in the decontamination shower with nursing staff and not found to have bed bugs at that time. Patient denies feeling the bed bugs in the ED. Patient reports that she lives alone in her apartment with her dog and that she does not have family or friends. She reports that another resident was concerned about her and checked in via text message but that is the only person she has talked to recently. She states there are no people this writer can contact for collateral. She reports she is a patient of Dr Bereket Olivares but he is changing practices so she is starting a new doctor. She reports that she is prescribed Lexapro, Venlafaxine, Klonopin and Trileptal but has not taken the medications in several days as she keeps forgetting too. Patient denies suicidal ideation, homicidal ideation, paranoia or auditory hallucinations. She is not open to the idea of starting mental health therapy at this time but states she used to see a  who came to her home. The patienthas demonstrated mental capacity to provide informed consent. The information is given by the patient. The Chief Complaint is bed bugs, anxiety. The Precipitant Factors are belief she has bed bugs and change in psychiatrist. 
Previous Hospitalizations: \"over 20 years ago. \" The patient has not previously been in restraints. Current Psychiatrist and/or  is Dr Shadi Beaulieu (unknown full name) who took over for Dr Bereket Olivares. Lethality Assessment: The potential for suicide noted by the following: active psychosis . The potential for homicide is not noted. The patient has not been a perpetrator of sexual or physical abuse. There are not pending charges. The patient is not felt to be at risk for self harm or harm to others. The attending nurse was advised the patient needs supervision. Section III - Psychosocial 
The patient's overall mood and attitude is bizarre, anxious and preoccupied. Feelings of helplessness and hopelessness are not observed. Generalized anxiety is not observed. Panic is not observed. Phobias are not observed. Obsessive compulsive tendencies are not observed. Section IV - Mental Status Exam 
The patient's appearance is unkempt and is bizarre. The patient's behavior is agitated and is guarded. The patient is oriented to time, place, person and situation. The patient's speech is loud. The patient's mood is anxious, is irritable and is expansive. The range of affect is labile. The patient's thought content demonstrates preoccupation. The thought process perseveration. The patient's perception demonstrated changes in the following:  tactile hallucinations. The patient's memory shows no evidence of impairment. The patient's appetite shows no evidence of impairment. The patient's sleep has evidence of insomnia. The patient shows little insight. The patient's judgement is psychologically impaired. Section V - Substance Abuse The patient denies using substances. Section VI - Living Arrangements The patient is . The patient lives alone. The patient has no children. The patient does plan to return home upon discharge. The patient does not have legal issues pending. The patient's source of income comes from social security. Orthodoxy and cultural practices have not been voiced at this time. The patient's greatest support comes from \"no one. \" 
 The patient has not been in an event described as horrible or outside the realm of ordinary life experience either currently or in the past. 
The patient has not been a victim of sexual/physical abuse. Section VII - Other Areas of Clinical Concern The highest grade achieved is unassessed with the overall quality of school experience being described as unassessed. The patient is currently unemployed and speaks [place] as a primary language. The patient has no communication impairments affecting communication. The patient's preference for learning can be described as: can read and write adequately. The patient's hearing is hard of hearing. The patient's vision is impaired and  wears glasses or contacts.  
 
 
Promise Vera MS

## 2019-08-01 NOTE — PROGRESS NOTES
NUTRITION       Nutrition screening referral was triggered based on results obtained during nursing admission assessment. The patient's chart was reviewed and nutrition assessment is not indicated at this time. Plan to see patient for rescreen as indicated. Thank you.      Wt Readings from Last 10 Encounters:   08/01/19 86.9 kg (191 lb 9.6 oz)   07/02/19 84.4 kg (186 lb)   06/11/19 87.5 kg (193 lb)   04/16/19 90.3 kg (199 lb)   04/09/19 89.8 kg (197 lb 14.4 oz)   01/08/19 87.1 kg (192 lb)   01/06/19 96.5 kg (212 lb 11.9 oz)   11/26/18 90.2 kg (198 lb 13.7 oz)   10/30/18 90.7 kg (199 lb 15.3 oz)   10/02/18 89.4 kg (197 lb)         Jd Barrett RD [facility]

## 2019-08-01 NOTE — ED NOTES
2111 [employer] at bedside. 2125 Pt ambulatory with steady gait to restroom. Instructed to provide urine specimen - pt did not do so. Pt instructed next time we would need urine specimen.

## 2019-08-01 NOTE — BH NOTES
PSYCHOSOCIAL ASSESSMENT  :Patient identifying info:  Yvette Beard is a 76 y.o., female admitted 2019  6:51 PM     Presenting problem and precipitating factors: she presented to the ER by EMS due to having increased anxiety and chest pains. She lives in an apartment at [facility] with her dog. She stated she bought a blanket form [employer] several months ago and the blanket had bed bugs and she has bites all over her. While in the ER there were no bed bugs found on her . Mental status assessment:alert, oriented x's 2 , very disorganized and somewhat guarded. , denies suicidal /homicidal ideations     Strengths: secure housing, established out patient treatment    Collateral information:     Current psychiatric /substance abuse providers and contact info: Dr. Marian Chavez     Previous psychiatric/substance abuse providers and response to treatment: she was hospitalized at [facility] 20 years ago     Family history of mental illness or substance abuse: none noted     Substance abuse history:    Social History     Tobacco Use    Smoking status: Never Smoker    Smokeless tobacco: Never Used   Substance Use Topics    Alcohol use: No       History of biomedical complications associated with substance abuse :none noted     Patient's current acceptance of treatment or motivation for change:    Family constellation:  , no children     Is significant other involved?        Describe support system:     Describe living arrangements and home environment:    Health issues:   Hospital Problems  Date Reviewed: 2019          Codes Class Noted POA    Depression with anxiety ICD-10-CM: F41.8  ICD-9-CM: 300.4  2012 Unknown              Trauma history: none noted     Legal issues: no    History of  service: no     Financial status: SS    Alevism/cultural factors: none noted     Education/work history: unknown     Have you been licensed as a health care professional (current or ): no    Leisure and recreation preferences: unknown     Describe coping skills:ineffectual     Noelle Koo  8/1/2019

## 2019-08-01 NOTE — ED NOTES
Bedside shift change report given to Tanvir Weiss  (oncoming nurse) by Mojgan Flannery RN  (offgoing nurse). Report included the following information SBAR and ED Summary.

## 2019-08-01 NOTE — PROGRESS NOTES
Problem: Depressed Mood (Adult/Pediatric)  Goal: *STG: Participates in treatment plan  8/1/2019 1620 by Cierra Rodriges RN  Note:   Depressed Mood. Denies SI. Med and meal compliant. Cooperative. 8/1/2019 1451 by Cierra Rodriges RN  Outcome: Progressing Towards Goal  Note:   Patient denies SI. Med and meal compliant. Cooperative. Mood is depressed. Patient goal: \"to talk to the doctor, I do not feel good, I usually take Klonopin\" Staff goal: to orient to unit routine and encourage groups. Isolative.

## 2019-08-01 NOTE — ED NOTES
Pt in doorway expressing desire to be admitted or  to go home .   Explained process  of medially clearing her

## 2019-08-01 NOTE — BH NOTES
PRN Medication Documentation  [id number]  Specific patient behavior that led to need for PRN medication: c/o anxiety and nausea  Staff interventions attempted prior to PRN being given: relaxation  PRN medication given: atarax 50 mg and zofran 4 mg po prn   Patient response/effectiveness of PRN medication: will determine

## 2019-08-01 NOTE — ED PROVIDER NOTES
77-year-old female presents via EMS with several months of bed bugs crawling under her skin. She states that she sees bed bugs crawling under her skin and it come out and bite her. She also states that she took her dog to the  today, and that Cristy Robles confirmed that don't live in the dog and was put on medication. She also states that she recently got a blanket from [employer] and went she read the ingredients it was made of bed bugs. She has a significant psychiatric history reviewing her chart, but it appears as if she's been stable on her medication regimen. She is quite certain that there are parasites and insight of her. She states that she was in an apartment and he was inspected and noted to be clean. She has no homicidal or suicidal ideation. Past Medical History:   Diagnosis Date    Arthritis     GENERALIZED IN JOINTS.  Breast cancer ([address]     Left Breast Cancer 1995    Cancer [facility])     left breast - surgery/radiation    Chronic pain     Back pain    Depression     Mood Swings, anxiety    GERD (gastroesophageal reflux disease)     Hiatal Hernia    Headache     Hypercholesterolemia     Hypertension     CONTROLLED BY MEDS., Pt states she is no liong er on BP med's    Ill-defined condition     Neuropathy bilat feet    Irregular heart beat     Other ill-defined conditions(799.89)     MIGRAINES    Other ill-defined conditions(799.89)     neuropathy of lower legs and feet    Other ill-defined conditions(799.89)     increased cholesterol    Other ill-defined conditions(799.89)     bronchitis    Psychotic disorder (HCC)     S/P radiation therapy     1995 Left Breast    Unspecified adverse effect of anesthesia     HEART PALPITATION. Past Surgical History:   Procedure Laterality Date    BREAST SURGERY PROCEDURE UNLISTED  7/1995    DCIS /BIOPSIES MULTIPLE.     BREAST SURGERY PROCEDURE UNLISTED  1995    LUMPECTOMY WITH RADIATION INSITU    HX BREAST LUMPECTOMY Left     1995 - POSITIVE    HX ORTHOPAEDIC  2006    LEFT KNEE ARTHROSCOPY    HX ORTHOPAEDIC  4/2010    RIGHT KNEE ARTHROSCOPY, concrete nail placed    HX ORTHOPAEDIC      Hammertoe , doesnt know which foot    HX ORTHOPAEDIC      Small growth removed from between toes but doesnt know which foot         Family History:   Problem Relation Age of Onset    Cancer Mother         BLADDER CA    Heart Disease Father     Lung Disease Father        Social History     Socioeconomic History    Marital status:      Spouse name: Not on file    Number of children: Not on file    Years of education: Not on file    Highest education level: Not on file   Occupational History    Not on file   Social Needs    Financial resource strain: Not on file    Food insecurity:     Worry: Not on file     Inability: Not on file    Transportation needs:     Medical: Not on file     Non-medical: Not on file   Tobacco Use    Smoking status: Never Smoker    Smokeless tobacco: Never Used   Substance and Sexual Activity    Alcohol use: No    Drug use: No    Sexual activity: Never   Lifestyle    Physical activity:     Days per week: Not on file     Minutes per session: Not on file    Stress: Not on file   Relationships    Social connections:     Talks on phone: Not on file     Gets together: Not on file     Attends Voodoo service: Not on file     Active member of club or organization: Not on file     Attends meetings of clubs or organizations: Not on file     Relationship status: Not on file    Intimate partner violence:     Fear of current or ex partner: Not on file     Emotionally abused: Not on file     Physically abused: Not on file     Forced sexual activity: Not on file   Other Topics Concern    Not on file   Social History Narrative    Not on file         ALLERGIES: Epinephrine; Novocain [procaine]; Nsaids (non-steroidal anti-inflammatory drug);  Pravastatin; Aspirin; and Ibuprofen    Review of Systems   Constitutional: Negative for chills, diaphoresis, fatigue and fever. Respiratory: Negative. Cardiovascular: Negative. Gastrointestinal: Negative. Genitourinary: Negative. Musculoskeletal: Negative. Neurological: Negative. Hematological: Negative. Psychiatric/Behavioral: Positive for agitation and hallucinations. The patient is nervous/anxious. Vitals:    07/31/19 1828 07/31/19 1852 07/31/19 1948   BP:  (!) 138/93 (!) 171/91   Pulse:  (!) 111 74   Resp:  16 16   Temp:  97.9 °F (36.6 °C) 98.2 °F (36.8 °C)   SpO2: 97% 94% 97%            Physical Exam   Constitutional: She is oriented to person, place, and time. She appears well-developed and well-nourished. No distress. HENT:   Head: Normocephalic and atraumatic. Eyes: Pupils are equal, round, and reactive to light. Conjunctivae and EOM are normal.   Neck: Normal range of motion. Pulmonary/Chest: Effort normal. She has no wheezes. Musculoskeletal: Normal range of motion. She exhibits no deformity. Neurological: She is alert and oriented to person, place, and time. Coordination normal.   Skin: She is not diaphoretic. Psychiatric: Her speech is normal. Her affect is angry. She is agitated. Thought content is delusional. Cognition and memory are impaired. Nursing note and vitals reviewed. MDM  Number of Diagnoses or Management Options  Delusional disorder [facility]):   Diagnosis management comments:     Acute delusions in the setting of a known psychiatric history. She states that there is no thoughts of harm to herself or others. We'll have her BSMART come to evaluate the patient. Pt seen by Laura Martinez who will plan to admit pending medical clearance.     Procedures

## 2019-08-01 NOTE — PROGRESS NOTES
Pt admitted to acute unit Voluntary with CC- of having bed bugs in her skin   Delusional regarding bed bugs ,Depressed ,states  not taking meds x2 weeks or longer  , eating one meal /day, c/o of nausea     unhappy at [facility] where she lives. Denies ETOH use, non smoker,   Denies S/I, H/I   Limited support system - friend Jovany Wells and her dog Lea     Had decon shower in ED and no bed bugs found. Primary Nurse Sherrin Babinski, RN and PREMA London performed a dual skin  assessment on this patient s/p rt knee replacement , abrasion areas on mid chest, lt shoulder , small  areas on top thighs scabbed cover . Ankles swollen w/2+ pitting edema  Pt wearing bracelet w/ broken latch and could not be removed . 1 yellow colored ring , States she left glasses at home   Satish score is 23    SOB on exertion, states neuropathy in both legs , denies falls in the last year   0300- sitting on the side of her bed , anxious , had c/o of fatigue but now not able to sleep . Scheduled Trazodone 100 mg po given , HR 86 , SATS =92 . HOB elevated for comfort .   States she has been taking  Clonazapam 3mg po  TID will monitor for Signs and symptoms of withdrawal

## 2019-08-01 NOTE — PROGRESS NOTES
[facility]  Master Treatment Plan for Bernetta Libman    Date Treatment Plan Initiated: 8/1/19    Treatment Plan Modalities:  Type of Modality Amount  (x minutes) Frequency (x/week) Duration (x days) Name of Responsible Staff   Community & wrap-up meetings to encourage peer interactions 15 7 1 Josh AMBROCIO     Group psychotherapy to assist in building coping skills and internal controls 60 7 1 Jarek Alejandre   Therapeutic activity groups to build coping skills 60 7 1 Jarek Alejandre   Psychoeducation in group setting to address:   Medication education   15 7 Americo 109 skills         Relaxation techniques         Symptom management         Discharge planning   60 2 Perlisa Hernandez 115   60 1 1 volunteer   Recovery/AA/NA   61 1 1 volunteer   Physician medication management   15 7 1 Dr. Janina Morgan meeting/discharge planning   15 2 [facility] MET BY 8/8/19  Problem: Depressed Mood (Adult/Pediatric)  Goal: *STG: Participates in treatment plan  Outcome: Progressing Towards Goal  Note:   Patient denies SI. Med and meal compliant. Cooperative. Mood is depressed. Patient goal: \"to talk to the doctor, I do not feel good, I usually take Klonopin\" Staff goal: to orient to unit routine and encourage groups. Isolative. Goal: *STG: Verbalizes anger, guilt, and other feelings in a constructive manor  Outcome: Progressing Towards Goal  Note:   Verbalizes feelings  Goal: *STG: Attends activities and groups  Outcome: Progressing Towards Goal  Note:   Patient participates in groups    Goal: *STG: Demonstrates reduction in symptoms and increase in insight into coping skills/future focused  Outcome: Progressing Towards Goal  Note:   Would like to learn coping skills   Goal: *STG: Remains safe in hospital  Outcome: Progressing Towards Goal  Note:   Q 15 min safety rounds.  Contracts for safety  Goal: *STG: Complies with medication therapy  Outcome: Progressing Towards Goal  Note:   Compliant  Goal: Interventions  Outcome: Progressing Towards Goal  Note:   Medication management. Q 15 min safety rounds. Group therapy.

## 2019-08-01 NOTE — BH NOTES
PRN Medication Documentation    Specific patient behavior that led to need for PRN medication: pt increasing in anxiety and describing tactile disturbances \"bed bugs all over me biting me everywhere\"  Staff interventions attempted prior to PRN being given: decreased stimuli, reassurance  PRN medication given: zyprexa and phenobarb  Patient response/effectiveness of PRN medication: will continue to assess

## 2019-08-01 NOTE — INTERDISCIPLINARY ROUNDS
Behavioral Health Interdisciplinary Rounds Patient Name: Zia Woodall  Age: 76 y.o. Room/Bed:  726/ Primary Diagnosis: <principal problem not specified> Admission Status: Voluntary Readmission within 30 days: no 
Power of  in place: no 
Patient requires a blocked bed: no          Reason for blocked bed: VTE Prophylaxis: No 
 
Mobility needs/Fall risk: no 
Flu Vaccine : n/a Nutritional Plan: no 
Consults:       
Labs/Testing due today?: no 
 
Sleep hours:      
Participation in Care/Groups:  New admit Medication Compliant?: new admit PRNS (last 24 hours): Sleep Aid Restraints (last 24 hours):  no 
  
CIWA (range last 24 hours): COWS (range last 24 hours): Alcohol screening (AUDIT) completed -   AUDIT Score: 0 If applicable, date SBIRT discussed in treatment team AND documented:  
AUDIT Screen Score: AUDIT Score: 0 Tobacco - patient is a smoker: Have You Used Tobacco in the Past 30 Days: Never a smoker Illegal Drugs use: Have You Used Any Illegal Substances Over the Past 12 Months: No 
 
24 hour chart check complete: yes Patient goal(s) for today:  
Treatment team focus/goals: Plan to assess for medications and discharge needs. Progress note : She was disorganized in treatment team , pleasant , willing to start her medications LOS:  0  Expected LOS: TBD Financial concerns/prescription coverage: medicare / [place] - Date of last family contact:     
Family requesting physician contact today Discharge plan: She will return to  
[facility] in the home: no Outpatient provider(s): Dr. Kedar Cardenas Participating treatment team members: Andrew Chase RN

## 2019-08-01 NOTE — ED NOTES
TRANSFER - OUT REPORT:    Verbal report given to aurora rn (name) on [facility]  being transferred to [address] [place] [address] (unit) for routine progression of care       Report consisted of patients Situation, Background, Assessment and   Recommendations(SBAR). Information from the following report(s) SBAR, ED Summary and Recent Results was reviewed with the receiving nurse. Lines:       Opportunity for questions and clarification was provided.       Patient transported with:   [facility] 354  ED staff

## 2019-08-01 NOTE — BH NOTES
GROUP THERAPY PROGRESS NOTE    Nita Campbell participated in the Acute Unit's Process Group, with a focus identifying feelings, planning for the day, and considering the relationship between loneliness and running. .  Group time: 25 minutes. Personal goal for participation: To increase the capacity to identify and improve ones mood and to consider what one might be running from and how one handles isolation. .     Goal orientation: The patient will be able to identify their feelings, develop a plan for structuring their day, and consider their tendencies to run or isolate as they develop their discharge plans. Group therapy participation: With prompting, this patient actively participated in the group. Therapeutic interventions reviewed and discussed: The group members were asked to introduce themselves to each other and to see if they could identify an emotion they are having and/or let the group know what they want to focus on for the day as they continue to make discharge plans. They were also provided a handout that they could complete on their own that asks the patients to consider how they cope with their isolation and tendencies to run. Impression of participation: The patient said she had \"poor sleep\" after coming to the unit after 12:30 AM. She added that she was here in the hospital to get her medications straight, \"I'd been off my medication for a week and don't quite know why I'm here. \"  She admitted to living with a 15year old [place], named Lea, who is her sole . She added, \"My  of 29 plus years  in 2018. \"      The patient was alert and generally oriented. She expressed no current SI/HI and displayed no overt psychotic symptoms, unless her mention of \"bed bugs\" was part of a delusion. Her affect was depressed, with anxiety. Her mood matched her affect. This was the patient's first process group with the undersigned.

## 2019-08-02 LAB
CHOLEST SERPL-MCNC: 104 MG/DL
GLUCOSE P FAST SERPL-MCNC: 121 MG/DL (ref 65–100)
HDLC SERPL-MCNC: 37 MG/DL
HDLC SERPL: 2.8 {RATIO} (ref 0–5)
LDLC SERPL CALC-MCNC: 54.6 MG/DL (ref 0–100)
LIPID PROFILE,FLP: NORMAL
TRIGL SERPL-MCNC: 62 MG/DL (ref ?–150)
VLDLC SERPL CALC-MCNC: 12.4 MG/DL

## 2019-08-02 PROCEDURE — 74011250637 HC RX REV CODE- 250/637: Performed by: PSYCHIATRY & NEUROLOGY

## 2019-08-02 PROCEDURE — 65220000003 HC RM SEMIPRIVATE PSYCH

## 2019-08-02 PROCEDURE — 36415 COLL VENOUS BLD VENIPUNCTURE: CPT

## 2019-08-02 PROCEDURE — 80061 LIPID PANEL: CPT

## 2019-08-02 PROCEDURE — 82947 ASSAY GLUCOSE BLOOD QUANT: CPT

## 2019-08-02 PROCEDURE — 74011250637 HC RX REV CODE- 250/637: Performed by: NURSE PRACTITIONER

## 2019-08-02 RX ORDER — BISACODYL 5 MG
5 TABLET, DELAYED RELEASE (ENTERIC COATED) ORAL DAILY PRN
Status: DISCONTINUED | OUTPATIENT
Start: 2019-08-02 | End: 2019-08-06 | Stop reason: HOSPADM

## 2019-08-02 RX ORDER — OXCARBAZEPINE 150 MG/1
300 TABLET, FILM COATED ORAL 2 TIMES DAILY
Status: DISCONTINUED | OUTPATIENT
Start: 2019-08-02 | End: 2019-08-05

## 2019-08-02 RX ORDER — ASPIRIN 81 MG/1
81 TABLET ORAL EVERY EVENING
Status: DISCONTINUED | OUTPATIENT
Start: 2019-08-02 | End: 2019-08-06 | Stop reason: HOSPADM

## 2019-08-02 RX ORDER — FERROUS SULFATE, DRIED 160(50) MG
1 TABLET, EXTENDED RELEASE ORAL
Status: DISCONTINUED | OUTPATIENT
Start: 2019-08-03 | End: 2019-08-06 | Stop reason: HOSPADM

## 2019-08-02 RX ORDER — ROSUVASTATIN CALCIUM 10 MG/1
10 TABLET, COATED ORAL
Status: DISCONTINUED | OUTPATIENT
Start: 2019-08-02 | End: 2019-08-06 | Stop reason: HOSPADM

## 2019-08-02 RX ORDER — LANOLIN ALCOHOL/MO/W.PET/CERES
10.5 CREAM (GRAM) TOPICAL
Status: DISCONTINUED | OUTPATIENT
Start: 2019-08-02 | End: 2019-08-06 | Stop reason: HOSPADM

## 2019-08-02 RX ORDER — CELECOXIB 100 MG/1
100 CAPSULE ORAL EVERY 12 HOURS
Status: DISCONTINUED | OUTPATIENT
Start: 2019-08-02 | End: 2019-08-06 | Stop reason: HOSPADM

## 2019-08-02 RX ORDER — RISPERIDONE 0.5 MG/1
0.5 TABLET, FILM COATED ORAL
Status: DISCONTINUED | OUTPATIENT
Start: 2019-08-02 | End: 2019-08-05

## 2019-08-02 RX ORDER — LOPERAMIDE HYDROCHLORIDE 2 MG/1
2 CAPSULE ORAL AS NEEDED
Status: DISCONTINUED | OUTPATIENT
Start: 2019-08-02 | End: 2019-08-06 | Stop reason: HOSPADM

## 2019-08-02 RX ORDER — VENLAFAXINE HYDROCHLORIDE 37.5 MG/1
150 CAPSULE, EXTENDED RELEASE ORAL 2 TIMES DAILY
Status: DISCONTINUED | OUTPATIENT
Start: 2019-08-03 | End: 2019-08-05

## 2019-08-02 RX ORDER — FAMOTIDINE 20 MG/1
20 TABLET, FILM COATED ORAL DAILY
Status: DISCONTINUED | OUTPATIENT
Start: 2019-08-02 | End: 2019-08-06 | Stop reason: HOSPADM

## 2019-08-02 RX ADMIN — ASPIRIN 81 MG: 81 TABLET ORAL at 17:16

## 2019-08-02 RX ADMIN — ROSUVASTATIN CALCIUM 10 MG: 10 TABLET, COATED ORAL at 21:11

## 2019-08-02 RX ADMIN — CELECOXIB 100 MG: 100 CAPSULE ORAL at 12:55

## 2019-08-02 RX ADMIN — OXCARBAZEPINE 300 MG: 150 TABLET, FILM COATED ORAL at 12:37

## 2019-08-02 RX ADMIN — CELECOXIB 100 MG: 100 CAPSULE ORAL at 19:31

## 2019-08-02 RX ADMIN — ACETAMINOPHEN 650 MG: 325 TABLET ORAL at 09:00

## 2019-08-02 RX ADMIN — OXCARBAZEPINE 300 MG: 150 TABLET, FILM COATED ORAL at 17:16

## 2019-08-02 RX ADMIN — RISPERIDONE 0.5 MG: 0.5 TABLET ORAL at 21:12

## 2019-08-02 RX ADMIN — FAMOTIDINE 20 MG: 20 TABLET ORAL at 12:37

## 2019-08-02 RX ADMIN — TRAZODONE HYDROCHLORIDE 100 MG: 100 TABLET ORAL at 21:12

## 2019-08-02 RX ADMIN — BISACODYL 5 MG: 5 TABLET, COATED ORAL at 12:55

## 2019-08-02 NOTE — BH NOTES
2048:Prn acetaminophen 650 mg given for 5/10 pain  Prn atarax 50 mg given for anxiety. Will continue to monitor. 2148: Patient is currently resting quietly.

## 2019-08-02 NOTE — H&P
[address] [facility] HISTORY AND PHYSICAL    Name:  Felipe Simon  MR#:  [id number]  :  1950  ACCOUNT #:  [de-identified]  ADMIT DATE:  2019      CHIEF COMPLAINT:  \"Silly. \"    HISTORY OF PRESENT ILLNESS:  The patient is a 60-year-old female who was admitted at [facility] Unit on a voluntary basis. She currently resides at [facility] for the past 2-1/2 years. She reports that she was being followed by Dr. Sergey Linda, but he is about to retire and so she will have her first appointment with Dr. David Nelson at [facility] for transition of care in 2 weeks. She states that she bought an electric blanket on [employer] and it was $40 cheaper. She thought that something was off because it was so cheap, but unfortunately she could not return it. She states that it was completely infested with bed bugs. She was trying to get rid of it, but she could not. She stated she has bed bugs inside her body and mostly in her vagina and also on her legs. She states there are bed bugs in her dog, so she took her dog to the  and is currently boarding for observation. She states that the senior living facility treated the apartment for bed bugs but did not find any. She states that \"everyone thinks I am crazy. I know you think I am crazy, but I am not. \"  When she was in the emergency room, she was treated in the decontamination shower by nursing staff and there were no bed bugs found. She reports that there is another resident at the apartment who was concerned about her and checks in by text messages. Stated she has been diagnosed with severe depression, anxiety, and mood swings. PAST MEDICAL HISTORY:  See H and P. PAST PSYCHIATRIC HOSPITALIZATION:  She was previously hospitalized here at [facility] 20 years ago. She was also admitted at [facility]. She will have her first appointment with Dr. David Nelson in 2-1/2 weeks.   She was seeing Dr. Ilia Lopez previously. PSYCHOSOCIAL HISTORY:  She is a . She has no children. She is currently receiving social security disability checks. She currently resides at [facility] for 2-1/2 years. MENTAL STATUS EXAM:  She is alert and oriented in all spheres. She is dressed in hospital apparel. Mood is dysphoric. Affect is blunted. Speech normal rate and rhythm. Thought process logical and goal directed. She denies suicidal ideation, homicidal ideation or avh. Paranoia is evident. Memory seems intact. Intelligence seems average. Insight is poor. Judgment is poor. DIAGNOSIS:  Delusional disorder. TREATMENT PLANNING:  I will continue her inpatient stay. She will be provided with support and encouraged to attend groups. Her safety will be monitored. Her medications will be modified and assessed. Case Management will work on discharge planning. ASSETS AND STRENGTHS:  She is willing to seek help. She is willing to take medications. ESTIMATED LENGTH OF STAY:  5-7 days.       SHARIF LOPEZ NP SE/SYED_GRDHS_I/BS_EDIT  D:  08/01/2019 17:15  T:  08/01/2019 19:34  JOB #:  [id number]

## 2019-08-02 NOTE — BH NOTES
PRN Medication Documentation    Specific patient behavior that led to need for PRN medication: Lower left back pain  Staff interventions attempted prior to PRN being given: Laying down  PRN medication given: Tylenol 650 mg given at 0900  Patient response/effectiveness of PRN medication: TBD      1020: Symptom relief

## 2019-08-02 NOTE — BH NOTES
Pt moved from Acute to the general unit   Oriented patient to room and unit  NAD noted   Will continue to monitor.

## 2019-08-02 NOTE — INTERDISCIPLINARY ROUNDS
Behavioral Health Interdisciplinary Rounds Patient Name: Sonya Peraza  Age: 76 y.o. Room/Bed:  726/ Primary Diagnosis: <principal problem not specified> Admission Status: Voluntary Readmission within 30 days: no 
Power of  in place: no 
Patient requires a blocked bed: no          Reason for blocked bed: VTE Prophylaxis: No 
 
Mobility needs/Fall risk: no 
Flu Vaccine : no  
Nutritional Plan: no 
Consults:         
Labs/Testing due today?: yes Sleep hours:  6.5 Participation in Care/Groups:  no 
Medication Compliant?: Yes PRNS (last 24 hours): Antianxiety and Pain Restraints (last 24 hours):  no 
  
CIWA (range last 24 hours): CIWA-Ar Total: 0  
COWS (range last 24 hours): Alcohol screening (AUDIT) completed -   AUDIT Score: 0 If applicable, date SBIRT discussed in treatment team AND documented:  
AUDIT Screen Score: AUDIT Score: 0 Tobacco - patient is a smoker: Have You Used Tobacco in the Past 30 Days: Never a smoker Illegal Drugs use: Have You Used Any Illegal Substances Over the Past 12 Months: No 
 
24 hour chart check complete: yes Patient goal(s) for today:  
Treatment team focus/goals: Plan to titrate her medications Progress note : She is brighter today and less somatic. SW spoke to the dog clinic that is caring for dog LOS:  1  Expected LOS: TBD Financial concerns/prescription coverage:  Medicare - [facility] Date of last family contact:    
Family requesting physician contact today:  
Discharge plan: she will return to [facility] in the home:  no Outpatient provider(s): Dr. Marion Abebe Participating treatment team members: Kimberly Montemayor  RN

## 2019-08-02 NOTE — BH NOTES
GROUP THERAPY PROGRESS NOTE    Nakul Domínguez participated in a Process Group on the Acute Unit with a focus identifying feelings, planning for the day, and reviewing stress exhaustion symptoms and developing a coping plan. Group time: 65 minutes. Personal goal for participation: To increase the capacity to improve ones mood, structure, and coping capacity. Goal orientation: The patient will be able to identify their feelings, develop a plan for structuring their day, and begin to appreciate the possibility of successfully managing distress and other forms of intense emotions. Group therapy participation: This patient actively participated in the group. Therapeutic interventions reviewed and discussed: The group members were asked to introduce themselves to each other and to see if they could identify an emotion they are having and/or let the group know what they want to focus on for the day as they continue to make discharge plans. The group members also asked reviewed a handout with a checklist of five areas of stress to help them understand warning signs: physical emotional, spiritual, mental, and relationship. They were also asked to consider developing a simple coping plan that might include: exercise, a journal, taking personal time, getting involved in fun, and considering talking to someone. Impression of participation: The patient said she was \"barely hanging on\" and that she was having her response to her medication reviewed for side effects over the next couple of days. She was alert and generally oriented. She expressed no current SI/HI and no overt psychotic symptoms. Her affect was not quite as depressed or anxious as she was earlier this week. Her mood reflected her affect. She appeared to be in the process of refining her aftercare needs while continuing to work on her coping skills and providing feedback to her medications.

## 2019-08-02 NOTE — PROGRESS NOTES
Cooperative. Pleasant. Excellent eye contact. Verbalizing needs appropriately. Denies SI or HI, agreeable to informing staff if feelings of self harm or distress occur.

## 2019-08-02 NOTE — PROGRESS NOTES
PSYCHIATRIC PROGRESS NOTE       Patient: Dain Trimble MRN: [id number]  SSN: [id number]    YOB: 1950  Age: 76 y.o. Sex: female      Admit Date: 7/31/2019    LOS: 1 day       Chief Complaint:  I am doing pretty good. Interval History:  Dain Trimble reports feeling good. Slept over 6 hours. Trying to eat more. Has a few physical complaints. She continues to have persecutory delusions about bugs \"they are still biting me on my thighs, back, stomach. \" Denies si hi or avh. Past Medical History:  Past Medical History:   Diagnosis Date    Arthritis     GENERALIZED IN JOINTS.  Breast cancer ([facility] [address]     Left Breast Cancer 1995    Cancer [facility])     left breast - surgery/radiation    Chronic pain     Back pain    Depression     Mood Swings, anxiety    GERD (gastroesophageal reflux disease)     Hiatal Hernia    Headache     Hypercholesterolemia     Hypertension     CONTROLLED BY MEDS., Pt states she is no liong er on BP med's    Ill-defined condition     Neuropathy bilat feet    Irregular heart beat     Other ill-defined conditions(799.89)     MIGRAINES    Other ill-defined conditions(799.89)     neuropathy of lower legs and feet    Other ill-defined conditions(799.89)     increased cholesterol    Other ill-defined conditions(799.89)     bronchitis    Psychotic disorder (HCC)     S/P radiation therapy     1995 Left Breast    Unspecified adverse effect of anesthesia     HEART PALPITATION. ALLERGIES:(reviewed/updated 8/2/2019)  Allergies   Allergen Reactions    Epinephrine Palpitations     \"Makes my heart  ' shudder'. \" Allergic to epinephrine with novocain. Plain epinephrine is ok.  Novocain [Procaine] Palpitations    Nsaids (Non-Steroidal Anti-Inflammatory Drug) Other (comments)     GASTRIC DISTRESS.     Pravastatin Myalgia    Aspirin Other (comments)     Gastric irritation    Ibuprofen Other (comments)     Gastric irritation       Laboratory report:  Lab Results   Component Value Date/Time    WBC 11.6 (H) 07/31/2019 10:33 PM    HGB 12.0 07/31/2019 10:33 PM    HCT 39.5 07/31/2019 10:33 PM    PLATELET 447 02/60/9318 10:33 PM    MCV 89.0 07/31/2019 10:33 PM      Lab Results   Component Value Date/Time    Sodium 141 07/31/2019 10:33 PM    Potassium 4.1 07/31/2019 10:33 PM    Chloride 110 (H) 07/31/2019 10:33 PM    CO2 25 07/31/2019 10:33 PM    Anion gap 6 07/31/2019 10:33 PM    Glucose 121 (H) 08/02/2019 05:06 AM    BUN 17 07/31/2019 10:33 PM    Creatinine 0.81 07/31/2019 10:33 PM    BUN/Creatinine ratio 21 (H) 07/31/2019 10:33 PM    GFR est AA >60 07/31/2019 10:33 PM    GFR est non-AA >60 07/31/2019 10:33 PM    Calcium 9.3 07/31/2019 10:33 PM    Bilirubin, total 0.8 07/31/2019 10:33 PM    AST (SGOT) 26 07/31/2019 10:33 PM    Alk.  phosphatase 122 (H) 07/31/2019 10:33 PM    Protein, total 6.4 07/31/2019 10:33 PM    Albumin 3.1 (L) 07/31/2019 10:33 PM    Globulin 3.3 07/31/2019 10:33 PM    A-G Ratio 0.9 (L) 07/31/2019 10:33 PM    ALT (SGPT) 118 (H) 07/31/2019 10:33 PM      Vitals:    08/01/19 1252 08/01/19 1301 08/01/19 1546 08/01/19 2024   BP: 141/89  133/88 120/80   Pulse: 100  99 99   Resp: 18  18 16   Temp: 97.4 °F (36.3 °C)  98.1 °F (36.7 °C) 98.3 °F (36.8 °C)   SpO2:   95%    Weight:  86.9 kg (191 lb 9.6 oz)     Height:  5' (1.524 m)         No results found for: VALF2, VALAC, VALP, VALPR, DS6, CRBAM, CRBAMP, CARB2, XCRBAM  No results found for: LITHM    Vital Signs  Patient Vitals for the past 24 hrs:   Temp Pulse Resp BP SpO2   08/01/19 2024 98.3 °F (36.8 °C) 99 16 120/80    08/01/19 1546 98.1 °F (36.7 °C) 99 18 133/88 95 %   08/01/19 1252 97.4 °F (36.3 °C) 100 18 141/89      Wt Readings from Last 3 Encounters:   08/01/19 86.9 kg (191 lb 9.6 oz)   07/02/19 84.4 kg (186 lb)   06/11/19 87.5 kg (193 lb)     Temp Readings from Last 3 Encounters:   08/01/19 98.3 °F (36.8 °C)   06/11/19 98.4 °F (36.9 °C)   04/09/19 97.9 °F (36.6 °C) (Oral)     BP Readings from Last 3 Encounters: 08/01/19 120/80   07/02/19 134/86   06/11/19 143/76     Pulse Readings from Last 3 Encounters:   08/01/19 99   07/02/19 93   06/11/19 82       Radiology (reviewed/updated 8/2/2019)  No results found. Side Effects: (reviewed/updated 8/2/2019)  None reported or admitted to. Review of Systems: (reviewed/updated 8/2/2019)  Appetite: good  Sleep: good   All other Review of Systems: de;usions    Mental Status Exam:  Eye contact: Good eye contact  Psychomotor activity: Relaxed   Speech is spontaneous  Thought process: Logical and goal directed  Mood is \"ok\"  Affect: Full   Perception: No avh  Suicidal ideation: No si  Homicidal ideation: No hi  Insight/judgment: Poor  Cognition is grossly intact      Physical Exam:  Musculoskeletal system: steady gait  Tremor not present  Cog wheeling not present      Assessment and Plan:  Mavis Setting meets criteria for a diagnosis of Delusional disorder. Start risperdal 0.5mg bedtime. Continue rest of medications as prescribed. We will closely monitor for safety. We will encourage reality orientation. Disposition planning to continue. I certify that this patients inpatient psychiatric hospital services furnished since the previous certification were, and continue to be, required for treatment that could reasonably be expected to improve the patient's condition, or for diagnostic study, and that the patient continues to need, on a daily basis, active treatment furnished directly by or requiring the supervision of inpatient psychiatric facility personnel. In addition, the hospital records show that services furnished were intensive treatment services, admission or related services, or equivalent services.       Signed:  Cherelle Martinez NP  8/2/2019

## 2019-08-02 NOTE — PROGRESS NOTES
Problem: Depressed Mood (Adult/Pediatric)  Goal: *STG: Attends activities and groups  Outcome: Not Progressing Towards Goal  Note:   Pt out visible on the unit for breakfast, vs, medications. Isolative to her room during majority of the morning. Problem: Depressed Mood (Adult/Pediatric)  Goal: *STG: Remains safe in hospital  Outcome: Progressing Towards Goal  Note:   Pt remains safe in the hospital on q 15 min safety checks and CIWA monitoring. Problem: Depressed Mood (Adult/Pediatric)  Goal: *STG: Complies with medication therapy  Outcome: Progressing Towards Goal  Note:   Medication meal compliant. Problem: Depressed Mood (Adult/Pediatric)  Goal: Interventions  Outcome: Progressing Towards Goal  Note:   Education provided. Coping skills encouraged. Pt encouraged to attend groups. PTA medications restarted. Discussed starting Risperdal today. Per treatment team pt may transfer to general unit when bed becomes available.

## 2019-08-02 NOTE — PROGRESS NOTES
TRANSFER - OUT REPORT:    Verbal report given to [facility] RN(name) on Rick Enriquez  being transferred to [facility] general unit(unit) for routine progression of care       Report consisted of patients Situation, Background, Assessment and   Recommendations(SBAR). Information from the following report(s) SBAR was reviewed with the receiving nurse. Lines:       Opportunity for questions and clarification was provided.       Patient transported with:   Monitor  Registered Nurse

## 2019-08-03 PROCEDURE — 65220000003 HC RM SEMIPRIVATE PSYCH

## 2019-08-03 PROCEDURE — 74011250637 HC RX REV CODE- 250/637: Performed by: NURSE PRACTITIONER

## 2019-08-03 PROCEDURE — 74011250637 HC RX REV CODE- 250/637: Performed by: PSYCHIATRY & NEUROLOGY

## 2019-08-03 RX ADMIN — ASPIRIN 81 MG: 81 TABLET ORAL at 17:28

## 2019-08-03 RX ADMIN — CELECOXIB 100 MG: 100 CAPSULE ORAL at 09:07

## 2019-08-03 RX ADMIN — RISPERIDONE 0.5 MG: 0.5 TABLET ORAL at 21:13

## 2019-08-03 RX ADMIN — HYDROXYZINE HYDROCHLORIDE 50 MG: 50 TABLET, FILM COATED ORAL at 11:45

## 2019-08-03 RX ADMIN — ROSUVASTATIN CALCIUM 10 MG: 10 TABLET, COATED ORAL at 21:13

## 2019-08-03 RX ADMIN — TRAZODONE HYDROCHLORIDE 100 MG: 100 TABLET ORAL at 21:13

## 2019-08-03 RX ADMIN — CELECOXIB 100 MG: 100 CAPSULE ORAL at 19:21

## 2019-08-03 RX ADMIN — OXCARBAZEPINE 300 MG: 150 TABLET, FILM COATED ORAL at 09:08

## 2019-08-03 RX ADMIN — FAMOTIDINE 20 MG: 20 TABLET ORAL at 09:08

## 2019-08-03 RX ADMIN — Medication 1 TABLET: at 09:07

## 2019-08-03 RX ADMIN — VENLAFAXINE HYDROCHLORIDE 150 MG: 37.5 CAPSULE, EXTENDED RELEASE ORAL at 14:42

## 2019-08-03 RX ADMIN — OXCARBAZEPINE 300 MG: 150 TABLET, FILM COATED ORAL at 17:28

## 2019-08-03 RX ADMIN — MULTIPLE VITAMINS W/ MINERALS TAB 1 TABLET: TAB at 09:07

## 2019-08-03 NOTE — PROGRESS NOTES
Problem: Falls - Risk of  Goal: *Absence of Falls  Description  Document Shivam Muniz Fall Risk and appropriate interventions in the flowsheet. Outcome: Progressing Towards Goal  Note:   Fall Risk Interventions:       Mentation Interventions: Adequate sleep, hydration, pain control    Medication Interventions: Teach patient to arise slowly         History of Falls Interventions: Investigate reason for fall, Door open when patient unattended, Room close to nurse's station         Problem: Patient Education: Go to Patient Education Activity  Goal: Patient/Family Education  Outcome: Progressing Towards Goal     Problem: Depressed Mood (Adult/Pediatric)  Goal: *STG: Verbalizes anger, guilt, and other feelings in a constructive manor  Outcome: Progressing Towards Goal  Note:   Pt able to verbalize feelings in a constructive manor. Visible on the unit and interacting with peers. Hygiene is appropriate. Goal: *STG: Attends activities and groups  Outcome: Progressing Towards Goal  Note:   Encouraged pt to attend groups and express feelings and concerns. Goal: *STG: Remains safe in hospital  Outcome: Progressing Towards Goal  Note:   Remains safe on the unit and continued on Q 15 minute safety checks.   Goal: Interventions  Outcome: Progressing Towards Goal     Problem: Patient Education: Go to Patient Education Activity  Goal: Patient/Family Education  Outcome: Progressing Towards Goal

## 2019-08-03 NOTE — BH NOTES
GROUP THERAPY PROGRESS NOTE    Suzette Love is participating in Recreational Therapy. Group time: 30 minutes    Personal goal for participation: Go home and take care of pet    Goal orientation: personal    Group therapy participation: minimal    Therapeutic interventions reviewed and discussed:  Staff reviewed music therapy benefits. Staff played songs to assist with calming down the mood to get a good night rest.     Impression of participation: Pt sat to themselves but sung along with the music that staff had played.

## 2019-08-03 NOTE — BH NOTES
PRN Medication Documentation    Specific patient behavior that led to need for PRN medication: anxiety  Staff interventions attempted prior to PRN being given: express feelings and concerns. PRN medication given: Atarax 50 mg po prn  Patient response/effectiveness of PRN medication: 12:46 pm-pt less anxious at this time.

## 2019-08-03 NOTE — PROGRESS NOTES
Problem: Depressed Mood (Adult/Pediatric)  Goal: *STG: Remains safe in hospital  Outcome: Progressing Towards Goal  Note:   Out in milieu interacting with peers appropriately. Still concerned about possible bed bug situation at home. Otherwise looking forward to discharging Monday. Meal and medication compliant. Staff will continue to monitor q 15 min checks.

## 2019-08-03 NOTE — PROGRESS NOTES
Problem: Falls - Risk of  Goal: *Absence of Falls  Description  Document Estefany Lewis Fall Risk and appropriate interventions in the flowsheet. Outcome: Progressing Towards Goal  Note:   Fall Risk Interventions:  Mentation Interventions: Adequate sleep, hydration, pain control    Medication Interventions: Teach patient to arise slowly, Patient to call before getting OOB    Pt appears to be asleep. NAD. Respirations even and unlabored. Will continue to monitor q15 for safety.

## 2019-08-03 NOTE — PROGRESS NOTES
08/03/19 1236   Vital Signs   Temp 97.8 °F (36.6 °C)   Temp Source Oral   Pulse (Heart Rate) (!) 112   Resp Rate 16   O2 Sat (%) 93 %   Level of Consciousness Alert   BP (!) 156/95   MAP (Calculated) 115   MEWS Score 3   MEWS 3-pt anxious regarding medications. Will increase monitoring of vital signs. 2 pm-Pt less anxious at this time. MEWS 1.

## 2019-08-03 NOTE — INTERDISCIPLINARY ROUNDS
Behavioral Health Interdisciplinary Rounds Patient Name: Momo Wilson  Age: 76 y.o. Room/Bed:  729/ Primary Diagnosis: <principal problem not specified> Admission Status: Voluntary Readmission within 30 days: no 
Power of  in place: no 
Patient requires a blocked bed: no          Reason for blocked bed: VTE Prophylaxis: No 
 
Mobility needs/Fall risk: no 
Flu Vaccine : no  
Nutritional Plan: no 
Consults:         
Labs/Testing due today?: no 
 
Sleep hours:  7 Participation in Care/Groups:  yes Medication Compliant?: Yes PRNS (last 24 hours): Pain Restraints (last 24 hours):  no 
  
CIWA (range last 24 hours): CIWA-Ar Total: 0  
COWS (range last 24 hours): Alcohol screening (AUDIT) completed -   AUDIT Score: 0 If applicable, date SBIRT discussed in treatment team AND documented:  
AUDIT Screen Score: AUDIT Score: 0 Document Brief Intervention (corresponds directly with the 5 A's, Ask, Advise, Assess, Assist, and Arrange): At- Risk Patients (Score 7-15 for women; 8-15 for men) Discuss concern patient is drinking at unhealthy levels known to increase risk of alcohol-related health problems. Is Patient ready to commit to change? If No: 
? Encourage reflection ? Discuss short term and long term health risks of consuming alcohol ? Barriers to change ? Reaffirm willingness to help / Educational materials provided If Yes: 
? Set goal 
? Plan 
? Educational materials provided Harmful use or Dependence (Score 16 or greater) ? Discuss short term and long term health risks of consuming alcohol ? Recommendations ? Negotiate drinking goal 
? Recommend addiction specialist/center ? Arrange follow-up appointments. Tobacco - patient is a smoker: Have You Used Tobacco in the Past 30 Days: Never a smoker Illegal Drugs use: Have You Used Any Illegal Substances Over the Past 12 Months: No 
 
24 hour chart check complete: yes Patient goal(s) for today: Treatment team focus/goals:  
Progress note LOS:  2  Expected LOS:  
 
Financial concerns/prescription coverage:   
Date of last family contact:      
Family requesting physician contact today:   
Discharge plan:  
Guns in the home: Outpatient provider(s):  
 
Participating treatment team members: Sarah Miller, * (assigned SW),

## 2019-08-04 PROCEDURE — 74011250637 HC RX REV CODE- 250/637

## 2019-08-04 PROCEDURE — 74011250637 HC RX REV CODE- 250/637: Performed by: NURSE PRACTITIONER

## 2019-08-04 PROCEDURE — 65220000003 HC RM SEMIPRIVATE PSYCH

## 2019-08-04 RX ORDER — TRAZODONE HYDROCHLORIDE 50 MG/1
50 TABLET ORAL
Status: DISCONTINUED | OUTPATIENT
Start: 2019-08-04 | End: 2019-08-06 | Stop reason: HOSPADM

## 2019-08-04 RX ADMIN — Medication 1 TABLET: at 09:08

## 2019-08-04 RX ADMIN — VENLAFAXINE HYDROCHLORIDE 150 MG: 37.5 CAPSULE, EXTENDED RELEASE ORAL at 09:10

## 2019-08-04 RX ADMIN — VENLAFAXINE HYDROCHLORIDE 150 MG: 37.5 CAPSULE, EXTENDED RELEASE ORAL at 17:37

## 2019-08-04 RX ADMIN — OXCARBAZEPINE 300 MG: 150 TABLET, FILM COATED ORAL at 09:09

## 2019-08-04 RX ADMIN — FAMOTIDINE 20 MG: 20 TABLET ORAL at 09:09

## 2019-08-04 RX ADMIN — ROSUVASTATIN CALCIUM 10 MG: 10 TABLET, COATED ORAL at 21:10

## 2019-08-04 RX ADMIN — OXCARBAZEPINE 300 MG: 150 TABLET, FILM COATED ORAL at 17:37

## 2019-08-04 RX ADMIN — RISPERIDONE 0.5 MG: 0.5 TABLET ORAL at 21:10

## 2019-08-04 RX ADMIN — TRAZODONE HYDROCHLORIDE 50 MG: 50 TABLET ORAL at 22:36

## 2019-08-04 RX ADMIN — MULTIPLE VITAMINS W/ MINERALS TAB 1 TABLET: TAB at 09:09

## 2019-08-04 RX ADMIN — CELECOXIB 100 MG: 100 CAPSULE ORAL at 09:09

## 2019-08-04 RX ADMIN — ASPIRIN 81 MG: 81 TABLET ORAL at 17:37

## 2019-08-04 RX ADMIN — CELECOXIB 100 MG: 100 CAPSULE ORAL at 21:10

## 2019-08-04 NOTE — PROGRESS NOTES
Problem: Falls - Risk of  Goal: *Absence of Falls  Description  Document Shivam Muniz Fall Risk and appropriate interventions in the flowsheet. Outcome: Progressing Towards Goal  Note:   Fall Risk Interventions:       Mentation Interventions: Adequate sleep, hydration, pain control    Medication Interventions: Teach patient to arise slowly         History of Falls Interventions: Investigate reason for fall, Door open when patient unattended, Room close to nurse's station         Problem: Patient Education: Go to Patient Education Activity  Goal: Patient/Family Education  Outcome: Progressing Towards Goal     Problem: Depressed Mood (Adult/Pediatric)  Goal: *STG: Participates in treatment plan  Outcome: Progressing Towards Goal  Note:   Pt participated in treatment team. Visible on the unit and interacting with peers. Continues to focus on discharge and medication changes. Concerned she will be in the hospital \"forever\". Reinforced with pt she will be discharged and reviewed plan during treatment team.  Goal: *STG: Verbalizes anger, guilt, and other feelings in a constructive manor  Outcome: Progressing Towards Goal  Note:   Pt able to verbalize feelings in a constructive manor. Less anxious at this time. Goal: *STG: Attends activities and groups  Outcome: Progressing Towards Goal  Note:   Encouraged pt to attend groups and express feelings and concerns.   Goal: Interventions  Outcome: Progressing Towards Goal     Problem: Patient Education: Go to Patient Education Activity  Goal: Patient/Family Education  Outcome: Progressing Towards Goal

## 2019-08-04 NOTE — PROGRESS NOTES
Problem: Depressed Mood (Adult/Pediatric)  Goal: *STG: Participates in treatment plan  Note:   1530: Greeted patient on unit in room. Appears in no acute distress. No voiced concerns at present. Will continue to monitor on Q 15 minute safety checks. 2010:   Alert. Vitals stable, wnl.   Medication and meal compliant. Ambulatory with steady gait and no assist.   Patient remains flat, and hopeless, helpless. Ciwa is currently 0.

## 2019-08-04 NOTE — PROGRESS NOTES
PSYCHIATRIC PROGRESS NOTE       Patient: Jackie Perdue MRN: [id number]  SSN: [id number]    YOB: 1950  Age: 76 y.o. Sex: female      Admit Date: 7/31/2019    LOS: 2 days       Chief Complaint:  I am doing pretty good. Interval History:  Jackie Perdue reports feeling good. Slept over 6 hours. Trying to eat more. Has a few physical complaints. She continues to have persecutory delusions about bugs. Denies si hi or avh.slept 7 hours,appetite is good. Deneis ah/vh/si      Past Medical History:  Past Medical History:   Diagnosis Date    Arthritis     GENERALIZED IN JOINTS.  Breast cancer ([facility] [address]     Left Breast Cancer 1995    Cancer [facility])     left breast - surgery/radiation    Chronic pain     Back pain    Depression     Mood Swings, anxiety    GERD (gastroesophageal reflux disease)     Hiatal Hernia    Headache     Hypercholesterolemia     Hypertension     CONTROLLED BY MEDS., Pt states she is no liong er on BP med's    Ill-defined condition     Neuropathy bilat feet    Irregular heart beat     Other ill-defined conditions(799.89)     MIGRAINES    Other ill-defined conditions(799.89)     neuropathy of lower legs and feet    Other ill-defined conditions(799.89)     increased cholesterol    Other ill-defined conditions(799.89)     bronchitis    Psychotic disorder (HCC)     S/P radiation therapy     1995 Left Breast    Unspecified adverse effect of anesthesia     HEART PALPITATION. ALLERGIES:(reviewed/updated 8/3/2019)  Allergies   Allergen Reactions    Epinephrine Palpitations     \"Makes my heart  ' shudder'. \" Allergic to epinephrine with novocain. Plain epinephrine is ok.  Novocain [Procaine] Palpitations    Nsaids (Non-Steroidal Anti-Inflammatory Drug) Other (comments)     GASTRIC DISTRESS.     Ibuprofen Other (comments)     Gastric irritation       Laboratory report:  Lab Results   Component Value Date/Time    WBC 11.6 (H) 07/31/2019 10:33 PM    HGB 12.0 07/31/2019 10:33 PM    HCT 39.5 07/31/2019 10:33 PM    PLATELET 870 62/66/8986 10:33 PM    MCV 89.0 07/31/2019 10:33 PM      Lab Results   Component Value Date/Time    Sodium 141 07/31/2019 10:33 PM    Potassium 4.1 07/31/2019 10:33 PM    Chloride 110 (H) 07/31/2019 10:33 PM    CO2 25 07/31/2019 10:33 PM    Anion gap 6 07/31/2019 10:33 PM    Glucose 121 (H) 08/02/2019 05:06 AM    BUN 17 07/31/2019 10:33 PM    Creatinine 0.81 07/31/2019 10:33 PM    BUN/Creatinine ratio 21 (H) 07/31/2019 10:33 PM    GFR est AA >60 07/31/2019 10:33 PM    GFR est non-AA >60 07/31/2019 10:33 PM    Calcium 9.3 07/31/2019 10:33 PM    Bilirubin, total 0.8 07/31/2019 10:33 PM    AST (SGOT) 26 07/31/2019 10:33 PM    Alk.  phosphatase 122 (H) 07/31/2019 10:33 PM    Protein, total 6.4 07/31/2019 10:33 PM    Albumin 3.1 (L) 07/31/2019 10:33 PM    Globulin 3.3 07/31/2019 10:33 PM    A-G Ratio 0.9 (L) 07/31/2019 10:33 PM    ALT (SGPT) 118 (H) 07/31/2019 10:33 PM      Vitals:    08/03/19 1236 08/03/19 1355 08/03/19 1653 08/03/19 1948   BP: (!) 156/95 128/69 142/88 131/86   Pulse: (!) 112 100 (!) 111 (!) 110   Resp: 16 16 18 18   Temp: 97.8 °F (36.6 °C) 98.7 °F (37.1 °C) 97.9 °F (36.6 °C) 97.9 °F (36.6 °C)   SpO2: 93% 94% 97% 96%   Weight:       Height:           No results found for: VALF2, VALAC, VALP, VALPR, DS6, CRBAM, CRBAMP, CARB2, XCRBAM  No results found for: LITHM    Vital Signs  Patient Vitals for the past 24 hrs:   Temp Pulse Resp BP SpO2   08/03/19 1948 97.9 °F (36.6 °C) (!) 110 18 131/86 96 %   08/03/19 1653 97.9 °F (36.6 °C) (!) 111 18 142/88 97 %   08/03/19 1355 98.7 °F (37.1 °C) 100 16 128/69 94 %   08/03/19 1236 97.8 °F (36.6 °C) (!) 112 16 (!) 156/95 93 %   08/03/19 0815 97.8 °F (36.6 °C) (!) 108 16 (!) 144/94 97 %     Wt Readings from Last 3 Encounters:   08/01/19 86.9 kg (191 lb 9.6 oz)   07/02/19 84.4 kg (186 lb)   06/11/19 87.5 kg (193 lb)     Temp Readings from Last 3 Encounters:   08/03/19 97.9 °F (36.6 °C)   06/11/19 98.4 °F (36.9 °C) 04/09/19 97.9 °F (36.6 °C) (Oral)     BP Readings from Last 3 Encounters:   08/03/19 131/86   07/02/19 134/86   06/11/19 143/76     Pulse Readings from Last 3 Encounters:   08/03/19 (!) 110   07/02/19 93   06/11/19 82       Radiology (reviewed/updated 8/3/2019)  No results found. Side Effects: (reviewed/updated 8/3/2019)  None reported or admitted to. Review of Systems: (reviewed/updated 8/3/2019)  Appetite: good  Sleep: good   All other Review of Systems: de;usions    Mental Status Exam:  Eye contact: Good eye contact  Psychomotor activity: Relaxed   Speech is spontaneous  Thought process: Logical and goal directed  Mood is \"ok\"  Affect: Full   Perception: No avh  Suicidal ideation: No si  Homicidal ideation: No hi  Insight/judgment: Poor  Cognition is grossly intact      Physical Exam:  Musculoskeletal system: steady gait  Tremor not present  Cog wheeling not present      Assessment and Plan:  Joy Roldan meets criteria for a diagnosis of Delusional disorder. Continue current medications   We will closely monitor for safety. We will encourage reality orientation. Disposition planning to continue. I certify that this patients inpatient psychiatric hospital services furnished since the previous certification were, and continue to be, required for treatment that could reasonably be expected to improve the patient's condition, or for diagnostic study, and that the patient continues to need, on a daily basis, active treatment furnished directly by or requiring the supervision of inpatient psychiatric facility personnel. In addition, the hospital records show that services furnished were intensive treatment services, admission or related services, or equivalent services.       Signed:  Joseph Woodall MD  8/3/2019

## 2019-08-05 PROCEDURE — 74011250637 HC RX REV CODE- 250/637

## 2019-08-05 PROCEDURE — 74011250637 HC RX REV CODE- 250/637: Performed by: NURSE PRACTITIONER

## 2019-08-05 PROCEDURE — 65220000003 HC RM SEMIPRIVATE PSYCH

## 2019-08-05 PROCEDURE — 74011250637 HC RX REV CODE- 250/637: Performed by: PSYCHIATRY & NEUROLOGY

## 2019-08-05 RX ORDER — VENLAFAXINE HYDROCHLORIDE 150 MG/1
150 CAPSULE, EXTENDED RELEASE ORAL ONCE
Status: DISCONTINUED | OUTPATIENT
Start: 2019-08-05 | End: 2019-08-05

## 2019-08-05 RX ORDER — RISPERIDONE 1 MG/1
1 TABLET, FILM COATED ORAL
Status: DISCONTINUED | OUTPATIENT
Start: 2019-08-05 | End: 2019-08-06 | Stop reason: HOSPADM

## 2019-08-05 RX ORDER — OXCARBAZEPINE 150 MG/1
150 TABLET, FILM COATED ORAL 2 TIMES DAILY
Status: DISCONTINUED | OUTPATIENT
Start: 2019-08-05 | End: 2019-08-06

## 2019-08-05 RX ORDER — VENLAFAXINE HYDROCHLORIDE 150 MG/1
300 CAPSULE, EXTENDED RELEASE ORAL
Status: DISCONTINUED | OUTPATIENT
Start: 2019-08-05 | End: 2019-08-06 | Stop reason: HOSPADM

## 2019-08-05 RX ADMIN — MULTIPLE VITAMINS W/ MINERALS TAB 1 TABLET: TAB at 08:10

## 2019-08-05 RX ADMIN — ROSUVASTATIN CALCIUM 10 MG: 10 TABLET, COATED ORAL at 21:28

## 2019-08-05 RX ADMIN — OXCARBAZEPINE 150 MG: 150 TABLET, FILM COATED ORAL at 17:42

## 2019-08-05 RX ADMIN — CELECOXIB 100 MG: 100 CAPSULE ORAL at 21:28

## 2019-08-05 RX ADMIN — OXCARBAZEPINE 300 MG: 150 TABLET, FILM COATED ORAL at 08:10

## 2019-08-05 RX ADMIN — MAGNESIUM HYDROXIDE 30 ML: 400 SUSPENSION ORAL at 01:21

## 2019-08-05 RX ADMIN — VENLAFAXINE HYDROCHLORIDE 300 MG: 150 CAPSULE, EXTENDED RELEASE ORAL at 10:23

## 2019-08-05 RX ADMIN — FAMOTIDINE 20 MG: 20 TABLET ORAL at 08:10

## 2019-08-05 RX ADMIN — CELECOXIB 100 MG: 100 CAPSULE ORAL at 08:10

## 2019-08-05 RX ADMIN — ASPIRIN 81 MG: 81 TABLET ORAL at 17:42

## 2019-08-05 RX ADMIN — TRAZODONE HYDROCHLORIDE 50 MG: 50 TABLET ORAL at 21:28

## 2019-08-05 RX ADMIN — Medication 1 TABLET: at 08:10

## 2019-08-05 RX ADMIN — RISPERIDONE 1 MG: 1 TABLET ORAL at 21:28

## 2019-08-05 RX ADMIN — Medication 10.5 MG: at 21:29

## 2019-08-05 NOTE — INTERDISCIPLINARY ROUNDS
Behavioral Health Interdisciplinary Rounds Patient Name: Leslie Day  Age: 76 y.o. Room/Bed:  729/ Primary Diagnosis: <principal problem not specified> Admission Status: Voluntary Readmission within 30 days: no 
Power of  in place: no 
Patient requires a blocked bed: no          Reason for blocked bed: VTE Prophylaxis: No 
 
Mobility needs/Fall risk: no 
Flu Vaccine : no  
Nutritional Plan: no 
Consults:         
Labs/Testing due today?: no 
 
Sleep hours:  5.5 Participation in Care/Groups:  yes Medication Compliant?: Yes PRNS (last 24 hours): None Restraints (last 24 hours):  no 
  
CIWA (range last 24 hours): CIWA-Ar Total: 0  
COWS (range last 24 hours): Alcohol screening (AUDIT) completed -   AUDIT Score: 0 If applicable, date SBIRT discussed in treatment team AND documented:  
AUDIT Screen Score: AUDIT Score: 0 Tobacco - patient is a smoker: Have You Used Tobacco in the Past 30 Days: Never a smoker Illegal Drugs use: Have You Used Any Illegal Substances Over the Past 12 Months: No 
 
24 hour chart check complete:   
 
Patient goal(s) for today: To attend groups Treatment team focus/goals: Continue to assess medication regime Progress note Patient is interacting well with other patients and attending groups LOS:  3  Expected LOS: 1-3 days Financial concerns/prescription coverage:  Pili Pop Date of last family contact:     
Family requesting physician contact today:  No 
Discharge plan: Return to [facility] Guns in the home:  No      
Outpatient provider(s): Dr. Krystle Rodriguez Participating treatment team members: Leslie Day, Jocelin Busby NP; Maryanne Rudolph; Jeanette Aranda, [place]; Tonya Palomino

## 2019-08-05 NOTE — BH NOTES
GROUP THERAPY PROGRESS NOTE    Fer Singh did not participate in Substance abuse group.      Geeta Hough [facility]

## 2019-08-05 NOTE — BH NOTES
GROUP THERAPY PROGRESS NOTE    Sarah Miller is participating in Community Group. Group time: 25 minutes    Personal goal for participation: File the past in a folder in my head and don't think about it again. Goal orientation: community    Group therapy participation: active    Therapeutic interventions reviewed and discussed: [place] patent to the unit, review rules and guidelines of General Unit, establish goals, fill out following day's menu, and review coping skills. While reviewing coping skills, patients engaged in therapeutic discussion about how to compare and contrast goals. Impression of participation: Patient was quiet and attentive. Responded appropriately to staff and peers.

## 2019-08-05 NOTE — PROGRESS NOTES
Laboratory Monitoring for Antipsychotics: This patient is currently prescribed the following medication(s):   Current Facility-Administered Medications   Medication Dose Route Frequency    [START ON 8/6/2019] venlafaxine-SR (EFFEXOR-XR) capsule 300 mg  300 mg Oral DAILY WITH BREAKFAST    venlafaxine-SR (EFFEXOR-XR) capsule 150 mg  150 mg Oral ONCE    risperiDONE (RisperDAL) tablet 1 mg  1 mg Oral QHS    OXcarbazepine (TRILEPTAL) tablet 150 mg  150 mg Oral BID    traZODone (DESYREL) tablet 50 mg  50 mg Oral QHS    aspirin delayed-release tablet 81 mg  81 mg Oral QPM    celecoxib (CELEBREX) capsule 100 mg  100 mg Oral Q12H    multivitamin, tx-iron-ca-min (THERA-M w/ IRON) tablet 1 Tab  1 Tab Oral DAILY    rosuvastatin (CRESTOR) tablet 10 mg  10 mg Oral QHS    calcium-vitamin D (OS-LIANNA) 500 mg-200 unit tablet  1 Tab Oral DAILY WITH BREAKFAST    famotidine (PEPCID) tablet 20 mg  20 mg Oral DAILY     The following labs have been completed for monitoring of antipsychotics and/or mood stabilizers:    Height, Weight, BMI Estimation  Estimated body mass index is 37.26 kg/m² as calculated from the following:    Height as of this encounter: 152.4 cm (60\"). Weight as of this encounter: 86.5 kg (190 lb 12.8 oz). Vital Signs/Blood Pressure  Visit Vitals  /88 (BP 1 Location: Right arm, BP Patient Position: Sitting)   Pulse (!) 104   Temp 97.7 °F (36.5 °C)   Resp 16   Ht 152.4 cm (60\")   Wt 86.5 kg (190 lb 12.8 oz)   SpO2 97%   BMI 37.26 kg/m²     Renal Function, Hepatic Function and Chemistry  Estimated Creatinine Clearance: 65 mL/min (based on SCr of 0.81 mg/dL).     Lab Results   Component Value Date/Time    Sodium 141 07/31/2019 10:33 PM    Potassium 4.1 07/31/2019 10:33 PM    Chloride 110 (H) 07/31/2019 10:33 PM    CO2 25 07/31/2019 10:33 PM    Anion gap 6 07/31/2019 10:33 PM    BUN 17 07/31/2019 10:33 PM    Creatinine 0.81 07/31/2019 10:33 PM    BUN/Creatinine ratio 21 (H) 07/31/2019 10:33 PM    Bilirubin, total 0.8 07/31/2019 10:33 PM    Protein, total 6.4 07/31/2019 10:33 PM    Albumin 3.1 (L) 07/31/2019 10:33 PM    Globulin 3.3 07/31/2019 10:33 PM    A-G Ratio 0.9 (L) 07/31/2019 10:33 PM    ALT (SGPT) 118 (H) 07/31/2019 10:33 PM    Alk. phosphatase 122 (H) 07/31/2019 10:33 PM     Lab Results   Component Value Date/Time    Glucose 121 (H) 08/02/2019 05:06 AM    Glucose (POC) 124 (H) 11/26/2018 08:29 AM     Lab Results   Component Value Date/Time    Hemoglobin A1c 5.8 02/16/2012 02:15 PM     Hematology  Lab Results   Component Value Date/Time    WBC 11.6 (H) 07/31/2019 10:33 PM    RBC 4.44 07/31/2019 10:33 PM    HGB 12.0 07/31/2019 10:33 PM    HCT 39.5 07/31/2019 10:33 PM    MCV 89.0 07/31/2019 10:33 PM    MCH 27.0 07/31/2019 10:33 PM    MCHC 30.4 07/31/2019 10:33 PM    RDW 15.8 (H) 07/31/2019 10:33 PM    PLATELET 627 12/49/0721 10:33 PM     Lipids  Lab Results   Component Value Date/Time    Cholesterol, total 104 08/02/2019 05:06 AM    HDL Cholesterol 37 08/02/2019 05:06 AM    LDL, calculated 54.6 08/02/2019 05:06 AM    Triglyceride 62 08/02/2019 05:06 AM    CHOL/HDL Ratio 2.8 08/02/2019 05:06 AM     Thyroid Function  Lab Results   Component Value Date/Time    TSH 1.84 07/31/2019 10:33 PM     Assessment/Plan:  Recommended baseline laboratory monitoring has been completed based on this patient's current medication regimen. No further interventions are needed at this time. Already taking rosuvastatin 10mg daily (PTA medication). Follow-up metabolic monitoring labs should be completed at least annually.      Marielle Olivas, PHARMD

## 2019-08-05 NOTE — PROGRESS NOTES
Problem: Falls - Risk of  Goal: *Absence of Falls  Description  Document Pedro Holden Fall Risk and appropriate interventions in the flowsheet. Outcome: Progressing Towards Goal  Note:   Fall Risk Interventions:  Mobility Interventions: Assess mobility with egress test    Mentation Interventions: Adequate sleep, hydration, pain control, Door open when patient unattended, HELP ([address]) if available    Medication Interventions: Teach patient to arise slowly         History of Falls Interventions: Consult care management for discharge planning         Problem: Depressed Mood (Adult/Pediatric)  Goal: *STG: Participates in treatment plan  Outcome: Progressing Towards Goal  Note:   Pt engaged in milieu aeb participating in group. Pt exhibit tearful mood and affect with organized thought process. Pt thought process rational with appropriate behavior. Pt progressing to appropriate discharge plan. Pt easily redirected with staff assistance. Staff focus limit setting, offering support and encouraging pt to reach goals. Pt compliant with discharge post medication stabilization.   Goal: *STG: Verbalizes anger, guilt, and other feelings in a constructive manor  Outcome: Progressing Towards Goal  Goal: *STG: Attends activities and groups  Outcome: Progressing Towards Goal  Goal: *STG: Demonstrates reduction in symptoms and increase in insight into coping skills/future focused  Outcome: Progressing Towards Goal  Goal: *STG: Remains safe in hospital  Outcome: Progressing Towards Goal  Goal: *STG: Complies with medication therapy  Outcome: Progressing Towards Goal  Goal: Interventions  Outcome: Progressing Towards Goal

## 2019-08-05 NOTE — PROGRESS NOTES
2315: Patient resting quietly in bed with eyes closed. No distress noted. Respirations are even and unlabored. Staff will continue to monitor q15 throughout the shift. 0115: patient verbalized \"severe\" heartburn and edema developing bilaterally on feet and legs. Problem: Falls - Risk of  Goal: *Absence of Falls  Description  Document Margrett Bernheim Fall Risk and appropriate interventions in the flowsheet.   Outcome: Progressing Towards Goal  Note:   Fall Risk Interventions:       Mentation Interventions: Adequate sleep, hydration, pain control    Medication Interventions: Teach patient to arise slowly         History of Falls Interventions: Bed/chair exit alarm

## 2019-08-05 NOTE — PROGRESS NOTES
PSYCHIATRIC PROGRESS NOTE       Patient: Key Turner MRN: [id number]  SSN: [id number]    YOB: 1950  Age: 76 y.o. Sex: female      Admit Date: 7/31/2019    LOS: 3 days       Chief Complaint:  \"I feel real good\". Interval History:  Key Turner reports feeling good. Patient reported sometimes I feel wobbly,whoozy,no fall reported,eating well,slept 7 hours,attending groups,persecutory delusions improved,arthur ah/vh/vh. Her trazodone dose was decreased due to above sympotoms       Past Medical History:  Past Medical History:   Diagnosis Date    Arthritis     GENERALIZED IN JOINTS.  Breast cancer ([facility] [address]     Left Breast Cancer 1995    Cancer [facility])     left breast - surgery/radiation    Chronic pain     Back pain    Depression     Mood Swings, anxiety    GERD (gastroesophageal reflux disease)     Hiatal Hernia    Headache     Hypercholesterolemia     Hypertension     CONTROLLED BY MEDS., Pt states she is no liong er on BP med's    Ill-defined condition     Neuropathy bilat feet    Irregular heart beat     Other ill-defined conditions(799.89)     MIGRAINES    Other ill-defined conditions(799.89)     neuropathy of lower legs and feet    Other ill-defined conditions(799.89)     increased cholesterol    Other ill-defined conditions(799.89)     bronchitis    Psychotic disorder (HCC)     S/P radiation therapy     1995 Left Breast    Unspecified adverse effect of anesthesia     HEART PALPITATION. ALLERGIES:(reviewed/updated 8/4/2019)  Allergies   Allergen Reactions    Epinephrine Palpitations     \"Makes my heart  ' shudder'. \" Allergic to epinephrine with novocain. Plain epinephrine is ok.  Novocain [Procaine] Palpitations    Nsaids (Non-Steroidal Anti-Inflammatory Drug) Other (comments)     GASTRIC DISTRESS.     Ibuprofen Other (comments)     Gastric irritation       Laboratory report:  Lab Results   Component Value Date/Time    WBC 11.6 (H) 07/31/2019 10:33 PM    HGB 12.0 07/31/2019 10:33 PM    HCT 39.5 07/31/2019 10:33 PM    PLATELET 060 07/64/4956 10:33 PM    MCV 89.0 07/31/2019 10:33 PM      Lab Results   Component Value Date/Time    Sodium 141 07/31/2019 10:33 PM    Potassium 4.1 07/31/2019 10:33 PM    Chloride 110 (H) 07/31/2019 10:33 PM    CO2 25 07/31/2019 10:33 PM    Anion gap 6 07/31/2019 10:33 PM    Glucose 121 (H) 08/02/2019 05:06 AM    BUN 17 07/31/2019 10:33 PM    Creatinine 0.81 07/31/2019 10:33 PM    BUN/Creatinine ratio 21 (H) 07/31/2019 10:33 PM    GFR est AA >60 07/31/2019 10:33 PM    GFR est non-AA >60 07/31/2019 10:33 PM    Calcium 9.3 07/31/2019 10:33 PM    Bilirubin, total 0.8 07/31/2019 10:33 PM    AST (SGOT) 26 07/31/2019 10:33 PM    Alk.  phosphatase 122 (H) 07/31/2019 10:33 PM    Protein, total 6.4 07/31/2019 10:33 PM    Albumin 3.1 (L) 07/31/2019 10:33 PM    Globulin 3.3 07/31/2019 10:33 PM    A-G Ratio 0.9 (L) 07/31/2019 10:33 PM    ALT (SGPT) 118 (H) 07/31/2019 10:33 PM      Vitals:    08/03/19 1948 08/04/19 0756 08/04/19 1803 08/04/19 1943   BP: 131/86 116/83 134/84 124/83   Pulse: (!) 110 98 65 100   Resp: 18 16 16 16   Temp: 97.9 °F (36.6 °C) 97.6 °F (36.4 °C) 97.6 °F (36.4 °C) 98.3 °F (36.8 °C)   SpO2: 96% 95% 99%    Weight:  86.5 kg (190 lb 12.8 oz)     Height:           No results found for: VALF2, VALAC, VALP, VALPR, DS6, CRBAM, CRBAMP, CARB2, XCRBAM  No results found for: LITHM    Vital Signs  Patient Vitals for the past 24 hrs:   Temp Pulse Resp BP SpO2   08/04/19 1943 98.3 °F (36.8 °C) 100 16 124/83    08/04/19 1803 97.6 °F (36.4 °C) 65 16 134/84 99 %   08/04/19 0756 97.6 °F (36.4 °C) 98 16 116/83 95 %     Wt Readings from Last 3 Encounters:   08/04/19 86.5 kg (190 lb 12.8 oz)   07/02/19 84.4 kg (186 lb)   06/11/19 87.5 kg (193 lb)     Temp Readings from Last 3 Encounters:   08/04/19 98.3 °F (36.8 °C)   06/11/19 98.4 °F (36.9 °C)   04/09/19 97.9 °F (36.6 °C) (Oral)     BP Readings from Last 3 Encounters:   08/04/19 124/83   07/02/19 134/86 06/11/19 143/76     Pulse Readings from Last 3 Encounters:   08/04/19 100   07/02/19 93   06/11/19 82       Radiology (reviewed/updated 8/4/2019)  No results found. Side Effects: (reviewed/updated 8/4/2019)  None reported or admitted to. Review of Systems: (reviewed/updated 8/4/2019)  Appetite: good  Sleep: good   All other Review of Systems: de;usions    Mental Status Exam:  Eye contact: Good eye contact  Psychomotor activity: Relaxed   Speech is spontaneous  Thought process: Logical and goal directed  Mood is \"ok\"  Affect: Full   Perception: No avh  Suicidal ideation: No si  Homicidal ideation: No hi  Insight/judgment: Poor  Cognition is grossly intact      Physical Exam:  Musculoskeletal system: steady gait  Tremor not present  Cog wheeling not present      Assessment and Plan:  Farhat Hyatt meets criteria for a diagnosis of Major Depressive disorder with psychotic symptoms    Decrease Trazodone to 50 mg po qhs  Continue Trileptal,Venlafaxine and Risperidone at current dose  We will closely monitor for safety. We will encourage reality orientation. Disposition planning to continue. I certify that this patients inpatient psychiatric hospital services furnished since the previous certification were, and continue to be, required for treatment that could reasonably be expected to improve the patient's condition, or for diagnostic study, and that the patient continues to need, on a daily basis, active treatment furnished directly by or requiring the supervision of inpatient psychiatric facility personnel. In addition, the hospital records show that services furnished were intensive treatment services, admission or related services, or equivalent services.       Signed:  Kaycee Doe MD  8/4/2019

## 2019-08-05 NOTE — BH NOTES
GROUP THERAPY PROGRESS NOTE    Nita Campbell participated in a Process Group on the General Unit with a focus identifying feelings, planning for the day, and learning more about DBT concepts of \"Interpersonal Effectiveness or how to ask for what you need. .  Group time: 90 minutes. Personal goal for participation: To increase the capacity to improve ones mood, set personal goals,   and understand more about basic activities to successfully state and get ones needs met. Goal orientation: The patients will be able to identify their feelings and develop a goal for   themselves for their day. The didactic portion of the session covered interpersonal. The group members were asked to review a   handout sheet on interpersonal effectiveness and asking for something necessary. Three main areas of   focus were explored in the interactive didactic session: 1) defining what it is one wants (describe the   current situation, express your feelings and opinions, assert  yourself by asking and being willing to say   No; 2) keeping a good relationship with the person you are asking (be gentle and courteous in ones   approach  no attacks, threats, or judging; listen and be interested in the other person; validate the other   persons feelings and needs; and use an easy manner  be diplomatic and use humor when you can);   and 3) maintaining ones self-respect (be fair to yourself and to the other person: no apologies for being   alive or making a request at all, stick to your values, and be truthful  dont lie, act helpless, exaggerate,   or make excuses). Group therapy participation: With prompting, this patient actively participated in the group. Therapeutic interventions reviewed and discussed: The group members were asked to   identify an emotion they are having and/or let the group know what they want to focus on for the   day as they continue to make discharge plans.  The group members took turns reading and   reviewing the summary sheet on Interpersonal Effectiveness. Impression of participation: The patient said she was having he medications re-evaluated by her treatment team and that she would be starting with a new outpatient psychiatrist to replace Dr. Delvin Mosquera. She also indicated that she planned also to be looking for a new outpatient therapist, who might work her new outpatient psychiatrist. The patient was alert and oriented. She was looking for concrete next steps in her aftercare and discharge plans. She expressed no current SI/HI and displayed no overt psychotic symptoms in this group. Her affect was mostly non-dysphoric and her mood reflected her affect.

## 2019-08-05 NOTE — BH NOTES
PRN Medication Documentation    Specific patient behavior that led to need for PRN medication: heart burn  Staff interventions attempted prior to PRN being given: evaluation  PRN medication given: M.O.M. @ 0121  Patient response/effectiveness of PRN medication: 0151: patient verbalized that medicine had relieved her heartburn

## 2019-08-05 NOTE — PROGRESS NOTES
Problem: Depressed Mood (Adult/Pediatric)  Goal: *STG: Participates in treatment plan  Outcome: Progressing Towards Goal  Note:   Out on unit passively engaged. Mood and affect brighter and reports decrease anxiety and depression and more concern with her medical concerns and leg strength. Denies SI, Daily goal is to talk about her medications. Staff focus is on simplification of medication and education.    Goal: *STG: Verbalizes anger, guilt, and other feelings in a constructive manor  Outcome: Progressing Towards Goal  Goal: *STG: Attends activities and groups  Outcome: Progressing Towards Goal  Goal: *STG: Demonstrates reduction in symptoms and increase in insight into coping skills/future focused  Outcome: Progressing Towards Goal  Goal: *STG: Remains safe in hospital  Outcome: Progressing Towards Goal  Goal: *STG: Complies with medication therapy  Outcome: Progressing Towards Goal  Goal: Interventions  Outcome: Progressing Towards Goal

## 2019-08-05 NOTE — PROGRESS NOTES
PSYCHIATRIC PROGRESS NOTE       Patient: Sangita Bryant MRN: [id number]  SSN: [id number]    YOB: 1950  Age: 76 y.o. Sex: female      Admit Date: 7/31/2019    LOS: 4 days       Chief Complaint:  I dont feel good. Interval History:  Sangita Bryant is not feeling well physically, has trace edema on her ble. Complaining of neuropathy. Never been diagnosed with bipolar disorder, she is unsure why she's taking trileptal. She states mood wise, she is trying to be positive. She cannot see bugs but \"I can feel my bugs are biting me all night and this morning. \" Fixated on her bite marks, showing her arm to treatment team. Denies si or hi. Past Medical History:  Past Medical History:   Diagnosis Date    Arthritis     GENERALIZED IN JOINTS.  Breast cancer ([address]     Left Breast Cancer 1995    Cancer [facility])     left breast - surgery/radiation    Chronic pain     Back pain    Depression     Mood Swings, anxiety    GERD (gastroesophageal reflux disease)     Hiatal Hernia    Headache     Hypercholesterolemia     Hypertension     CONTROLLED BY MEDS., Pt states she is no liong er on BP med's    Ill-defined condition     Neuropathy bilat feet    Irregular heart beat     Other ill-defined conditions(799.89)     MIGRAINES    Other ill-defined conditions(799.89)     neuropathy of lower legs and feet    Other ill-defined conditions(799.89)     increased cholesterol    Other ill-defined conditions(799.89)     bronchitis    Psychotic disorder (HCC)     S/P radiation therapy     1995 Left Breast    Unspecified adverse effect of anesthesia     HEART PALPITATION. ALLERGIES:(reviewed/updated 8/5/2019)  Allergies   Allergen Reactions    Epinephrine Palpitations     \"Makes my heart  ' shudder'. \" Allergic to epinephrine with novocain. Plain epinephrine is ok.  Novocain [Procaine] Palpitations    Nsaids (Non-Steroidal Anti-Inflammatory Drug) Other (comments)     GASTRIC DISTRESS.     Ibuprofen Other (comments)     Gastric irritation       Laboratory report:  Lab Results   Component Value Date/Time    WBC 11.6 (H) 07/31/2019 10:33 PM    HGB 12.0 07/31/2019 10:33 PM    HCT 39.5 07/31/2019 10:33 PM    PLATELET 254 32/04/9187 10:33 PM    MCV 89.0 07/31/2019 10:33 PM      Lab Results   Component Value Date/Time    Sodium 141 07/31/2019 10:33 PM    Potassium 4.1 07/31/2019 10:33 PM    Chloride 110 (H) 07/31/2019 10:33 PM    CO2 25 07/31/2019 10:33 PM    Anion gap 6 07/31/2019 10:33 PM    Glucose 121 (H) 08/02/2019 05:06 AM    BUN 17 07/31/2019 10:33 PM    Creatinine 0.81 07/31/2019 10:33 PM    BUN/Creatinine ratio 21 (H) 07/31/2019 10:33 PM    GFR est AA >60 07/31/2019 10:33 PM    GFR est non-AA >60 07/31/2019 10:33 PM    Calcium 9.3 07/31/2019 10:33 PM    Bilirubin, total 0.8 07/31/2019 10:33 PM    AST (SGOT) 26 07/31/2019 10:33 PM    Alk.  phosphatase 122 (H) 07/31/2019 10:33 PM    Protein, total 6.4 07/31/2019 10:33 PM    Albumin 3.1 (L) 07/31/2019 10:33 PM    Globulin 3.3 07/31/2019 10:33 PM    A-G Ratio 0.9 (L) 07/31/2019 10:33 PM    ALT (SGPT) 118 (H) 07/31/2019 10:33 PM      Vitals:    08/04/19 0756 08/04/19 1803 08/04/19 1943 08/05/19 0826   BP: 116/83 134/84 124/83 131/88   Pulse: 98 65 100 (!) 104   Resp: 16 16 16 16   Temp: 97.6 °F (36.4 °C) 97.6 °F (36.4 °C) 98.3 °F (36.8 °C) 97.7 °F (36.5 °C)   SpO2: 95% 99%  97%   Weight: 86.5 kg (190 lb 12.8 oz)      Height:           No results found for: VALF2, VALAC, VALP, VALPR, DS6, CRBAM, CRBAMP, CARB2, XCRBAM  No results found for: LITHM    Vital Signs  Patient Vitals for the past 24 hrs:   Temp Pulse Resp BP SpO2   08/05/19 0826 97.7 °F (36.5 °C) (!) 104 16 131/88 97 %   08/04/19 1943 98.3 °F (36.8 °C) 100 16 124/83    08/04/19 1803 97.6 °F (36.4 °C) 65 16 134/84 99 %     Wt Readings from Last 3 Encounters:   08/04/19 86.5 kg (190 lb 12.8 oz)   07/02/19 84.4 kg (186 lb)   06/11/19 87.5 kg (193 lb)     Temp Readings from Last 3 Encounters:   08/05/19 97.7 °F (36.5 °C)   06/11/19 98.4 °F (36.9 °C)   04/09/19 97.9 °F (36.6 °C) (Oral)     BP Readings from Last 3 Encounters:   08/05/19 131/88   07/02/19 134/86   06/11/19 143/76     Pulse Readings from Last 3 Encounters:   08/05/19 (!) 104   07/02/19 93   06/11/19 82       Radiology (reviewed/updated 8/5/2019)  No results found. Side Effects: (reviewed/updated 8/5/2019)  None reported or admitted to. Review of Systems: (reviewed/updated 8/5/2019)  Appetite: good  Sleep: good   All other Review of Systems: delusions    Mental Status Exam:  Eye contact: Good eye contact  Psychomotor activity: Relaxed   Speech is spontaneous  Thought process: Logical and goal directed  Mood is \"ok\"  Affect: Full   Perception: tactile hallucinations  Suicidal ideation: No si  Homicidal ideation: No hi  Insight/judgment: Poor  Cognition is grossly intact      Physical Exam:  Musculoskeletal system: steady gait  Tremor not present  Cog wheeling not present      Assessment and Plan:  Suzette Love meets criteria for a diagnosis of Delusional disorder. Increase risperdal to 1mg qhs. Change effexor to once a day. Effexor 150mg now dose. Taper trileptal.  Continue rest of medications as prescribed. We will closely monitor for safety. We will encourage reality orientation. Disposition planning to continue. I certify that this patients inpatient psychiatric hospital services furnished since the previous certification were, and continue to be, required for treatment that could reasonably be expected to improve the patient's condition, or for diagnostic study, and that the patient continues to need, on a daily basis, active treatment furnished directly by or requiring the supervision of inpatient psychiatric facility personnel. In addition, the hospital records show that services furnished were intensive treatment services, admission or related services, or equivalent services.       Signed:  Sujatha Velez NP  8/5/2019

## 2019-08-06 VITALS
SYSTOLIC BLOOD PRESSURE: 136 MMHG | TEMPERATURE: 97.4 F | HEART RATE: 98 BPM | DIASTOLIC BLOOD PRESSURE: 86 MMHG | BODY MASS INDEX: 37.46 KG/M2 | WEIGHT: 190.8 LBS | HEIGHT: 60 IN | OXYGEN SATURATION: 99 % | RESPIRATION RATE: 18 BRPM

## 2019-08-06 PROCEDURE — 74011250637 HC RX REV CODE- 250/637: Performed by: NURSE PRACTITIONER

## 2019-08-06 RX ORDER — VENLAFAXINE HYDROCHLORIDE 150 MG/1
300 CAPSULE, EXTENDED RELEASE ORAL
Qty: 60 CAP | Refills: 0 | Status: ON HOLD | OUTPATIENT
Start: 2019-08-07 | End: 2019-08-23 | Stop reason: SDUPTHER

## 2019-08-06 RX ORDER — TRAZODONE HYDROCHLORIDE 150 MG/1
150 TABLET ORAL
Qty: 30 TAB | Refills: 0 | Status: SHIPPED | OUTPATIENT
Start: 2019-08-06 | End: 2019-08-23

## 2019-08-06 RX ORDER — HYDROXYZINE 25 MG/1
25 TABLET, FILM COATED ORAL
Qty: 30 TAB | Refills: 0 | Status: SHIPPED | OUTPATIENT
Start: 2019-08-06 | End: 2019-08-23

## 2019-08-06 RX ORDER — RISPERIDONE 1 MG/1
1 TABLET, FILM COATED ORAL
Qty: 30 TAB | Refills: 0 | Status: SHIPPED | OUTPATIENT
Start: 2019-08-06 | End: 2019-08-23

## 2019-08-06 RX ORDER — FAMOTIDINE 20 MG/1
20 TABLET, FILM COATED ORAL DAILY
Qty: 30 TAB | Refills: 0 | Status: ON HOLD | OUTPATIENT
Start: 2019-08-07 | End: 2019-08-23 | Stop reason: SDUPTHER

## 2019-08-06 RX ADMIN — VENLAFAXINE HYDROCHLORIDE 300 MG: 150 CAPSULE, EXTENDED RELEASE ORAL at 08:22

## 2019-08-06 RX ADMIN — Medication 1 TABLET: at 08:22

## 2019-08-06 RX ADMIN — FAMOTIDINE 20 MG: 20 TABLET ORAL at 08:22

## 2019-08-06 RX ADMIN — MULTIPLE VITAMINS W/ MINERALS TAB 1 TABLET: TAB at 08:22

## 2019-08-06 RX ADMIN — OXCARBAZEPINE 150 MG: 150 TABLET, FILM COATED ORAL at 08:22

## 2019-08-06 RX ADMIN — CELECOXIB 100 MG: 100 CAPSULE ORAL at 08:22

## 2019-08-06 NOTE — BH NOTES
Lying quietly in bed with eyes closed, respirations even and unlabored , NAD noted   Q15 min safety monitoring continues .

## 2019-08-06 NOTE — PROGRESS NOTES
Pharmacist Discharge Medication Reconciliation    Discharging Provider: Angel Prather NP    Significant PMH:   Past Medical History:   Diagnosis Date    Arthritis     GENERALIZED IN JOINTS. Breast cancer ([facility] [address]     Left Breast Cancer 1995    Cancer ([facility] [address]     left breast - surgery/radiation    Chronic pain     Back pain    Depression     Mood Swings, anxiety    GERD (gastroesophageal reflux disease)     Hiatal Hernia    Headache     Hypercholesterolemia     Hypertension     CONTROLLED BY MEDS., Pt states she is no liong er on BP med's    Ill-defined condition     Neuropathy bilat feet    Irregular heart beat     Other ill-defined conditions(799.89)     MIGRAINES    Other ill-defined conditions(799.89)     neuropathy of lower legs and feet    Other ill-defined conditions(799.89)     increased cholesterol    Other ill-defined conditions(799.89)     bronchitis    Psychotic disorder (HCC)     S/P radiation therapy     1995 Left Breast    Unspecified adverse effect of anesthesia     HEART PALPITATION. Chief Complaint for this Admission:   Chief Complaint   Patient presents with    Mental Health Problem    Insect Bite     Allergies: Epinephrine; Novocain [procaine]; Nsaids (non-steroidal anti-inflammatory drug); and Ibuprofen    Discharge Medications:   Current Discharge Medication List        START taking these medications    Details   risperiDONE (RISPERDAL) 1 mg tablet Take 1 Tab by mouth nightly. Indications: psychosis  Qty: 30 Tab, Refills: 0      famotidine (PEPCID) 20 mg tablet Take 1 Tab by mouth daily. Indications: heartburn  Qty: 30 Tab, Refills: 0           CONTINUE these medications which have CHANGED    Details   hydrOXYzine HCl (ATARAX) 25 mg tablet Take 1 Tab by mouth every eight (8) hours as needed for Anxiety (anxiety). Indications: anxious  Qty: 30 Tab, Refills: 0      traZODone (DESYREL) 150 mg tablet Take 1 Tab by mouth nightly.  Indications: insomnia associated with depression  Qty: 30 Tab, Refills: 0      venlafaxine-SR (EFFEXOR-XR) 150 mg capsule Take 2 Caps by mouth daily (with breakfast). Indications: Anxiousness associated with Depression  Qty: 60 Cap, Refills: 0           CONTINUE these medications which have NOT CHANGED    Details   melatonin tab tablet Take 10 mg by mouth nightly as needed (sleep). vitamin D3/vitamin K2, MK4, (K2 PLUS D3 PO) Take 1 Tab by mouth daily. diphenhydrAMINE-acetaminophen (TYLENOL PM EXTRA STRENGTH)  mg tab Take 2 Caps by mouth nightly as needed. almotriptan (AXERT) 12.5 mg tablet Take 12.5 mg by mouth daily as needed for Migraine. may repeat in 2 hours if needed      celecoxib (CELEBREX) 100 mg capsule Take 100 mg by mouth two (2) times a day. aspirin delayed-release 81 mg tablet Take 81 mg by mouth every evening. loperamide (IMODIUM) 2 mg capsule Take  by mouth as needed for Diarrhea. bisacodyl (DULCOLAX, BISACODYL,) 5 mg EC tablet Take 5 mg by mouth daily as needed for Constipation. diphenhydrAMINE (BENADRYL) 25 mg capsule Take 25 mg by mouth every six (6) hours as needed. multivitamin, tx-iron-ca-min (THERA-M W/ IRON) 9 mg iron-400 mcg tab tablet Take 1 Tab by mouth daily. rosuvastatin (CRESTOR) 10 mg tablet Take 1 Tab by mouth nightly.   Qty: 30 Tab, Refills: 2           STOP taking these medications       cranberry conc-ascorbic acid 4,200-20 mg cap Comments:   Reason for Stopping:         garlic 8,437 mg cap Comments:   Reason for Stopping:         clonazePAM (KLONOPIN) 1 mg tablet Comments:   Reason for Stopping:         OXcarbazepine (TRILEPTAL) 300 mg tablet Comments:   Reason for Stopping:         raNITIdine (ZANTAC) 300 mg tablet Comments:   Reason for Stopping:         acetaminophen (ACETAMINOPHEN EXTRA STRENGTH) 500 mg tablet Comments:   Reason for Stopping:             The patient's chart, MAR and AVS were reviewed by Ky Bain, BRANDOND.

## 2019-08-06 NOTE — DISCHARGE INSTRUCTIONS
Patient Education      DISCHARGE SUMMARY    Radha Porras  : 1950  MRN: [id number]    The patient Mavis Setting exhibits the ability to control behavior in a less restrictive environment. Patient's level of functioning is improving. No assaultive/destructive behavior has been observed for the past 24 hours. No suicidal/homicidal threat or behavior has been observed for the past 24 hours. There is no evidence of serious medication side effects. Patient has not been in physical or protective restraints for at least the past 24 hours. If weapons involved, how are they secured? N/A    Is patient aware of and in agreement with discharge plan? Yes    Arrangements for medication:  Prescriptions given to patient. Copy of discharge instructions to provider?:  Yes. Fax: [phone] for transportation home:  Transportation provided    Keep all follow up appointments as scheduled, continue to take prescribed medications per physician instructions. Mental health crisis number:  [id number] or your local mental health crisis line number at [facility] [phone]          DISCHARGE SUMMARY from Nurse    PATIENT INSTRUCTIONS:      What to do at Home:  Recommended activity: Activity as tolerated,     If you experience any of the following symptoms thoughts of harming self, feeling overwhelmed with anxiety and hopelessness, please follow up with your local crisis number at ________________________    *  Please give a list of your current medications to your Primary Care Provider. *  Please update this list whenever your medications are discontinued, doses are      changed, or new medications (including over-the-counter products) are added. *  Please carry medication information at all times in case of emergency situations.     These are general instructions for a healthy lifestyle:    No smoking/ No tobacco products/ Avoid exposure to second hand smoke  Surgeon General's Warning:  Quitting smoking now greatly reduces serious risk to your health. Obesity, smoking, and sedentary lifestyle greatly increases your risk for illness    A healthy diet, regular physical exercise & weight monitoring are important for maintaining a healthy lifestyle    You may be retaining fluid if you have a history of heart failure or if you experience any of the following symptoms:  Weight gain of 3 pounds or more overnight or 5 pounds in a week, increased swelling in our hands or feet or shortness of breath while lying flat in bed. Please call your doctor as soon as you notice any of these symptoms; do not wait until your next office visit. The discharge information has been reviewed with the patient. The patient verbalized understanding. Discharge medications reviewed with the patient and appropriate educational materials and side effects teaching were provided.   ___________________________________________________________________________________________________________________________________

## 2019-08-06 NOTE — PROGRESS NOTES
Problem: Depressed Mood (Adult/Pediatric)  Goal: *STG: Participates in treatment plan  8/6/2019 1006 by Ramon Lewis RN  Outcome: Progressing Towards Goal  Note:   Out on unit social and engaged. Negative attitude and focus is on barriers and others behaviors. Deflective and dismissive when discussing coping skills and redirection for manipulative behaviors. Daily goal is to d/c home.  Staff focus is on d/c planning  8/6/2019 0801 by Ramon Lewis RN  Reactivated  Goal: *STG: Verbalizes anger, guilt, and other feelings in a constructive manor  8/6/2019 1006 by Ramon Lewis RN  Outcome: Progressing Towards Goal  8/6/2019 0801 by Ramon Lewis RN  Reactivated  Goal: *STG: Attends activities and groups  8/6/2019 1006 by Ramon Lewis RN  Outcome: Progressing Towards Goal  8/6/2019 0801 by Ramon Lewis RN  Reactivated  Goal: *STG: Demonstrates reduction in symptoms and increase in insight into coping skills/future focused  8/6/2019 1006 by Ramon Lewis RN  Outcome: Progressing Towards Goal  8/6/2019 0801 by Ramon Lewis RN  Reactivated  Goal: *STG: Remains safe in hospital  8/6/2019 1006 by Ramon Lewis RN  Outcome: Progressing Towards Goal  8/6/2019 0801 by Ramon Lewis RN  Reactivated  Goal: *STG: Complies with medication therapy  8/6/2019 1006 by Ramon Lewis RN  Outcome: Progressing Towards Goal  8/6/2019 0801 by Ramon Lewis RN  Reactivated  Goal: Interventions  8/6/2019 1006 by Ramon Lewis RN  Outcome: Progressing Towards Goal  8/6/2019 0801 by Ramon Lewis RN  Reactivated

## 2019-08-06 NOTE — BH NOTES
GROUP THERAPY PROGRESS NOTE    Mauri Isaac participated in a morning Process Group on the General Unit with a focus identifying feelings, planning for the day, and learning more about DBT concepts on \"Emotion Regulation. \"    .  Group time: 90 minutes. Personal goal for participation: To increase the capacity to improve ones mood, set personal goals, and understand more about basic activities to help regulate emotions. Goal orientation: The patients will be able to identify their feelings and develop a plan for structuring   their day. They were also presented with a summary sheet on emotional regulation, in regards to   focusing on one goal per day, taking physical care of oneself, and recognizing and/or building positive   experiences. The didactic portion of the session focused on these three concepts:   1) defining and focusing on one goal per day;   2) taking care of ones physical maintenance and basic needs   sleep, nutrition, and exercise; and   3) finding and building on positive experiences. Group therapy participation: This patient actively participated in the group. Therapeutic interventions reviewed and discussed: The group members were asked to identify an   emotion they are having and/or let the group know what they want to focus on for the day as they  continue to make discharge plans. The group members reviewed three DBT suggestions regarding   emotional regulation, in regards to focusing on one goal per day, taking physical care of oneself,  and recognizing and/or building positive experiences. It was suggested that these three concepts can be   seen as headings for their list of coping skills. The group members were also provided worksheets on the   topic discussed for their review and use on their own time. Impression of participation: The patient said she was, \"Looking forward to getting home. ...and being with Tawana Courts pet dog].  She was alert, generally oriented, and engaged in the group discussion, interacting positively and appropriately in the group. She acknowledged the support she felt from several of the peers in group and received compliments in return. The patient expressed no current SI/HI and displayed no overt psychotic symptoms in this group. Her affect was not as depressed as on admission and her mood reflected her progress. She was smiling and kidding with her peers. She was provided an information sheet on the [facility], with the telephone number to [email or web address]. She admitted to her loneliness in response to the information sheet. She also appeared to be in the process of finalizing her discharge plans with the assistance of her treatment team, especially nursing.

## 2019-08-06 NOTE — INTERDISCIPLINARY ROUNDS
Behavioral Health Interdisciplinary Rounds Patient Name: Delmar Reed  Age: 76 y.o. Room/Bed:  729/ Primary Diagnosis: <principal problem not specified> Admission Status: Voluntary Readmission within 30 days: no 
Power of  in place: no 
Patient requires a blocked bed: no          Reason for blocked bed: VTE Prophylaxis: No 
 
Mobility needs/Fall risk: no 
Flu Vaccine : no  
Nutritional Plan: no 
Consults:        
Labs/Testing due today?: no 
 
Sleep hours:  4+ Participation in Care/Groups:  yes Medication Compliant?: Yes PRNS (last 24 hours): none Restraints (last 24 hours):  no 
  
CIWA (range last 24 hours): CIWA-Ar Total: 0  
COWS (range last 24 hours): Alcohol screening (AUDIT) completed -   AUDIT Score: 0 If applicable, date SBIRT discussed in treatment team AND documented:  
AUDIT Screen Score: AUDIT Score: 0 Tobacco - patient is a smoker: Have You Used Tobacco in the Past 30 Days: Never a smoker Illegal Drugs use: Have You Used Any Illegal Substances Over the Past 12 Months: No 
 
24 hour chart check complete: yes Patient goal(s) for today: To be discharged Treatment team focus/goals:   Patient is alert and oriented and preparing for discharge Progress note: Patient attending groups and interacting well with staff and patients LOS:  5  Expected LOS: 0 Financial concerns/prescription coverage: Medicare Date of last family contact:     
Family requesting physician contact today: No 
Discharge plan: Return to home Guns in the home:  No    
Outpatient provider(s): Dr. Joanne Li Participating treatment team members: Delmar Brianna, Diana Busby,YADIRA; Jia Egan; Gabriela oRy,PharmD; Janelle Kohler

## 2019-08-06 NOTE — PROGRESS NOTES
Problem: Depressed Mood (Adult/Pediatric)  Goal: *STG: Participates in treatment plan  Outcome: Resolved/Met  Pt complies with medications, socializes with peers and participates in therapeutic activities.

## 2019-08-07 NOTE — BH NOTES
Behavioral Health Transition Record to Provider    Patient Name: Yohan Peters  YOB: 1950  Medical Record Number: [id number]  Date of Admission: 7/31/2019  Date of Discharge: 8/6/2019     Attending Provider: No att. providers found  Discharging Provider: Jesus Luis   To contact this individual call [phone] and ask the  to page. If unavailable, ask to be transferred to [facility] Provider on call. [facility] Provider will be available on call 24/7 and during holidays. Primary Care Provider: Ismael Joyce MD    Allergies   Allergen Reactions    Epinephrine Palpitations     \"Makes my heart  ' shudder'. \" Allergic to epinephrine with novocain. Plain epinephrine is ok.  Novocain [Procaine] Palpitations    Nsaids (Non-Steroidal Anti-Inflammatory Drug) Other (comments)     GASTRIC DISTRESS.  Ibuprofen Other (comments)     Gastric irritation       Reason for Admission: HISTORY OF PRESENT ILLNESS:  The patient is a 20-year-old female who was admitted at [facility] on a voluntary basis. She currently resides at [facility] for the past 2-1/2 years. She reports that she was being followed by Dr. Marry Ray, but he is about to retire and so she will have her first appointment with Dr. Victor Manuel Valdes at [facility] for transition of care in 2 weeks. She states that she bought an electric blanket on [employer] and it was $40 cheaper. She thought that something was off because it was so cheap, but unfortunately she could not return it. She states that it was completely infested with bed bugs. She was trying to get rid of it, but she could not. She stated she has bed bugs inside her body and mostly in her vagina and also on her legs.   She states there are bed bugs in her dog, so she took her dog to the  and is currently boarding for observation    Admission Diagnosis: Depression with anxiety [F41.8]    * No surgery found *    Results for orders placed or performed during the hospital encounter of 07/31/19   URINE CULTURE HOLD SAMPLE   Result Value Ref Range    Urine culture hold        URINE ON HOLD IN MICROBIOLOGY DEPT FOR 3 DAYS. IF UNPRESERVED URINE IS SUBMITTED, IT CANNOT BE USED FOR ADDITIONAL TESTING AFTER 24 HRS, RECOLLECTION WILL BE REQUIRED. CBC W/O DIFF   Result Value Ref Range    WBC 11.6 (H) 3.6 - 11.0 K/uL    RBC 4.44 3.80 - 5.20 M/uL    HGB 12.0 11.5 - 16.0 g/dL    HCT 39.5 35.0 - 47.0 %    MCV 89.0 80.0 - 99.0 FL    MCH 27.0 26.0 - 34.0 PG    MCHC 30.4 30.0 - 36.5 g/dL    RDW 15.8 (H) 11.5 - 14.5 %    PLATELET 702 887 - 977 K/uL    MPV 11.0 8.9 - 12.9 FL    NRBC 0.0 0  WBC    ABSOLUTE NRBC 0.00 0.00 - 8.68 K/uL   METABOLIC PANEL, COMPREHENSIVE   Result Value Ref Range    Sodium 141 136 - 145 mmol/L    Potassium 4.1 3.5 - 5.1 mmol/L    Chloride 110 (H) 97 - 108 mmol/L    CO2 25 21 - 32 mmol/L    Anion gap 6 5 - 15 mmol/L    Glucose 116 (H) 65 - 100 mg/dL    BUN 17 6 - 20 MG/DL    Creatinine 0.81 0.55 - 1.02 MG/DL    BUN/Creatinine ratio 21 (H) 12 - 20      GFR est AA >60 >60 ml/min/1.73m2    GFR est non-AA >60 >60 ml/min/1.73m2    Calcium 9.3 8.5 - 10.1 MG/DL    Bilirubin, total 0.8 0.2 - 1.0 MG/DL    ALT (SGPT) 118 (H) 12 - 78 U/L    AST (SGOT) 26 15 - 37 U/L    Alk. phosphatase 122 (H) 45 - 117 U/L    Protein, total 6.4 6.4 - 8.2 g/dL    Albumin 3.1 (L) 3.5 - 5.0 g/dL    Globulin 3.3 2.0 - 4.0 g/dL    A-G Ratio 0.9 (L) 1.1 - 2.2     ACETAMINOPHEN   Result Value Ref Range    Acetaminophen level <2 (L) 10 - 30 ug/mL   SALICYLATE   Result Value Ref Range    Salicylate level <2.0 (L) 2.8 - 20.0 MG/DL   SAMPLES BEING HELD   Result Value Ref Range    SAMPLES BEING HELD 1BLUE     COMMENT        Add-on orders for these samples will be processed based on acceptable specimen integrity and analyte stability, which may vary by analyte.    DRUG SCREEN, URINE   Result Value Ref Range    AMPHETAMINES NEGATIVE  NEG      BARBITURATES NEGATIVE  NEG      BENZODIAZEPINES NEGATIVE  NEG      COCAINE NEGATIVE  NEG      METHADONE NEGATIVE  NEG      OPIATES NEGATIVE  NEG      PCP(PHENCYCLIDINE) NEGATIVE  NEG      THC (TH-CANNABINOL) NEGATIVE  NEG      Drug screen comment (NOTE)    URINALYSIS W/MICROSCOPIC   Result Value Ref Range    Color DARK YELLOW      Appearance CLEAR CLEAR      Specific gravity 1.021 1.003 - 1.030      pH (UA) 5.5 5.0 - 8.0      Protein TRACE (A) NEG mg/dL    Glucose NEGATIVE  NEG mg/dL    Ketone TRACE (A) NEG mg/dL    Blood NEGATIVE  NEG      Urobilinogen 1.0 0.2 - 1.0 EU/dL    Nitrites NEGATIVE  NEG      Leukocyte Esterase NEGATIVE  NEG      WBC 0-4 0 - 4 /hpf    RBC 0-5 0 - 5 /hpf    Epithelial cells FEW FEW /lpf    Bacteria NEGATIVE  NEG /hpf    Hyaline cast 2-5 0 - 5 /lpf   BILIRUBIN, CONFIRM   Result Value Ref Range    Bilirubin UA, confirm NEGATIVE  NEG     ETHYL ALCOHOL   Result Value Ref Range    ALCOHOL(ETHYL),SERUM <10 <10 MG/DL   TSH 3RD GENERATION   Result Value Ref Range    TSH 1.84 0.36 - 3.74 uIU/mL   GLUCOSE, FASTING   Result Value Ref Range    Glucose 121 (H) 65 - 100 MG/DL   LIPID PANEL   Result Value Ref Range    LIPID PROFILE          Cholesterol, total 104 <200 MG/DL    Triglyceride 62 <150 MG/DL    HDL Cholesterol 37 MG/DL    LDL, calculated 54.6 0 - 100 MG/DL    VLDL, calculated 12.4 MG/DL    CHOL/HDL Ratio 2.8 0.0 - 5.0         Immunizations administered during this encounter:   Immunization History   Administered Date(s) Administered    (RETIRED) Pneumococcal Vaccine (Unspecified Type) 09/01/2011    Influenza Vaccine Whole 09/01/2011       Screening for Metabolic Disorders for Patients on Antipsychotic Medications  (Data obtained from the EMR)    Estimated Body Mass Index  Estimated body mass index is 37.26 kg/m² as calculated from the following:    Height as of this encounter: 5' (1.524 m). Weight as of this encounter: 86.5 kg (190 lb 12.8 oz).      Vital Signs/Blood Pressure  Visit Vitals  /86   Pulse 98   Temp 97.4 °F (36.3 °C)   Resp 18   Ht 5' (1.524 m)   Wt 86.5 kg (190 lb 12.8 oz)   SpO2 99%   BMI 37.26 kg/m²       Blood Glucose/Hemoglobin A1c  Lab Results   Component Value Date/Time    Glucose 121 (H) 08/02/2019 05:06 AM    Glucose (POC) 124 (H) 11/26/2018 08:29 AM       Lab Results   Component Value Date/Time    Hemoglobin A1c 5.8 02/16/2012 02:15 PM        Lipid Panel  Lab Results   Component Value Date/Time    Cholesterol, total 104 08/02/2019 05:06 AM    HDL Cholesterol 37 08/02/2019 05:06 AM    LDL, calculated 54.6 08/02/2019 05:06 AM    Triglyceride 62 08/02/2019 05:06 AM    CHOL/HDL Ratio 2.8 08/02/2019 05:06 AM        Discharge Diagnosis: Delusional Disorder     Discharge Plan: she will return to her apartment. The patient Versa Fine exhibits the ability to control behavior in a less restrictive environment. Patient's level of functioning is improving. No assaultive/destructive behavior has been observed for the past 24 hours. No suicidal/homicidal threat or behavior has been observed for the past 24 hours. There is no evidence of serious medication side effects. Patient has not been in physical or protective restraints for at least the past 24 hours. If weapons involved, how are they secured? N/A    Is patient aware of and in agreement with discharge plan? Yes    Arrangements for medication:  Prescriptions given to patient. Copy of discharge instructions to provider?:  Yes. Fax: [phone] for transportation home:  Transportation provided    Keep all follow up appointments as scheduled, continue to take prescribed medications per physician instructions.   Mental health crisis number:  [id number] or your local mental health crisis line number at [facility] [phone]            Discharge Medication List and Instructions:   Discharge Medication List as of 8/6/2019 12:13 PM      START taking these medications    Details risperiDONE (RISPERDAL) 1 mg tablet Take 1 Tab by mouth nightly. Indications: psychosis, Print, Disp-30 Tab, R-0      famotidine (PEPCID) 20 mg tablet Take 1 Tab by mouth daily. Indications: heartburn, Print, Disp-30 Tab, R-0         CONTINUE these medications which have CHANGED    Details   hydrOXYzine HCl (ATARAX) 25 mg tablet Take 1 Tab by mouth every eight (8) hours as needed for Anxiety (anxiety). Indications: anxious, Print, Disp-30 Tab, R-0      traZODone (DESYREL) 150 mg tablet Take 1 Tab by mouth nightly. Indications: insomnia associated with depression, Print, Disp-30 Tab, R-0      venlafaxine-SR (EFFEXOR-XR) 150 mg capsule Take 2 Caps by mouth daily (with breakfast). Indications: Anxiousness associated with Depression, Print, Disp-60 Cap, R-0         CONTINUE these medications which have NOT CHANGED    Details   melatonin tab tablet Take 10 mg by mouth nightly as needed (sleep). , Historical Med      vitamin D3/vitamin K2, MK4, (K2 PLUS D3 PO) Take 1 Tab by mouth daily. , Historical Med      diphenhydrAMINE-acetaminophen (TYLENOL PM EXTRA STRENGTH)  mg tab Take 2 Caps by mouth nightly as needed., Historical Med      almotriptan (AXERT) 12.5 mg tablet Take 12.5 mg by mouth daily as needed for Migraine. may repeat in 2 hours if needed, Historical Med      celecoxib (CELEBREX) 100 mg capsule Take 100 mg by mouth two (2) times a day., Historical Med      aspirin delayed-release 81 mg tablet Take 81 mg by mouth every evening., Historical Med      loperamide (IMODIUM) 2 mg capsule Take  by mouth as needed for Diarrhea., Historical Med      bisacodyl (DULCOLAX, BISACODYL,) 5 mg EC tablet Take 5 mg by mouth daily as needed for Constipation. , Historical Med      diphenhydrAMINE (BENADRYL) 25 mg capsule Take 25 mg by mouth every six (6) hours as needed., Historical Med      multivitamin, tx-iron-ca-min (THERA-M W/ IRON) 9 mg iron-400 mcg tab tablet Take 1 Tab by mouth daily. , Historical Med      rosuvastatin (CRESTOR) 10 mg tablet Take 1 Tab by mouth nightly., Normal, Disp-30 Tab, R-2         STOP taking these medications       clonazePAM (KLONOPIN) 1 mg tablet Comments:   Reason for Stopping:         cranberry conc-ascorbic acid 4,200-20 mg cap Comments:   Reason for Stopping:         garlic 3,596 mg cap Comments:   Reason for Stopping:         OXcarbazepine (TRILEPTAL) 300 mg tablet Comments:   Reason for Stopping:         raNITIdine (ZANTAC) 300 mg tablet Comments:   Reason for Stopping:         acetaminophen (ACETAMINOPHEN EXTRA STRENGTH) 500 mg tablet Comments:   Reason for Stopping:               Unresulted Labs (24h ago, onward)    None        To obtain results of studies pending at discharge, please contact [phone]    Follow-up Information     Follow up With Specialties Details Why Contact Info    Ankur Chamorro MD Family Practice Schedule an appointment as soon as possible for a visit in 2 days As needed [address]  [phone]      Dr. Rhoda Garcia  On 9/16/2019 Monday @ 2:30pm [facility], [address]  [address]          Advanced Directive:   Does the patient have an appointed surrogate decision maker? No  Does the patient have a Medical Advance Directive? No  Does the patient have a Psychiatric Advance Directive? No  If the patient does not have a surrogate or Medical Advance Directive AND Psychiatric Advance Directive, the patient was offered information on these advance directives Patient declined to complete    Patient Instructions: Please continue all medications until otherwise directed by physician. Tobacco Cessation Discharge Plan:   Is the patient a smoker and needs referral for smoking cessation? No  Patient referred to the following for smoking cessation with an appointment? No     Patient was offered medication to assist with smoking cessation at discharge?  No  Was education for smoking cessation added to the discharge instructions? Yes    Alcohol/Substance Abuse Discharge Plan:   Does the patient have a history of substance/alcohol abuse and requires a referral for treatment? No  Patient referred to the following for substance/alcohol abuse treatment with an appointment? No  Patient was offered medication to assist with alcohol cessation at discharge? No  Was education for substance/alcohol abuse added to discharge instructions? No    Patient discharged to Home; discussed with patient/caregiver and provided to the patient/caregiver either in hard copy or electronically.

## 2019-08-07 NOTE — DISCHARGE SUMMARY
PSYCHIATRIC DISCHARGE SUMMARY      Patient: Alida Dumont MRN: [id number]  SSN: [id number]    YOB: 1950  Age: 76 y.o. Sex: female        Date of Admission: 7/31/2019  Date of Discharge: 8/6/2019       Type of Discharge:  REGULAR     Admission data:  CHIEF COMPLAINT:  \"Silly. \"     HISTORY OF PRESENT ILLNESS:  The patient is a 58-year-old female who was admitted at [facility] on a voluntary basis. She currently resides at [facility] for the past 2-1/2 years. She reports that she was being followed by Dr. Dona Celeste, but he is about to retire and so she will have her first appointment with Dr. Emily Pierre at [facility] for transition of care in 2 weeks. She states that she bought an electric blanket on [employer] and it was $40 cheaper. She thought that something was off because it was so cheap, but unfortunately she could not return it. She states that it was completely infested with bed bugs. She was trying to get rid of it, but she could not. She stated she has bed bugs inside her body and mostly in her vagina and also on her legs. She states there are bed bugs in her dog, so she took her dog to the  and is currently boarding for observation. She states that the senior living facility treated the apartment for bed bugs but did not find any. She states that \"everyone thinks I am crazy. I know you think I am crazy, but I am not. \"  When she was in the emergency room, she was treated in the decontamination shower by nursing staff and there were no bed bugs found. She reports that there is another resident at the apartment who was concerned about her and checks in by text messages. Stated she has been diagnosed with severe depression, anxiety, and mood swings.     PAST MEDICAL HISTORY:  See H and P.     PAST PSYCHIATRIC HOSPITALIZATION:  She was previously hospitalized here at [facility] 20 years ago.   She was also admitted at [facility]. She will have her first appointment with Dr. Heriberto Joiner in 2-1/2 weeks. She was seeing Dr. Kathy Ingram previously.     PSYCHOSOCIAL HISTORY:  She is a . She has no children. She is currently receiving social security disability checks. She currently resides at [facility] for 2-1/2 years.     MENTAL STATUS EXAM:  She is alert and oriented in all spheres. She is dressed in hospital apparel. Mood is dysphoric. Affect is blunted. Speech normal rate and rhythm. Thought process logical and goal directed. She denies suicidal ideation, homicidal ideation or avh. Paranoia is evident. Memory seems intact. Intelligence seems average. Insight is poor. Judgment is poor.       Hospital Course:    Patient was admitted to the Psychiatric services for acute psychiatric stabilization in regards to symptomatology as described in the HPI above and placed on Q15 minute checks and suicide precautions. She was started back on her usual medication regimen as well as PRN medications including effexor and her medical meds. She was started on risperdal and was increased. Subsequent to admission, she continues to have delusions about bugs biting her, but has not seen the acutal bug. She was fixated on her bug bites. While on the unit Nakul Domníguez was involved in individual, group, occupational and milieu therapy. She improved gradually and was able to integrate into the milieu with help from the nursing staff. Patients symptoms improved gradually including no delusions, she believes that she is not getting bug bites. She was appropriate in her interactions, and cooperative with medications and the unit routine. Please see individual progress notes for more specific details regarding patient's hospitalization course. Patient was discharged as per the plan. She had been doing well on the unit as per the report of the nursing staff and my observations.  No PRN medication for agitation, seclusion or restraints were required during the last 48 hours of her stay. Travis Knapp had improved progressively to the point of being stable for discharge and outpatient FU. At this time she did not offer any complaints. Patient denied any SI or HI. Denied any AH or VH. She denied any delusions. Was not considered a danger to self or to others and is safe for discharge. Will FU with her appointments and remains motivated to be in treatment. The patient verbalized understanding of her discharge instructions. Some parts of the discharge summary are from the initial Psychiatric interview that was done on admission by the admitting psychiatrist.       Allergies:(reviewed/updated 8/7/2019)  Allergies   Allergen Reactions    Epinephrine Palpitations     \"Makes my heart  ' shudder'. \" Allergic to epinephrine with novocain. Plain epinephrine is ok.  Novocain [Procaine] Palpitations    Nsaids (Non-Steroidal Anti-Inflammatory Drug) Other (comments)     GASTRIC DISTRESS.  Ibuprofen Other (comments)     Gastric irritation       Side Effects: (reviewed/updated 8/7/2019)  None reported or admitted to. Vital Signs:  No data found. Wt Readings from Last 3 Encounters:   08/04/19 86.5 kg (190 lb 12.8 oz)   07/02/19 84.4 kg (186 lb)   06/11/19 87.5 kg (193 lb)     Temp Readings from Last 3 Encounters:   08/06/19 97.4 °F (36.3 °C)   06/11/19 98.4 °F (36.9 °C)   04/09/19 97.9 °F (36.6 °C) (Oral)     BP Readings from Last 3 Encounters:   08/06/19 136/86   07/02/19 134/86   06/11/19 143/76     Pulse Readings from Last 3 Encounters:   08/06/19 98   07/02/19 93   06/11/19 82       Labs: (reviewed/updated 8/7/2019)  No results found for this or any previous visit (from the past 24 hour(s)). No results found for: VALF2, VALAC, VALP, VALPR, DS6, CRBAM, CRBAMP, CARB2, XCRBAM  No results found for: [facility] EVALUATION [place]    Radiology (reviewed/updated 8/7/2019)  No results found. Mental Status Exam on Discharge:  General appearance:   Travis Knapp is a 76 y.o. WHITE OR  female who is well groomed, psychomotor activity is WNL  Eye contact: makes good eye contact  Speech: Spontaneous and coherent  Affect : Euthymic  Mood: \"OK\"  Thought Process: Logical, goal directed  Perception: Denies any AH or VH. Thought Content: Denies any SI or Plan  Insight: Partial  Judgement: Fair  Cognition: Intact grossly. Discharge Diagnosis:   Delusional disorder      Discharge Medication List as of 8/6/2019 12:13 PM      START taking these medications    Details   risperiDONE (RISPERDAL) 1 mg tablet Take 1 Tab by mouth nightly. Indications: psychosis, Print, Disp-30 Tab, R-0      famotidine (PEPCID) 20 mg tablet Take 1 Tab by mouth daily. Indications: heartburn, Print, Disp-30 Tab, R-0         CONTINUE these medications which have CHANGED    Details   hydrOXYzine HCl (ATARAX) 25 mg tablet Take 1 Tab by mouth every eight (8) hours as needed for Anxiety (anxiety). Indications: anxious, Print, Disp-30 Tab, R-0      traZODone (DESYREL) 150 mg tablet Take 1 Tab by mouth nightly. Indications: insomnia associated with depression, Print, Disp-30 Tab, R-0      venlafaxine-SR (EFFEXOR-XR) 150 mg capsule Take 2 Caps by mouth daily (with breakfast). Indications: Anxiousness associated with Depression, Print, Disp-60 Cap, R-0         CONTINUE these medications which have NOT CHANGED    Details   melatonin tab tablet Take 10 mg by mouth nightly as needed (sleep). , Historical Med      vitamin D3/vitamin K2, MK4, (K2 PLUS D3 PO) Take 1 Tab by mouth daily. , Historical Med      diphenhydrAMINE-acetaminophen (TYLENOL PM EXTRA STRENGTH)  mg tab Take 2 Caps by mouth nightly as needed., Historical Med      almotriptan (AXERT) 12.5 mg tablet Take 12.5 mg by mouth daily as needed for Migraine.  may repeat in 2 hours if needed, Historical Med      celecoxib (CELEBREX) 100 mg capsule Take 100 mg by mouth two (2) times a day., Historical Med      aspirin delayed-release 81 mg tablet Take 81 mg by mouth every evening., Historical Med      loperamide (IMODIUM) 2 mg capsule Take  by mouth as needed for Diarrhea., Historical Med      bisacodyl (DULCOLAX, BISACODYL,) 5 mg EC tablet Take 5 mg by mouth daily as needed for Constipation. , Historical Med      diphenhydrAMINE (BENADRYL) 25 mg capsule Take 25 mg by mouth every six (6) hours as needed., Historical Med      multivitamin, tx-iron-ca-min (THERA-M W/ IRON) 9 mg iron-400 mcg tab tablet Take 1 Tab by mouth daily. , Historical Med      rosuvastatin (CRESTOR) 10 mg tablet Take 1 Tab by mouth nightly., Normal, Disp-30 Tab, R-2         STOP taking these medications       clonazePAM (KLONOPIN) 1 mg tablet Comments:   Reason for Stopping:         cranberry conc-ascorbic acid 4,200-20 mg cap Comments:   Reason for Stopping:         garlic 2,760 mg cap Comments:   Reason for Stopping:         OXcarbazepine (TRILEPTAL) 300 mg tablet Comments:   Reason for Stopping:         raNITIdine (ZANTAC) 300 mg tablet Comments:   Reason for Stopping:         acetaminophen (ACETAMINOPHEN EXTRA STRENGTH) 500 mg tablet Comments:   Reason for Stopping: Follow-up Information     Follow up With Specialties Details Why Contact Info    Kinjal Chamorro MD Family Practice Schedule an appointment as soon as possible for a visit in 2 days As needed [address]  [phone]      Dr. Any Grady  On 9/16/2019 Monday @ 2:30pm [place] [address], [address]  [place], [address] Floors: none needed. Prognosis:   Good / [facility] based on nature of patient's pathology/ies and treatment compliance issues. Prognosis is greatly dependent upon patient's ability to  follow up on psychiatric/psychotherapy appointments as well as to comply with psychiatric medications as prescribed.        I certify that this patients inpatient psychiatric hospital services furnished since the previous certification were, and continue to be, required for treatment that could reasonably be expected to improve the patient's condition, or for diagnostic study, and that the patient continues to need, on a daily basis, active treatment furnished directly by or requiring the supervision of inpatient psychiatric facility personnel. In addition, the hospital records show that services furnished were intensive treatment services, admission or related services, or equivalent services.      Signed:  Leonides Grimes NP  8/7/2019

## 2019-08-08 ENCOUNTER — APPOINTMENT (OUTPATIENT)
Dept: CT IMAGING | Age: 69
DRG: 293 | End: 2019-08-08
Attending: EMERGENCY MEDICINE
Payer: MEDICARE

## 2019-08-08 ENCOUNTER — HOSPITAL ENCOUNTER (INPATIENT)
Age: 69
LOS: 3 days | Discharge: PSYCHIATRIC OR SUBSTANCE USE FACILITY | DRG: 293 | End: 2019-08-14
Attending: EMERGENCY MEDICINE | Admitting: HOSPITALIST
Payer: MEDICARE

## 2019-08-08 DIAGNOSIS — Z78.9 SELF-CARE DEFICIT IN PATIENT LIVING ALONE: ICD-10-CM

## 2019-08-08 DIAGNOSIS — R62.7 FAILURE TO THRIVE IN ADULT: Primary | ICD-10-CM

## 2019-08-08 PROBLEM — R62.51 FAILURE TO THRIVE (0-17): Status: ACTIVE | Noted: 2019-08-08

## 2019-08-08 LAB
ALBUMIN SERPL-MCNC: 3.2 G/DL (ref 3.5–5)
ALBUMIN/GLOB SERPL: 1 {RATIO} (ref 1.1–2.2)
ALP SERPL-CCNC: 124 U/L (ref 45–117)
ALT SERPL-CCNC: 68 U/L (ref 12–78)
ANION GAP SERPL CALC-SCNC: 8 MMOL/L (ref 5–15)
APPEARANCE UR: CLEAR
AST SERPL-CCNC: 65 U/L (ref 15–37)
BACTERIA URNS QL MICRO: NEGATIVE /HPF
BASOPHILS # BLD: 0.1 K/UL (ref 0–0.1)
BASOPHILS NFR BLD: 1 % (ref 0–1)
BILIRUB SERPL-MCNC: 1 MG/DL (ref 0.2–1)
BILIRUB UR QL CFM: NEGATIVE
BUN SERPL-MCNC: 19 MG/DL (ref 6–20)
BUN/CREAT SERPL: 21 (ref 12–20)
CALCIUM SERPL-MCNC: 9.3 MG/DL (ref 8.5–10.1)
CHLORIDE SERPL-SCNC: 106 MMOL/L (ref 97–108)
CO2 SERPL-SCNC: 27 MMOL/L (ref 21–32)
COLOR UR: ABNORMAL
COMMENT, HOLDF: NORMAL
CREAT SERPL-MCNC: 0.89 MG/DL (ref 0.55–1.02)
DIFFERENTIAL METHOD BLD: ABNORMAL
EOSINOPHIL # BLD: 0 K/UL (ref 0–0.4)
EOSINOPHIL NFR BLD: 0 % (ref 0–7)
EPITH CASTS URNS QL MICRO: ABNORMAL /LPF
ERYTHROCYTE [DISTWIDTH] IN BLOOD BY AUTOMATED COUNT: 16.4 % (ref 11.5–14.5)
GLOBULIN SER CALC-MCNC: 3.3 G/DL (ref 2–4)
GLUCOSE SERPL-MCNC: 95 MG/DL (ref 65–100)
GLUCOSE UR STRIP.AUTO-MCNC: NEGATIVE MG/DL
HCT VFR BLD AUTO: 41.6 % (ref 35–47)
HGB BLD-MCNC: 12.5 G/DL (ref 11.5–16)
HGB UR QL STRIP: NEGATIVE
HYALINE CASTS URNS QL MICRO: ABNORMAL /LPF (ref 0–5)
IMM GRANULOCYTES # BLD AUTO: 0 K/UL (ref 0–0.04)
IMM GRANULOCYTES NFR BLD AUTO: 0 % (ref 0–0.5)
KETONES UR QL STRIP.AUTO: 15 MG/DL
LEUKOCYTE ESTERASE UR QL STRIP.AUTO: ABNORMAL
LYMPHOCYTES # BLD: 1.5 K/UL (ref 0.8–3.5)
LYMPHOCYTES NFR BLD: 15 % (ref 12–49)
MCH RBC QN AUTO: 26.3 PG (ref 26–34)
MCHC RBC AUTO-ENTMCNC: 30 G/DL (ref 30–36.5)
MCV RBC AUTO: 87.6 FL (ref 80–99)
MONOCYTES # BLD: 1.1 K/UL (ref 0–1)
MONOCYTES NFR BLD: 10 % (ref 5–13)
NEUTS SEG # BLD: 7.5 K/UL (ref 1.8–8)
NEUTS SEG NFR BLD: 74 % (ref 32–75)
NITRITE UR QL STRIP.AUTO: NEGATIVE
NRBC # BLD: 0.02 K/UL (ref 0–0.01)
NRBC BLD-RTO: 0.2 PER 100 WBC
PH UR STRIP: 5.5 [PH] (ref 5–8)
PLATELET # BLD AUTO: 262 K/UL (ref 150–400)
PMV BLD AUTO: 10.9 FL (ref 8.9–12.9)
POTASSIUM SERPL-SCNC: 4.3 MMOL/L (ref 3.5–5.1)
PROT SERPL-MCNC: 6.5 G/DL (ref 6.4–8.2)
PROT UR STRIP-MCNC: 30 MG/DL
RBC # BLD AUTO: 4.75 M/UL (ref 3.8–5.2)
RBC #/AREA URNS HPF: ABNORMAL /HPF (ref 0–5)
SAMPLES BEING HELD,HOLD: NORMAL
SODIUM SERPL-SCNC: 141 MMOL/L (ref 136–145)
SP GR UR REFRACTOMETRY: 1.02 (ref 1–1.03)
UR CULT HOLD, URHOLD: NORMAL
UROBILINOGEN UR QL STRIP.AUTO: 1 EU/DL (ref 0.2–1)
WBC # BLD AUTO: 10.1 K/UL (ref 3.6–11)
WBC URNS QL MICRO: ABNORMAL /HPF (ref 0–4)

## 2019-08-08 PROCEDURE — 99284 EMERGENCY DEPT VISIT MOD MDM: CPT

## 2019-08-08 PROCEDURE — 99218 HC RM OBSERVATION: CPT

## 2019-08-08 PROCEDURE — 85025 COMPLETE CBC W/AUTO DIFF WBC: CPT

## 2019-08-08 PROCEDURE — 74011250636 HC RX REV CODE- 250/636: Performed by: INTERNAL MEDICINE

## 2019-08-08 PROCEDURE — 70450 CT HEAD/BRAIN W/O DYE: CPT

## 2019-08-08 PROCEDURE — 90791 PSYCH DIAGNOSTIC EVALUATION: CPT

## 2019-08-08 PROCEDURE — 74011250637 HC RX REV CODE- 250/637: Performed by: INTERNAL MEDICINE

## 2019-08-08 PROCEDURE — 81001 URINALYSIS AUTO W/SCOPE: CPT

## 2019-08-08 PROCEDURE — 36415 COLL VENOUS BLD VENIPUNCTURE: CPT

## 2019-08-08 PROCEDURE — 80053 COMPREHEN METABOLIC PANEL: CPT

## 2019-08-08 RX ORDER — CELECOXIB 100 MG/1
100 CAPSULE ORAL 2 TIMES DAILY
Status: DISCONTINUED | OUTPATIENT
Start: 2019-08-08 | End: 2019-08-10

## 2019-08-08 RX ORDER — ROSUVASTATIN CALCIUM 10 MG/1
10 TABLET, COATED ORAL
Status: DISCONTINUED | OUTPATIENT
Start: 2019-08-08 | End: 2019-08-14 | Stop reason: HOSPADM

## 2019-08-08 RX ORDER — LISINOPRIL 5 MG/1
5 TABLET ORAL DAILY
Status: DISCONTINUED | OUTPATIENT
Start: 2019-08-08 | End: 2019-08-09

## 2019-08-08 RX ORDER — RISPERIDONE 1 MG/1
1 TABLET, FILM COATED ORAL
Status: COMPLETED | OUTPATIENT
Start: 2019-08-08 | End: 2019-08-09

## 2019-08-08 RX ORDER — ASPIRIN 81 MG/1
81 TABLET ORAL EVERY EVENING
Status: CANCELLED | OUTPATIENT
Start: 2019-08-08

## 2019-08-08 RX ORDER — LISINOPRIL 5 MG/1
5 TABLET ORAL DAILY
Status: DISCONTINUED | OUTPATIENT
Start: 2019-08-09 | End: 2019-08-08

## 2019-08-08 RX ORDER — HYDRALAZINE HYDROCHLORIDE 20 MG/ML
10 INJECTION INTRAMUSCULAR; INTRAVENOUS
Status: DISCONTINUED | OUTPATIENT
Start: 2019-08-08 | End: 2019-08-14 | Stop reason: HOSPADM

## 2019-08-08 RX ORDER — SODIUM CHLORIDE 0.9 % (FLUSH) 0.9 %
5-40 SYRINGE (ML) INJECTION AS NEEDED
Status: DISCONTINUED | OUTPATIENT
Start: 2019-08-08 | End: 2019-08-14 | Stop reason: HOSPADM

## 2019-08-08 RX ORDER — VENLAFAXINE HYDROCHLORIDE 150 MG/1
300 CAPSULE, EXTENDED RELEASE ORAL
Status: DISCONTINUED | OUTPATIENT
Start: 2019-08-09 | End: 2019-08-14 | Stop reason: HOSPADM

## 2019-08-08 RX ORDER — SODIUM CHLORIDE 0.9 % (FLUSH) 0.9 %
5-40 SYRINGE (ML) INJECTION EVERY 8 HOURS
Status: DISCONTINUED | OUTPATIENT
Start: 2019-08-08 | End: 2019-08-14 | Stop reason: HOSPADM

## 2019-08-08 RX ORDER — ASPIRIN 81 MG/1
81 TABLET ORAL EVERY EVENING
Status: DISCONTINUED | OUTPATIENT
Start: 2019-08-08 | End: 2019-08-14 | Stop reason: HOSPADM

## 2019-08-08 RX ORDER — ENOXAPARIN SODIUM 100 MG/ML
40 INJECTION SUBCUTANEOUS EVERY 24 HOURS
Status: DISCONTINUED | OUTPATIENT
Start: 2019-08-08 | End: 2019-08-14 | Stop reason: HOSPADM

## 2019-08-08 RX ORDER — HYDROXYZINE 25 MG/1
25 TABLET, FILM COATED ORAL
Status: DISCONTINUED | OUTPATIENT
Start: 2019-08-08 | End: 2019-08-14 | Stop reason: HOSPADM

## 2019-08-08 RX ADMIN — Medication 10 ML: at 22:33

## 2019-08-08 RX ADMIN — ASPIRIN 81 MG: 81 TABLET ORAL at 18:47

## 2019-08-08 RX ADMIN — ROSUVASTATIN CALCIUM 10 MG: 10 TABLET, COATED ORAL at 22:32

## 2019-08-08 RX ADMIN — TRAZODONE HYDROCHLORIDE 150 MG: 100 TABLET ORAL at 22:31

## 2019-08-08 RX ADMIN — ENOXAPARIN SODIUM 40 MG: 40 INJECTION SUBCUTANEOUS at 18:47

## 2019-08-08 RX ADMIN — LISINOPRIL 5 MG: 5 TABLET ORAL at 18:47

## 2019-08-08 RX ADMIN — RISPERIDONE 1 MG: 1 TABLET ORAL at 22:32

## 2019-08-08 RX ADMIN — CELECOXIB 100 MG: 100 CAPSULE ORAL at 18:47

## 2019-08-08 NOTE — PROGRESS NOTES
2:03 PM  Change of shift. Care of patient taken over from Dr Philippe Leos; H&P reviewed, bedside handoff complete. Awaiting 'CM evaluation/ plan to have evaluated for admission as pt lives alone/ unless arrangements can be found; Hospitalist Astrid for Admission  3:29 PM    ED Room Number: ER06/06  Patient Name and age:  Rhoda Hill 76 y.o.  female  Working Diagnosis:   1. Failure to thrive in adult    2. Self-care deficit in patient living alone      Readmission: yes  Isolation Requirements:  no  Recommended Level of Care:  med/surg  Code Status:  Full Code  Department:[facility] Adult ED - 21   Other:  Failure to thrive.  Unable to care for herself/ CM on the case

## 2019-08-08 NOTE — PROGRESS NOTES
Case discussed w/ REBECA Brandon  regarding transitional care planning. VM left for [address] [facility]    CM met w/ TRI Perston then accompanied into patient's room.  unable to provide events of past 2 days. Discussed patient has [facility] plan and would need to meet criteria for skilled services. Patient does not have family support only 2 cousins living however no communication. Call received from [place], [place] discussed discharge plan from [address] [place] [address]. Patient was discharged from General Unit and no additional needs. F/U new patient appointment 9/16/19 @ [facility]. Transportation home by [employer]. Discussed RXs. CM to follow up w/ [address] [place] [address] [facility] regarding scripts versus call in to [facility]. This writer acknowledge patient uses [facility] and has used [employer] in the past. VM left for [facility]/ [facility]. Informed patient has a dog previously boarded at [facility] during [address] [place] [address] admission. [facility] is not certain if dog was picked up by patient or still boarded - [facility] is contact at [facility]. Case Management will continue to follow.

## 2019-08-08 NOTE — BSMART NOTE
Comprehensive Assessment Form Part 1 Section I - Disposition Axis I - Major Depression; recurrent; moderate [place] II - R/O Borderline Personality Disorder Axis III - Past Medical History:  
Diagnosis Date  Arthritis GENERALIZED IN JOINTS.  Breast cancer ([facility] [address] Left Breast Cancer 1995  Cancer [facility])   
 left breast - surgery/radiation  Chronic pain Back pain  Depression Mood Swings, anxiety  GERD (gastroesophageal reflux disease) Hiatal Hernia  Headache  Hypercholesterolemia  Hypertension CONTROLLED BY MEDS., Pt states she is no liong er on BP med's  Ill-defined condition Neuropathy bilat feet  Irregular heart beat  Other ill-defined conditions(799.89) MIGRAINES  
 Other ill-defined conditions(799.89)   
 neuropathy of lower legs and feet  Other ill-defined conditions(799.89)   
 increased cholesterol  Other ill-defined conditions(799.89) bronchitis  Psychotic disorder ([facility] [address]  S/P radiation therapy 1995 Left Breast  
 Unspecified adverse effect of anesthesia HEART PALPITATION. Axis IV - unable to ascertain Axis V - 35-40 The Medical Doctor to Psychiatrist conference was not completed. The Medical Doctor is in agreement with Psychiatrist disposition because of (reason) she does not meet criteria for inpatient admission. The plan is medical clearance. She is not recommended for psychiatric admission The on-call Psychiatrist consulted was NP Epstien. Section II - Integrated Summary Summary: Writer met with this patient face to face at [facility]. She stated that she does not know why she is here. She states that she remembers arriving home and then remembers being awoken by ambulance personnel. She denies suicidal ideation, homicidal ideation, and perceptual disruption. She was irritable on interview and continues to insist \"I have been asleep for two days\".  She was alert and oriented on all spheres. Writer discussed this case with on call nurse practitioner who declined this patient stating that she does not meet criteria for inpatient admission. The patienthas demonstrated mental capacity to provide informed consent. The information is given by the patient and past medical records. The Chief Complaint is \"I must have been asleep I guess\". The Precipitant Factors are see axis IV. Previous Hospitalizations: discharged from [facility] 8/6/19 The patient has not previously been in restraints. Current Psychiatrist and/or  is Dr. Jade Levine. Lethality Assessment: 
 
The potential for suicide noted by the following: previous history of attempts. The potential for homicide is not noted. The patient has not been a perpetrator of sexual or physical abuse. There are not pending charges. The patient is not felt to be at risk for self harm or harm to others. The attending nurse was advised that security has not been notified. Section III - Psychosocial 
The patient's overall mood and attitude is irritable. Feelings of helplessness and hopelessness are not observed. Generalized anxiety is not observed. Panic is not observed. Phobias are not observed. Obsessive compulsive tendencies are not observed. Section IV - Mental Status Exam 
The patient's appearance is unkempt. The patient's behavior is guarded. The patient is oriented to time, place, person and situation. The patient's speech shows no evidence of impairment. The patient's mood is irritable. The range of affect is labile. The patient's thought content demonstrates delusions. The thought process shows no evidence of impairment. The patient's perception shows no evidence of impairment. The patient's memory shows no evidence of impairment. The patient's appetite shows no evidence of impairment. The patient's sleep has evidence of hypersomnia. The patient shows no insight.   The patient's judgement shows no evidence of impairment. Section V - Substance Abuse The patient denies using substances. 
  
Section VI - Living Arrangements The patient is . The patient lives alone. The patient has no children. The patient does plan to return home upon discharge. The patient does not have legal issues pending. The patient's source of income comes from social security. Mormon and cultural practices have not been voiced at this time. 
  
The patient's greatest support comes from \"no one. \" The patient has not been in an event described as horrible or outside the realm of ordinary life experience either currently or in the past. 
The patient has not been a victim of sexual/physical abuse. 
  
Section VII - Other Areas of Clinical Concern The highest grade achieved is unassessed with the overall quality of school experience being described as unassessed. The patient is currently unemployed and speaks [place] as a primary language. The patient has no communication impairments affecting communication. The patient's preference for learning can be described as: can read and write adequately. The patient's hearing is hard of hearing. The patient's vision is impaired and  wears glasses or contacts. REBECA Best, Supervisee in Social Work

## 2019-08-08 NOTE — PROGRESS NOTES
Primary Nurse Florida Beebe RN and Daryl Beck RN performed a dual skin assessment on this patient Impairment noted- see wound doc flow sheet  Satish score is 20.

## 2019-08-08 NOTE — PROGRESS NOTES
08/08/19 1737   Vital Signs   Temp 97.8 °F (36.6 °C)   Temp Source Oral   Pulse (Heart Rate) (!) 106   Heart Rate Source Monitor   Resp Rate 24   O2 Sat (%) 97 %   Level of Consciousness Alert   BP (!) 171/114   MAP (Calculated) 133   BP 1 Method Automatic   BP 1 Location Right arm   BP Patient Position At rest   MEWS Score 3   Pain 1   Pain Scale 1 Numeric (0 - 10)   Pain Intensity 1 0   Oxygen Therapy   O2 Device Room air   Patient Observation   Special Appliances/Equipment Bed (comment)   Repositioned Head of bed flat     Notified MD,Order received.

## 2019-08-08 NOTE — PROGRESS NOTES
TRANSFER - IN REPORT:    Verbal report received from [facility] on Bob Snell  being received from ED (unit) for routine progression of care      Report consisted of patients Situation, Background, Assessment and   Recommendations(SBAR). Information from the following report(s) SBAR, Kardex and ED Summary was reviewed with the receiving nurse. Opportunity for questions and clarification was provided. Assessment completed upon patients arrival to unit and care assumed.

## 2019-08-08 NOTE — H&P
Hospitalist Admission Note    NAME: Suzette Love   :  1950   MRN:  [id number]     Date/Time:  2019 4:14 PM    Patient PCP: Julisa Shabazz MD  ________________________________________________________________________    My assessment of this patient's clinical condition and my plan of care is as follows. Assessment / Plan:    1) Failure to thrive: Normal electrolytes, no fever, no UTI, no signs of infectionpatient confused, dont think she is safe to be d/c home. Will consult CM for assistance. Home health,  Specially be sure she has her meds to take     2) Hx HTN: Controlled, continue current meds, adjust as needed    3) Mood swings: Continue current meds    4) Depression: Continue home meds, no suicidal ideation, she seams depressed, will consult Psych for eval and recs      Code Status: full  Surrogate Decision Maker:    DVT Prophylaxis: yes  GI Prophylaxis: not indicated    Baseline: lives at [facility]    Functional status befor hospitalization: Apparently she was able to do all ADL        Subjective:   CHIEF COMPLAINT: not felling well    HISTORY OF PRESENT ILLNESS:     Robby Leonard is a 76 y.o. Female PMH OA, migraines, neuropathy , 1995 left breast radiation, psychotic disorder, Hyperlipidemia,  HTN,  depression with mood swings who presents from home via EMS because  She was not feeling well. She currently resides at [facility] for the past 2-1/2 years. She was recently d/c 19 from the [facility] here for delusional disorder. . She mentioned that since she left the hospital she has not had a chance to fill her prescriptions yet. She mentioned that she does \"all the things at home\" But does not have help. She mentioned that she does not really know why she called 911 to come to the hospital.    She denies Pian, fever, or any complaint      Past Medical History:   Diagnosis Date    Arthritis     GENERALIZED IN JOINTS.     Breast cancer ([address]     Left Breast Cancer 1995    Cancer [facility])     left breast - surgery/radiation    Chronic pain     Back pain    Depression     Mood Swings, anxiety    GERD (gastroesophageal reflux disease)     Hiatal Hernia    Headache     Hypercholesterolemia     Hypertension     CONTROLLED BY MEDS., Pt states she is no liong er on BP med's    Ill-defined condition     Neuropathy bilat feet    Irregular heart beat     Other ill-defined conditions(799.89)     MIGRAINES    Other ill-defined conditions(799.89)     neuropathy of lower legs and feet    Other ill-defined conditions(799.89)     increased cholesterol    Other ill-defined conditions(799.89)     bronchitis    Psychotic disorder (HCC)     S/P radiation therapy     1995 Left Breast    Unspecified adverse effect of anesthesia     HEART PALPITATION. Past Surgical History:   Procedure Laterality Date    BREAST SURGERY PROCEDURE UNLISTED  7/1995    DCIS /BIOPSIES MULTIPLE.  BREAST SURGERY PROCEDURE UNLISTED  1995    LUMPECTOMY WITH RADIATION INSITU    HX BREAST LUMPECTOMY Left     1995 - POSITIVE    HX ORTHOPAEDIC  2006    LEFT KNEE ARTHROSCOPY    HX ORTHOPAEDIC  4/2010    RIGHT KNEE ARTHROSCOPY, concrete nail placed    HX ORTHOPAEDIC      Hammertoe , doesnt know which foot    HX ORTHOPAEDIC      Small growth removed from between toes but doesnt know which foot       Social History     Tobacco Use    Smoking status: Never Smoker    Smokeless tobacco: Never Used   Substance Use Topics    Alcohol use: No        Family History   Problem Relation Age of Onset    Cancer Mother         BLADDER CA    Heart Disease Father     Lung Disease Father      Allergies   Allergen Reactions    Epinephrine Palpitations     \"Makes my heart  ' shudder'. \" Allergic to epinephrine with novocain. Plain epinephrine is ok.  Novocain [Procaine] Palpitations    Nsaids (Non-Steroidal Anti-Inflammatory Drug) Other (comments)     GASTRIC DISTRESS.     Ibuprofen Other (comments) Gastric irritation        Prior to Admission medications    Medication Sig Start Date End Date Taking? Authorizing Provider   hydrOXYzine HCl (ATARAX) 25 mg tablet Take 1 Tab by mouth every eight (8) hours as needed for Anxiety (anxiety). Indications: anxious 8/6/19   Kevin Abbie SANYADIRA   traZODone (DESYREL) 150 mg tablet Take 1 Tab by mouth nightly. Indications: insomnia associated with depression 8/6/19   Kevin SAN NP   venlafaxine-SR (EFFEXOR-XR) 150 mg capsule Take 2 Caps by mouth daily (with breakfast). Indications: Anxiousness associated with Depression 8/7/19   Kevin SAN NP   risperiDONE (RISPERDAL) 1 mg tablet Take 1 Tab by mouth nightly. Indications: psychosis 8/6/19   Kevin SAN NP   famotidine (PEPCID) 20 mg tablet Take 1 Tab by mouth daily. Indications: heartburn 8/7/19   Kevin SAN NP   melatonin tab tablet Take 10 mg by mouth nightly as needed (sleep). Provider, Historical   vitamin D3/vitamin K2, MK4, (K2 PLUS D3 PO) Take 1 Tab by mouth daily. Provider, Historical   diphenhydrAMINE-acetaminophen (TYLENOL PM EXTRA STRENGTH)  mg tab Take 2 Caps by mouth nightly as needed. Provider, Historical   almotriptan (AXERT) 12.5 mg tablet Take 12.5 mg by mouth daily as needed for Migraine. may repeat in 2 hours if needed    Provider, Historical   aspirin delayed-release 81 mg tablet Take 81 mg by mouth every evening. Provider, Historical   loperamide (IMODIUM) 2 mg capsule Take  by mouth as needed for Diarrhea. Provider, Historical   bisacodyl (DULCOLAX, BISACODYL,) 5 mg EC tablet Take 5 mg by mouth daily as needed for Constipation. Provider, Historical   diphenhydrAMINE (BENADRYL) 25 mg capsule Take 25 mg by mouth every six (6) hours as needed. Provider, Historical   multivitamin, tx-iron-ca-min (THERA-M W/ IRON) 9 mg iron-400 mcg tab tablet Take 1 Tab by mouth daily.     Other, MD Liliana   rosuvastatin (CRESTOR) 10 mg tablet Take 1 Tab by mouth nightly. 12/2/15   Sonia Sanchez MD   celecoxib (CELEBREX) 100 mg capsule Take 100 mg by mouth two (2) times a day. Provider, Historical       REVIEW OF SYSTEMS:     I am not able to complete the review of systems because: The patient is intubated and sedated    The patient has altered mental status due to his acute medical problems    The patient has baseline aphasia from prior stroke(s)    The patient has baseline dementia and is not reliable historian    The patient is in acute medical distress and unable to provide information           Total of 12 systems reviewed as follows:       POSITIVE= underlined text  Negative = text not underlined  General:  fever, chills, sweats, generalized weakness, weight loss/gain,      loss of appetite   Eyes:    blurred vision, eye pain, loss of vision, double vision  ENT:    rhinorrhea, pharyngitis   Respiratory:   cough, sputum production, SOB, CROUCH, wheezing, pleuritic pain   Cardiology:   chest pain, palpitations, orthopnea, PND, edema, syncope   Gastrointestinal:  abdominal pain , N/V, diarrhea, dysphagia, constipation, bleeding   Genitourinary:  frequency, urgency, dysuria, hematuria, incontinence   Muskuloskeletal :  arthralgia, myalgia, back pain  Hematology:  easy bruising, nose or gum bleeding, lymphadenopathy   Dermatological: rash, ulceration, pruritis, color change / jaundice  Endocrine:   hot flashes or polydipsia   Neurological:  headache, dizziness, confusion, focal weakness, paresthesia,     Speech difficulties, memory loss, gait difficulty  Psychological: Feelings of anxiety, depression, agitation    Objective:   VITALS:    Visit Vitals  /88 (BP 1 Location: Right arm, BP Patient Position: At rest)   Pulse 64   Temp 98.4 °F (36.9 °C)   Resp 20   Ht 5' 2\" (1.575 m)   Wt 85.7 kg (189 lb)   SpO2 100%   BMI 34.57 kg/m²       PHYSICAL EXAM:    General:    Alert,  no distress, appears stated age.      HEENT: Atraumatic, anicteric sclerae, pink conjunctivae     No oral ulcers, mucosa moist, throat clear, dentition fair  Neck:  Supple, symmetrical,  thyroid: non tender  Lungs:   Clear to auscultation bilaterally. No Wheezing or Rhonchi. No rales. Chest wall:  No tenderness  No Accessory muscle use. Heart:   Regular  rhythm,  No  murmur   No edema  Abdomen:   Soft, non-tender. Not distended. Bowel sounds normal  Extremities: No cyanosis. No clubbing,      Skin turgor normal, Capillary refill normal, Radial dial pulse 2+  Skin:     Not pale. Not Jaundiced  No rashes   Psych:  Poor insight. Signs of depression. Not anxious or agitated. Neurologic: EOMs intact. No facial asymmetry. No aphasia or slurred speech. Symmetrical strength, Sensation grossly intact. Alert and oriented X 3.     _______________________________________________________________________  Care Plan discussed with:    Comments   Patient x    Family      RN x    Care Manager x                   Consultant:      _______________________________________________________________________  Expected  Disposition:   Home with Family x   HH/PT/OT/RN    SNF/LTC    SHAHIDA    ________________________________________________________________________  TOTAL TIME:  28  Minutes    Critical Care Provided     Minutes non procedure based      Comments     Reviewed previous records   >50% of visit spent in counseling and coordination of care  Discussion with patient and/or family and questions answered       ________________________________________________________________________  Signed: Terry Soto MD    Procedures: see electronic medical records for all procedures/Xrays and details which were not copied into this note but were reviewed prior to creation of Plan.     LAB DATA REVIEWED:    Recent Results (from the past 24 hour(s))   CBC WITH AUTOMATED DIFF    Collection Time: 08/08/19  1:58 PM   Result Value Ref Range    WBC 10.1 3.6 - 11.0 K/uL    RBC 4.75 3.80 - 5.20 M/uL    HGB 12.5 11.5 - 16.0 g/dL    HCT 41.6 35.0 - 47.0 %    MCV 87.6 80.0 - 99.0 FL    MCH 26.3 26.0 - 34.0 PG    MCHC 30.0 30.0 - 36.5 g/dL    RDW 16.4 (H) 11.5 - 14.5 %    PLATELET 728 882 - 147 K/uL    MPV 10.9 8.9 - 12.9 FL    NRBC 0.2 (H) 0  WBC    ABSOLUTE NRBC 0.02 (H) 0.00 - 0.01 K/uL    NEUTROPHILS 74 32 - 75 %    LYMPHOCYTES 15 12 - 49 %    MONOCYTES 10 5 - 13 %    EOSINOPHILS 0 0 - 7 %    BASOPHILS 1 0 - 1 %    IMMATURE GRANULOCYTES 0 0.0 - 0.5 %    ABS. NEUTROPHILS 7.5 1.8 - 8.0 K/UL    ABS. LYMPHOCYTES 1.5 0.8 - 3.5 K/UL    ABS. MONOCYTES 1.1 (H) 0.0 - 1.0 K/UL    ABS. EOSINOPHILS 0.0 0.0 - 0.4 K/UL    ABS. BASOPHILS 0.1 0.0 - 0.1 K/UL    ABS. IMM. GRANS. 0.0 0.00 - 0.04 K/UL    DF AUTOMATED     METABOLIC PANEL, COMPREHENSIVE    Collection Time: 08/08/19  1:58 PM   Result Value Ref Range    Sodium 141 136 - 145 mmol/L    Potassium 4.3 3.5 - 5.1 mmol/L    Chloride 106 97 - 108 mmol/L    CO2 27 21 - 32 mmol/L    Anion gap 8 5 - 15 mmol/L    Glucose 95 65 - 100 mg/dL    BUN 19 6 - 20 MG/DL    Creatinine 0.89 0.55 - 1.02 MG/DL    BUN/Creatinine ratio 21 (H) 12 - 20      GFR est AA >60 >60 ml/min/1.73m2    GFR est non-AA >60 >60 ml/min/1.73m2    Calcium 9.3 8.5 - 10.1 MG/DL    Bilirubin, total 1.0 0.2 - 1.0 MG/DL    ALT (SGPT) 68 12 - 78 U/L    AST (SGOT) 65 (H) 15 - 37 U/L    Alk. phosphatase 124 (H) 45 - 117 U/L    Protein, total 6.5 6.4 - 8.2 g/dL    Albumin 3.2 (L) 3.5 - 5.0 g/dL    Globulin 3.3 2.0 - 4.0 g/dL    A-G Ratio 1.0 (L) 1.1 - 2.2     SAMPLES BEING HELD    Collection Time: 08/08/19  2:01 PM   Result Value Ref Range    SAMPLES BEING HELD 1RD,1BLU     COMMENT        Add-on orders for these samples will be processed based on acceptable specimen integrity and analyte stability, which may vary by analyte.    URINALYSIS W/MICROSCOPIC    Collection Time: 08/08/19  2:06 PM   Result Value Ref Range    Color DARK YELLOW      Appearance CLEAR CLEAR      Specific gravity 1.023 1.003 - 1.030      pH (UA) 5.5 5.0 - 8.0 Protein 30 (A) NEG mg/dL    Glucose NEGATIVE  NEG mg/dL    Ketone 15 (A) NEG mg/dL    Blood NEGATIVE  NEG      Urobilinogen 1.0 0.2 - 1.0 EU/dL    Nitrites NEGATIVE  NEG      Leukocyte Esterase TRACE (A) NEG      WBC 10-20 0 - 4 /hpf    RBC 0-5 0 - 5 /hpf    Epithelial cells FEW FEW /lpf    Bacteria NEGATIVE  NEG /hpf    Hyaline cast 2-5 0 - 5 /lpf   URINE CULTURE HOLD SAMPLE    Collection Time: 08/08/19  2:06 PM   Result Value Ref Range    Urine culture hold        URINE ON HOLD IN MICROBIOLOGY DEPT FOR 3 DAYS. IF UNPRESERVED URINE IS SUBMITTED, IT CANNOT BE USED FOR ADDITIONAL TESTING AFTER 24 HRS, RECOLLECTION WILL BE REQUIRED.    BILIRUBIN, CONFIRM    Collection Time: 08/08/19  2:06 PM   Result Value Ref Range    Bilirubin UA, confirm NEGATIVE  NEG

## 2019-08-08 NOTE — PROGRESS NOTES
Problem: Falls - Risk of  Goal: *Absence of Falls  Description  Document [place] Fall Risk and appropriate interventions in the flowsheet. Outcome: Progressing Towards Goal  Note:   Fall Risk Interventions:            Medication Interventions: Patient to call before getting OOB, Teach patient to arise slowly    Elimination Interventions:  Toileting schedule/hourly rounds              Problem: Patient Education: Go to Patient Education Activity  Goal: Patient/Family Education  Outcome: Progressing Towards Goal

## 2019-08-08 NOTE — PROGRESS NOTES
TRANSFER - IN REPORT: 
 
Verbal report received from Faith Cervantes on Nita Campbell  being received from ED (unit) for routine progression of care Report consisted of patients Situation, Background, Assessment and  
Recommendations(SBAR). Information from the following report(s) SBAR, Kardex and ED Summary was reviewed with the receiving nurse. Opportunity for questions and clarification was provided. Assessment completed upon patients arrival to unit and care assumed.

## 2019-08-08 NOTE — PROGRESS NOTES
Call placed to [facility] [phone] message left for Sammy Jaimes w/ CM contact number. Call received from [address] [place] [address] is still bordered and safe.

## 2019-08-08 NOTE — ED PROVIDER NOTES
76 y.o. female with past medical history significant for arthritis, heart palpitations, migraines, neuropathy of lower legs and feet, increased cholesterol, bronchitis, 1995 left breast radiation, headache, psychotic disorder, hypercholesterolemia, HTN, chronic back pain, hiatal hernia, and depression with mood swings who presents from home via EMS with chief complaint of mental health problem. Pt presents in the ED unsure of how or why she got here. Pt states she just woke up and EMS was standing over her. Pt states that she must have called 911, but cannot remember why. Pt states she has severe depression, anxiety, and mood swings and thinks she must have been having an episode of something. Pt reports being in the hospital for 1 week and being discharged on 8/6/19, 2 days ago. Pt states she has not had a chance to fill her prescriptions yet. Pt denies having anyone at home to help her. Pt denies SI at this time. There are no other acute medical concerns at this time. Social hx: never smoker. PCP: Piedad Chan MD    Full history, physical exam, and ROS unable to be obtained due to:  Poor historian. Note written by yanna Flannery, as dictated by Sunny Delgado MD 12:35 PM      The history is provided by the patient. No  was used. Past Medical History:   Diagnosis Date    Arthritis     GENERALIZED IN JOINTS.     Breast cancer ([address]     Left Breast Cancer 1995    Cancer [facility])     left breast - surgery/radiation    Chronic pain     Back pain    Depression     Mood Swings, anxiety    GERD (gastroesophageal reflux disease)     Hiatal Hernia    Headache     Hypercholesterolemia     Hypertension     CONTROLLED BY MEDS., Pt states she is no liong er on BP med's    Ill-defined condition     Neuropathy bilat feet    Irregular heart beat     Other ill-defined conditions(799.89)     MIGRAINES    Other ill-defined conditions(799.89)     neuropathy of lower legs and feet    Other ill-defined conditions(799.89)     increased cholesterol    Other ill-defined conditions(799.89)     bronchitis    Psychotic disorder ([facility] [address]     S/P radiation therapy     1995 Left Breast    Unspecified adverse effect of anesthesia     HEART PALPITATION. Past Surgical History:   Procedure Laterality Date    BREAST SURGERY PROCEDURE UNLISTED  7/1995    DCIS /BIOPSIES MULTIPLE.     BREAST SURGERY PROCEDURE UNLISTED  1995    LUMPECTOMY WITH RADIATION INSITU    HX BREAST LUMPECTOMY Left     1995 - POSITIVE    HX ORTHOPAEDIC  2006    LEFT KNEE ARTHROSCOPY    HX ORTHOPAEDIC  4/2010    RIGHT KNEE ARTHROSCOPY, concrete nail placed    HX ORTHOPAEDIC      Hammertoe , doesnt know which foot    HX ORTHOPAEDIC      Small growth removed from between toes but doesnt know which foot         Family History:   Problem Relation Age of Onset    Cancer Mother         BLADDER CA    Heart Disease Father     Lung Disease Father        Social History     Socioeconomic History    Marital status:      Spouse name: Not on file    Number of children: Not on file    Years of education: Not on file    Highest education level: Not on file   Occupational History    Not on file   Social Needs    Financial resource strain: Not on file    Food insecurity:     Worry: Not on file     Inability: Not on file    Transportation needs:     Medical: Not on file     Non-medical: Not on file   Tobacco Use    Smoking status: Never Smoker    Smokeless tobacco: Never Used   Substance and Sexual Activity    Alcohol use: No    Drug use: No    Sexual activity: Never   Lifestyle    Physical activity:     Days per week: Not on file     Minutes per session: Not on file    Stress: Not on file   Relationships    Social connections:     Talks on phone: Not on file     Gets together: Not on file     Attends Muslim service: Not on file     Active member of club or organization: Not on file     Attends meetings of clubs or organizations: Not on file     Relationship status: Not on file    Intimate partner violence:     Fear of current or ex partner: Not on file     Emotionally abused: Not on file     Physically abused: Not on file     Forced sexual activity: Not on file   Other Topics Concern     Service Not Asked    Blood Transfusions Not Asked    Caffeine Concern Not Asked    Occupational Exposure Not Asked    Hobby Hazards Not Asked    Sleep Concern Not Asked    Stress Concern Not Asked    Weight Concern Not Asked    Special Diet Not Asked    Back Care Not Asked    Exercise Not Asked    Bike Helmet Not Asked   [place] Not Asked    Self-Exams Not Asked   Social History Narrative    Not on file         ALLERGIES: Epinephrine; Novocain [procaine]; Nsaids (non-steroidal anti-inflammatory drug); and Ibuprofen    Review of Systems   Unable to perform ROS: Psychiatric disorder   Psychiatric/Behavioral: Positive for confusion and dysphoric mood. Negative for self-injury and suicidal ideas. The patient is nervous/anxious. Vitals:    08/08/19 1231   BP: 136/88   Pulse: 64   Resp: 20   Temp: 98.4 °F (36.9 °C)   SpO2: 100%   Weight: 85.7 kg (189 lb)   Height: 5' 2\" (1.575 m)            Physical Exam   Constitutional: She appears well-developed and well-nourished. No distress. HENT:   Head: Atraumatic. Eyes: EOM are normal.   Neck: No tracheal deviation present. Cardiovascular:   Warm and well perfused   Pulmonary/Chest: Effort normal. No respiratory distress. Musculoskeletal: Normal range of motion. Neurological: She is alert. Coordination normal.   Skin: Skin is warm and dry. She is not diaphoretic. Psychiatric: Her speech is normal. Judgment and thought content normal. She is slowed. She exhibits a depressed mood. She expresses no suicidal ideation. She expresses no suicidal plans. She exhibits abnormal recent memory. Nursing note and vitals reviewed.      Note written by Edy Rudd. Carol Mesa, as dictated by Steffen Meadows MD 12:35 PM    MDM     This is a 42-year-old female with past medical history, review of systems, physical exam as above, presenting via EMS for complaints of confusion. Upon arrival patient is awake and alert, stating she does not know who called EMS, or why EMS was called. EMS reports the patient called for distress, unclear as to the nature. Patient was recently discharged approximately 2 days ago, for psychosis. She denies complaints at this time, denying chest pain, shortness of breath, nausea, vomiting, suicidal or homicidal ideation. Physical exam is remarkable for well-appearing elderly female, in no acute distress, with clear breath sounds, soft nontender abdomen, regular rate and rhythm without murmurs gallops or rubs. Patient seems awake and alert, however has no recall, stating she does not make Her medications after being discharged 2 days ago. Plan to reconsult psych, obtain CMP, CBC, UA. We will make a disposition based on the patient's diagnostics and response to therapy, however she may not be safe to discharge back to her independent living facility. Procedures    BESIDE SIGN OUT:  1:58 PM  Discussed pt's hx, disposition, and available diagnostic and imaging results with Dr. Jocelynn Roland at bedside with the patient. Reviewed care plans. Both providers and patient are in agreement with care plan. Dr. Warden Kimball is transferring care of the pt to Dr. Jocelynn Roland at this time.

## 2019-08-08 NOTE — ED TRIAGE NOTES
Patient comes to the ER via EMS for mental health. EMS reports patient has called them several times a day for the past 2 weeks because \"bugs are eating her walls\". Recently seen and evaluated, given medication and now patient has been sleeping for the past 2 days. Patient tearful during triage stating \"there is no one to take care of me\". Ambulatory from EMS stretcher to ED bed without difficulty.

## 2019-08-08 NOTE — ROUTINE PROCESS
TRANSFER - OUT REPORT: 
 
Verbal report given to markie(name) on Yohan Peters  being transferred to (unit) for routine progression of care Report consisted of patients Situation, Background, Assessment and  
Recommendations(SBAR). Information from the following report(s) SBAR was reviewed with the receiving nurse. Lines:  
Peripheral IV 08/08/19 Right Antecubital (Active) Site Assessment Clean, dry, & intact 8/8/2019  4:48 PM  
Phlebitis Assessment 0 8/8/2019  4:48 PM  
Infiltration Assessment 0 8/8/2019  4:48 PM  
Dressing Status Clean, dry, & intact 8/8/2019  4:48 PM  
  
 
Opportunity for questions and clarification was provided. Patient transported with: 
 Preo

## 2019-08-08 NOTE — PROGRESS NOTES
Date of previous inpatient admission/ ED visit? Patient previous admission < 30 days. Behavioral Health Admission -19 (Depression w/ Anxiety)  What brought the patient back to ED? Patient presents to the ED for mental health evaluation    Did patient decline recommended services during last admission/ ED visit (if yes, what)? UNK    Has patient seen a provider since their last inpatient admission/ED visit (if yes, when)? No    CM Interventions:  From previous inpatient admission/ED visit:Patient Assessment by [address] [place] [address] Counselor  From current inpatient admission/ED visit: Assessment    SSED/CM consult received and appreciated. EMR reviewed. History significant for arthritis, heart palpitations, migraines, neuropathy of lower legs and feet, increased cholesterol, bronchitis,left breast radiation, headache, psychotic disorder, hypercholesterolemia, HTN, chronic back pain, hiatal hernia, and depression with mood swings. Case discussed w/ [facility] counselor and updates received. Met w/patient and introduced to role of CM. Patient asking if this writer worked for the [facility] or [facility]? Informed is [employer] Employee. Ms. Faizan Schultz states \" I don't know why I'm here. \" Patient state has resided at [facility] for 2 years and prior was [facility] for 2 years before leaving home residence [address] [facility]. Patient endorses spouse  in  from blood disorder and she does not have anyone else living (no children, siblings , etc).  acknowledges having 2 cousins \" they don't want to be bothered by me. \" Provided names Samaria Prince ([place]) and Alecia Santana ([address] During assessment patient very apologetic for not being able to remember events of past 2 day. Emotional support provided.  is alert and oriented (person/place/time). Updates provided to Luis Angel Ramirez.      Case Management will continue to follow for transitions of care needs. [employer] is insurance provider  PCP is Lowry Gaucher  [facility] is local pharmacy / [facility]    Care Management Interventions  PCP Verified by CM:  Yes  Palliative Care Criteria Met (RRAT>21 & CHF Dx)?: No  Transition of Care Consult (CM Consult): Discharge Planning  MyChart Signup: No  Discharge Durable Medical Equipment: No  Health Maintenance Reviewed: Yes  Physical Therapy Consult: No  Occupational Therapy Consult: No  Speech Therapy Consult: No  Current Support Network: Lives Alone  Confirm Follow Up Transport: (TBD)  Plan discussed with Pt/Family/Caregiver: Yes  [place] Resource Information Provided?: No  Discharge Location  Discharge Placement: (TBD)

## 2019-08-08 NOTE — PROGRESS NOTES
Admission Medication Reconciliation:  **UNABLE TO ASSESS**    Information obtained from:  Discharge summary yesterday 8/06  RxQuery data available**:  YES    Comments/Recommendations: Updated PTA meds/reviewed patient's allergies. 1)  Patient with auditory hallucinations during interview. States \"there is a tape of me talking outside and they wont turn it off. \"  Otherwise, patient is pleasant and reports she was discharged with one new medicine to help her sleep (discharged on 8/06). Upon review of discharge list patient was ordered risperidone and famotidine. Patient states she has not filled 6 medications that are in a folder at the end of her bed. Patient reports not taking anything in 2 days. 2)  Medication changes:  No changes made as patient has not taken any medications in 2 days. 3)  Discussed findings with Dr. Barbie Meng     **RxQuery pharmacy benefit data reflects medications filled and processed through the patient's insurance, however   this data does NOT capture whether the medication was picked up or is currently being taken by the patient. Allergies:  Epinephrine; Novocain [procaine]; Nsaids (non-steroidal anti-inflammatory drug); and Ibuprofen    Significant PMH/Disease States:   Past Medical History:   Diagnosis Date    Arthritis     GENERALIZED IN JOINTS.     Breast cancer ([facility] [address]     Left Breast Cancer 1995    Cancer ([facility] [address]     left breast - surgery/radiation    Chronic pain     Back pain    Depression     Mood Swings, anxiety    GERD (gastroesophageal reflux disease)     Hiatal Hernia    Headache     Hypercholesterolemia     Hypertension     CONTROLLED BY MEDS., Pt states she is no liong er on BP med's    Ill-defined condition     Neuropathy bilat feet    Irregular heart beat     Other ill-defined conditions(799.89)     MIGRAINES    Other ill-defined conditions(799.89)     neuropathy of lower legs and feet    Other ill-defined conditions(799.89)     increased cholesterol    Other ill-defined conditions(799.89)     bronchitis    Psychotic disorder ([facility] [address]     S/P radiation therapy     1995 Left Breast    Unspecified adverse effect of anesthesia     HEART PALPITATION. Chief Complaint for this Admission:    Chief Complaint   Patient presents with    Mental Health Problem     Prior to Admission Medications:   Prior to Admission Medications   Prescriptions Last Dose Informant Patient Reported? Taking?   almotriptan (AXERT) 12.5 mg tablet   Yes No   Sig: Take 12.5 mg by mouth daily as needed for Migraine. may repeat in 2 hours if needed   aspirin delayed-release 81 mg tablet   Yes No   Sig: Take 81 mg by mouth every evening. bisacodyl (DULCOLAX, BISACODYL,) 5 mg EC tablet   Yes No   Sig: Take 5 mg by mouth daily as needed for Constipation. celecoxib (CELEBREX) 100 mg capsule   Yes No   Sig: Take 100 mg by mouth two (2) times a day. diphenhydrAMINE (BENADRYL) 25 mg capsule   Yes No   Sig: Take 25 mg by mouth every six (6) hours as needed. diphenhydrAMINE-acetaminophen (TYLENOL PM EXTRA STRENGTH)  mg tab   Yes No   Sig: Take 2 Caps by mouth nightly as needed. famotidine (PEPCID) 20 mg tablet   No No   Sig: Take 1 Tab by mouth daily. Indications: heartburn   hydrOXYzine HCl (ATARAX) 25 mg tablet   No No   Sig: Take 1 Tab by mouth every eight (8) hours as needed for Anxiety (anxiety). Indications: anxious   loperamide (IMODIUM) 2 mg capsule   Yes No   Sig: Take  by mouth as needed for Diarrhea.   melatonin tab tablet   Yes No   Sig: Take 10 mg by mouth nightly as needed (sleep). multivitamin, tx-iron-ca-min (THERA-M W/ IRON) 9 mg iron-400 mcg tab tablet   Yes No   Sig: Take 1 Tab by mouth daily. risperiDONE (RISPERDAL) 1 mg tablet   No No   Sig: Take 1 Tab by mouth nightly. Indications: psychosis   rosuvastatin (CRESTOR) 10 mg tablet   No No   Sig: Take 1 Tab by mouth nightly. traZODone (DESYREL) 150 mg tablet   No No   Sig: Take 1 Tab by mouth nightly. Indications: insomnia associated with depression   venlafaxine-SR (EFFEXOR-XR) 150 mg capsule   No No   Sig: Take 2 Caps by mouth daily (with breakfast). Indications: Anxiousness associated with Depression   vitamin D3/vitamin K2, MK4, (K2 PLUS D3 PO)   Yes No   Sig: Take 1 Tab by mouth daily.       Facility-Administered Medications: None     Mami Nascimento, PharmD  Clinical Pharmacist  [facility]  [phone]

## 2019-08-09 ENCOUNTER — APPOINTMENT (OUTPATIENT)
Dept: GENERAL RADIOLOGY | Age: 69
DRG: 293 | End: 2019-08-09
Attending: HOSPITALIST
Payer: MEDICARE

## 2019-08-09 LAB
ANION GAP SERPL CALC-SCNC: 9 MMOL/L (ref 5–15)
BNP SERPL-MCNC: 6248 PG/ML
BUN SERPL-MCNC: 17 MG/DL (ref 6–20)
BUN/CREAT SERPL: 20 (ref 12–20)
CALCIUM SERPL-MCNC: 8.6 MG/DL (ref 8.5–10.1)
CHLORIDE SERPL-SCNC: 109 MMOL/L (ref 97–108)
CO2 SERPL-SCNC: 25 MMOL/L (ref 21–32)
CREAT SERPL-MCNC: 0.87 MG/DL (ref 0.55–1.02)
GLUCOSE SERPL-MCNC: 106 MG/DL (ref 65–100)
POTASSIUM SERPL-SCNC: 3.7 MMOL/L (ref 3.5–5.1)
SODIUM SERPL-SCNC: 143 MMOL/L (ref 136–145)

## 2019-08-09 PROCEDURE — 83880 ASSAY OF NATRIURETIC PEPTIDE: CPT

## 2019-08-09 PROCEDURE — 36415 COLL VENOUS BLD VENIPUNCTURE: CPT

## 2019-08-09 PROCEDURE — 97161 PT EVAL LOW COMPLEX 20 MIN: CPT

## 2019-08-09 PROCEDURE — 97162 PT EVAL MOD COMPLEX 30 MIN: CPT

## 2019-08-09 PROCEDURE — 74011250636 HC RX REV CODE- 250/636: Performed by: HOSPITALIST

## 2019-08-09 PROCEDURE — 74011250637 HC RX REV CODE- 250/637: Performed by: HOSPITALIST

## 2019-08-09 PROCEDURE — 80048 BASIC METABOLIC PNL TOTAL CA: CPT

## 2019-08-09 PROCEDURE — 99218 HC RM OBSERVATION: CPT

## 2019-08-09 PROCEDURE — 97116 GAIT TRAINING THERAPY: CPT

## 2019-08-09 PROCEDURE — 71045 X-RAY EXAM CHEST 1 VIEW: CPT

## 2019-08-09 PROCEDURE — 74011250636 HC RX REV CODE- 250/636: Performed by: INTERNAL MEDICINE

## 2019-08-09 PROCEDURE — 74011250637 HC RX REV CODE- 250/637: Performed by: NURSE PRACTITIONER

## 2019-08-09 PROCEDURE — 74011250637 HC RX REV CODE- 250/637: Performed by: INTERNAL MEDICINE

## 2019-08-09 RX ORDER — FUROSEMIDE 10 MG/ML
20 INJECTION INTRAMUSCULAR; INTRAVENOUS ONCE
Status: COMPLETED | OUTPATIENT
Start: 2019-08-09 | End: 2019-08-09

## 2019-08-09 RX ORDER — METOPROLOL TARTRATE 25 MG/1
12.5 TABLET, FILM COATED ORAL EVERY 12 HOURS
Status: DISCONTINUED | OUTPATIENT
Start: 2019-08-09 | End: 2019-08-10

## 2019-08-09 RX ORDER — RISPERIDONE 1 MG/1
2 TABLET, FILM COATED ORAL
Status: DISCONTINUED | OUTPATIENT
Start: 2019-08-10 | End: 2019-08-14 | Stop reason: HOSPADM

## 2019-08-09 RX ORDER — ACETAMINOPHEN 325 MG/1
650 TABLET ORAL
Status: DISCONTINUED | OUTPATIENT
Start: 2019-08-09 | End: 2019-08-14 | Stop reason: HOSPADM

## 2019-08-09 RX ADMIN — ASPIRIN 81 MG: 81 TABLET ORAL at 18:36

## 2019-08-09 RX ADMIN — ENOXAPARIN SODIUM 40 MG: 40 INJECTION SUBCUTANEOUS at 18:36

## 2019-08-09 RX ADMIN — METOPROLOL TARTRATE 12.5 MG: 25 TABLET ORAL at 21:22

## 2019-08-09 RX ADMIN — Medication 10 ML: at 15:28

## 2019-08-09 RX ADMIN — VENLAFAXINE HYDROCHLORIDE 300 MG: 150 CAPSULE, EXTENDED RELEASE ORAL at 09:52

## 2019-08-09 RX ADMIN — CELECOXIB 100 MG: 100 CAPSULE ORAL at 18:36

## 2019-08-09 RX ADMIN — LISINOPRIL 5 MG: 5 TABLET ORAL at 09:52

## 2019-08-09 RX ADMIN — TRAZODONE HYDROCHLORIDE 150 MG: 100 TABLET ORAL at 21:21

## 2019-08-09 RX ADMIN — CELECOXIB 100 MG: 100 CAPSULE ORAL at 09:52

## 2019-08-09 RX ADMIN — FUROSEMIDE 20 MG: 10 INJECTION, SOLUTION INTRAMUSCULAR; INTRAVENOUS at 15:28

## 2019-08-09 RX ADMIN — Medication 10 ML: at 06:00

## 2019-08-09 RX ADMIN — ROSUVASTATIN CALCIUM 10 MG: 10 TABLET, COATED ORAL at 21:20

## 2019-08-09 RX ADMIN — RISPERIDONE 1 MG: 1 TABLET ORAL at 21:22

## 2019-08-09 NOTE — PROGRESS NOTES
Problem: Falls - Risk of  Goal: *Absence of Falls  Description  Document Candida Lightning Fall Risk and appropriate interventions in the flowsheet.   Outcome: Progressing Towards Goal  Note:   Fall Risk Interventions:       Mentation Interventions: Adequate sleep, hydration, pain control    Medication Interventions: Patient to call before getting OOB    Elimination Interventions: Patient to call for help with toileting needs              Problem: Patient Education: Go to Patient Education Activity  Goal: Patient/Family Education  Outcome: Progressing Towards Goal

## 2019-08-09 NOTE — PROGRESS NOTES
Hospitalist Progress Note  Cami Pete MD  Answering service: [phone] from in house phone        Date of Service:  2019  NAME:  Jackie Perdue  :  1950  MRN:  [id number]      Admission Summary:   76 y.o F with PMH of depression,delusional disorder,anxiety,HTN,arthritis and other medical issues was admitted to the hospital yesterday -due to confusion and was unable to take of her self. Interval history / Subjective:     She states that she does not have any family,friends. Repeatedly says she has depression,mood disorder and anxiety problems. States that she lives in [facility],she has a dog. States that she does not know who called 911 or why she is in the hospital. She then talks about some bugs biting her. Denied any nausea/vomiting,abdominal pain,chest pain,diarrhea,cough. She states that her mother has anxiety and father has mood swings. Later,she started crying and states \"I can't control my nerves and feel like panic attack\". Per chart review,she stopped taking all her medications after going home from the behavioral health unit. Assessment & Plan:     #Shortness of breath:  -CXR obtained which showed pulmonary edema,BNP is elevated. -will give Iv lasix  -obtain echo. #Depression,deluisonal disorder:  -psych consulted -recommended admission once medically stable.  -will continue meds - risperidal,effexor and trazadone. #HTN:  -start metoprolol 12.5 mg BID. Code status: full  DVT prophylaxis: lovenox    Care Plan discussed with: [facility] when medically stable     Hospital Problems  Date Reviewed: 2019          Codes Class Noted POA    Failure to thrive (0-17) ICD-10-CM: R62.51  ICD-9-CM: 783.41  2019 Unknown                Review of Systems:   As per HPI      Vital Signs:    Last 24hrs VS reviewed since prior progress note.  Most recent are:  Visit Vitals  /79 (BP 1 Location: Right arm, BP Patient Position: Sitting)   Pulse (!) 109   Temp 98.4 °F (36.9 °C)   Resp 20   Ht 5' 2\" (1.575 m)   Wt 85.7 kg (189 lb)   SpO2 97%   BMI 34.57 kg/m²         Intake/Output Summary (Last 24 hours) at 8/9/2019 2017  Last data filed at 8/9/2019 1951  Gross per 24 hour   Intake    Output 1080 ml   Net -1080 ml        Physical Examination:             Constitutional:  No acute distress   ENT:  Oral mucous moist, oropharynx benign. Resp:  decreased breath sounds at bases   CV:  Regular rhythm, normal rate, no murmurs    GI:  Soft, non distended, non tender. normoactive bowel sounds    Musculoskeletal:  2+ LE edema    Neurologic:  Moves all extremities. AAOx2(could not tell year)     Psych:  She appears depressed,anxious,tearful       Data Review:    Review and/or order of clinical lab test  Review and/or order of tests in the radiology section of CPT  Review and/or order of tests in the medicine section of CPT      Labs:     Recent Labs     08/08/19  1358   WBC 10.1   HGB 12.5   HCT 41.6        Recent Labs     08/09/19  1521 08/08/19  1358    141   K 3.7 4.3   * 106   CO2 25 27   BUN 17 19   CREA 0.87 0.89   * 95   CA 8.6 9.3     Recent Labs     08/08/19  1358   SGOT 65*   ALT 68   *   TBILI 1.0   TP 6.5   ALB 3.2*   GLOB 3.3     No results for input(s): INR, PTP, APTT in the last 72 hours. No lab exists for component: INREXT   No results for input(s): FE, TIBC, PSAT, FERR in the last 72 hours. No results found for: FOL, RBCF   No results for input(s): PH, PCO2, PO2 in the last 72 hours. No results for input(s): CPK, CKNDX, TROIQ in the last 72 hours.     No lab exists for component: CPKMB  Lab Results   Component Value Date/Time    Cholesterol, total 104 08/02/2019 05:06 AM    HDL Cholesterol 37 08/02/2019 05:06 AM    LDL, calculated 54.6 08/02/2019 05:06 AM    Triglyceride 62 08/02/2019 05:06 AM    CHOL/HDL Ratio 2.8 08/02/2019 05:06 AM     Lab Results   Component Value Date/Time    Glucose (POC) 124 (H) 11/26/2018 08:29 AM     Lab Results   Component Value Date/Time    Color DARK YELLOW 08/08/2019 02:06 PM    Appearance CLEAR 08/08/2019 02:06 PM    Specific gravity 1.023 08/08/2019 02:06 PM    Specific gravity >1.030 (H) 02/16/2012 03:00 PM    pH (UA) 5.5 08/08/2019 02:06 PM    Protein 30 (A) 08/08/2019 02:06 PM    Glucose NEGATIVE  08/08/2019 02:06 PM    Ketone 15 (A) 08/08/2019 02:06 PM    Bilirubin NEGATIVE  10/30/2018 11:37 AM    Urobilinogen 1.0 08/08/2019 02:06 PM    Nitrites NEGATIVE  08/08/2019 02:06 PM    Leukocyte Esterase TRACE (A) 08/08/2019 02:06 PM    Epithelial cells FEW 08/08/2019 02:06 PM    Bacteria NEGATIVE  08/08/2019 02:06 PM    WBC 10-20 08/08/2019 02:06 PM    RBC 0-5 08/08/2019 02:06 PM         Medications Reviewed:     Current Facility-Administered Medications   Medication Dose Route Frequency    [START ON 8/10/2019] risperiDONE (RisperDAL) tablet 2 mg  2 mg Oral QHS    metoprolol tartrate (LOPRESSOR) tablet 12.5 mg  12.5 mg Oral Q12H    aspirin delayed-release tablet 81 mg  81 mg Oral QPM    celecoxib (CELEBREX) capsule 100 mg  100 mg Oral BID    hydrOXYzine HCl (ATARAX) tablet 25 mg  25 mg Oral TID PRN    risperiDONE (RisperDAL) tablet 1 mg  1 mg Oral QHS    rosuvastatin (CRESTOR) tablet 10 mg  10 mg Oral QHS    traZODone (DESYREL) tablet 150 mg  150 mg Oral QHS    venlafaxine-SR (EFFEXOR-XR) capsule 300 mg  300 mg Oral DAILY WITH BREAKFAST    sodium chloride (NS) flush 5-40 mL  5-40 mL IntraVENous Q8H    sodium chloride (NS) flush 5-40 mL  5-40 mL IntraVENous PRN    enoxaparin (LOVENOX) injection 40 mg  40 mg SubCUTAneous Q24H    hydrALAZINE (APRESOLINE) 20 mg/mL injection 10 mg  10 mg IntraVENous Q6H PRN     ______________________________________________________________________  EXPECTED LENGTH OF STAY: - - -  ACTUAL LENGTH OF STAY:          0                 Sheryl Robert MD

## 2019-08-09 NOTE — PROGRESS NOTES
PHYSICAL THERAPY EVALUATION/DISCHARGE  Patient: Zhen Kumar (6 y.o. female)  Date: 8/9/2019  Primary Diagnosis: Failure to thrive (0-17) [R62.51]       Precautions: fall, bed alarm         ASSESSMENT  Based on the objective data described below, the patient presents with decreased mobility and stability minimally due to depression and anxiety more than physical issue. Pt demos decreased balance minimally and will stop walking when anxiety overwhelms her and leans into the wall or person next to her. Pt appears safe for transfer to behavioral health unit as she will have assistance to walk and she perseverates on depression and past issues. Attempted to gently comfort and reinforce anxiety mamagement strategies/breathing, however pt is difficult to reorient when discussing why she is upset. Will discharge as patient is not requiring PT skills at this time. Functional Outcome Measure: The patient scored 20/24 on the [facility] outcome measure which is indicative of low complexity. Other factors to consider for discharge: Pt is living \"alone\" with her dog. Dog is currently being taken care of at [facility].     Further skilled acute physical therapy is not indicated at this time. PLAN :  Recommendation for discharge: (in order for the patient to meet his/her long term goals)  No skilled physical therapy/ follow up rehabilitation needs identified at this time. If pt function declines while in [facility], please reorder PT. This discharge recommendation:  Has been made in collaboration with the attending provider and/or case management    Equipment recommendations for successful discharge: patient owns DME required for discharge       SUBJECTIVE:   Patient stated my dog is my world, but im all alone, everyone else is gone.  highly emotional and affecting movement/safety    OBJECTIVE DATA SUMMARY:   HISTORY:    Past Medical History:   Diagnosis Date    Arthritis     GENERALIZED IN JOINTS.     Breast cancer ([facility] [address]     Left Breast Cancer 1995    Cancer [facility])     left breast - surgery/radiation    Chronic pain     Back pain    Depression     Mood Swings, anxiety    GERD (gastroesophageal reflux disease)     Hiatal Hernia    Headache     Hypercholesterolemia     Hypertension     CONTROLLED BY MEDS., Pt states she is no liong er on BP med's    Ill-defined condition     Neuropathy bilat feet    Irregular heart beat     Other ill-defined conditions(799.89)     MIGRAINES    Other ill-defined conditions(799.89)     neuropathy of lower legs and feet    Other ill-defined conditions(799.89)     increased cholesterol    Other ill-defined conditions(799.89)     bronchitis    Psychotic disorder (HCC)     S/P radiation therapy     1995 Left Breast    Unspecified adverse effect of anesthesia     HEART PALPITATION. Past Surgical History:   Procedure Laterality Date    BREAST SURGERY PROCEDURE UNLISTED  7/1995    DCIS /BIOPSIES MULTIPLE.  BREAST SURGERY PROCEDURE UNLISTED  1995    LUMPECTOMY WITH RADIATION INSITU    HX BREAST LUMPECTOMY Left     1995 - POSITIVE    HX ORTHOPAEDIC  2006    LEFT KNEE ARTHROSCOPY    HX ORTHOPAEDIC  4/2010    RIGHT KNEE ARTHROSCOPY, concrete nail placed    HX ORTHOPAEDIC      Hammertoe , doesnt know which foot    HX ORTHOPAEDIC      Small growth removed from between toes but doesnt know which foot       Prior level of function: mod I  Personal factors and/or comorbidities impacting plan of care:     Home Situation  Home Environment: Independent living([facility])  # Steps to Enter: 0  One/Two Story Residence: One story  Living Alone: Yes  Support Systems: Other (comments)(staff)  Patient Expects to be Discharged to[de-identified] Independent living facility(post acute behavioral stay)  Current DME Used/Available at Home: None    EXAMINATION/PRESENTATION/DECISION MAKING:   Critical Behavior:              Hearing:   Auditory  Auditory Impairment: None  Skin:    Edema:   Range Of Motion:  AROM: Within functional limits                       Strength:    Strength: Within functional limits                    Tone & Sensation:   Tone: Normal              Sensation: Intact               Coordination:  Coordination: Within functional limits  Vision:      Functional Mobility:  Bed Mobility:  Rolling: Independent  Supine to Sit: Independent  Sit to Supine: Independent  Scooting: Independent  Transfers:  Sit to Stand: Supervision  Stand to Sit: Supervision                       Balance:   Sitting: Intact  Standing: Impaired; Without support  Standing - Static: Fair;Good;Constant support  Standing - Dynamic : Fair;Constant support  Ambulation/Gait Training:  Distance (ft): 200 Feet (ft)     Ambulation - Level of Assistance: Minimal assistance;Contact guard assistance     Gait Description (WDL): Exceptions to WDL  Gait Abnormalities: Decreased step clearance;Shuffling gait; Path deviations        Base of Support: Other (comment)(WFL)     Speed/Janell: Fluctuations(normal vs slow)  Step Length: Left shortened;Right shortened                     *    Functional Measure:  AMPAC 4, 4, 4, 4, 3, 1, score of 20/24     Physical Therapy Evaluation Charge Determination   History Examination Presentation Decision-Making   HIGH Complexity :3+ comorbidities / personal factors will impact the outcome/ POC  HIGH Complexity : 4+ Standardized tests and measures addressing body structure, function, activity limitation and / or participation in recreation  LOW Complexity : Stable, uncomplicated  Other outcome measures AMPAC  LOW       Based on the above components, the patient evaluation is determined to be of the following complexity level: LOW     Pain Ratin/10    Activity Tolerance:   Good and SpO2 stable on RA  Please refer to the flowsheet for vital signs taken during this treatment.     After treatment patient left in no apparent distress:   Supine in bed, Call bell within reach and emotionally affected    COMMUNICATION/EDUCATION:   The patients plan of care was discussed with: Registered Nurse and . Fall prevention education was provided and the patient/caregiver indicated understanding. and Patient/family have participated as able in goal setting and plan of care.     Thank you for this referral.  Yessy Smith, PT   Time Calculation: 35 mins

## 2019-08-09 NOTE — CONSULTS
PSYCHIATRY CONSULT NOTE:    REASON FOR CONSULT: depression and delusions      HISTORY OF PRESENTING COMPLAINT:  Karly Herrera is a 76 y.o. WHITE OR  female who is currently admitted to the medical floor at [facility]. She is known to me from recent inpatient psychiatric admission at [facility] [place], she was discharged under my care 3 days ago after a 1-week inpatient stay. She was diagnosed with delusional disorder. She believes that bugs are biting her and they are on her right leg. Unfortunately, since her discharge at [facility] [place] she has not taken her medications. She tells me that she is feeling overwhelmed, because she misses her dog. Her story keeps changing. She tells me that she does not know where her dog is but then she called her vet and she was told that her dog just finished eating and is in a safe place. She also initially told me that she does not know where she leaves but later stated that she lives in [facility]. She reports that since her discharged, she has no recollection of anything, doesn't know how she ended up in the hospital, \"I feel very overwhelmed and confused. \" She denies si or hi but has vh of bugs. She has the resurgence of elusions of being bitten bugs in her legs and was pointing them at me. Her delusions and hallucinations improved to the point of being almost nonexistent when she was on her meds. She was previously being followed by Dr. Carolina Kee, but he is about to retire and so she will have her first appointment [place] [id number] [facility] on August 16th. PAST PSYCHIATRIC HISTORY:  She was previously hospitalized here at [facility] 20 years ago. Amanda Solorzano was also admitted at [facility]. Amanda Solorzano will have her first appointment with Dr. Ayaz Sellers in a week.       PAST MEDICAL HISTORY:  Please see H&P for details. Past Medical History:   Diagnosis Date    Arthritis     GENERALIZED IN JOINTS.     Breast cancer ([address]     Left Breast Cancer 1995    Cancer [facility])     left breast - surgery/radiation    Chronic pain     Back pain    Depression     Mood Swings, anxiety    GERD (gastroesophageal reflux disease)     Hiatal Hernia    Headache     Hypercholesterolemia     Hypertension     CONTROLLED BY MEDS., Pt states she is no liong er on BP med's    Ill-defined condition     Neuropathy bilat feet    Irregular heart beat     Other ill-defined conditions(799.89)     MIGRAINES    Other ill-defined conditions(799.89)     neuropathy of lower legs and feet    Other ill-defined conditions(799.89)     increased cholesterol    Other ill-defined conditions(799.89)     bronchitis    Psychotic disorder (HCC)     S/P radiation therapy     1995 Left Breast    Unspecified adverse effect of anesthesia     HEART PALPITATION. Lab Results   Component Value Date/Time    WBC 10.1 08/08/2019 01:58 PM    HGB 12.5 08/08/2019 01:58 PM    HCT 41.6 08/08/2019 01:58 PM    PLATELET 289 41/75/1862 01:58 PM    MCV 87.6 08/08/2019 01:58 PM      Lab Results   Component Value Date/Time    Sodium 141 08/08/2019 01:58 PM    Potassium 4.3 08/08/2019 01:58 PM    Chloride 106 08/08/2019 01:58 PM    CO2 27 08/08/2019 01:58 PM    Anion gap 8 08/08/2019 01:58 PM    Glucose 95 08/08/2019 01:58 PM    Glucose 121 (H) 08/02/2019 05:06 AM    BUN 19 08/08/2019 01:58 PM    Creatinine 0.89 08/08/2019 01:58 PM    BUN/Creatinine ratio 21 (H) 08/08/2019 01:58 PM    GFR est AA >60 08/08/2019 01:58 PM    GFR est non-AA >60 08/08/2019 01:58 PM    Calcium 9.3 08/08/2019 01:58 PM    Bilirubin, total 1.0 08/08/2019 01:58 PM    AST (SGOT) 65 (H) 08/08/2019 01:58 PM    Alk.  phosphatase 124 (H) 08/08/2019 01:58 PM    Protein, total 6.5 08/08/2019 01:58 PM    Albumin 3.2 (L) 08/08/2019 01:58 PM    Globulin 3.3 08/08/2019 01:58 PM    A-G Ratio 1.0 (L) 08/08/2019 01:58 PM    ALT (SGPT) 68 08/08/2019 01:58 PM          PSYCHOSOCIAL HISTORY:  She is a . Jarrett Gamble has no children.  She is currently receiving social security disability checks.  She currently resides at [facility] for 2-1/2 years. MENTAL STATUS EXAM:    General appearance: Dressed in hospital apparel. Eye contact: Good eye contact  Speech: Spontaneous  Affect : Blunt  Mood: \"overwhelmed\"  Thought Process: Somewhat disorganized  Perception: VH of bugs  Thought Content: Denies SI or Plan  Insight: Poor  Judgement: Poor  Cognition: Impaired     ASSESSMENT AND PLAN:  Rick Enriquez meets criteria for a diagnosis of  Delusional disorder. She is inconsistent with her story as described above. Her delusions and vh of bugs tends to improve with medications. Unfortunately, she stopped taking them. I will continue risperdal 1mg until tonight and increase it to 2mg starting tomorrow to help with hallucinations and delusions. Continue effexor. Please admit to [facility] acute unit once medically cleared for further evaluation and treatment. Thank you for this kind consult.

## 2019-08-10 ENCOUNTER — APPOINTMENT (OUTPATIENT)
Dept: NON INVASIVE DIAGNOSTICS | Age: 69
DRG: 293 | End: 2019-08-10
Attending: HOSPITALIST
Payer: MEDICARE

## 2019-08-10 LAB
ANION GAP SERPL CALC-SCNC: 12 MMOL/L (ref 5–15)
BUN SERPL-MCNC: 16 MG/DL (ref 6–20)
BUN/CREAT SERPL: 20 (ref 12–20)
CALCIUM SERPL-MCNC: 8.4 MG/DL (ref 8.5–10.1)
CHLORIDE SERPL-SCNC: 110 MMOL/L (ref 97–108)
CO2 SERPL-SCNC: 20 MMOL/L (ref 21–32)
CREAT SERPL-MCNC: 0.82 MG/DL (ref 0.55–1.02)
ECHO AO ROOT DIAM: 2.73 CM
ECHO AV AREA PEAK VELOCITY: 1.7 CM2
ECHO AV AREA/BSA PEAK VELOCITY: 0.8 CM2/M2
ECHO AV PEAK GRADIENT: 7.4 MMHG
ECHO AV PEAK VELOCITY: 135.74 CM/S
ECHO EST RA PRESSURE: 3 MMHG
ECHO LA MAJOR AXIS: 4.41 CM
ECHO LA TO AORTIC ROOT RATIO: 1.61
ECHO LV E' LATERAL VELOCITY: 8.14 CM/S
ECHO LV E' SEPTAL VELOCITY: 5.72 CM/S
ECHO LV INTERNAL DIMENSION DIASTOLIC: 4.4 CM (ref 3.9–5.3)
ECHO LV INTERNAL DIMENSION SYSTOLIC: 3.98 CM
ECHO LV IVSD: 1.39 CM (ref 0.6–0.9)
ECHO LV MASS 2D: 280.4 G (ref 67–162)
ECHO LV MASS INDEX 2D: 150.3 G/M2 (ref 43–95)
ECHO LV POSTERIOR WALL DIASTOLIC: 1.37 CM (ref 0.6–0.9)
ECHO LVOT DIAM: 2.06 CM
ECHO LVOT PEAK GRADIENT: 2 MMHG
ECHO LVOT PEAK VELOCITY: 70.7 CM/S
ECHO MV A VELOCITY: 0.43 CM/S
ECHO MV AREA PHT: 6.3 CM2
ECHO MV E DECELERATION TIME (DT): 125 MS
ECHO MV E VELOCITY: 87.15 CM/S
ECHO MV E/A RATIO: 202.67
ECHO MV E/E' LATERAL: 10.71
ECHO MV E/E' RATIO (AVERAGED): 12.97
ECHO MV E/E' SEPTAL: 15.24
ECHO MV PRESSURE HALF TIME (PHT): 35.1 MS
ECHO PVEIN A DURATION: 168.1 MS
ECHO PVEIN PEAK D VELOCITY: 110.1 CM/S
ECHO RIGHT VENTRICULAR SYSTOLIC PRESSURE (RVSP): 27 MMHG
ECHO TV REGURGITANT MAX VELOCITY: 244.77 CM/S
ECHO TV REGURGITANT PEAK GRADIENT: 24 MMHG
GLUCOSE SERPL-MCNC: 79 MG/DL (ref 65–100)
MAGNESIUM SERPL-MCNC: 1.9 MG/DL (ref 1.6–2.4)
POTASSIUM SERPL-SCNC: 4 MMOL/L (ref 3.5–5.1)
SODIUM SERPL-SCNC: 142 MMOL/L (ref 136–145)

## 2019-08-10 PROCEDURE — 93306 TTE W/DOPPLER COMPLETE: CPT

## 2019-08-10 PROCEDURE — 74011250637 HC RX REV CODE- 250/637: Performed by: INTERNAL MEDICINE

## 2019-08-10 PROCEDURE — 99218 HC RM OBSERVATION: CPT

## 2019-08-10 PROCEDURE — 80048 BASIC METABOLIC PNL TOTAL CA: CPT

## 2019-08-10 PROCEDURE — 36415 COLL VENOUS BLD VENIPUNCTURE: CPT

## 2019-08-10 PROCEDURE — 74011250636 HC RX REV CODE- 250/636: Performed by: HOSPITALIST

## 2019-08-10 PROCEDURE — 93005 ELECTROCARDIOGRAM TRACING: CPT

## 2019-08-10 PROCEDURE — 74011250637 HC RX REV CODE- 250/637: Performed by: NURSE PRACTITIONER

## 2019-08-10 PROCEDURE — 83735 ASSAY OF MAGNESIUM: CPT

## 2019-08-10 PROCEDURE — 74011250636 HC RX REV CODE- 250/636: Performed by: INTERNAL MEDICINE

## 2019-08-10 PROCEDURE — 74011250637 HC RX REV CODE- 250/637: Performed by: HOSPITALIST

## 2019-08-10 RX ORDER — FUROSEMIDE 10 MG/ML
40 INJECTION INTRAMUSCULAR; INTRAVENOUS ONCE
Status: COMPLETED | OUTPATIENT
Start: 2019-08-10 | End: 2019-08-10

## 2019-08-10 RX ORDER — LISINOPRIL 5 MG/1
5 TABLET ORAL DAILY
Status: DISCONTINUED | OUTPATIENT
Start: 2019-08-10 | End: 2019-08-12

## 2019-08-10 RX ORDER — FUROSEMIDE 10 MG/ML
40 INJECTION INTRAMUSCULAR; INTRAVENOUS DAILY
Status: DISCONTINUED | OUTPATIENT
Start: 2019-08-10 | End: 2019-08-12

## 2019-08-10 RX ORDER — METOPROLOL SUCCINATE 25 MG/1
25 TABLET, EXTENDED RELEASE ORAL DAILY
Status: DISCONTINUED | OUTPATIENT
Start: 2019-08-10 | End: 2019-08-14

## 2019-08-10 RX ADMIN — VENLAFAXINE HYDROCHLORIDE 300 MG: 150 CAPSULE, EXTENDED RELEASE ORAL at 08:11

## 2019-08-10 RX ADMIN — ASPIRIN 81 MG: 81 TABLET ORAL at 19:06

## 2019-08-10 RX ADMIN — METOPROLOL SUCCINATE 25 MG: 25 TABLET, EXTENDED RELEASE ORAL at 14:03

## 2019-08-10 RX ADMIN — METOPROLOL TARTRATE 12.5 MG: 25 TABLET ORAL at 08:12

## 2019-08-10 RX ADMIN — HYDROXYZINE HYDROCHLORIDE 25 MG: 25 TABLET, FILM COATED ORAL at 14:03

## 2019-08-10 RX ADMIN — LISINOPRIL 5 MG: 5 TABLET ORAL at 14:03

## 2019-08-10 RX ADMIN — ROSUVASTATIN CALCIUM 10 MG: 10 TABLET, COATED ORAL at 21:31

## 2019-08-10 RX ADMIN — FUROSEMIDE 40 MG: 10 INJECTION, SOLUTION INTRAMUSCULAR; INTRAVENOUS at 14:03

## 2019-08-10 RX ADMIN — CELECOXIB 100 MG: 100 CAPSULE ORAL at 19:06

## 2019-08-10 RX ADMIN — Medication 10 ML: at 21:32

## 2019-08-10 RX ADMIN — TRAZODONE HYDROCHLORIDE 150 MG: 100 TABLET ORAL at 21:31

## 2019-08-10 RX ADMIN — CELECOXIB 100 MG: 100 CAPSULE ORAL at 08:11

## 2019-08-10 RX ADMIN — Medication 10 ML: at 09:45

## 2019-08-10 RX ADMIN — ENOXAPARIN SODIUM 40 MG: 40 INJECTION SUBCUTANEOUS at 19:06

## 2019-08-10 RX ADMIN — FUROSEMIDE 40 MG: 10 INJECTION, SOLUTION INTRAMUSCULAR; INTRAVENOUS at 08:12

## 2019-08-10 RX ADMIN — RISPERIDONE 2 MG: 1 TABLET ORAL at 21:31

## 2019-08-10 NOTE — PROGRESS NOTES
Hospitalist Progress Note  Rita Pacheco MD  Answering service: [phone] from in house phone        Date of Service:  8/10/2019  NAME:  Zhen Kumar  :  1950  MRN:  [id number]      Admission Summary:   76 y.o F with PMH of depression,delusional disorder,anxiety,HTN,arthritis and other medical issues was admitted to the hospital yesterday -due to confusion and was unable to take of her self. She was recently discharged from behavioral health unit after receiving treatment for depression and delusional disorder. She stopped taking all her meds after going home    Interval history / Subjective:   Noted  Overnight events - O2 sat dropped while walking to the bath room. This morning,she states \" I dont know why I am here,I just want to go and be with my dog\". When asked about her breathing she states \" I can be better and you never know\". She is alert and oriented X 3, appears depressed. Assessment & Plan:     #Shortness of breath:  -CXR showed pulmonary edema,BNP is elevated -6000.  -echo pending  -Will give 1 dose 40 mg IV lasix. -BMP pending. #Depression,deluisonal disorder:  -psych consulted -recommended admission once medically stable.  -will continue meds - risperidal,effexor and trazadone. #HTN:  -continue metoprolol 12.5 mg BID. Code status: full  DVT prophylaxis: lovenox    Care Plan discussed with: [facility] unit when medically stable     Hospital Problems  Date Reviewed: 2019          Codes Class Noted POA    Failure to thrive (0-17) ICD-10-CM: R62.51  ICD-9-CM: 783.41  2019 Unknown                Review of Systems:   As per HPI      Vital Signs:    Last 24hrs VS reviewed since prior progress note.  Most recent are:  Visit Vitals  BP (!) 142/91 (BP 1 Location: Right arm, BP Patient Position: At rest)   Pulse 98   Temp 97.8 °F (36.6 °C)   Resp 16   Ht 5' 2\" (1.575 m)   Wt 85.7 kg (189 lb)   SpO2 94%   BMI 34.57 kg/m²         Intake/Output Summary (Last 24 hours) at 8/10/2019 4420  Last data filed at 8/9/2019 1951  Gross per 24 hour   Intake    Output 1080 ml   Net -1080 ml        Physical Examination:             Constitutional:  No acute distress   ENT:  Oral mucous moist, oropharynx benign. Resp:  decreased breath sounds at bases   CV:  Regular rhythm, normal rate, no murmurs    GI:  Soft, non distended, non tender. normoactive bowel sounds    Musculoskeletal:  1-2+ LE edema    Neurologic:  Moves all extremities. AAOx3     Psych:  She appears depressed,anxious       Data Review:    Review and/or order of clinical lab test  Review and/or order of tests in the medicine section of CPT      Labs:     Recent Labs     08/08/19  1358   WBC 10.1   HGB 12.5   HCT 41.6        Recent Labs     08/10/19  0114 08/09/19  1521 08/08/19  1358   NA  --  143 141   K  --  3.7 4.3   CL  --  109* 106   CO2  --  25 27   BUN  --  17 19   CREA  --  0.87 0.89   GLU  --  106* 95   CA  --  8.6 9.3   MG 1.9  --   --      Recent Labs     08/08/19  1358   SGOT 65*   ALT 68   *   TBILI 1.0   TP 6.5   ALB 3.2*   GLOB 3.3     No results for input(s): INR, PTP, APTT in the last 72 hours. No lab exists for component: INREXT, INREXT   No results for input(s): FE, TIBC, PSAT, FERR in the last 72 hours. No results found for: FOL, RBCF   No results for input(s): PH, PCO2, PO2 in the last 72 hours. No results for input(s): CPK, CKNDX, TROIQ in the last 72 hours.     No lab exists for component: CPKMB  Lab Results   Component Value Date/Time    Cholesterol, total 104 08/02/2019 05:06 AM    HDL Cholesterol 37 08/02/2019 05:06 AM    LDL, calculated 54.6 08/02/2019 05:06 AM    Triglyceride 62 08/02/2019 05:06 AM    CHOL/HDL Ratio 2.8 08/02/2019 05:06 AM     Lab Results   Component Value Date/Time    Glucose (POC) 124 (H) 11/26/2018 08:29 AM     Lab Results   Component Value Date/Time Color DARK YELLOW 08/08/2019 02:06 PM    Appearance CLEAR 08/08/2019 02:06 PM    Specific gravity 1.023 08/08/2019 02:06 PM    Specific gravity >1.030 (H) 02/16/2012 03:00 PM    pH (UA) 5.5 08/08/2019 02:06 PM    Protein 30 (A) 08/08/2019 02:06 PM    Glucose NEGATIVE  08/08/2019 02:06 PM    Ketone 15 (A) 08/08/2019 02:06 PM    Bilirubin NEGATIVE  10/30/2018 11:37 AM    Urobilinogen 1.0 08/08/2019 02:06 PM    Nitrites NEGATIVE  08/08/2019 02:06 PM    Leukocyte Esterase TRACE (A) 08/08/2019 02:06 PM    Epithelial cells FEW 08/08/2019 02:06 PM    Bacteria NEGATIVE  08/08/2019 02:06 PM    WBC 10-20 08/08/2019 02:06 PM    RBC 0-5 08/08/2019 02:06 PM         Medications Reviewed:     Current Facility-Administered Medications   Medication Dose Route Frequency    risperiDONE (RisperDAL) tablet 2 mg  2 mg Oral QHS    metoprolol tartrate (LOPRESSOR) tablet 12.5 mg  12.5 mg Oral Q12H    acetaminophen (TYLENOL) tablet 650 mg  650 mg Oral Q6H PRN    aspirin delayed-release tablet 81 mg  81 mg Oral QPM    celecoxib (CELEBREX) capsule 100 mg  100 mg Oral BID    hydrOXYzine HCl (ATARAX) tablet 25 mg  25 mg Oral TID PRN    rosuvastatin (CRESTOR) tablet 10 mg  10 mg Oral QHS    traZODone (DESYREL) tablet 150 mg  150 mg Oral QHS    venlafaxine-SR (EFFEXOR-XR) capsule 300 mg  300 mg Oral DAILY WITH BREAKFAST    sodium chloride (NS) flush 5-40 mL  5-40 mL IntraVENous Q8H    sodium chloride (NS) flush 5-40 mL  5-40 mL IntraVENous PRN    enoxaparin (LOVENOX) injection 40 mg  40 mg SubCUTAneous Q24H    hydrALAZINE (APRESOLINE) 20 mg/mL injection 10 mg  10 mg IntraVENous Q6H PRN     ______________________________________________________________________  EXPECTED LENGTH OF STAY: - - -  ACTUAL LENGTH OF STAY:          0                 Guanakito Luciano MD

## 2019-08-10 NOTE — CONSULTS
CARDIOLOGY CONSULT                  Subjective:    Date of  Admission: 8/8/2019 12:26 PM     Admission type:Emergency    Farhat Hyatt is a 76 y.o. female admitted for Failure to thrive (0-17) [R62.51]. She also has significant psychiatric diagnoses and will be admitted to  psychiatric unit once medically cleared. She was noted to have developed difficulty with dyspnea. CHF on CXR and elevated BNP. Echo showed depressed LVEF. She is unable to give a cogent cardiac history    Patient Active Problem List    Diagnosis Date Noted    Failure to thrive (0-17) 08/08/2019    Atypical glandular cells of undetermined significance (BASILIO) on cervical Pap smear 11/26/2018    Severe obesity with body mass index (BMI) of 35.0 to 39.9 with serious comorbidity ([facility] [address] 07/03/2018    Hx of carcinoma in situ of breast 04/09/2018    Personality disorder ([facility] [address] 03/02/2017    Hyperlipidemia 12/02/2015    Chronic lower back pain 11/19/2015    Depression with anxiety 09/11/2012    Arthritis of knee, right 02/20/2012      Nova Chamorro MD  Past Medical History:   Diagnosis Date    Arthritis     GENERALIZED IN JOINTS.     Breast cancer ([facility] [address]     Left Breast Cancer 1995    Cancer [facility])     left breast - surgery/radiation    Chronic pain     Back pain    Depression     Mood Swings, anxiety    GERD (gastroesophageal reflux disease)     Hiatal Hernia    Headache     Hypercholesterolemia     Hypertension     CONTROLLED BY MEDS., Pt states she is no liong er on BP med's    Ill-defined condition     Neuropathy bilat feet    Irregular heart beat     Other ill-defined conditions(799.89)     MIGRAINES    Other ill-defined conditions(799.89)     neuropathy of lower legs and feet    Other ill-defined conditions(799.89)     increased cholesterol    Other ill-defined conditions(799.89)     bronchitis    Psychotic disorder (HCC)     S/P radiation therapy     1995 Left Breast    Unspecified adverse effect of anesthesia     HEART PALPITATION. Past Surgical History:   Procedure Laterality Date    BREAST SURGERY PROCEDURE UNLISTED  7/1995    DCIS /BIOPSIES MULTIPLE.  BREAST SURGERY PROCEDURE UNLISTED  1995    LUMPECTOMY WITH RADIATION INSITU    HX BREAST LUMPECTOMY Left     1995 - POSITIVE    HX ORTHOPAEDIC  2006    LEFT KNEE ARTHROSCOPY    HX ORTHOPAEDIC  4/2010    RIGHT KNEE ARTHROSCOPY, concrete nail placed    HX ORTHOPAEDIC      Hammertoe , doesnt know which foot    HX ORTHOPAEDIC      Small growth removed from between toes but doesnt know which foot     Allergies   Allergen Reactions    Epinephrine Palpitations     \"Makes my heart  ' shudder'. \" Allergic to epinephrine with novocain. Plain epinephrine is ok.  Novocain [Procaine] Palpitations    Nsaids (Non-Steroidal Anti-Inflammatory Drug) Other (comments)     GASTRIC DISTRESS.     Ibuprofen Other (comments)     Gastric irritation      Family History   Problem Relation Age of Onset    Cancer Mother         BLADDER CA    Heart Disease Father     Lung Disease Father       Current Facility-Administered Medications   Medication Dose Route Frequency    risperiDONE (RisperDAL) tablet 2 mg  2 mg Oral QHS    metoprolol tartrate (LOPRESSOR) tablet 12.5 mg  12.5 mg Oral Q12H    acetaminophen (TYLENOL) tablet 650 mg  650 mg Oral Q6H PRN    aspirin delayed-release tablet 81 mg  81 mg Oral QPM    celecoxib (CELEBREX) capsule 100 mg  100 mg Oral BID    hydrOXYzine HCl (ATARAX) tablet 25 mg  25 mg Oral TID PRN    rosuvastatin (CRESTOR) tablet 10 mg  10 mg Oral QHS    traZODone (DESYREL) tablet 150 mg  150 mg Oral QHS    venlafaxine-SR (EFFEXOR-XR) capsule 300 mg  300 mg Oral DAILY WITH BREAKFAST    sodium chloride (NS) flush 5-40 mL  5-40 mL IntraVENous Q8H    sodium chloride (NS) flush 5-40 mL  5-40 mL IntraVENous PRN    enoxaparin (LOVENOX) injection 40 mg  40 mg SubCUTAneous Q24H    hydrALAZINE (APRESOLINE) 20 mg/mL injection 10 mg  10 mg IntraVENous Q6H PRN Review of Symptoms:  UTO 2/2 delusions         Physical Exam    Visit Vitals  BP (!) 142/91 (BP 1 Location: Right arm, BP Patient Position: At rest)   Pulse 98   Temp 97.8 °F (36.6 °C)   Resp 16   Ht 5' 2\" (1.575 m)   Wt 85.7 kg (188 lb 14.9 oz)   SpO2 94%   BMI 34.56 kg/m²     NAD  Skin warm and dry  Nl conjunctiva  Oropharynx without exudate.     Neck supple  Lungs clear  Normal S1/ S2 with occasional ectopy  No Murmurs, click or Rubs  Abdomen soft and non tender  Pulses 2+ radials  Neuro:  Grossly intact  Appropriate      Cardiographics    Telemetry: normal sinus rhythm  Echocardiogram: pending    Labs:   Recent Results (from the past 24 hour(s))   NT-PRO BNP    Collection Time: 08/09/19  3:21 PM   Result Value Ref Range    NT pro-BNP 6,248 (H) <054 PG/ML   METABOLIC PANEL, BASIC    Collection Time: 08/09/19  3:21 PM   Result Value Ref Range    Sodium 143 136 - 145 mmol/L    Potassium 3.7 3.5 - 5.1 mmol/L    Chloride 109 (H) 97 - 108 mmol/L    CO2 25 21 - 32 mmol/L    Anion gap 9 5 - 15 mmol/L    Glucose 106 (H) 65 - 100 mg/dL    BUN 17 6 - 20 MG/DL    Creatinine 0.87 0.55 - 1.02 MG/DL    BUN/Creatinine ratio 20 12 - 20      GFR est AA >60 >60 ml/min/1.73m2    GFR est non-AA >60 >60 ml/min/1.73m2    Calcium 8.6 8.5 - 10.1 MG/DL   MAGNESIUM    Collection Time: 08/10/19  1:14 AM   Result Value Ref Range    Magnesium 1.9 1.6 - 2.4 mg/dL   METABOLIC PANEL, BASIC    Collection Time: 08/10/19  1:14 AM   Result Value Ref Range    Sodium 142 136 - 145 mmol/L    Potassium 4.0 3.5 - 5.1 mmol/L    Chloride 110 (H) 97 - 108 mmol/L    CO2 20 (L) 21 - 32 mmol/L    Anion gap 12 5 - 15 mmol/L    Glucose 79 65 - 100 mg/dL    BUN 16 6 - 20 MG/DL    Creatinine 0.82 0.55 - 1.02 MG/DL    BUN/Creatinine ratio 20 12 - 20      GFR est AA >60 >60 ml/min/1.73m2    GFR est non-AA >60 >60 ml/min/1.73m2    Calcium 8.4 (L) 8.5 - 10.1 MG/DL   ECHO ADULT COMPLETE    Collection Time: 08/10/19 11:29 AM   Result Value Ref Range    LV E' Lateral Velocity 8.14 cm/s    LV E' Septal Velocity 5.72 cm/s    Ao Root D 2.73 cm    Aortic Valve Systolic Peak Velocity 431.70 cm/s    Aortic Valve Area by Continuity of Peak Velocity 1.7 cm2    AoV PG 7.4 mmHg    LVIDd 4.40 3.9 - 5.3 cm    LVPWd 1.37 (A) 0.6 - 0.9 cm    LVIDs 3.98 cm    IVSd 1.39 (A) 0.6 - 0.9 cm    LVOT d 2.06 cm    LVOT Peak Velocity 70.70 cm/s    LVOT Peak Gradient 2.0 mmHg    MVA (PHT) 6.3 cm2    MV A Chan 0.43 cm/s    MV E Chan 87.15 cm/s    MV E/A 202.67     Left Atrium to Aortic Root Ratio 1.61     LV Mass .4 (A) 67 - 162 g    LV Mass AL Index 150.3 (A) 43 - 95 g/m2    E/E' lateral 10.71     E/E' septal 15.24     RVSP 27.0 mmHg    E/E' ratio (averaged) 12.97     Est. RA Pressure 3.0 mmHg    Mitral Valve E Wave Deceleration Time 125.0 ms    Mitral Valve Pressure Half-time 35.1 ms    Left Atrium Major Axis 4.41 cm    Triscuspid Valve Regurgitation Peak Gradient 24.0 mmHg    P Vein A Dur 168.1 ms    Pulm Vein Peak D Velocity 110.10 cm/s    TR Max Velocity 244.77 cm/s    PASP 27.0 mmHg    ROBY/BSA Pk Chan 0.8 cm2/m2        Assessment and Plan:   This is a 76 yof with delusional d/o found to have new onset CHF, appears acute systolic NYHA2    Restarted IV lasix  Changed metoprolol tartrate to succinate  Added lisinopril  Will defer ischemic evaluation for now until psychiatric issues are stable and she is reliable to take her cardiac meds; would not want issue with medical compliance to occur after she has a possible PCI; will treat aggressively with ASA, statin  Hopeful that diuresis will improve her pleural effusion    Thank you for this consult, please call with questions     Assessment:

## 2019-08-11 PROBLEM — I50.9 ACUTE CHF (CONGESTIVE HEART FAILURE) (HCC): Status: ACTIVE | Noted: 2019-08-11

## 2019-08-11 LAB
ANION GAP SERPL CALC-SCNC: 5 MMOL/L (ref 5–15)
ANION GAP SERPL CALC-SCNC: 6 MMOL/L (ref 5–15)
ATRIAL RATE: 94 BPM
BUN SERPL-MCNC: 18 MG/DL (ref 6–20)
BUN SERPL-MCNC: 20 MG/DL (ref 6–20)
BUN/CREAT SERPL: 21 (ref 12–20)
BUN/CREAT SERPL: 22 (ref 12–20)
CALCIUM SERPL-MCNC: 8.5 MG/DL (ref 8.5–10.1)
CALCIUM SERPL-MCNC: 8.9 MG/DL (ref 8.5–10.1)
CALCULATED P AXIS, ECG09: 52 DEGREES
CALCULATED R AXIS, ECG10: 34 DEGREES
CALCULATED T AXIS, ECG11: 10 DEGREES
CHLORIDE SERPL-SCNC: 103 MMOL/L (ref 97–108)
CHLORIDE SERPL-SCNC: 104 MMOL/L (ref 97–108)
CO2 SERPL-SCNC: 30 MMOL/L (ref 21–32)
CO2 SERPL-SCNC: 32 MMOL/L (ref 21–32)
CREAT SERPL-MCNC: 0.82 MG/DL (ref 0.55–1.02)
CREAT SERPL-MCNC: 0.96 MG/DL (ref 0.55–1.02)
DIAGNOSIS, 93000: NORMAL
GLUCOSE SERPL-MCNC: 118 MG/DL (ref 65–100)
GLUCOSE SERPL-MCNC: 85 MG/DL (ref 65–100)
MAGNESIUM SERPL-MCNC: 1.9 MG/DL (ref 1.6–2.4)
P-R INTERVAL, ECG05: 128 MS
POTASSIUM SERPL-SCNC: 2.9 MMOL/L (ref 3.5–5.1)
POTASSIUM SERPL-SCNC: 4.4 MMOL/L (ref 3.5–5.1)
Q-T INTERVAL, ECG07: 390 MS
QRS DURATION, ECG06: 84 MS
QTC CALCULATION (BEZET), ECG08: 487 MS
SODIUM SERPL-SCNC: 138 MMOL/L (ref 136–145)
SODIUM SERPL-SCNC: 142 MMOL/L (ref 136–145)
VENTRICULAR RATE, ECG03: 94 BPM

## 2019-08-11 PROCEDURE — 65660000000 HC RM CCU STEPDOWN

## 2019-08-11 PROCEDURE — 97165 OT EVAL LOW COMPLEX 30 MIN: CPT

## 2019-08-11 PROCEDURE — 36415 COLL VENOUS BLD VENIPUNCTURE: CPT

## 2019-08-11 PROCEDURE — 99218 HC RM OBSERVATION: CPT

## 2019-08-11 PROCEDURE — 74011250637 HC RX REV CODE- 250/637: Performed by: NURSE PRACTITIONER

## 2019-08-11 PROCEDURE — 80048 BASIC METABOLIC PNL TOTAL CA: CPT

## 2019-08-11 PROCEDURE — 74011250637 HC RX REV CODE- 250/637: Performed by: HOSPITALIST

## 2019-08-11 PROCEDURE — 83735 ASSAY OF MAGNESIUM: CPT

## 2019-08-11 PROCEDURE — 74011250637 HC RX REV CODE- 250/637: Performed by: INTERNAL MEDICINE

## 2019-08-11 PROCEDURE — 74011250636 HC RX REV CODE- 250/636: Performed by: INTERNAL MEDICINE

## 2019-08-11 PROCEDURE — 74011250636 HC RX REV CODE- 250/636: Performed by: HOSPITALIST

## 2019-08-11 RX ORDER — CALCIUM CARBONATE 200(500)MG
200 TABLET,CHEWABLE ORAL
Status: DISCONTINUED | OUTPATIENT
Start: 2019-08-11 | End: 2019-08-14 | Stop reason: HOSPADM

## 2019-08-11 RX ORDER — POTASSIUM CHLORIDE 14.9 MG/ML
10 INJECTION INTRAVENOUS
Status: DISCONTINUED | OUTPATIENT
Start: 2019-08-11 | End: 2019-08-11 | Stop reason: SDUPTHER

## 2019-08-11 RX ORDER — POTASSIUM CHLORIDE 750 MG/1
40 TABLET, FILM COATED, EXTENDED RELEASE ORAL
Status: COMPLETED | OUTPATIENT
Start: 2019-08-11 | End: 2019-08-11

## 2019-08-11 RX ORDER — POTASSIUM CHLORIDE 7.45 MG/ML
10 INJECTION INTRAVENOUS
Status: COMPLETED | OUTPATIENT
Start: 2019-08-11 | End: 2019-08-11

## 2019-08-11 RX ADMIN — ROSUVASTATIN CALCIUM 10 MG: 10 TABLET, COATED ORAL at 21:18

## 2019-08-11 RX ADMIN — Medication 5 ML: at 14:00

## 2019-08-11 RX ADMIN — TRAZODONE HYDROCHLORIDE 150 MG: 100 TABLET ORAL at 21:18

## 2019-08-11 RX ADMIN — POTASSIUM CHLORIDE 10 MEQ: 10 INJECTION, SOLUTION INTRAVENOUS at 09:52

## 2019-08-11 RX ADMIN — LISINOPRIL 5 MG: 5 TABLET ORAL at 09:51

## 2019-08-11 RX ADMIN — POTASSIUM CHLORIDE 10 MEQ: 10 INJECTION, SOLUTION INTRAVENOUS at 18:30

## 2019-08-11 RX ADMIN — Medication 10 ML: at 07:38

## 2019-08-11 RX ADMIN — ENOXAPARIN SODIUM 40 MG: 40 INJECTION SUBCUTANEOUS at 17:32

## 2019-08-11 RX ADMIN — CALCIUM CARBONATE (ANTACID) CHEW TAB 500 MG 200 MG: 500 CHEW TAB at 18:29

## 2019-08-11 RX ADMIN — ASPIRIN 81 MG: 81 TABLET ORAL at 17:32

## 2019-08-11 RX ADMIN — POTASSIUM CHLORIDE 10 MEQ: 10 INJECTION, SOLUTION INTRAVENOUS at 15:29

## 2019-08-11 RX ADMIN — POTASSIUM CHLORIDE 40 MEQ: 750 TABLET, EXTENDED RELEASE ORAL at 16:05

## 2019-08-11 RX ADMIN — METOPROLOL SUCCINATE 25 MG: 25 TABLET, EXTENDED RELEASE ORAL at 09:51

## 2019-08-11 RX ADMIN — RISPERIDONE 2 MG: 1 TABLET ORAL at 21:18

## 2019-08-11 RX ADMIN — FUROSEMIDE 40 MG: 10 INJECTION, SOLUTION INTRAMUSCULAR; INTRAVENOUS at 09:51

## 2019-08-11 RX ADMIN — Medication 10 ML: at 21:19

## 2019-08-11 RX ADMIN — POTASSIUM CHLORIDE 10 MEQ: 10 INJECTION, SOLUTION INTRAVENOUS at 12:40

## 2019-08-11 RX ADMIN — VENLAFAXINE HYDROCHLORIDE 300 MG: 150 CAPSULE, EXTENDED RELEASE ORAL at 07:38

## 2019-08-11 NOTE — PROGRESS NOTES
Problem: Falls - Risk of  Goal: *Absence of Falls  Description  Document Louis Swain Fall Risk and appropriate interventions in the flowsheet.   Outcome: Progressing Towards Goal  Note:   Fall Risk Interventions:       Mentation Interventions: Door open when patient unattended    Medication Interventions: Evaluate medications/consider consulting pharmacy, Patient to call before getting OOB, Teach patient to arise slowly    Elimination Interventions: Elevated toilet seat, Bed/chair exit alarm              Problem: Patient Education: Go to Patient Education Activity  Goal: Patient/Family Education  Outcome: Progressing Towards Goal

## 2019-08-11 NOTE — PROGRESS NOTES
Cardiology Progress Note  2019     Admit Date: 2019  Admit Diagnosis: Failure to thrive (0-17) [R62.51]  Acute CHF (congestive heart failure) ([facility] [address] [I50.9]  CC: none currently    Assessment:   Active Problems:    Failure to thrive (0-17) (2019)      Acute CHF (congestive heart failure) ([facility] [address] (2019)      Plan:     Cont with diuresis with IV lasix  Cont other cardiac meds  No further IP cardiac plans    Subjective:      Taylorphani Barbosa Pyron resting comfortably this AM. MARY      Objective:    Physical Exam:  Overall VSSAF;    Visit Vitals  /66 (BP 1 Location: Right arm, BP Patient Position: At rest)   Pulse 75   Temp 97.6 °F (36.4 °C)   Resp 18   Ht 5' 2\" (1.575 m)   Wt 89.4 kg (197 lb 1.5 oz)   SpO2 95%   BMI 36.05 kg/m²     Temp (24hrs), Av °F (36.7 °C), Min:97.5 °F (36.4 °C), Max:98.4 °F (36.9 °C)    Patient Vitals for the past 8 hrs:   Pulse   19 0950 75   19 0307 74    Patient Vitals for the past 8 hrs:   Resp   19 0950 18   19 0307 16    Patient Vitals for the past 8 hrs:   BP   19 0950 102/66   19 0307 96/60       1901 -  0700  In: -   Out: 975 [Urine:975]      General Appearance: Well developed, well nourished, no acute distress. Ears/Nose/Mouth/Throat:   Normal MM; anicteric. JVP: WNL   Resp:   Lungs clear to auscultation bilaterally. Nl resp effort. Cardiovascular:  RRR, S1, S2 normal, no new murmur. No gallop or rub. Abdomen:   Soft, non-tender, bowel sounds are present. Extremities: No edema bilaterally. Skin:  Neuro: Warm and dry.   A/O x3, grossly nonfocal                         Data Review:       Labs:   Recent Results (from the past 24 hour(s))   ECHO ADULT COMPLETE    Collection Time: 08/10/19 11:29 AM   Result Value Ref Range    LV E' Lateral Velocity 8.14 cm/s    LV E' Septal Velocity 5.72 cm/s    Ao Root D 2.73 cm    Aortic Valve Systolic Peak Velocity 977.28 cm/s    Aortic Valve Area by Continuity of Peak Velocity 1.7 cm2    AoV PG 7.4 mmHg    LVIDd 4.40 3.9 - 5.3 cm    LVPWd 1.37 (A) 0.6 - 0.9 cm    LVIDs 3.98 cm    IVSd 1.39 (A) 0.6 - 0.9 cm    LVOT d 2.06 cm    LVOT Peak Velocity 70.70 cm/s    LVOT Peak Gradient 2.0 mmHg    MVA (PHT) 6.3 cm2    MV A Chan 0.43 cm/s    MV E Chan 87.15 cm/s    MV E/A 202.67     Left Atrium to Aortic Root Ratio 1.61     LV Mass .4 (A) 67 - 162 g    LV Mass AL Index 150.3 (A) 43 - 95 g/m2    E/E' lateral 10.71     E/E' septal 15.24     RVSP 27.0 mmHg    E/E' ratio (averaged) 12.97     Est. RA Pressure 3.0 mmHg    Mitral Valve E Wave Deceleration Time 125.0 ms    Mitral Valve Pressure Half-time 35.1 ms    Left Atrium Major Axis 4.41 cm    Triscuspid Valve Regurgitation Peak Gradient 24.0 mmHg    P Vein A Dur 168.1 ms    Pulm Vein Peak D Velocity 110.10 cm/s    TR Max Velocity 244.77 cm/s    ROBY/BSA Pk Chan 0.8 cm2/m2   EKG, 12 LEAD, INITIAL    Collection Time: 08/10/19  2:01 PM   Result Value Ref Range    Ventricular Rate 94 BPM    Atrial Rate 94 BPM    P-R Interval 128 ms    QRS Duration 84 ms    Q-T Interval 390 ms    QTC Calculation (Bezet) 487 ms    Calculated P Axis 52 degrees    Calculated R Axis 34 degrees    Calculated T Axis 10 degrees    Diagnosis       Normal sinus rhythm  Low voltage QRS  Nonspecific T wave abnormality  Prolonged QT  Abnormal ECG  When compared with ECG of 06-JAN-2019 09:48,  Nonspecific T wave abnormality now evident in Inferior leads     METABOLIC PANEL, BASIC    Collection Time: 08/11/19  4:30 AM   Result Value Ref Range    Sodium 142 136 - 145 mmol/L    Potassium 2.9 (L) 3.5 - 5.1 mmol/L    Chloride 104 97 - 108 mmol/L    CO2 32 21 - 32 mmol/L    Anion gap 6 5 - 15 mmol/L    Glucose 85 65 - 100 mg/dL    BUN 18 6 - 20 MG/DL    Creatinine 0.82 0.55 - 1.02 MG/DL    BUN/Creatinine ratio 22 (H) 12 - 20      GFR est AA >60 >60 ml/min/1.73m2    GFR est non-AA >60 >60 ml/min/1.73m2    Calcium 8.5 8.5 - 10.1 MG/DL   MAGNESIUM    Collection Time: 08/11/19  4:30 AM Result Value Ref Range    Magnesium 1.9 1.6 - 2.4 mg/dL      Current medications reviewed       Gregory Schulte MD

## 2019-08-11 NOTE — PROGRESS NOTES
Hospitalist Progress Note  Poonam Kuo MD  Answering service: [phone] from in house phone        Date of Service:  2019  NAME:  Joey Harris  :  1950  MRN:  [id number]      Admission Summary:   76 y.o F with PMH of depression,delusional disorder,anxiety,HTN,arthritis and other medical issues was admitted to the hospital yesterday -due to confusion and was unable to take of her self. She was recently discharged from behavioral health unit after receiving treatment for depression and delusional disorder. She stopped taking all her meds after going home    Interval history / Subjective:   F/u for heart failure    Poor historian. States that breathing is OK but said her legs are swollen. I discussed the echo results with her -low EF - CHF -medical management-unclear how much she understood though. Assessment & Plan:     #Acute on chronic systolic and diastolic heart failure: NYHA class 2 at admission  -CXR showed pulmonary edema,BNP is elevated -6000.  -Echo showed 20-25% EF and grade 3 diastolic dysfunction. Global hypokinesis,mild to moderate MR. -Appreciate cardiology input.  -continue IV lasix as she is still volume up ,metorpolol and lisinopril.  -Noted cardiology recommendations - no further ischemic work up till psych issues stable and have reliable medication compliance. -continue aspirin,statin. Hypokalemia:  -due to diuresis  -Potassium supplements -  40 X 2 today. -recheck BMP in evening. #Depression,deluisonal disorder:  -psych consulted -recommended admission once medically stable.  -will continue meds - risperidal,effexor and trazadone. #HTN:  -BP controlled. Continue same regimen        Code status: full  DVT prophylaxis: lovenox    Care Plan discussed with: patient,nurse  Disposition:behavioral health unit when medically stable     Hospital Problems  Date Reviewed: 2019          Codes Class Noted POA    Failure to thrive (0-17) ICD-10-CM: R62.51  ICD-9-CM: 783.41  8/8/2019 Unknown                Review of Systems:   As per HPI      Vital Signs:    Last 24hrs VS reviewed since prior progress note. Most recent are:  Visit Vitals  BP 96/60 (BP 1 Location: Right arm, BP Patient Position: At rest)   Pulse 74   Temp 98.4 °F (36.9 °C)   Resp 16   Ht 5' 2\" (1.575 m)   Wt 89.4 kg (197 lb 1.5 oz)   SpO2 96%   BMI 36.05 kg/m²         Intake/Output Summary (Last 24 hours) at 8/11/2019 0827  Last data filed at 8/10/2019 1457  Gross per 24 hour   Intake    Output 675 ml   Net -675 ml        Physical Examination:             Constitutional:  No acute distress   ENT:  Oral mucous moist, oropharynx benign. Resp:  CTA b/l   CV:  Regular rhythm, normal rate, no murmurs    GI:  Soft, non distended, non tender. normoactive bowel sounds    Musculoskeletal:  1-2+ LE edema    Neurologic:  Moves all extremities. AAOx3     Psych:  She appears depressed       Data Review:    Review and/or order of clinical lab test,echo  Review and/or order of tests in the medicine section of CPT      Labs:     Recent Labs     08/08/19  1358   WBC 10.1   HGB 12.5   HCT 41.6        Recent Labs     08/11/19  0430 08/10/19  0114 08/09/19  1521    142 143   K 2.9* 4.0 3.7    110* 109*   CO2 32 20* 25   BUN 18 16 17   CREA 0.82 0.82 0.87   GLU 85 79 106*   CA 8.5 8.4* 8.6   MG 1.9 1.9  --      Recent Labs     08/08/19  1358   SGOT 65*   ALT 68   *   TBILI 1.0   TP 6.5   ALB 3.2*   GLOB 3.3     No results for input(s): INR, PTP, APTT in the last 72 hours. No lab exists for component: INREXT, INREXT   No results for input(s): FE, TIBC, PSAT, FERR in the last 72 hours. No results found for: FOL, RBCF   No results for input(s): PH, PCO2, PO2 in the last 72 hours. No results for input(s): CPK, CKNDX, TROIQ in the last 72 hours.     No lab exists for component: CPKMB  Lab Results   Component Value Date/Time Cholesterol, total 104 08/02/2019 05:06 AM    HDL Cholesterol 37 08/02/2019 05:06 AM    LDL, calculated 54.6 08/02/2019 05:06 AM    Triglyceride 62 08/02/2019 05:06 AM    CHOL/HDL Ratio 2.8 08/02/2019 05:06 AM     Lab Results   Component Value Date/Time    Glucose (POC) 124 (H) 11/26/2018 08:29 AM     Lab Results   Component Value Date/Time    Color DARK YELLOW 08/08/2019 02:06 PM    Appearance CLEAR 08/08/2019 02:06 PM    Specific gravity 1.023 08/08/2019 02:06 PM    Specific gravity >1.030 (H) 02/16/2012 03:00 PM    pH (UA) 5.5 08/08/2019 02:06 PM    Protein 30 (A) 08/08/2019 02:06 PM    Glucose NEGATIVE  08/08/2019 02:06 PM    Ketone 15 (A) 08/08/2019 02:06 PM    Bilirubin NEGATIVE  10/30/2018 11:37 AM    Urobilinogen 1.0 08/08/2019 02:06 PM    Nitrites NEGATIVE  08/08/2019 02:06 PM    Leukocyte Esterase TRACE (A) 08/08/2019 02:06 PM    Epithelial cells FEW 08/08/2019 02:06 PM    Bacteria NEGATIVE  08/08/2019 02:06 PM    WBC 10-20 08/08/2019 02:06 PM    RBC 0-5 08/08/2019 02:06 PM         Medications Reviewed:     Current Facility-Administered Medications   Medication Dose Route Frequency    potassium chloride 10 mEq in 50 ml IVPB  10 mEq IntraVENous Q1H    furosemide (LASIX) injection 40 mg  40 mg IntraVENous DAILY    metoprolol succinate (TOPROL-XL) XL tablet 25 mg  25 mg Oral DAILY    lisinopril (PRINIVIL, ZESTRIL) tablet 5 mg  5 mg Oral DAILY    risperiDONE (RisperDAL) tablet 2 mg  2 mg Oral QHS    acetaminophen (TYLENOL) tablet 650 mg  650 mg Oral Q6H PRN    aspirin delayed-release tablet 81 mg  81 mg Oral QPM    hydrOXYzine HCl (ATARAX) tablet 25 mg  25 mg Oral TID PRN    rosuvastatin (CRESTOR) tablet 10 mg  10 mg Oral QHS    traZODone (DESYREL) tablet 150 mg  150 mg Oral QHS    venlafaxine-SR (EFFEXOR-XR) capsule 300 mg  300 mg Oral DAILY WITH BREAKFAST    sodium chloride (NS) flush 5-40 mL  5-40 mL IntraVENous Q8H    sodium chloride (NS) flush 5-40 mL  5-40 mL IntraVENous PRN    enoxaparin (LOVENOX) injection 40 mg  40 mg SubCUTAneous Q24H    hydrALAZINE (APRESOLINE) 20 mg/mL injection 10 mg  10 mg IntraVENous Q6H PRN     ______________________________________________________________________  EXPECTED LENGTH OF STAY: - - -  ACTUAL LENGTH OF STAY:          0                 Molly Hess MD

## 2019-08-11 NOTE — PROGRESS NOTES
Problem: Falls - Risk of  Goal: *Absence of Falls  Description  Document Consuelo Sweeney Fall Risk and appropriate interventions in the flowsheet.   Outcome: Progressing Towards Goal  Note:   Fall Risk Interventions:       Mentation Interventions: Bed/chair exit alarm, Door open when patient unattended, Toileting rounds, Room close to nurse's station, More frequent rounding    Medication Interventions: Patient to call before getting OOB    Elimination Interventions: Call light in reach, Toilet paper/wipes in reach, Toileting schedule/hourly rounds              Problem: Patient Education: Go to Patient Education Activity  Goal: Patient/Family Education  Outcome: Progressing Towards Goal

## 2019-08-11 NOTE — PROGRESS NOTES
Bedside shift change report given to PREMA Yang (oncoming nurse) by Jocelynn Valenzuela RN (offgoing nurse). Report included the following information SBAR and Kardex.

## 2019-08-11 NOTE — PROGRESS NOTES
Spiritual Care Partner Volunteer visited patient in [facility] on 8/11/19. Documented by:   Chaplain Pennington MDiv, [facility]  [phone]

## 2019-08-11 NOTE — PROGRESS NOTES
OCCUPATIONAL THERAPY EVALUATION/DISCHARGE  Patient: Felix Hsieh (69 y.o. female)  Date: 8/11/2019  Primary Diagnosis: Failure to thrive (0-17) [R62.51]  Acute CHF (congestive heart failure) ([facility] [address] [I50.9]       Precautions:        ASSESSMENT  Based on the objective data described below, the patient presents with overall near baseline functioning for functional mobility and ADL follow admission. Patient seen on way back from [facility] with supervision to independence level, states she completed grooming at sink in bathroom this morning without difficulty. Patient completed bed mobility, donned/doffed socks, and transferred to chair with independence. Prior to admission patient living alone with her dog (Lea) and states she was independent with ADLs, not using a device, and typically used an reginald to order food and have it delivered to her door. At this time, patient seemingly near baseline functioning and with no further acute OT needs at this time. Current Level of Function (ADLs/self-care): Independent ADL    Functional Outcome Measure: The patient scored 70/100 on the Barthel Index outcome measure which is indicative of mild need. Other factors to consider for discharge: lives alone in [address] [place] [address] :  Recommend with staff: home with no OT needs    Recommendation for discharge: (in order for the patient to meet his/her long term goals)  No skilled occupational therapy/ follow up rehabilitation needs identified at this time. This discharge recommendation:  Has not yet been discussed the attending provider and/or case management    Equipment recommendations for successful discharge: none        SUBJECTIVE:   Patient stated I do it all for myself, just a little more careful these days.     OBJECTIVE DATA SUMMARY:   HISTORY:   Past Medical History:   Diagnosis Date    Arthritis     GENERALIZED IN JOINTS.     Breast cancer ([facility] [address]     Left Breast Cancer 1995    Cancer [facility])     left breast - surgery/radiation    Chronic pain     Back pain    Depression     Mood Swings, anxiety    GERD (gastroesophageal reflux disease)     Hiatal Hernia    Headache     Hypercholesterolemia     Hypertension     CONTROLLED BY MEDS., Pt states she is no liong er on BP med's    Ill-defined condition     Neuropathy bilat feet    Irregular heart beat     Other ill-defined conditions(799.89)     MIGRAINES    Other ill-defined conditions(799.89)     neuropathy of lower legs and feet    Other ill-defined conditions(799.89)     increased cholesterol    Other ill-defined conditions(799.89)     bronchitis    Psychotic disorder (HCC)     S/P radiation therapy     1995 Left Breast    Unspecified adverse effect of anesthesia     HEART PALPITATION. Past Surgical History:   Procedure Laterality Date    BREAST SURGERY PROCEDURE UNLISTED  7/1995    DCIS /BIOPSIES MULTIPLE.     BREAST SURGERY PROCEDURE UNLISTED  1995    LUMPECTOMY WITH RADIATION INSITU    HX BREAST LUMPECTOMY Left     1995 - POSITIVE    HX ORTHOPAEDIC  2006    LEFT KNEE ARTHROSCOPY    HX ORTHOPAEDIC  4/2010    RIGHT KNEE ARTHROSCOPY, concrete nail placed    HX ORTHOPAEDIC      Hammertoe , doesnt know which foot    HX ORTHOPAEDIC      Small growth removed from between toes but doesnt know which foot       Prior Level of Function/Environment/Context: Patient lives in [address] [place] [address] with ADL PTA  Expanded or extensive additional review of patient history:   Home Situation  Home Environment: Independent living([facility])  # Steps to Enter: 0  One/Two Story Residence: One story  Living Alone: Yes  Support Systems: Other (comments)(José Neri, cousin)  Patient Expects to be Discharged to[de-identified] Independent living facility([facility])  Current DME Used/Available at Home: None  Tub or Shower Type: Shower        EXAMINATION OF PERFORMANCE DEFICITS:  Cognitive/Behavioral Status:  Neurologic State: Alert                   Skin: intact    Edema: none noted    Hearing: Auditory  Auditory Impairment: None    Vision/Perceptual:                                     Range of Motion:    AROM: Within functional limits                         Strength:    Strength: Generally decreased, functional                Coordination:  Coordination: Within functional limits            Tone & Sensation:    Tone: Normal                         Balance:  Sitting: Intact  Standing: Intact  Standing - Static: Good  Standing - Dynamic : Good    Functional Mobility and Transfers for ADLs:  Bed Mobility:  Rolling: Independent  Supine to Sit: Independent  Scooting: Independent    Transfers:  Sit to Stand: Independent  Stand to Sit: Independent  Bed to Chair: Independent  Toilet Transfer : Independent(infer)    ADL Assessment:       Oral Facial Hygiene/Grooming: Independent(at sink, per patient report)              Lower Body Dressing: Independent(don/doff socks, sitting)    Toileting: Supervision                ADL Intervention and task modifications:              Therapeutic Exercise:     Functional Measure:  Barthel Index:    Bathin  Bladder: 10  Bowels: 10  Groomin  Dressing: 10  Feeding: 10  Mobility: 0  Stairs: 0  Toilet Use: 10  Transfer (Bed to Chair and Back): 10  Total: 70/100        Percentage of impairment   0%   1-19%   20-39%   40-59%   60-79%   80-99%   100%   Barthel Score 0-100 100 99-80 79-60 59-40 20-39 1-19   0     The Barthel ADL Index: Guidelines  1. The index should be used as a record of what a patient does, not as a record of what a patient could do. 2. The main aim is to establish degree of independence from any help, physical or verbal, however minor and for whatever reason. 3. The need for supervision renders the patient not independent. 4. A patient's performance should be established using the best available evidence.  Asking the patient, friends/relatives and nurses are the usual sources, but direct observation and common sense are also important. However direct testing is not needed. 5. Usually the patient's performance over the preceding 24-48 hours is important, but occasionally longer periods will be relevant. 6. Middle categories imply that the patient supplies over 50 per cent of the effort. 7. Use of aids to be independent is allowed. Emory Jimenes., Barthel, D.W. (7931). Functional evaluation: the Barthel Index. [facility] (14)2. HUYEN Kumar, Kashif Guillen., Manny Nose., [place], [address] (1999). Measuring the change indisability after inpatient rehabilitation; comparison of the responsiveness of the Barthel Index and Functional [place] Measure. Journal of Neurology, Neurosurgery, and Psychiatry, 66(4), 633-596. Julee Pool, [place].JASWINDER, ASHISH Quevedo, & Lisa Bhat MHERNAN. (2004.) Assessment of post-stroke quality of life in cost-effectiveness studies: The usefulness of the Barthel Index and the EuroQoL-5D. Quality of Life Research, 15, 101-74         Occupational Therapy Evaluation Charge Determination   History Examination Decision-Making   LOW Complexity : Brief history review  LOW Complexity : 1-3 performance deficits relating to physical, cognitive , or psychosocial skils that result in activity limitations and / or participation restrictions  LOW Complexity : No comorbidities that affect functional and no verbal or physical assistance needed to complete eval tasks       Based on the above components, the patient evaluation is determined to be of the following complexity level: LOW   Pain Rating:      Activity Tolerance: WNL  Please refer to the flowsheet for vital signs taken during this treatment. After treatment patient left in no apparent distress:    Sitting in chair, Call bell within reach and Bed / chair alarm activated    COMMUNICATION/EDUCATION:   The patients plan of care was discussed with: Registered Nurse.     Thank you for this referral.  Laurita Parkinson  Time Calculation: 11 mins

## 2019-08-12 LAB
ANION GAP SERPL CALC-SCNC: 8 MMOL/L (ref 5–15)
ATRIAL RATE: 70 BPM
BUN SERPL-MCNC: 21 MG/DL (ref 6–20)
BUN/CREAT SERPL: 19 (ref 12–20)
CALCIUM SERPL-MCNC: 9.3 MG/DL (ref 8.5–10.1)
CALCULATED P AXIS, ECG09: 56 DEGREES
CALCULATED R AXIS, ECG10: 23 DEGREES
CALCULATED T AXIS, ECG11: -32 DEGREES
CHLORIDE SERPL-SCNC: 103 MMOL/L (ref 97–108)
CO2 SERPL-SCNC: 29 MMOL/L (ref 21–32)
CREAT SERPL-MCNC: 1.09 MG/DL (ref 0.55–1.02)
DIAGNOSIS, 93000: NORMAL
GLUCOSE SERPL-MCNC: 107 MG/DL (ref 65–100)
MAGNESIUM SERPL-MCNC: 2 MG/DL (ref 1.6–2.4)
P-R INTERVAL, ECG05: 122 MS
POTASSIUM SERPL-SCNC: 3.8 MMOL/L (ref 3.5–5.1)
Q-T INTERVAL, ECG07: 406 MS
QRS DURATION, ECG06: 84 MS
QTC CALCULATION (BEZET), ECG08: 438 MS
SODIUM SERPL-SCNC: 140 MMOL/L (ref 136–145)
TROPONIN I SERPL-MCNC: <0.05 NG/ML
VENTRICULAR RATE, ECG03: 70 BPM

## 2019-08-12 PROCEDURE — 93005 ELECTROCARDIOGRAM TRACING: CPT

## 2019-08-12 PROCEDURE — 74011250637 HC RX REV CODE- 250/637: Performed by: NURSE PRACTITIONER

## 2019-08-12 PROCEDURE — 65660000000 HC RM CCU STEPDOWN

## 2019-08-12 PROCEDURE — 36415 COLL VENOUS BLD VENIPUNCTURE: CPT

## 2019-08-12 PROCEDURE — 83735 ASSAY OF MAGNESIUM: CPT

## 2019-08-12 PROCEDURE — 84484 ASSAY OF TROPONIN QUANT: CPT

## 2019-08-12 PROCEDURE — 74011250636 HC RX REV CODE- 250/636: Performed by: INTERNAL MEDICINE

## 2019-08-12 PROCEDURE — 74011250637 HC RX REV CODE- 250/637: Performed by: INTERNAL MEDICINE

## 2019-08-12 PROCEDURE — 74011250637 HC RX REV CODE- 250/637: Performed by: HOSPITALIST

## 2019-08-12 PROCEDURE — 80048 BASIC METABOLIC PNL TOTAL CA: CPT

## 2019-08-12 RX ORDER — LISINOPRIL 5 MG/1
5 TABLET ORAL DAILY
Status: DISCONTINUED | OUTPATIENT
Start: 2019-08-12 | End: 2019-08-12

## 2019-08-12 RX ORDER — POTASSIUM CHLORIDE 750 MG/1
40 TABLET, FILM COATED, EXTENDED RELEASE ORAL
Status: COMPLETED | OUTPATIENT
Start: 2019-08-12 | End: 2019-08-12

## 2019-08-12 RX ORDER — LISINOPRIL 5 MG/1
2.5 TABLET ORAL DAILY
Status: DISCONTINUED | OUTPATIENT
Start: 2019-08-12 | End: 2019-08-14 | Stop reason: HOSPADM

## 2019-08-12 RX ORDER — FUROSEMIDE 40 MG/1
40 TABLET ORAL DAILY
Status: DISCONTINUED | OUTPATIENT
Start: 2019-08-12 | End: 2019-08-14

## 2019-08-12 RX ADMIN — POTASSIUM CHLORIDE 40 MEQ: 750 TABLET, EXTENDED RELEASE ORAL at 09:04

## 2019-08-12 RX ADMIN — Medication 10 ML: at 06:09

## 2019-08-12 RX ADMIN — Medication 10 ML: at 13:44

## 2019-08-12 RX ADMIN — FUROSEMIDE 40 MG: 40 TABLET ORAL at 09:04

## 2019-08-12 RX ADMIN — CALCIUM CARBONATE (ANTACID) CHEW TAB 500 MG 200 MG: 500 CHEW TAB at 02:55

## 2019-08-12 RX ADMIN — ROSUVASTATIN CALCIUM 10 MG: 10 TABLET, COATED ORAL at 23:06

## 2019-08-12 RX ADMIN — RISPERIDONE 2 MG: 1 TABLET ORAL at 23:06

## 2019-08-12 RX ADMIN — TRAZODONE HYDROCHLORIDE 150 MG: 100 TABLET ORAL at 23:06

## 2019-08-12 RX ADMIN — LISINOPRIL 2.5 MG: 5 TABLET ORAL at 17:27

## 2019-08-12 RX ADMIN — ASPIRIN 81 MG: 81 TABLET ORAL at 17:27

## 2019-08-12 RX ADMIN — ENOXAPARIN SODIUM 40 MG: 40 INJECTION SUBCUTANEOUS at 17:27

## 2019-08-12 RX ADMIN — Medication 10 ML: at 23:07

## 2019-08-12 NOTE — PROGRESS NOTES
Hospitalist Progress Note  Emma Min MD  Answering service: [phone] from in house phone        Date of Service:  2019  NAME:  Momo Wilson  :  1950  MRN:  [id number]      Admission Summary:   76 y.o F with PMH of depression,delusional disorder,anxiety,HTN,arthritis and other medical issues was admitted to the hospital yesterday -due to confusion and was unable to take of her self. She was recently discharged from behavioral health unit after receiving treatment for depression and delusional disorder. She stopped taking all her meds after going home    Interval history / Subjective:   F/u for heart failure    Poor historian due to underlying psychiatric issues. States \"breathing is OK\". Assessment & Plan:     #Acute on chronic systolic and diastolic heart failure: NYHA class 2 at admission  -CXR showed pulmonary edema,BNP is elevated -6000.  -Echo showed 20-25% EF and grade 3 diastolic dysfunction. Global hypokinesis,mild to moderate MR. -Appreciate cardiology input.  -Noted cardiology recommendations - no further ischemic work up till psych issues stable and have reliable medication compliance. -continue aspirin,statin. -BP soft 02R systolic this morning, will change to oral lasix 40 mg and continue troprol. Will give lisinopril in the evening,if BP tolerates. Hypokalemia:  -due to diuresis  -replete as needed. #Depression,deluisonal disorder:  -psych consulted -recommended admission once medically stable.  -will continue meds - risperidal,effexor and trazadone. #HTN:  -BP soft,meds with holding parameters -discussed with nurse.         Code status: full  DVT prophylaxis: lovenox    Care Plan discussed with: [facility] unit when medically stable     Hospital Problems  Date Reviewed: 2019          Codes Class Noted POA    Acute CHF (congestive heart failure) [facility]) ICD-10-CM: I50.9  ICD-9-CM: 428.0  8/11/2019 Unknown        Failure to thrive (0-17) ICD-10-CM: R62.51  ICD-9-CM: 783.41  8/8/2019 Unknown                Review of Systems:   As per HPI      Vital Signs:    Last 24hrs VS reviewed since prior progress note. Most recent are:  Visit Vitals  BP 92/65 (BP 1 Location: Left arm, BP Patient Position: At rest)   Pulse 66   Temp 97.7 °F (36.5 °C)   Resp 16   Ht 5' 2\" (1.575 m)   Wt 89.4 kg (197 lb 1.5 oz)   SpO2 96%   BMI 36.05 kg/m²         Intake/Output Summary (Last 24 hours) at 8/12/2019 0857  Last data filed at 8/11/2019 1504  Gross per 24 hour   Intake 200 ml   Output 400 ml   Net -200 ml        Physical Examination:             Constitutional:  No acute distress   ENT:  Oral mucous moist, oropharynx benign. Resp:  CTA b/l   CV:  Regular rhythm, normal rate, no murmurs    GI:  Soft, non distended, non tender. normoactive bowel sounds    Musculoskeletal:  1+ LE edema- improving    Neurologic:  Moves all extremities. AAOx2-3     Psych:  She appears depressed       Data Review:    Review and/or order of clinical lab test  Review and/or order of tests in the medicine section of CPT      Labs:     No results for input(s): WBC, HGB, HCT, PLT, HGBEXT, HCTEXT, PLTEXT, HGBEXT, HCTEXT, PLTEXT in the last 72 hours. Recent Labs     08/12/19  0237 08/11/19  1831 08/11/19  0430 08/10/19  0114    138 142 142   K 3.8 4.4 2.9* 4.0    103 104 110*   CO2 29 30 32 20*   BUN 21* 20 18 16   CREA 1.09* 0.96 0.82 0.82   * 118* 85 79   CA 9.3 8.9 8.5 8.4*   MG 2.0  --  1.9 1.9     No results for input(s): SGOT, GPT, ALT, AP, TBIL, TBILI, TP, ALB, GLOB, GGT, AML, LPSE in the last 72 hours. No lab exists for component: AMYP, HLPSE  No results for input(s): INR, PTP, APTT in the last 72 hours. No lab exists for component: INREXT, INREXT   No results for input(s): FE, TIBC, PSAT, FERR in the last 72 hours.    No results found for: FOL, RBCF   No results for input(s): PH, PCO2, PO2 in the last 72 hours. No results for input(s): CPK, CKNDX, TROIQ in the last 72 hours.     No lab exists for component: CPKMB  Lab Results   Component Value Date/Time    Cholesterol, total 104 08/02/2019 05:06 AM    HDL Cholesterol 37 08/02/2019 05:06 AM    LDL, calculated 54.6 08/02/2019 05:06 AM    Triglyceride 62 08/02/2019 05:06 AM    CHOL/HDL Ratio 2.8 08/02/2019 05:06 AM     Lab Results   Component Value Date/Time    Glucose (POC) 124 (H) 11/26/2018 08:29 AM     Lab Results   Component Value Date/Time    Color DARK YELLOW 08/08/2019 02:06 PM    Appearance CLEAR 08/08/2019 02:06 PM    Specific gravity 1.023 08/08/2019 02:06 PM    Specific gravity >1.030 (H) 02/16/2012 03:00 PM    pH (UA) 5.5 08/08/2019 02:06 PM    Protein 30 (A) 08/08/2019 02:06 PM    Glucose NEGATIVE  08/08/2019 02:06 PM    Ketone 15 (A) 08/08/2019 02:06 PM    Bilirubin NEGATIVE  10/30/2018 11:37 AM    Urobilinogen 1.0 08/08/2019 02:06 PM    Nitrites NEGATIVE  08/08/2019 02:06 PM    Leukocyte Esterase TRACE (A) 08/08/2019 02:06 PM    Epithelial cells FEW 08/08/2019 02:06 PM    Bacteria NEGATIVE  08/08/2019 02:06 PM    WBC 10-20 08/08/2019 02:06 PM    RBC 0-5 08/08/2019 02:06 PM         Medications Reviewed:     Current Facility-Administered Medications   Medication Dose Route Frequency    furosemide (LASIX) tablet 40 mg  40 mg Oral DAILY    potassium chloride SR (KLOR-CON 10) tablet 40 mEq  40 mEq Oral NOW    calcium carbonate (TUMS) chewable tablet 200 mg [elemental]  200 mg Oral TID PRN    metoprolol succinate (TOPROL-XL) XL tablet 25 mg  25 mg Oral DAILY    lisinopril (PRINIVIL, ZESTRIL) tablet 5 mg  5 mg Oral DAILY    risperiDONE (RisperDAL) tablet 2 mg  2 mg Oral QHS    acetaminophen (TYLENOL) tablet 650 mg  650 mg Oral Q6H PRN    aspirin delayed-release tablet 81 mg  81 mg Oral QPM    hydrOXYzine HCl (ATARAX) tablet 25 mg  25 mg Oral TID PRN    rosuvastatin (CRESTOR) tablet 10 mg  10 mg Oral QHS    traZODone (DESYREL) tablet 150 mg  150 mg Oral QHS    venlafaxine-SR (EFFEXOR-XR) capsule 300 mg  300 mg Oral DAILY WITH BREAKFAST    sodium chloride (NS) flush 5-40 mL  5-40 mL IntraVENous Q8H    sodium chloride (NS) flush 5-40 mL  5-40 mL IntraVENous PRN    enoxaparin (LOVENOX) injection 40 mg  40 mg SubCUTAneous Q24H    hydrALAZINE (APRESOLINE) 20 mg/mL injection 10 mg  10 mg IntraVENous Q6H PRN     ______________________________________________________________________  EXPECTED LENGTH OF STAY: - - -  ACTUAL LENGTH OF STAY:          1                 El Whaley MD

## 2019-08-12 NOTE — PROGRESS NOTES
Cardiology Progress Note  2019     Admit Date: 2019  Admit Diagnosis: Failure to thrive (0-17) [R62.51]  Acute CHF (congestive heart failure) ([facility] [I50.9]  CC: none currently    Assessment:   Active Problems:    Failure to thrive (0-17) (2019)      [facility]congest[facility] (2019)      Plan:     Now on PO lasix  Decreased dose of lisinopril  Cont metoprolol  Likely OK to transfer to  psych soon    Subjective:      Theola Jewel continues to feel better. MARY    Objective:    Physical Exam:  Overall VSSAF;    Visit Vitals  BP 92/65 (BP 1 Location: Left arm, BP Patient Position: At rest)   Pulse 66   Temp 97.7 °F (36.5 °C)   Resp 16   Ht 5' 2\" (1.575 m)   Wt 89.4 kg (197 lb 1.5 oz)   SpO2 96%   BMI 36.05 kg/m²     Temp (24hrs), Av.2 °F (36.8 °C), Min:97.6 °F (36.4 °C), Max:98.8 °F (37.1 °C)    Patient Vitals for the past 8 hrs:   Pulse   19 0811 66   19 0228 77    Patient Vitals for the past 8 hrs:   Resp   19 0811 16   198 18    Patient Vitals for the past 8 hrs:   BP   19 0811 92/65   19 0228 105/72      08/10 1901 -  0700  In: 200 [I.V.:200]  Out: 400 [Urine:400]      General Appearance: Well developed, well nourished, no acute distress. Ears/Nose/Mouth/Throat:   Normal MM; anicteric. JVP: WNL   Resp:   Lungs clear to auscultation bilaterally. Nl resp effort. Cardiovascular:  RRR, S1, S2 normal, no new murmur. No gallop or rub. Abdomen:   Soft, non-tender, bowel sounds are present. Extremities: No edema bilaterally. Skin:  Neuro: Warm and dry.   A/O x3, grossly nonfocal                         Data Review:       Labs:   Recent Results (from the past 24 hour(s))   METABOLIC PANEL, BASIC    Collection Time: 19  6:31 PM   Result Value Ref Range    Sodium 138 136 - 145 mmol/L    Potassium 4.4 3.5 - 5.1 mmol/L    Chloride 103 97 - 108 mmol/L    CO2 30 21 - 32 mmol/L    Anion gap 5 5 - 15 mmol/L    Glucose 118 (H) 65 - 100 mg/dL    BUN 20 6 - 20 MG/DL    Creatinine 0.96 0.55 - 1.02 MG/DL    BUN/Creatinine ratio 21 (H) 12 - 20      GFR est AA >60 >60 ml/min/1.73m2    GFR est non-AA 58 (L) >60 ml/min/1.73m2    Calcium 8.9 8.5 - 10.1 MG/DL   MAGNESIUM    Collection Time: 08/12/19  2:37 AM   Result Value Ref Range    Magnesium 2.0 1.6 - 2.4 mg/dL   METABOLIC PANEL, BASIC    Collection Time: 08/12/19  2:37 AM   Result Value Ref Range    Sodium 140 136 - 145 mmol/L    Potassium 3.8 3.5 - 5.1 mmol/L    Chloride 103 97 - 108 mmol/L    CO2 29 21 - 32 mmol/L    Anion gap 8 5 - 15 mmol/L    Glucose 107 (H) 65 - 100 mg/dL    BUN 21 (H) 6 - 20 MG/DL    Creatinine 1.09 (H) 0.55 - 1.02 MG/DL    BUN/Creatinine ratio 19 12 - 20      GFR est AA >60 >60 ml/min/1.73m2    GFR est non-AA 50 (L) >60 ml/min/1.73m2    Calcium 9.3 8.5 - 10.1 MG/DL      Current medications reviewed       Michael Bain MD

## 2019-08-12 NOTE — PROGRESS NOTES
Problem: Falls - Risk of  Goal: *Absence of Falls  Description  Document Molly Girt Fall Risk and appropriate interventions in the flowsheet. 8/12/2019 1219 by Elyse Cosby RN  Outcome: Progressing Towards Goal  Note:   Fall Risk Interventions:       Mentation Interventions: Door open when patient unattended, Bed/chair exit alarm    Medication Interventions: Assess postural VS orthostatic hypotension, Teach patient to arise slowly, Patient to call before getting OOB    Elimination Interventions: Call light in reach           8/12/2019 1219 by Elyse Cosby RN  Outcome: Progressing Towards Goal  Note:   Fall Risk Interventions:       Mentation Interventions: Door open when patient unattended, Bed/chair exit alarm    Medication Interventions: Assess postural VS orthostatic hypotension, Teach patient to arise slowly, Patient to call before getting OOB    Elimination Interventions: Call light in reach              Problem: Patient Education: Go to Patient Education Activity  Goal: Patient/Family Education  8/12/2019 1219 by Elyse Cosby RN  Outcome: Progressing Towards Goal  8/12/2019 1219 by Elyse Cosby RN  Outcome: Progressing Towards Goal     Problem: Pressure Injury - Risk of  Goal: *Prevention of pressure injury  Description  Document Satish Scale and appropriate interventions in the flowsheet.   8/12/2019 1219 by Elyse Cosby RN  Outcome: Progressing Towards Goal  Note:   Pressure Injury Interventions:  Sensory Interventions: Assess changes in LOC    Moisture Interventions: Absorbent underpads, Minimize layers, Moisture barrier, Offer toileting Q_hr    Activity Interventions: PT/OT evaluation, Increase time out of bed    Mobility Interventions: PT/OT evaluation, HOB 30 degrees or less    Nutrition Interventions: Document food/fluid/supplement intake    Friction and Shear Interventions: Lift sheet             8/12/2019 1219 by Elyse Cosby RN  Outcome: Progressing Towards Goal  Note: Pressure Injury Interventions:  Sensory Interventions: Assess changes in LOC    Moisture Interventions: Absorbent underpads, Minimize layers, Moisture barrier, Offer toileting Q_hr    Activity Interventions: PT/OT evaluation, Increase time out of bed    Mobility Interventions: PT/OT evaluation, HOB 30 degrees or less    Nutrition Interventions: Document food/fluid/supplement intake    Friction and Shear Interventions: Lift sheet                Problem: Patient Education: Go to Patient Education Activity  Goal: Patient/Family Education  8/12/2019 1219 by Tony Eid RN  Outcome: Progressing Towards Goal  8/12/2019 1219 by Tony Eid RN  Outcome: Progressing Towards Goal

## 2019-08-12 NOTE — PROGRESS NOTES
CM completed consult, patient will go to [facility] when stable.     transient ischemic attacks  t

## 2019-08-12 NOTE — NURSE NAVIGATOR
Chart reviewed by Heart Failure Nurse Navigator. Heart Failure database completed. EF:  21/25%    ACEi/ARB/ARNi: Lisinopril    BB: Toprol XL    Aldosterone Antagonist: Please consider a guideline directed Aldosterone Receptor Antagonist (Spironolactone/Eplerenone) for patients with an EF of 35% or less and a NYHA functional class of II-IV unless contraindicated. Contraindications include allergy due to aldosterone receptor antagonist, hyperkalemia,(potassium above 5.0 mEq/L ) and renal dysfunction. (creatinine >2.5 mg/dL in men or >2.0 mg/dL in women (or estimated glomerular filtration rate < 30 ml/min/1.732m2)      Obstructive Sleep Apnea Screening:   STOP-BANG score:   Referred to Sleep Medicine:     CRT Not indicated. NYHA Functional Class II     Heart Failure Teach Back in Patient Education. Heart Failure Avoiding Triggers on Discharge Instructions. Cardiologist: JOON      Post discharge follow up phone call to be made within 48-72 hours of discharge.

## 2019-08-12 NOTE — PROGRESS NOTES
Spiritual Care Partner Volunteer visited patient in room 536 on 8.12.19. Documented by: : Rev. Isabelle Hicks.  Simeon Styles; [facility], to contact [phone] [address] call: [phone]

## 2019-08-13 LAB
ANION GAP SERPL CALC-SCNC: 8 MMOL/L (ref 5–15)
BUN SERPL-MCNC: 23 MG/DL (ref 6–20)
BUN/CREAT SERPL: 26 (ref 12–20)
CALCIUM SERPL-MCNC: 9 MG/DL (ref 8.5–10.1)
CHLORIDE SERPL-SCNC: 106 MMOL/L (ref 97–108)
CO2 SERPL-SCNC: 29 MMOL/L (ref 21–32)
CREAT SERPL-MCNC: 0.87 MG/DL (ref 0.55–1.02)
GLUCOSE SERPL-MCNC: 108 MG/DL (ref 65–100)
MAGNESIUM SERPL-MCNC: 1.8 MG/DL (ref 1.6–2.4)
POTASSIUM SERPL-SCNC: 3.7 MMOL/L (ref 3.5–5.1)
SODIUM SERPL-SCNC: 143 MMOL/L (ref 136–145)

## 2019-08-13 PROCEDURE — 74011250637 HC RX REV CODE- 250/637: Performed by: INTERNAL MEDICINE

## 2019-08-13 PROCEDURE — 36415 COLL VENOUS BLD VENIPUNCTURE: CPT

## 2019-08-13 PROCEDURE — 74011250637 HC RX REV CODE- 250/637: Performed by: NURSE PRACTITIONER

## 2019-08-13 PROCEDURE — 74011250637 HC RX REV CODE- 250/637: Performed by: HOSPITALIST

## 2019-08-13 PROCEDURE — 83735 ASSAY OF MAGNESIUM: CPT

## 2019-08-13 PROCEDURE — 65660000000 HC RM CCU STEPDOWN

## 2019-08-13 PROCEDURE — 80048 BASIC METABOLIC PNL TOTAL CA: CPT

## 2019-08-13 PROCEDURE — 74011250636 HC RX REV CODE- 250/636: Performed by: INTERNAL MEDICINE

## 2019-08-13 RX ADMIN — LISINOPRIL 2.5 MG: 5 TABLET ORAL at 08:58

## 2019-08-13 RX ADMIN — Medication 10 ML: at 06:00

## 2019-08-13 RX ADMIN — ENOXAPARIN SODIUM 40 MG: 40 INJECTION SUBCUTANEOUS at 17:37

## 2019-08-13 RX ADMIN — VENLAFAXINE HYDROCHLORIDE 300 MG: 150 CAPSULE, EXTENDED RELEASE ORAL at 08:58

## 2019-08-13 RX ADMIN — Medication 10 ML: at 21:34

## 2019-08-13 RX ADMIN — ROSUVASTATIN CALCIUM 10 MG: 10 TABLET, COATED ORAL at 21:34

## 2019-08-13 RX ADMIN — RISPERIDONE 2 MG: 1 TABLET ORAL at 21:34

## 2019-08-13 RX ADMIN — Medication 10 ML: at 14:39

## 2019-08-13 RX ADMIN — FUROSEMIDE 40 MG: 40 TABLET ORAL at 08:58

## 2019-08-13 RX ADMIN — TRAZODONE HYDROCHLORIDE 150 MG: 100 TABLET ORAL at 21:34

## 2019-08-13 RX ADMIN — METOPROLOL SUCCINATE 25 MG: 25 TABLET, EXTENDED RELEASE ORAL at 08:58

## 2019-08-13 RX ADMIN — ASPIRIN 81 MG: 81 TABLET ORAL at 17:37

## 2019-08-13 NOTE — PROGRESS NOTES
Cardiology Progress Note  2019     Admit Date: 2019  Admit Diagnosis: Failure to thrive (0-17) [R62.51]  Acute CHF (congestive heart failure) ([facility] [I50.9]  CC: none currently    Assessment:   Active Problems:    Failure to thrive (0-17) (2019)      Acute CHF (congestive heart failure) ([facility] (2019)      Plan:     Cont current cardiac meds  No events on telemetry  No further IP cardiac plans    Subjective:      Jay Porras resting comfortably this AM    Objective:    Physical Exam:  Overall VSSAF;    Visit Vitals  /70 (BP 1 Location: Right arm)   Pulse 78   Temp 98.5 °F (36.9 °C)   Resp 16   Ht 5' 2\" (1.575 m)   Wt 79.1 kg (174 lb 4.8 oz)   SpO2 96%   BMI 31.88 kg/m²     Temp (24hrs), Av.1 °F (36.7 °C), Min:97.5 °F (36.4 °C), Max:98.6 °F (37 °C)    Patient Vitals for the past 8 hrs:   Pulse   19 0901 78   19 0858 78   19 0238 80    Patient Vitals for the past 8 hrs:   Resp   19 0901 16   19 0858 16   19 0238 18    Patient Vitals for the past 8 hrs:   BP   19 0901 115/70   19 0858 115/70   19 0238 92/55       1901 -  0700  In: -   Out: 50 [Urine:50]      General Appearance: Well developed, well nourished, no acute distress. Ears/Nose/Mouth/Throat:   Normal MM; anicteric. JVP: WNL   Resp:   Lungs clear to auscultation bilaterally. Nl resp effort. Cardiovascular:  RRR, S1, S2 normal, no new murmur. No gallop or rub. Abdomen:   Soft, non-tender, bowel sounds are present. Extremities: No edema bilaterally. Skin:  Neuro: Warm and dry.   A/O x3, grossly nonfocal                         Data Review:       Labs:   Recent Results (from the past 24 hour(s))   EKG, 12 LEAD, INITIAL    Collection Time: 19 11:05 AM   Result Value Ref Range    Ventricular Rate 70 BPM    Atrial Rate 70 BPM    P-R Interval 122 ms    QRS Duration 84 ms    Q-T Interval 406 ms    QTC Calculation (Bezet) 438 ms    Calculated P Axis 56 degrees    Calculated R Axis 23 degrees    Calculated T Axis -32 degrees    Diagnosis       Normal sinus rhythm  Low voltage QRS  Nonspecific T wave abnormality  Abnormal ECG  When compared with ECG of 10-AUG-2019 14:01,  No significant change was found  Confirmed by Natalia Cintron MD., --- ([id number]) on 8/12/2019 6:04:21 PM     TROPONIN I    Collection Time: 08/12/19 11:19 AM   Result Value Ref Range    Troponin-I, Qt. <0.05 <0.05 ng/mL   MAGNESIUM    Collection Time: 08/13/19  5:20 AM   Result Value Ref Range    Magnesium 1.8 1.6 - 2.4 mg/dL   METABOLIC PANEL, BASIC    Collection Time: 08/13/19  5:20 AM   Result Value Ref Range    Sodium 143 136 - 145 mmol/L    Potassium 3.7 3.5 - 5.1 mmol/L    Chloride 106 97 - 108 mmol/L    CO2 29 21 - 32 mmol/L    Anion gap 8 5 - 15 mmol/L    Glucose 108 (H) 65 - 100 mg/dL    BUN 23 (H) 6 - 20 MG/DL    Creatinine 0.87 0.55 - 1.02 MG/DL    BUN/Creatinine ratio 26 (H) 12 - 20      GFR est AA >60 >60 ml/min/1.73m2    GFR est non-AA >60 >60 ml/min/1.73m2    Calcium 9.0 8.5 - 10.1 MG/DL      Current medications reviewed       Santa Mathias MD

## 2019-08-13 NOTE — PROGRESS NOTES
Hospitalist Progress Note  Nikki Mosquera MD  Answering service: [phone] OR [phone] from in house phone        Date of Service:  2019  NAME:  Delmar Reed  :  1950  MRN:  [id number]      Admission Summary:   76 y.o F with PMH of depression,delusional disorder,anxiety,HTN,arthritis and other medical issues was admitted to the hospital yesterday -due to confusion and was unable to take of her self. She was recently discharged from behavioral health unit after receiving treatment for depression and delusional disorder. She stopped taking all her meds after going home    Interval history / Subjective:   F/u for heart failure  Calm and cooperative. No complaints. Denied dizziness,chest pressure. Assessment & Plan:     #Acute on chronic systolic and diastolic heart failure: NYHA class 2 at admission  -CXR showed pulmonary edema,BNP is elevated -6000.  -Echo showed 20-25% EF and grade 3 diastolic dysfunction. Global hypokinesis,mild to moderate MR.   -Evaluated by cardiaoloy- no further ischemic work up till psych issues stable and have reliable medication compliance. -continue aspirin,statin,Toprol, lisinopril and lasix. Hypokalemia:  -due to diuresis  -replete as needed. #Depression,deluisonal disorder:  -psych consulted -recommended admission once medically stable.  -will continue meds - risperidal,effexor and trazadone. #HTN:  -BP soft,meds with holding parameters -discussed with nurse. Code status: full  DVT prophylaxis: lovenox    Care Plan discussed with: patient,nurse  Disposition:behavioral health unit tomorrow or when bed becomes available.      Hospital Problems  Date Reviewed: 2019          Codes Class Noted POA    Acute CH[facility]congestive heart failure) ([facility] ICD-10-CM: I50.9  ICD-9-CM: 428.0  2019 Unknown        Failure to thrive (0-17) ICD-10-CM: R62.51  ICD-9-CM: 783.41  2019 Unknown Review of Systems:   As per HPI      Vital Signs:    Last 24hrs VS reviewed since prior progress note. Most recent are:  Visit Vitals  BP 95/59 (BP 1 Location: Right arm)   Pulse 73   Temp 97.8 °F (36.6 °C)   Resp 16   Ht 5' 2\" (1.575 m)   Wt 79.1 kg (174 lb 4.8 oz)   SpO2 98%   BMI 31.88 kg/m²         Intake/Output Summary (Last 24 hours) at 8/13/2019 1837  Last data filed at 8/12/2019 2106  Gross per 24 hour   Intake    Output 50 ml   Net -50 ml        Physical Examination:             Constitutional:  No acute distress   ENT:  Oral mucous moist, oropharynx benign. Resp:  CTA b/l   CV:  Regular rhythm, normal rate, no murmurs    GI:  Soft, non distended, non tender. normoactive bowel sounds    Musculoskeletal:  1+ LE edema- improving    Neurologic:  Moves all extremities. AAOx2-3     Psych:  She appears quiet,depressed. copperative and interactive. Data Review:    Review and/or order of clinical lab test  Review and/or order of tests in the medicine section of CPT      Labs:     No results for input(s): WBC, HGB, HCT, PLT, HGBEXT, HCTEXT, PLTEXT, HGBEXT, HCTEXT, PLTEXT in the last 72 hours. Recent Labs     08/13/19  0520 08/12/19  0237 08/11/19  1831 08/11/19  0430    140 138 142   K 3.7 3.8 4.4 2.9*    103 103 104   CO2 29 29 30 32   BUN 23* 21* 20 18   CREA 0.87 1.09* 0.96 0.82   * 107* 118* 85   CA 9.0 9.3 8.9 8.5   MG 1.8 2.0  --  1.9     No results for input(s): SGOT, GPT, ALT, AP, TBIL, TBILI, TP, ALB, GLOB, GGT, AML, LPSE in the last 72 hours. No lab exists for component: AMYP, HLPSE  No results for input(s): INR, PTP, APTT in the last 72 hours. No lab exists for component: INREXT, INREXT   No results for input(s): FE, TIBC, PSAT, FERR in the last 72 hours. No results found for: FOL, RBCF   No results for input(s): PH, PCO2, PO2 in the last 72 hours.   Recent Labs     08/12/19  1119   TROIQ <0.05     Lab Results   Component Value Date/Time    Cholesterol, total 104 08/02/2019 05:06 AM    HDL Cholesterol 37 08/02/2019 05:06 AM    LDL, calculated 54.6 08/02/2019 05:06 AM    Triglyceride 62 08/02/2019 05:06 AM    CHOL/HDL Ratio 2.8 08/02/2019 05:06 AM     Lab Results   Component Value Date/Time    Glucose (POC) 124 (H) 11/26/2018 08:29 AM     Lab Results   Component Value Date/Time    Color DARK YELLOW 08/08/2019 02:06 PM    Appearance CLEAR 08/08/2019 02:06 PM    Specific gravity 1.023 08/08/2019 02:06 PM    Specific gravity >1.030 (H) 02/16/2012 03:00 PM    pH (UA) 5.5 08/08/2019 02:06 PM    Protein 30 (A) 08/08/2019 02:06 PM    Glucose NEGATIVE  08/08/2019 02:06 PM    Ketone 15 (A) 08/08/2019 02:06 PM    Bilirubin NEGATIVE  10/30/2018 11:37 AM    Urobilinogen 1.0 08/08/2019 02:06 PM    Nitrites NEGATIVE  08/08/2019 02:06 PM    Leukocyte Esterase TRACE (A) 08/08/2019 02:06 PM    Epithelial cells FEW 08/08/2019 02:06 PM    Bacteria NEGATIVE  08/08/2019 02:06 PM    WBC 10-20 08/08/2019 02:06 PM    RBC 0-5 08/08/2019 02:06 PM         Medications Reviewed:     Current Facility-Administered Medications   Medication Dose Route Frequency    furosemide (LASIX) tablet 40 mg  40 mg Oral DAILY    lisinopril (PRINIVIL, ZESTRIL) tablet 2.5 mg  2.5 mg Oral DAILY    calcium carbonate (TUMS) chewable tablet 200 mg [elemental]  200 mg Oral TID PRN    metoprolol succinate (TOPROL-XL) XL tablet 25 mg  25 mg Oral DAILY    risperiDONE (RisperDAL) tablet 2 mg  2 mg Oral QHS    acetaminophen (TYLENOL) tablet 650 mg  650 mg Oral Q6H PRN    aspirin delayed-release tablet 81 mg  81 mg Oral QPM    hydrOXYzine HCl (ATARAX) tablet 25 mg  25 mg Oral TID PRN    rosuvastatin (CRESTOR) tablet 10 mg  10 mg Oral QHS    traZODone (DESYREL) tablet 150 mg  150 mg Oral QHS    venlafaxine-SR (EFFEXOR-XR) capsule 300 mg  300 mg Oral DAILY WITH BREAKFAST    sodium chloride (NS) flush 5-40 mL  5-40 mL IntraVENous Q8H    sodium chloride (NS) flush 5-40 mL  5-40 mL IntraVENous PRN    enoxaparin (LOVENOX) injection 40 mg  40 mg SubCUTAneous Q24H    hydrALAZINE (APRESOLINE) 20 mg/mL injection 10 mg  10 mg IntraVENous Q6H PRN     ______________________________________________________________________  EXPECTED LENGTH OF STAY: 2d 9h  ACTUAL LENGTH OF STAY:          2                 Kristina Chou MD

## 2019-08-14 ENCOUNTER — HOSPITAL ENCOUNTER (INPATIENT)
Age: 69
LOS: 9 days | Discharge: HOME | DRG: 885 | End: 2019-08-23
Attending: PSYCHIATRY & NEUROLOGY | Admitting: PSYCHIATRY & NEUROLOGY
Payer: MEDICARE

## 2019-08-14 VITALS
OXYGEN SATURATION: 97 % | DIASTOLIC BLOOD PRESSURE: 62 MMHG | RESPIRATION RATE: 16 BRPM | WEIGHT: 171.5 LBS | HEIGHT: 62 IN | TEMPERATURE: 97.9 F | HEART RATE: 81 BPM | SYSTOLIC BLOOD PRESSURE: 98 MMHG | BODY MASS INDEX: 31.56 KG/M2

## 2019-08-14 PROBLEM — F22 DELUSIONAL DISORDER (HCC): Status: ACTIVE | Noted: 2019-08-14

## 2019-08-14 LAB
ANION GAP SERPL CALC-SCNC: 6 MMOL/L (ref 5–15)
BUN SERPL-MCNC: 20 MG/DL (ref 6–20)
BUN/CREAT SERPL: 22 (ref 12–20)
CALCIUM SERPL-MCNC: 9.4 MG/DL (ref 8.5–10.1)
CHLORIDE SERPL-SCNC: 104 MMOL/L (ref 97–108)
CO2 SERPL-SCNC: 31 MMOL/L (ref 21–32)
CREAT SERPL-MCNC: 0.9 MG/DL (ref 0.55–1.02)
GLUCOSE SERPL-MCNC: 98 MG/DL (ref 65–100)
MAGNESIUM SERPL-MCNC: 2 MG/DL (ref 1.6–2.4)
POTASSIUM SERPL-SCNC: 3.6 MMOL/L (ref 3.5–5.1)
SODIUM SERPL-SCNC: 141 MMOL/L (ref 136–145)

## 2019-08-14 PROCEDURE — 74011250637 HC RX REV CODE- 250/637: Performed by: INTERNAL MEDICINE

## 2019-08-14 PROCEDURE — 80048 BASIC METABOLIC PNL TOTAL CA: CPT

## 2019-08-14 PROCEDURE — 83735 ASSAY OF MAGNESIUM: CPT

## 2019-08-14 PROCEDURE — 74011250637 HC RX REV CODE- 250/637: Performed by: HOSPITALIST

## 2019-08-14 PROCEDURE — 36415 COLL VENOUS BLD VENIPUNCTURE: CPT

## 2019-08-14 PROCEDURE — 65220000003 HC RM SEMIPRIVATE PSYCH

## 2019-08-14 RX ORDER — AMOXICILLIN 250 MG
1 CAPSULE ORAL DAILY
Status: DISCONTINUED | OUTPATIENT
Start: 2019-08-14 | End: 2019-08-14 | Stop reason: HOSPADM

## 2019-08-14 RX ORDER — FAMOTIDINE 20 MG/1
20 TABLET, FILM COATED ORAL DAILY
Status: CANCELLED | OUTPATIENT
Start: 2019-08-15

## 2019-08-14 RX ORDER — ASPIRIN 81 MG/1
81 TABLET ORAL EVERY EVENING
Status: CANCELLED | OUTPATIENT
Start: 2019-08-14

## 2019-08-14 RX ORDER — HYDROXYZINE 50 MG/1
50 TABLET, FILM COATED ORAL
Status: DISCONTINUED | OUTPATIENT
Start: 2019-08-14 | End: 2019-08-14

## 2019-08-14 RX ORDER — LISINOPRIL 5 MG/1
2.5 TABLET ORAL DAILY
Status: CANCELLED | OUTPATIENT
Start: 2019-08-15

## 2019-08-14 RX ORDER — ACETAMINOPHEN 325 MG/1
650 TABLET ORAL
Status: DISCONTINUED | OUTPATIENT
Start: 2019-08-14 | End: 2019-08-14 | Stop reason: SDUPTHER

## 2019-08-14 RX ORDER — POLYETHYLENE GLYCOL 3350 17 G/17G
17 POWDER, FOR SOLUTION ORAL DAILY
Status: DISCONTINUED | OUTPATIENT
Start: 2019-08-15 | End: 2019-08-23 | Stop reason: HOSPADM

## 2019-08-14 RX ORDER — ASPIRIN 81 MG/1
81 TABLET ORAL EVERY EVENING
Status: DISCONTINUED | OUTPATIENT
Start: 2019-08-14 | End: 2019-08-23 | Stop reason: HOSPADM

## 2019-08-14 RX ORDER — BENZTROPINE MESYLATE 1 MG/ML
1 INJECTION INTRAMUSCULAR; INTRAVENOUS
Status: DISCONTINUED | OUTPATIENT
Start: 2019-08-14 | End: 2019-08-14

## 2019-08-14 RX ORDER — AMOXICILLIN 250 MG
1 CAPSULE ORAL DAILY
Status: DISCONTINUED | OUTPATIENT
Start: 2019-08-15 | End: 2019-08-23 | Stop reason: HOSPADM

## 2019-08-14 RX ORDER — METOPROLOL SUCCINATE 25 MG/1
12.5 TABLET, EXTENDED RELEASE ORAL DAILY
Status: CANCELLED | OUTPATIENT
Start: 2019-08-15

## 2019-08-14 RX ORDER — POLYETHYLENE GLYCOL 3350 17 G/17G
17 POWDER, FOR SOLUTION ORAL DAILY
Status: DISCONTINUED | OUTPATIENT
Start: 2019-08-14 | End: 2019-08-14 | Stop reason: HOSPADM

## 2019-08-14 RX ORDER — ACETAMINOPHEN 325 MG/1
650 TABLET ORAL
Status: DISCONTINUED | OUTPATIENT
Start: 2019-08-14 | End: 2019-08-23 | Stop reason: HOSPADM

## 2019-08-14 RX ORDER — ACETAMINOPHEN 325 MG/1
650 TABLET ORAL
Status: CANCELLED | OUTPATIENT
Start: 2019-08-14

## 2019-08-14 RX ORDER — VENLAFAXINE HYDROCHLORIDE 150 MG/1
300 CAPSULE, EXTENDED RELEASE ORAL
Status: DISCONTINUED | OUTPATIENT
Start: 2019-08-15 | End: 2019-08-23 | Stop reason: HOSPADM

## 2019-08-14 RX ORDER — HYDROXYZINE 25 MG/1
25 TABLET, FILM COATED ORAL
Status: CANCELLED | OUTPATIENT
Start: 2019-08-14

## 2019-08-14 RX ORDER — ADHESIVE BANDAGE
30 BANDAGE TOPICAL DAILY PRN
Status: DISCONTINUED | OUTPATIENT
Start: 2019-08-14 | End: 2019-08-23 | Stop reason: HOSPADM

## 2019-08-14 RX ORDER — METOPROLOL SUCCINATE 25 MG/1
12.5 TABLET, EXTENDED RELEASE ORAL DAILY
Status: DISCONTINUED | OUTPATIENT
Start: 2019-08-15 | End: 2019-08-23 | Stop reason: HOSPADM

## 2019-08-14 RX ORDER — VENLAFAXINE HYDROCHLORIDE 150 MG/1
300 CAPSULE, EXTENDED RELEASE ORAL
Status: CANCELLED | OUTPATIENT
Start: 2019-08-15

## 2019-08-14 RX ORDER — ROSUVASTATIN CALCIUM 10 MG/1
10 TABLET, COATED ORAL
Status: CANCELLED | OUTPATIENT
Start: 2019-08-14

## 2019-08-14 RX ORDER — FUROSEMIDE 20 MG/1
20 TABLET ORAL DAILY
Status: CANCELLED | OUTPATIENT
Start: 2019-08-15

## 2019-08-14 RX ORDER — BENZTROPINE MESYLATE 1 MG/1
1 TABLET ORAL
Status: DISCONTINUED | OUTPATIENT
Start: 2019-08-14 | End: 2019-08-14

## 2019-08-14 RX ORDER — OLANZAPINE 2.5 MG/1
2.5 TABLET ORAL
Status: DISCONTINUED | OUTPATIENT
Start: 2019-08-14 | End: 2019-08-23 | Stop reason: HOSPADM

## 2019-08-14 RX ORDER — RISPERIDONE 1 MG/1
2 TABLET, FILM COATED ORAL
Status: CANCELLED | OUTPATIENT
Start: 2019-08-14

## 2019-08-14 RX ORDER — LISINOPRIL 5 MG/1
2.5 TABLET ORAL DAILY
Status: DISCONTINUED | OUTPATIENT
Start: 2019-08-15 | End: 2019-08-21

## 2019-08-14 RX ORDER — CALCIUM CARBONATE 200(500)MG
200 TABLET,CHEWABLE ORAL
Status: CANCELLED | OUTPATIENT
Start: 2019-08-14

## 2019-08-14 RX ORDER — ROSUVASTATIN CALCIUM 10 MG/1
10 TABLET, COATED ORAL
Status: DISCONTINUED | OUTPATIENT
Start: 2019-08-14 | End: 2019-08-23 | Stop reason: HOSPADM

## 2019-08-14 RX ORDER — FUROSEMIDE 20 MG/1
20 TABLET ORAL DAILY
Status: DISCONTINUED | OUTPATIENT
Start: 2019-08-15 | End: 2019-08-23 | Stop reason: HOSPADM

## 2019-08-14 RX ORDER — RISPERIDONE 1 MG/1
2 TABLET, FILM COATED ORAL
Status: DISCONTINUED | OUTPATIENT
Start: 2019-08-14 | End: 2019-08-23 | Stop reason: HOSPADM

## 2019-08-14 RX ORDER — FUROSEMIDE 20 MG/1
20 TABLET ORAL DAILY
Status: DISCONTINUED | OUTPATIENT
Start: 2019-08-14 | End: 2019-08-14 | Stop reason: HOSPADM

## 2019-08-14 RX ORDER — POLYETHYLENE GLYCOL 3350 17 G/17G
17 POWDER, FOR SOLUTION ORAL DAILY
Status: CANCELLED | OUTPATIENT
Start: 2019-08-15

## 2019-08-14 RX ORDER — METOPROLOL SUCCINATE 25 MG/1
12.5 TABLET, EXTENDED RELEASE ORAL DAILY
Status: DISCONTINUED | OUTPATIENT
Start: 2019-08-14 | End: 2019-08-14 | Stop reason: HOSPADM

## 2019-08-14 RX ORDER — CALCIUM CARBONATE 200(500)MG
200 TABLET,CHEWABLE ORAL
Status: DISCONTINUED | OUTPATIENT
Start: 2019-08-14 | End: 2019-08-23 | Stop reason: HOSPADM

## 2019-08-14 RX ORDER — HYDROXYZINE 25 MG/1
25 TABLET, FILM COATED ORAL
Status: DISCONTINUED | OUTPATIENT
Start: 2019-08-14 | End: 2019-08-14 | Stop reason: SDUPTHER

## 2019-08-14 RX ORDER — TRAZODONE HYDROCHLORIDE 50 MG/1
25 TABLET ORAL
Status: DISCONTINUED | OUTPATIENT
Start: 2019-08-14 | End: 2019-08-14 | Stop reason: SDUPTHER

## 2019-08-14 RX ORDER — POTASSIUM CHLORIDE 750 MG/1
40 TABLET, FILM COATED, EXTENDED RELEASE ORAL
Status: COMPLETED | OUTPATIENT
Start: 2019-08-14 | End: 2019-08-14

## 2019-08-14 RX ORDER — AMOXICILLIN 250 MG
1 CAPSULE ORAL DAILY
Status: CANCELLED | OUTPATIENT
Start: 2019-08-15

## 2019-08-14 RX ADMIN — FUROSEMIDE 20 MG: 20 TABLET ORAL at 09:42

## 2019-08-14 RX ADMIN — ROSUVASTATIN CALCIUM 10 MG: 10 TABLET, COATED ORAL at 21:10

## 2019-08-14 RX ADMIN — POTASSIUM CHLORIDE 40 MEQ: 750 TABLET, EXTENDED RELEASE ORAL at 14:08

## 2019-08-14 RX ADMIN — Medication 10 ML: at 14:08

## 2019-08-14 RX ADMIN — POLYETHYLENE GLYCOL 3350 17 G: 17 POWDER, FOR SOLUTION ORAL at 14:10

## 2019-08-14 RX ADMIN — ASPIRIN 81 MG: 81 TABLET, COATED ORAL at 18:10

## 2019-08-14 RX ADMIN — RISPERIDONE 2 MG: 1 TABLET ORAL at 21:10

## 2019-08-14 RX ADMIN — SENNOSIDES, DOCUSATE SODIUM 1 TABLET: 50; 8.6 TABLET, FILM COATED ORAL at 14:08

## 2019-08-14 RX ADMIN — LISINOPRIL 2.5 MG: 5 TABLET ORAL at 09:43

## 2019-08-14 RX ADMIN — METOPROLOL SUCCINATE 12.5 MG: 25 TABLET, EXTENDED RELEASE ORAL at 09:43

## 2019-08-14 RX ADMIN — Medication 10 ML: at 06:00

## 2019-08-14 RX ADMIN — VENLAFAXINE HYDROCHLORIDE 300 MG: 150 CAPSULE, EXTENDED RELEASE ORAL at 09:15

## 2019-08-14 RX ADMIN — TRAZODONE HYDROCHLORIDE 150 MG: 100 TABLET ORAL at 21:10

## 2019-08-14 NOTE — PROGRESS NOTES
Cardiology Progress Note  2019     Admit Date: 2019  Admit Diagnosis: Failure to thrive (0-17) [R62.51]  Acute CH[facility] (congestive heart failure) ([facility] [I50.9]  CC: none currently    Assessment:   Active Problems:    Failure to thrive (0-17) (2019)      Acute CHF (congestive heart failure) ([facility] (2019)      Plan:     Halved dose of BB given hypotension  No other changes  No other IP cardiac plans    Subjective:      Rhoda Hill has no cardiac c/o    Objective:    Physical Exam:  Overall VSSAF;    Visit Vitals  /73 (BP 1 Location: Right arm, BP Patient Position: At rest)   Pulse 73   Temp 97.7 °F (36.5 °C)   Resp 16   Ht 5' 2\" (1.575 m)   Wt 77.8 kg (171 lb 8 oz)   SpO2 98%   BMI 31.37 kg/m²     Temp (24hrs), Av.1 °F (36.7 °C), Min:97.7 °F (36.5 °C), Max:98.6 °F (37 °C)    Patient Vitals for the past 8 hrs:   Pulse   19 0911 73   19 0237 77    Patient Vitals for the past 8 hrs:   Resp   19 0911 16   19 0237 16    Patient Vitals for the past 8 hrs:   BP   19 0911 108/73   19 0237 119/77       1901 -  0700  In: -   Out: 50 [Urine:50]      General Appearance: Well developed, well nourished, no acute distress. Ears/Nose/Mouth/Throat:   Normal MM; anicteric. JVP: WNL   Resp:   Lungs clear to auscultation bilaterally. Nl resp effort. Cardiovascular:  RRR, S1, S2 normal, no new murmur. No gallop or rub. Abdomen:   Soft, non-tender, bowel sounds are present. Extremities: No edema bilaterally. Skin:  Neuro: Warm and dry.   A/O x3, grossly nonfocal                         Data Review:       Labs:   Recent Results (from the past 24 hour(s))   MAGNESIUM    Collection Time: 19  2:49 AM   Result Value Ref Range    Magnesium 2.0 1.6 - 2.4 mg/dL   METABOLIC PANEL, BASIC    Collection Time: 19  2:49 AM   Result Value Ref Range    Sodium 141 136 - 145 mmol/L    Potassium 3.6 3.5 - 5.1 mmol/L    Chloride 104 97 - 108 mmol/L    CO2 31 21 - 32 mmol/L    Anion gap 6 5 - 15 mmol/L    Glucose 98 65 - 100 mg/dL    BUN 20 6 - 20 MG/DL    Creatinine 0.90 0.55 - 1.02 MG/DL    BUN/Creatinine ratio 22 (H) 12 - 20      GFR est AA >60 >60 ml/min/1.73m2    GFR est non-AA >60 >60 ml/min/1.73m2    Calcium 9.4 8.5 - 10.1 MG/DL      Current medications reviewed       Pepe Gleason MD

## 2019-08-14 NOTE — DISCHARGE SUMMARY
Discharge Summary       PATIENT ID: Rylie Zhou  MRN: [id number]   YOB: 1950    DATE OF ADMISSION: 8/8/2019 12:26 PM    DATE OF DISCHARGE: 8/14/2019  PRIMARY CARE PROVIDER: Nelly Spears MD     ATTENDING PHYSICIAN: Gustavo Parmar MD  DISCHARGING PROVIDER: Gustavo Parmar MD    To contact this individual call [phone] and ask the  to page. If unavailable ask to be transferred the Adult Hospitalist Department. CONSULTATIONS: ED CONSULT TO SENIOR SERVICES CASE MANAGEMENT  IP CONSULT TO PSYCHIATRY    PROCEDURES/SURGERIES: * No surgery found *    ADMITTING [address] [place] [address] COURSE:   Admission Summary:   76 y.o F with PMH of depression,delusional disorder,anxiety,HTN,arthritis and other medical issues was admitted to the hospital yesterday -due to confusion and was unable to take of her self. She was recently discharged from behavioral health unit after receiving treatment for depression and delusional disorder. She stopped taking all her meds after going home. #Acute on chronic systolic and diastolic heart failure: NYHA class 2 at admission,call I on d/c  -Echo showed 20-25% EF and grade 3 diastolic dysfunction. Global hypokinesis,mild to moderate MR.   -Evaluated by cardiaoloy- no further ischemic work up till psych issues stable and have reliable medication compliance. -continue aspirin,statin,Toprol, lisinopril and lasix. Dose adjusted due to low BP     Hypokalemia:potassium 3.4 today. Given klor con 40 meq x1 dose. BMP in AM.Magnesium 2. Five runs of NSVT reported on remote tele. Rate is very slow,doubt this is svt,patient was moving,in any case,she is on beta blocker. Mg 2. Potassium replaced. #Depression,deluisonal disorder:  -continue meds - risperidal,effexor and trazadone. Being transferred to inpatient psychiatry unit at [facility]     #HTN:  -BP regimen,Toprol, lisinopril,adjusted.               DISCHARGE MEDICATIONS:  Current Discharge Medication List PENDING TEST RESULTS:   At the time of discharge the following test results are still pending: none    FOLLOW UP APPOINTMENTS:    Follow-up Information     Follow up With Specialties Details Why Contact Info    Clotilde Chamorro, [address]  [place]  [phone]             ADDITIONAL CARE RECOMMENDATIONS:     DIET: Regular Diet and Cardiac Diet    ACTIVITY: Activity as tolerated    WOUND CARE: NA    EQUIPMENT needed: na          NOTIFY YOUR PHYSICIAN FOR ANY OF THE FOLLOWING:   Fever over 101 degrees for 24 hours. Chest pain, shortness of breath, fever, chills, nausea, vomiting, diarrhea, change in mentation, falling, weakness, bleeding. Severe pain or pain not relieved by medications. Or, any other signs or symptoms that you may have questions about. DISPOSITION:inpatient psychiatry     Home With:   OT  PT  [place]  RN       SNF(Name)    Inpatient Rehab(name)    Independent/assisted living(name)    Hospice    Other:       PATIENT CONDITION AT DISCHARGE:     Functional status        Poor    x Deconditioned     Independent    Cognition       x Lucid    Forgetful    Dementia   Catheter/Lines(indications)     [place]illo    PICC    PEG   x None   Code status     x Full    DNR       PHYSICAL EXAMINATION AT DISCHARGE:     Visit Vitals  /73 (BP 1 Location: Right arm, BP Patient Position: At rest)   Pulse 73   Temp 97.7 °F (36.5 °C)   Resp 16   Ht 5' 2\" (1.575 m)   Wt 77.8 kg (171 lb 8 oz)   SpO2 98%   BMI 31.37 kg/m²    O2 Flow Rate (L/min): 1 l/min O2 Device: Room air    Temp (24hrs), Av.2 °F (36.8 °C), Min:97.7 °F (36.5 °C), Max:98.6 °F (37 °C)    No intake/output data recorded.  1901 -  0700  In: -   Out: 50 [Urine:50]    GENERAL:  Alert, oriented, cooperative, no apparent distress  HEENT:  Normocephalic, atraumatic, non icteric sclerae, non pallor conjuctivae, EOMs intact, PERRLA.   NECK: Supple, trachea midline, no adenopathy, no thyromegally or tenderness, no carotid bruit and no JVD. LUNGS:   Vesicular breath sounds bilaterally, no added sounds. HEART:   S1 and S2 well heard,RRR,  no murmur, click, rub or gallop. ABDOMEB:   Soft, non-tender. Normoactive bowel sounds. No masses,  No organomegaly. EXTREMETIES:  Atraumatic, acyanotic, no edema  PULSES: 2+ and symmetric all extremities. SKIN:  No rashes or lesions  NEUROLOGY: Alert and oriented to PPT, CNII-XII intact. Motor and sensory exam grossly intact. CHRONIC MEDICAL DIAGNOSES:  Problem List as of 8/14/2019 Date Reviewed: 4/9/2019          Codes Class Noted - Resolved    Acute CHF (congestive heart failure) (HCC) ICD-10-CM: I50.9  ICD-9-CM: 428.0  8/11/2019 - Present        Failure to thrive (0-17) ICD-10-CM: R62.51  ICD-9-CM: 783.41  8/8/2019 - Present        Atypical glandular cells of undetermined significance (BASILIO) on cervical Pap smear ICD-10-CM: R87.619  ICD-9-CM: 795.00  11/26/2018 - Present        Severe obesity with body mass index (BMI) of 35.0 to 39.9 with serious comorbidity ([facility] ICD-10-CM: E66.01  ICD-9-CM: 278.01  7/3/2018 - Present        Hx of carcinoma in situ of breast ICD-10-CM: Z86.000  ICD-9-CM: V13.89  4/9/2018 - Present        Personality disorder ([facility] ICD-10-CM: F60.9  ICD-9-CM: 301.9  3/2/2017 - Present        Hyperlipidemia ICD-10-CM: E78.5  ICD-9-CM: 272.4  12/2/2015 - Present        Chronic lower back pain ICD-10-CM: M54.5, G89.29  ICD-9-CM: 724.2, 338.29  11/19/2015 - Present        Depression with anxiety ICD-10-CM: F41.8  ICD-9-CM: 300.4  9/11/2012 - Present        Arthritis of knee, right ICD-10-CM: M17.11  ICD-9-CM: 716.96  2/20/2012 - Present        RESOLVED: Breast pain ICD-10-CM: N64.4  ICD-9-CM: 611.71  7/22/2016 - 7/3/2018              Greater than 30 minutes were spent with the patient on counseling and coordination of care    Signed:    Italo Gilbert MD  8/14/2019  2:44 PM

## 2019-08-14 NOTE — BH NOTES
At 1700:  Patient arrived via wheelchair from [address] [place] [address] under the treatment of Dr. Gui Sanchez. Admission status self. PTA medication(s) verified by patient and Kne Brenner RN. Patient chief complaint depression with delusions. Patient denies delusions and audio hallucinations upon admission assessment. Patient oriented to unit and room, signed consent to treat form, belongings searched and stored, and patient placed in gown. Jewelry on patient: wedding ring, yellow in color, no stones  Patient behavior: cooperative   Safety: Q15 checks    Skin and Pressure Documentation  Primary Nurse, Kelly Dennis RN and Melyssa Zepeda RN performed a dual skin assessment on this patient No impairment noted  Satish score is 23   Only skin impairments are bruising on abdominal area from heparin injections from Med floor and right hand bruising from IV. Patient has Hx of Left breast cancer with mastectomy. Do not use limb band placed on left arm. Pressure Injury Documentation  (COMPLETE ONE LABEL PER PRESSURE INJURY)  For further information, please review corresponding Wound Care flowsheet. Suzette Love has: No pressure injury noted and pressure ulcer prevention initiated. TRANSFER - IN REPORT:    Verbal report received from eKn Brenner, [facility] on Suzette Love  being received from [address] [place] [address] for routine progression of care      Report consisted of patients Situation, Background, Assessment and   Recommendations(SBAR). Information from the following report(s) SBAR was reviewed with the receiving nurse. Opportunity for questions and clarification was provided. Assessment completed upon patients arrival to unit and care assumed. Obtained orders to continue Lasix, Metoprolol and Prinivil for CHF, EF 25%. Patient has Hx of Left breast cancer.  past away in 2005 and has been depressed since his death. Patient has Hx of low BP.

## 2019-08-15 LAB
ANION GAP SERPL CALC-SCNC: 6 MMOL/L (ref 5–15)
BUN SERPL-MCNC: 15 MG/DL (ref 6–20)
BUN/CREAT SERPL: 18 (ref 12–20)
CALCIUM SERPL-MCNC: 9.8 MG/DL (ref 8.5–10.1)
CHLORIDE SERPL-SCNC: 106 MMOL/L (ref 97–108)
CO2 SERPL-SCNC: 31 MMOL/L (ref 21–32)
CREAT SERPL-MCNC: 0.82 MG/DL (ref 0.55–1.02)
GLUCOSE SERPL-MCNC: 109 MG/DL (ref 65–100)
MAGNESIUM SERPL-MCNC: 2 MG/DL (ref 1.6–2.4)
POTASSIUM SERPL-SCNC: 4.8 MMOL/L (ref 3.5–5.1)
SODIUM SERPL-SCNC: 143 MMOL/L (ref 136–145)

## 2019-08-15 PROCEDURE — 74011250637 HC RX REV CODE- 250/637: Performed by: HOSPITALIST

## 2019-08-15 PROCEDURE — 74011250637 HC RX REV CODE- 250/637: Performed by: PSYCHIATRY & NEUROLOGY

## 2019-08-15 PROCEDURE — 65220000003 HC RM SEMIPRIVATE PSYCH

## 2019-08-15 PROCEDURE — 36415 COLL VENOUS BLD VENIPUNCTURE: CPT

## 2019-08-15 PROCEDURE — 83735 ASSAY OF MAGNESIUM: CPT

## 2019-08-15 PROCEDURE — 80048 BASIC METABOLIC PNL TOTAL CA: CPT

## 2019-08-15 RX ADMIN — FUROSEMIDE 20 MG: 20 TABLET ORAL at 08:49

## 2019-08-15 RX ADMIN — ASPIRIN 81 MG: 81 TABLET, COATED ORAL at 16:53

## 2019-08-15 RX ADMIN — RISPERIDONE 2 MG: 1 TABLET ORAL at 21:11

## 2019-08-15 RX ADMIN — OLANZAPINE 2.5 MG: 2.5 TABLET, FILM COATED ORAL at 14:46

## 2019-08-15 RX ADMIN — VENLAFAXINE HYDROCHLORIDE 300 MG: 150 CAPSULE, EXTENDED RELEASE ORAL at 08:50

## 2019-08-15 RX ADMIN — SENNOSIDES AND DOCUSATE SODIUM 1 TABLET: 8.6; 5 TABLET ORAL at 08:50

## 2019-08-15 RX ADMIN — ACETAMINOPHEN 650 MG: 325 TABLET ORAL at 20:09

## 2019-08-15 RX ADMIN — POLYETHYLENE GLYCOL 3350 17 G: 17 POWDER, FOR SOLUTION ORAL at 08:50

## 2019-08-15 RX ADMIN — ROSUVASTATIN CALCIUM 10 MG: 10 TABLET, COATED ORAL at 21:11

## 2019-08-15 NOTE — BH NOTES
GROUP THERAPY PROGRESS NOTE    Joy Roldan did not participate in the 25 minute Acute Unit's Process Group, with a focus on setting a direction or goal, identifying feelings and planning for the day. The group was also encouraged to consider where they are in their inpatient treatment; e.g., at the beginning, in the middle, or near the end of their maze or hospitalization.

## 2019-08-15 NOTE — H&P
[address] [facility] HISTORY AND PHYSICAL    Name:  Keaton Varghese  MR#:  [id number]  :  1950  ACCOUNT #:  [de-identified]  ADMIT DATE:  2019    DATE OF SERVICE: 8/15/2019      CHIEF COMPLAINT:  \"The bugs are biting me. \"    HISTORY OF PRESENTING ILLNESS:  The patient is a 51-year-old female who is admitted at [address] [place] [address] on a voluntary basis. She is well known to this provider as a result of recent inpatient psychiatric admission at [facility]. She was last under my care beginning of this month after almost a 1 week of inpatient stay. She was diagnosed with delusional disorder. She also has been diagnosed with severe depression, anxiety. After 2 days, she presented herself back to the emergency room feeling very confused, not able to care for herself. She actually did not fill any of her prescriptions. She was then admitted to the medical unit for further evaluation and treatment. While she was in the medical unit, her echocardiogram showed 20% to 25% EF and grade 3 diastolic dysfunction. She has been diagnosed with systolic and diastolic heart failure. Her medications have been adjusted. She was then transferred back here at [facility] after she was medically cleared in the medical unit. I saw her for a psychiatric consultation in the medical unit, and she stated she has been feeling overwhelmed, she misses her dog. She was very confused. She could not tell initially where her dog was. She also could not tell where she was staying at. She continues to have visual hallucinations of bugs but there was none. She believes that she is being bitten on her legs and all over her body. She was actually pointing these bugs at me. She is supposed to have a followup appointment with Dr. Humberto aMrie tomorrow for continuity of care. Today, she continues to have persecutory delusions. She states that she is feeling bugs all over her body.   She wants the whole [employer] to be [employer]. \"  She stated that she would like to see it being debugged. She also states that her dog has bugs right now. She denies suicidal ideation or homicidal ideation. PAST MEDICAL HISTORY:  See H and P. PAST PSYCHIATRIC HOSPITALIZATIONS:  This is her 4th psychiatric inpatient admission. She was recently under my care earlier this month. She was also admitted at the [facility]. She will have her first appointment with Dr. Manuela Weeks tomorrow, but this would be rescheduled. She was previously seeing Dr. Howard Antonio. PSYCHOSOCIAL HISTORY:  She is a . She has no children. She is currently receiving Social Security Disability checks. She resides at [facility]. MENTAL STATUS EXAM:  She is alert and oriented in all spheres. She is dressed in hospital apparel. She reports her mood is not good. Affect is mildly constricted. Speech, soft and slow. Thought process is logical.  She denies suicidal ideation and homicidal ideation, but hallucinations and paranoia are evident. Memory is intact. Intelligence seems average. Insight is poor. Judgment is poor. DIAGNOSIS:  Delusional disorder. TREATMENT PLANNING:  I would continue her inpatient stay. She will be provided with support and encouraged to attend groups. Her safety will be monitored. Her medications will be modified and assessed. Case Management will work on discharge planning. ASSETS AND STRENGTHS:  She is willing to seek help. She is willing to take medications. ESTIMATED LENGTH OF STAY:  5-7 days.       SHARIF LOPEZ NP      [id number]  D:  08/15/2019 15:03  T:  08/15/2019 16:05  JOB #:  [id number]

## 2019-08-15 NOTE — PROGRESS NOTES
Laboratory Monitoring for Antipsychotics: This patient is currently prescribed the following medication(s):   Current Facility-Administered Medications   Medication Dose Route Frequency    aspirin delayed-release tablet 81 mg  81 mg Oral QPM    furosemide (LASIX) tablet 20 mg  20 mg Oral DAILY    [Held by provider] lisinopril (PRINIVIL, ZESTRIL) tablet 2.5 mg  2.5 mg Oral DAILY    metoprolol succinate (TOPROL-XL) XL tablet 12.5 mg  12.5 mg Oral DAILY    polyethylene glycol (MIRALAX) packet 17 g  17 g Oral DAILY    risperiDONE (RisperDAL) tablet 2 mg  2 mg Oral QHS    rosuvastatin (CRESTOR) tablet 10 mg  10 mg Oral QHS    senna-docusate (PERICOLACE) 8.6-50 mg per tablet 1 Tab  1 Tab Oral DAILY    traZODone (DESYREL) tablet 150 mg  150 mg Oral QHS    venlafaxine-SR (EFFEXOR-XR) capsule 300 mg  300 mg Oral DAILY WITH BREAKFAST     The following labs have been completed for monitoring of antipsychotics and/or mood stabilizers:    Height, Weight, BMI Estimation  Estimated body mass index is 31.37 kg/m² as calculated from the following:    Height as of 8/13/19: 157.5 cm (62\"). Weight as of an earlier encounter on 8/14/19: 77.8 kg (171 lb 8 oz). Vital Signs/Blood Pressure  Visit Vitals  BP 90/60 (BP 1 Location: Left arm, BP Patient Position: Sitting)   Pulse 81   Temp 98.1 °F (36.7 °C)   Resp 16   SpO2 96%     Renal Function, Hepatic Function and Chemistry  Estimated Creatinine Clearance: 63.4 mL/min (based on SCr of 0.82 mg/dL).     Lab Results   Component Value Date/Time    Sodium 143 08/15/2019 06:25 AM    Potassium 4.8 08/15/2019 06:25 AM    Chloride 106 08/15/2019 06:25 AM    CO2 31 08/15/2019 06:25 AM    Anion gap 6 08/15/2019 06:25 AM    BUN 15 08/15/2019 06:25 AM    Creatinine 0.82 08/15/2019 06:25 AM    BUN/Creatinine ratio 18 08/15/2019 06:25 AM    Bilirubin, total 1.0 08/08/2019 01:58 PM    Protein, total 6.5 08/08/2019 01:58 PM    Albumin 3.2 (L) 08/08/2019 01:58 PM    Globulin 3.3 08/08/2019 01:58 PM A-G Ratio 1.0 (L) 08/08/2019 01:58 PM    ALT (SGPT) 68 08/08/2019 01:58 PM    Alk. phosphatase 124 (H) 08/08/2019 01:58 PM     Lab Results   Component Value Date/Time    Glucose 109 (H) 08/15/2019 06:25 AM    Glucose 121 (H) 08/02/2019 05:06 AM    Glucose (POC) 124 (H) 11/26/2018 08:29 AM     Lab Results   Component Value Date/Time    Hemoglobin A1c 5.8 02/16/2012 02:15 PM     Hematology  Lab Results   Component Value Date/Time    WBC 10.1 08/08/2019 01:58 PM    RBC 4.75 08/08/2019 01:58 PM    HGB 12.5 08/08/2019 01:58 PM    HCT 41.6 08/08/2019 01:58 PM    MCV 87.6 08/08/2019 01:58 PM    MCH 26.3 08/08/2019 01:58 PM    MCHC 30.0 08/08/2019 01:58 PM    RDW 16.4 (H) 08/08/2019 01:58 PM    PLATELET 909 13/48/7592 01:58 PM     Lipids  Lab Results   Component Value Date/Time    Cholesterol, total 104 08/02/2019 05:06 AM    HDL Cholesterol 37 08/02/2019 05:06 AM    LDL, calculated 54.6 08/02/2019 05:06 AM    Triglyceride 62 08/02/2019 05:06 AM    CHOL/HDL Ratio 2.8 08/02/2019 05:06 AM     Thyroid Function  Lab Results   Component Value Date/Time    TSH 1.84 07/31/2019 10:33 PM     Assessment/Plan:  Recommended baseline laboratory monitoring has been completed based on this patient's current medication regimen. No further interventions are needed at this time. Follow-up metabolic monitoring labs should be completed in 3 months (quarterly for the first year of antipsychotic therapy).      Mario Maurice, BRANDOND

## 2019-08-15 NOTE — BH NOTES
Travis Knapp  was absent from [facility].     Valente Tripathi  8/15/2019  10:11 AM

## 2019-08-15 NOTE — PROGRESS NOTES
Problem: Altered Thought Process (Adult/Pediatric)N 118/19Goal: *STG: Complies with medication therapy  Variance Patient slowly responding  Note:   Has been medication complaint. BP medication held this am for low blood pressure. Problem: Altered Thought Process (Adult/Pediatric) [id number]  Goal: *STG: Attends activities and groups  Outcome: Not Progressing Towards Goal  Note:   Declined to attend group this am.No explanation given. Problem: Altered Thought Process (Adult/Pediatric)  Goal: *STG: Decreased delusional thinking  Outcome: Not Progressing Towards Goal  Note:   Stated 'I have bugs in my skin and my dog has bugs. We got them from [facility] I need a bomb set off to kill them. \"     Problem: Altered Thought Process (Adult/Pediatric)  Goal: *STG: Demonstrates ability to understand and use improved judgment in daily activities and relationships  Outcome: Not Progressing Towards Goal  Note:   Expressed desire to \"sit in room when they bug bomb apt. \"       [address] [facility] 60  Master Treatment Plan for Nita Adrian    Date Treatment Plan Initiated: 8/15/19    Treatment Plan Modalities:  Type of Modality Amount  (x minutes) Frequency (x/week) Duration (x days) Name of Responsible Staff   Community & wrap-up meetings to encourage peer interactions 15 7 1   HEIDI Tam [facility]   Group psychotherapy to assist in building coping skills and internal controls 60 7 1 Jarek Alejandre   Therapeutic activity groups to build coping skills 60 7 1 Jarek Alejandre   Psychoeducation in group setting to address:   Medication education   15 7 1 JASWINDER Mckeon RN   Coping skills      C DRE LLOYD   Relaxation techniques      S \[facility]\""   Symptom management         Discharge planning   60 [address]   60 1 1 volunteer   Recovery/AA/NA      volunteer   Physician medication management   15 7 1 DR JOSSY Rae/CONCHITA Busby [facility]   Family meeting/discharge planning   15 2 1 Ghazal Aguilera Bobo and Burnard Scale

## 2019-08-15 NOTE — MASTER TREATMENT PLAN
[address] [facility] 60  Master Treatment Plan for Verl Nette    Date Treatment Plan Initiated: 8/15/19    Treatment Plan Modalities:  Type of Modality Amount  (x minutes) Frequency (x/week) Duration (x days) Name of Responsible Staff   [address] [facility] meetings to encourage peer interactions Sinai Demarco.     Group psychotherapy to assist in building coping skills and internal controls 60 7 1 Jarek Alejandre   Therapeutic activity groups to build coping skills 60 7 1 Jarek Alejandre   Psychoeducation in group setting to address:   Medication education   15 7 1 [address] skills         Relaxation techniques         Symptom management         Discharge planning   60 [address]   60 1 1 volunteer   Recovery/AA/NA      volunteer   Physician medication management   15 7 1 Dr. Hendrickson McMillan meeting/discharge planning   15 2 [address] [facility] and Sonya Clemente

## 2019-08-15 NOTE — PROGRESS NOTES
GROUP THERAPY PROGRESS NOTE      Rick Enriquez was present for medication education group. GROUP TIME: 45 minutes, Thursdays 15:45-16:30    PERSONAL GOAL FOR PARTICIPATION: To be present for group, participate in discussion and answer patient-directed questions. THERAPEUTIC INTERVENTIONS REVIEWED AND DISCUSSED: The following topics were presented: medication adherence, how to remember to refill medications, importance of follow-up after discharge and keeping up with appointments, relapse prevention, importance of using one pharmacy, differences between brand and generic medications, drug interactions, effects of alcohol on psychiatric medications, keeping a record of all medication including prescription and non-prescription drugs, storage of medications and who to contact with medication related questions. IMPRESSION OF PARTICIPATION: Ms. Kennedi Hoyt attended the entire medication education group. She was quiet throughout the group but did nod her agreement to the discussion at time. She was respectful to both her peers and the .     Heather Ingram, PharmD, BCPP, [facility], [address] [place] [address]

## 2019-08-15 NOTE — INTERDISCIPLINARY ROUNDS
PSYCHOSOCIAL ASSESSMENT  :Patient identifying info:  Felix Hsieh is a 76 y.o., female admitted 2019  5:32 PM     Presenting problem and precipitating factors: She was transferred from the medical unit to restart psych medications. Chart reports she called 911 a few days after discharge from [facility] and returned to the ER with confusion and increased paranoia. Reports she did not fill her prescriptions when discharge from [facility]. 2019     Mental status assessment:alert, oriented x's 3 , but somewhat disorganized , insight and judgment are impaired      Strengths: stable housing - established outpatient care     Collateral information:     Current psychiatric /substance abuse providers and contact info: Dr. Keturah Anglin     Previous psychiatric/substance abuse providers and response to treatment: Dr. Susie Meek and several inpatient stays. Family history of mental illness or substance abuse: unknown    Substance abuse history:    Social History     Tobacco Use    Smoking status: Never Smoker    Smokeless tobacco: Never Used   Substance Use Topics    Alcohol use: No       History of biomedical complications associated with substance abuse :none noted   Patient's current acceptance of treatment or motivation for change:    Family constellation:      Is significant other involved?        Describe support system: friend     Describe living arrangements and home environment:[facility]:   Hospital Problems  Date Reviewed: 2019          Codes Class Noted POA    Delusional disorder [facility]) ICD-10-CM: F22  ICD-9-CM: 297.1  2019 Unknown              Trauma history: none noted     Legal issues: no    History of  service: no    Financial status: long term     Rastafari/cultural factors: none noted     Education/work history:     Have you been licensed as a health care professional (current or ): no    Leisure and recreation preferences: unknown     Describe coping skills:ineffectual     Skyler Goodman  8/15/2019

## 2019-08-15 NOTE — INTERDISCIPLINARY ROUNDS
Behavioral Health Interdisciplinary Rounds     Patient Name: Peng Cervantes  Age: 76 y.o. Room/Bed:  728/  Primary Diagnosis: <principal problem not specified>   Admission Status: Voluntary     Readmission within 30 days: no  Power of  in place: no  Patient requires a blocked bed: no          Reason for blocked bed:     VTE Prophylaxis: No    Mobility needs/Fall risk: no  Flu Vaccine : no   Nutritional Plan: no  Consults:          Labs/Testing due today?: yes    Sleep hours:  5      Participation in Care/Groups:  New Admit  Medication Compliant?: Yes  PRNS (last 24 hours): None    Restraints (last 24 hours):  no     CIWA (range last 24 hours):     COWS (range last 24 hours):      Alcohol screening (AUDIT) completed -   AUDIT Score: 0     If applicable, date SBIRT discussed in treatment team AND documented:   AUDIT Screen Score: AUDIT Score: 0      Document Brief Intervention (corresponds directly with the 5 A's, Ask, Advise, Assess, Assist, and Arrange): At- Risk Patients (Score 7-15 for women; 8-15 for men)  Discuss concern patient is drinking at unhealthy levels known to increase risk of alcohol-related health problems. Is Patient ready to commit to change? If No:   Encourage reflection   Discuss short term and long term health risks of consuming alcohol   Barriers to change   Reaffirm willingness to help / Educational materials provided  If Yes:   Set goal  [employer] provided    Harmful use or Dependence (Score 16 or greater)   Discuss short term and long term health risks of consuming alcohol   Recommendations   Negotiate drinking goal   Recommend addiction specialist/center   Arrange follow-up appointments.     Tobacco - patient is a smoker: Have You Used Tobacco in the Past 30 Days: No  Illegal Drugs use: Have You Used Any Illegal Substances Over the Past 12 Months: No    24 hour chart check complete: yes     Patient goal(s) for today:   Treatment team focus/goals: Plan to assess for medications and discharge needs. Progress note : She was pleasant and compliant with treatment team.      LOS:  1  Expected LOS: TBD    Financial concerns/prescription coverage:  Medicare - [facility]   Date of last family contact:n family contact       Family requesting physician contact today:   Discharge plan: she will return to her apartment   Guns in the home:  no     Outpatient provider(s): Dr. Ivette Chaparro    Participating treatment team members: Robson Downey, RN - [address], PharmD.

## 2019-08-15 NOTE — PROGRESS NOTES
Problem: Falls - Risk of  Goal: *Absence of Falls  Description  Document Shivam Vigilis Fall Risk and appropriate interventions in the flowsheet. Outcome: Progressing Towards Goal  Note:   Fall Risk Interventions:  Mobility Interventions: Assess mobility with egress test    Mentation Interventions: Adequate sleep, hydration, pain control    Medication Interventions: Teach patient to arise slowly    Elimination Interventions:  Toilet paper/wipes in reach    History of Falls Interventions: Door open when patient unattended         Problem: Altered Thought Process (Adult/Pediatric)  Goal: *STG: Participates in treatment plan  Outcome: Progressing Towards Goal  Goal: *STG: Complies with medication therapy  Outcome: Progressing Towards Goal  Goal: *STG: Attends activities and groups  Outcome: Progressing Towards Goal

## 2019-08-16 LAB
ANION GAP SERPL CALC-SCNC: 8 MMOL/L (ref 5–15)
BUN SERPL-MCNC: 16 MG/DL (ref 6–20)
BUN/CREAT SERPL: 20 (ref 12–20)
CALCIUM SERPL-MCNC: 9.2 MG/DL (ref 8.5–10.1)
CHLORIDE SERPL-SCNC: 107 MMOL/L (ref 97–108)
CO2 SERPL-SCNC: 29 MMOL/L (ref 21–32)
CREAT SERPL-MCNC: 0.81 MG/DL (ref 0.55–1.02)
GLUCOSE SERPL-MCNC: 107 MG/DL (ref 65–100)
POTASSIUM SERPL-SCNC: 4.3 MMOL/L (ref 3.5–5.1)
SODIUM SERPL-SCNC: 144 MMOL/L (ref 136–145)

## 2019-08-16 PROCEDURE — 80048 BASIC METABOLIC PNL TOTAL CA: CPT

## 2019-08-16 PROCEDURE — 65220000003 HC RM SEMIPRIVATE PSYCH

## 2019-08-16 PROCEDURE — 74011250637 HC RX REV CODE- 250/637: Performed by: HOSPITALIST

## 2019-08-16 PROCEDURE — 36415 COLL VENOUS BLD VENIPUNCTURE: CPT

## 2019-08-16 PROCEDURE — 74011250637 HC RX REV CODE- 250/637: Performed by: PSYCHIATRY & NEUROLOGY

## 2019-08-16 RX ORDER — FAMOTIDINE 20 MG/1
20 TABLET, FILM COATED ORAL DAILY
Status: DISCONTINUED | OUTPATIENT
Start: 2019-08-17 | End: 2019-08-23 | Stop reason: HOSPADM

## 2019-08-16 RX ADMIN — CALCIUM CARBONATE (ANTACID) CHEW TAB 500 MG 200 MG: 500 CHEW TAB at 17:46

## 2019-08-16 RX ADMIN — METOPROLOL SUCCINATE 12.5 MG: 25 TABLET, EXTENDED RELEASE ORAL at 08:44

## 2019-08-16 RX ADMIN — POLYETHYLENE GLYCOL 3350 17 G: 17 POWDER, FOR SOLUTION ORAL at 08:42

## 2019-08-16 RX ADMIN — TRAZODONE HYDROCHLORIDE 150 MG: 100 TABLET ORAL at 21:06

## 2019-08-16 RX ADMIN — ASPIRIN 81 MG: 81 TABLET, COATED ORAL at 17:36

## 2019-08-16 RX ADMIN — RISPERIDONE 2 MG: 1 TABLET ORAL at 21:06

## 2019-08-16 RX ADMIN — ROSUVASTATIN CALCIUM 10 MG: 10 TABLET, COATED ORAL at 21:06

## 2019-08-16 RX ADMIN — FUROSEMIDE 20 MG: 20 TABLET ORAL at 08:43

## 2019-08-16 RX ADMIN — SENNOSIDES AND DOCUSATE SODIUM 1 TABLET: 8.6; 5 TABLET ORAL at 08:43

## 2019-08-16 RX ADMIN — OLANZAPINE 2.5 MG: 2.5 TABLET, FILM COATED ORAL at 15:33

## 2019-08-16 RX ADMIN — ACETAMINOPHEN 650 MG: 325 TABLET ORAL at 16:14

## 2019-08-16 RX ADMIN — VENLAFAXINE HYDROCHLORIDE 300 MG: 150 CAPSULE, EXTENDED RELEASE ORAL at 08:43

## 2019-08-16 NOTE — PROGRESS NOTES
Problem: Altered Thought Process (Adult/Pediatric)  Goal: *STG: Complies with medication therapy  Outcome: Progressing Towards Goal  Note:   Medications reviewed   in treatment team     Problem: Altered Thought Process (Adult/Pediatric)  Goal: *STG: Attends activities and groups  Outcome: Not Progressing Towards Goal  Note:   Isolating in her room      Problem: Altered Thought Process (Adult/Pediatric)  Goal: *STG: Decreased delusional thinking  Outcome: Not Progressing Towards Goal  Note:   Pt. Remains  delusional  thinking \"bugs are all over her\"     somatic      Problem: Altered Thought Process (Adult/Pediatric)  Goal: Interventions  Outcome: Not Progressing Towards Goal  Note:   Will continue to monitor on 15 min.  Checks for safety   assess thought process  medication compliance  effectiveness   encourage groups

## 2019-08-16 NOTE — BH NOTES
GROUP THERAPY PROGRESS NOTE    Sarah Miller participated in an afternoon Process Group on the Acute Unit with a focus identifying feelings, planning for the day, and learning more about DBT concepts on \"Emotion Regulation. \"    .  Group time: 25 minutes. Personal goal for participation: To increase the capacity to improve ones mood, set personal goals, and understand more about basic activities to help regulate emotions. Goal orientation: The patients will be able to identify their feelings  and develop a plan for structuring their day. They were also   presented with a summary sheet on emotional regulation, in  regards to focusing on one goal per day, taking physical care of   oneself, and recognizing and/or building positive experiences. The didactic portion of the session focused on these three oncepts:   1) defining and focusing on one goal per day;   2) taking care of ones physical maintenance and basic needs -  sleep, nutrition, and exercise; and   3) finding and building on positive experiences. Group therapy participation: This patient participated in the group. Therapeutic interventions reviewed and discussed: The group  members were asked to identify an emotion they are having and/or   let the group know what they want to focus on for the day as they  continue to make discharge plans. The group members reviewed   three DBT suggestions regarding emotional regulation, in regards  to focusing on one goal per day, taking physical care of oneself,   and recognizing and/or building positive experiences. It was   suggested that these three concepts can be seen as headings for  their list of coping skills. The group members were also provided   worksheets on the topic discussed for their review and use on their  own time.      Impression of participation: The patient said she had been going through a \"depression\" and that this was what brought her back to the hospital. She spoke in a voice that was of low volume and this made it difficult to understand all that she was saying. She also acknowledged she had gotten into some disagreements with her family but did not explain what those were about. The patient was alert and generally oriented. She expressed no current SI/HI and may have been responding to internal stimuli; given her willingness to engaged in Adventist metaphors with a peer, she might be engaging a Adventist delusion. She did not bring up the bugs in this session. Her affect was depressed and her mood bewildered and uncertain. This was the patient's first process group with the undersigned in this rehospitalization.

## 2019-08-16 NOTE — INTERDISCIPLINARY ROUNDS
Behavioral Health Interdisciplinary Rounds     Patient Name: Rhoda Hill  Age: 76 y.o. Room/Bed:  728/  Primary Diagnosis: <principal problem not specified>   Admission Status: Voluntary     Readmission within 30 days: no  Power of  in place: no  Patient requires a blocked bed: no          Reason for blocked bed:     VTE Prophylaxis: No    Mobility needs/Fall risk: no  Flu Vaccine : no   Nutritional Plan: no  Consults:          Labs/Testing due today?: yes    Sleep hours:  6.45      Participation in Care/Groups:  no  Medication Compliant?: Yes  PRNS (last 24 hours): Antipsychotic (PO) and Pain    Restraints (last 24 hours):  no     CIWA (range last 24 hours):     COWS (range last 24 hours):      Alcohol screening (AUDIT) completed -   AUDIT Score: 0     If applicable, date SBIRT discussed in treatment team AND documented:   AUDIT Screen Score: AUDIT Score: 0    Tobacco - patient is a smoker: Have You Used Tobacco in the Past 30 Days: No  Illegal Drugs use: Have You Used Any Illegal Substances Over the Past 12 Months: No    24 hour chart check complete: yes     Patient goal(s) for today:   Treatment team focus/goals: Plan to give Invega injection today. Progress note : reports she weak today , still feels weak today.      LOS:  2  Expected LOS: TBD    Financial concerns/prescription coverage:  [facility]   Date of last family contact:  No family     Family requesting physician contact today:    Discharge plan: she will return to [place] in the home:  no      Outpatient provider(s): Dr. Ivette Chaparro   Participating treatment team members: Rhoda Hill,  Kelsy Olson, [facility] YADIRA Sullivan

## 2019-08-16 NOTE — BH NOTES
PRN Medication Documentation    Specific patient behavior that led to need for PRN medication: Pt tearful, incoherent soft speech about her mother taking pills, pt trying to pull open all doors on unit  Staff interventions attempted prior to PRN being given: reassurance, listening  PRN medication given: Zyprexa PO  Patient response/effectiveness of PRN medication: will continue to monitor    1746 - Pt calm and cooperative, no longer tearful. Ate dinner, sitting in dining room watching TV.      PRN Medication Documentation    Specific patient behavior that led to need for PRN medication: Pt reporting indigestion  Staff interventions attempted prior to PRN being given: pt given water to drink  PRN medication given: Tums  Patient response/effectiveness of PRN medication: will continue to monitor

## 2019-08-16 NOTE — PROGRESS NOTES
PSYCHIATRIC PROGRESS NOTE       Patient: Alida Dumont MRN: [id number]  SSN: [id number]    YOB: 1950  Age: 76 y.o. Sex: female      Admit Date: 8/14/2019    LOS: 2 days       Chief Complaint:  I just woke up. Interval History:  Alida Dumont reports feeling weak. Slept over 6 hours. Her delusions remain, bugs are still biting her but the scabs are \"dropping off. \" States her dog has been debugged for about a month. Very fixated on bugs. Denies si or hi. She received first loading dose of sustenna 156mg yesterday and tolerated it. Plan for second dose Monday. Past Medical History:  Past Medical History:   Diagnosis Date    Arthritis     GENERALIZED IN JOINTS.  Breast cancer ([address]     Left Breast Cancer 1995    Cancer [facility])     left breast - surgery/radiation    Chronic pain     Back pain    Depression     Mood Swings, anxiety    GERD (gastroesophageal reflux disease)     Hiatal Hernia    Headache     Hypercholesterolemia     Hypertension     CONTROLLED BY MEDS., Pt states she is no liong er on BP med's    Ill-defined condition     Neuropathy bilat feet    Irregular heart beat     Other ill-defined conditions(799.89)     MIGRAINES    Other ill-defined conditions(799.89)     neuropathy of lower legs and feet    Other ill-defined conditions(799.89)     increased cholesterol    Other ill-defined conditions(799.89)     bronchitis    Psychotic disorder (HCC)     S/P radiation therapy     1995 Left Breast    Unspecified adverse effect of anesthesia     HEART PALPITATION. ALLERGIES:(reviewed/updated 8/16/2019)  Allergies   Allergen Reactions    Epinephrine Palpitations     \"Makes my heart  ' shudder'. \" Allergic to epinephrine with novocain. Plain epinephrine is ok.  Novocain [Procaine] Palpitations    Nsaids (Non-Steroidal Anti-Inflammatory Drug) Other (comments)     GASTRIC DISTRESS.     Ibuprofen Other (comments)     Gastric irritation       Laboratory report:  Lab Results   Component Value Date/Time    WBC 10.1 08/08/2019 01:58 PM    HGB 12.5 08/08/2019 01:58 PM    HCT 41.6 08/08/2019 01:58 PM    PLATELET 435 45/12/2971 01:58 PM    MCV 87.6 08/08/2019 01:58 PM      Lab Results   Component Value Date/Time    Sodium 144 08/16/2019 05:39 AM    Potassium 4.3 08/16/2019 05:39 AM    Chloride 107 08/16/2019 05:39 AM    CO2 29 08/16/2019 05:39 AM    Anion gap 8 08/16/2019 05:39 AM    Glucose 107 (H) 08/16/2019 05:39 AM    Glucose 121 (H) 08/02/2019 05:06 AM    BUN 16 08/16/2019 05:39 AM    Creatinine 0.81 08/16/2019 05:39 AM    BUN/Creatinine ratio 20 08/16/2019 05:39 AM    GFR est AA >60 08/16/2019 05:39 AM    GFR est non-AA >60 08/16/2019 05:39 AM    Calcium 9.2 08/16/2019 05:39 AM    Bilirubin, total 1.0 08/08/2019 01:58 PM    AST (SGOT) 65 (H) 08/08/2019 01:58 PM    Alk.  phosphatase 124 (H) 08/08/2019 01:58 PM    Protein, total 6.5 08/08/2019 01:58 PM    Albumin 3.2 (L) 08/08/2019 01:58 PM    Globulin 3.3 08/08/2019 01:58 PM    A-G Ratio 1.0 (L) 08/08/2019 01:58 PM    ALT (SGPT) 68 08/08/2019 01:58 PM      Vitals:    08/15/19 1116 08/15/19 1544 08/15/19 2007 08/16/19 0822   BP: 90/60 100/67 100/68 126/70   Pulse: 81 80 80 91   Resp: 16 18 16 16   Temp: 98.1 °F (36.7 °C) 97.8 °F (36.6 °C) 97.5 °F (36.4 °C) 98 °F (36.7 °C)   SpO2:  99% 97% 96%       No results found for: VALF2, VALAC, VALP, VALPR, DS6, CRBAM, CRBAMP, CARB2, XCRBAM  No results found for: LITHM    Vital Signs  Patient Vitals for the past 24 hrs:   Temp Pulse Resp BP SpO2   08/16/19 0822 98 °F (36.7 °C) 91 16 126/70 96 %   08/15/19 2007 97.5 °F (36.4 °C) 80 16 100/68 97 %   08/15/19 1544 97.8 °F (36.6 °C) 80 18 100/67 99 %   08/15/19 1116 98.1 °F (36.7 °C) 81 16 90/60 --     Wt Readings from Last 3 Encounters:   08/14/19 77.8 kg (171 lb 8 oz)   08/04/19 86.5 kg (190 lb 12.8 oz)   07/02/19 84.4 kg (186 lb)     Temp Readings from Last 3 Encounters:   08/16/19 98 °F (36.7 °C)   08/14/19 97.9 °F (36.6 °C)   08/06/19 97.4 °F (36.3 °C)     BP Readings from Last 3 Encounters:   08/16/19 126/70   08/14/19 98/62   08/06/19 136/86     Pulse Readings from Last 3 Encounters:   08/16/19 91   08/14/19 81   08/06/19 98       Radiology (reviewed/updated 8/16/2019)  Ct Head Wo Cont    Result Date: 8/8/2019  EXAM:  CT HEAD WO CONT INDICATION: Confusion. COMPARISON: CT 7/12/2016 TECHNIQUE: Axial noncontrast head CT from foramen magnum to vertex. Coronal and sagittal reformatted images were obtained. CT dose reduction was achieved through use of a standardized protocol tailored for this examination and automatic exposure control for dose modulation. Adaptive statistical iterative reconstruction (ASIR) was utilized. FINDINGS:  There is diffuse age-related parenchymal volume loss. The ventricles and sulci are age-appropriate without hydrocephalus. There is no mass effect or midline shift. There is no intracranial hemorrhage or extra-axial fluid collection. There is no significant white matter disease. The gray-white matter differentiation is maintained. The basal cisterns are patent. The osseous structures are intact. The visualized paranasal sinuses and mastoid air cells are clear. IMPRESSION: No acute intracranial abnormality. Xr Chest Port    Result Date: 8/9/2019  PORTABLE CHEST RADIOGRAPH/S: 8/9/2019 2:20 PM INDICATION: Heart failure. COMPARISON: None. TECHNIQUE: Portable frontal semiupright radiograph/s of the chest. FINDINGS: There are small bilateral pleural effusions and passive atelectasis. The central airways are patent. No pneumothorax. There is pulmonary vascular congestion without peripheral septal Allison Labrador) lines to further suggest interstitial edema. IMPRESSION: Small bilateral pleural effusions and pulmonary vascular congestion. Side Effects: (reviewed/updated 8/16/2019)  None reported or admitted to.     Review of Systems: (reviewed/updated 8/16/2019)  Appetite: good  Sleep: good   All other Review of Systems: delusions    Mental Status Exam:  Eye contact: Good eye contact  Psychomotor activity: Anxious  Speech is spontaneous  Thought process: disorganized  Mood is \"ok\"  Affect: Blunted  Perception: +tactile hallucinations  Suicidal ideation: No si  Homicidal ideation: No hi  Insight/judgment: Poor  Cognition is grossly intact      Physical Exam:  Musculoskeletal system: steady gait  Tremor not present  Cog wheeling not present      Assessment and Plan:  Mai Tan meets criteria for a diagnosis of Delusional disorder. Continue current medications as prescribed. 2nd loading dose of sustenna 117mg next Monday. We will closely monitor for safety. We will encourage reality orientation. Disposition planning to continue. I certify that this patients inpatient psychiatric hospital services furnished since the previous certification were, and continue to be, required for treatment that could reasonably be expected to improve the patient's condition, or for diagnostic study, and that the patient continues to need, on a daily basis, active treatment furnished directly by or requiring the supervision of inpatient psychiatric facility personnel. In addition, the hospital records show that services furnished were intensive treatment services, admission or related services, or equivalent services.       Signed:  Wilson Araiza NP  8/16/2019

## 2019-08-16 NOTE — INTERDISCIPLINARY ROUNDS
Behavioral Health Interdisciplinary Rounds     Patient Name: Lam Rodriguez  Age: 76 y.o. Room/Bed:  72/  Primary Diagnosis: <principal problem not specified>   Admission Status: Voluntary     Readmission within 30 days: no  Power of  in place: no  Patient requires a blocked bed: no          Reason for blocked bed:     VTE Prophylaxis: No    Mobility needs/Fall risk: no  Flu Vaccine : no   Nutritional Plan: no  Consults:          Labs/Testing due today?: yes    Sleep hours:        Participation in Care/Groups:  no  Medication Compliant?: Yes  PRNS (last 24 hours): Antipsychotic (PO) and Pain    Restraints (last 24 hours):  no     CIWA (range last 24 hours):     COWS (range last 24 hours):      Alcohol screening (AUDIT) completed -   AUDIT Score: 0     If applicable, date SBIRT discussed in treatment team AND documented:   AUDIT Screen Score: AUDIT Score: 0      Document Brief Intervention (corresponds directly with the 5 A's, Ask, Advise, Assess, Assist, and Arrange): At- Risk Patients (Score 7-15 for women; 8-15 for men)  Discuss concern patient is drinking at unhealthy levels known to increase risk of alcohol-related health problems. Is Patient ready to commit to change? If No:   Encourage reflection   Discuss short term and long term health risks of consuming alcohol   Barriers to change   Reaffirm willingness to help / Educational materials provided  If Yes:   Set goal  [place] provided    Harmful use or Dependence (Score 16 or greater)   Discuss short term and long term health risks of consuming alcohol   Recommendations   Negotiate drinking goal   Recommend addiction specialist/center   Arrange follow-up appointments.     Tobacco - patient is a smoker: Have You Used Tobacco in the Past 30 Days: No  Illegal Drugs use: Have You Used Any Illegal Substances Over the Past 12 Months: No    24 hour chart check complete: yes     Patient goal(s) for today: Treatment team focus/goals:   Progress note     LOS:  2  Expected LOS:     Financial concerns/prescription coverage:    Date of last family contact:      Family requesting physician contact today:    Discharge plan:   Guns in the home:        Outpatient provider(s):     Participating treatment team members: Nakul Domínguez, * (assigned SW),

## 2019-08-16 NOTE — BH NOTES
GROUP THERAPY PROGRESS NOTE    Karly Herrera is participating in [facility]. Group time: 15 minutes    Personal goal for participation:      Goal orientation: relaxation    Group therapy participation: active    Therapeutic interventions reviewed and discussed: Review of unit guidelines and socializing appropriately with peers.     Impression of participation: active

## 2019-08-17 PROCEDURE — 74011250637 HC RX REV CODE- 250/637: Performed by: HOSPITALIST

## 2019-08-17 PROCEDURE — 65220000003 HC RM SEMIPRIVATE PSYCH

## 2019-08-17 RX ADMIN — ROSUVASTATIN CALCIUM 10 MG: 10 TABLET, COATED ORAL at 21:14

## 2019-08-17 RX ADMIN — SENNOSIDES AND DOCUSATE SODIUM 1 TABLET: 8.6; 5 TABLET ORAL at 08:11

## 2019-08-17 RX ADMIN — RISPERIDONE 2 MG: 1 TABLET ORAL at 21:13

## 2019-08-17 RX ADMIN — POLYETHYLENE GLYCOL 3350 17 G: 17 POWDER, FOR SOLUTION ORAL at 08:11

## 2019-08-17 RX ADMIN — VENLAFAXINE HYDROCHLORIDE 300 MG: 150 CAPSULE, EXTENDED RELEASE ORAL at 08:11

## 2019-08-17 RX ADMIN — ASPIRIN 81 MG: 81 TABLET, COATED ORAL at 17:15

## 2019-08-17 RX ADMIN — FAMOTIDINE 20 MG: 20 TABLET ORAL at 08:11

## 2019-08-17 RX ADMIN — TRAZODONE HYDROCHLORIDE 150 MG: 100 TABLET ORAL at 21:13

## 2019-08-17 NOTE — PROGRESS NOTES
Problem: Altered Thought Process (Adult/Pediatric)  Goal: *STG: Complies with medication therapy  Outcome: Progressing Towards Goal  Note:   Out in [facility] confused, tearful and thoughts disorganized. Needing some redirection from staff along with emotional support. Staff will continue to monitor q 15 min checks.

## 2019-08-17 NOTE — BH NOTES
Chief Complaint: \"I'm OK. \"      Interval History: Luis Houston continues to express beliefs her home is infested with bugs and wants it to be sprayed from the \"roof to the ground. \"  Denies SI/HI. Denies AH/VH. Eating and sleeping well      Mental Status Exam:  General:  Fair grooming  Eye contact. Good   Speech: soft and slow  Mood: OK  Affect: constricted  Perception: Denies AH/VH  Thought process: Logical  Thought content: Paranoid  Cognition: Alert, awake and oriented X 3   Insight: Poor  Judgment: Fair      Assessment and Plan:  Delusional disorder    Continue the current medication regimen   Disposition planning to continue. I certify that this patients inpatient psychiatric hospital services furnished since the previous certification were, and continue to be, required for treatment that could reasonably be expected to improve the patient's condition, or for diagnostic study, and that the patient continues to need, on a daily basis, active treatment furnished directly by or requiring the supervision of inpatient psychiatric facility personnel. In addition, the hospital records show that services furnished were intensive treatment services, admission or related services, or equivalent services.      Current Facility-Administered Medications   Medication Dose Route Frequency    famotidine (PEPCID) tablet 20 mg  20 mg Oral DAILY    aspirin delayed-release tablet 81 mg  81 mg Oral QPM    furosemide (LASIX) tablet 20 mg  20 mg Oral DAILY    [Held by provider] lisinopril (PRINIVIL, ZESTRIL) tablet 2.5 mg  2.5 mg Oral DAILY    metoprolol succinate (TOPROL-XL) XL tablet 12.5 mg  12.5 mg Oral DAILY    polyethylene glycol (MIRALAX) packet 17 g  17 g Oral DAILY    risperiDONE (RisperDAL) tablet 2 mg  2 mg Oral QHS    rosuvastatin (CRESTOR) tablet 10 mg  10 mg Oral QHS    senna-docusate (PERICOLACE) 8.6-50 mg per tablet 1 Tab  1 Tab Oral DAILY    traZODone (DESYREL) tablet 150 mg  150 mg Oral QHS    venlafaxine-SR (EFFEXOR-XR) capsule 300 mg  300 mg Oral DAILY WITH BREAKFAST       Physical Exam:         Past Medical History:  Past Medical History:   Diagnosis Date    Arthritis     GENERALIZED IN JOINTS.  Breast cancer ([address]     Left Breast Cancer 1995    Cancer [facility])     left breast - surgery/radiation    Chronic pain     Back pain    Depression     Mood Swings, anxiety    GERD (gastroesophageal reflux disease)     Hiatal Hernia    Headache     Hypercholesterolemia     Hypertension     CONTROLLED BY MEDS., Pt states she is no liong er on BP med's    Ill-defined condition     Neuropathy bilat feet    Irregular heart beat     Other ill-defined conditions(799.89)     MIGRAINES    Other ill-defined conditions(799.89)     neuropathy of lower legs and feet    Other ill-defined conditions(799.89)     increased cholesterol    Other ill-defined conditions(799.89)     bronchitis    Psychotic disorder (HCC)     S/P radiation therapy     1995 Left Breast    Unspecified adverse effect of anesthesia     HEART PALPITATION. Labs:  Lab Results   Component Value Date/Time    WBC 10.1 08/08/2019 01:58 PM    HGB 12.5 08/08/2019 01:58 PM    HCT 41.6 08/08/2019 01:58 PM    PLATELET 292 77/37/1917 01:58 PM    MCV 87.6 08/08/2019 01:58 PM      Lab Results   Component Value Date/Time    Sodium 144 08/16/2019 05:39 AM    Potassium 4.3 08/16/2019 05:39 AM    Chloride 107 08/16/2019 05:39 AM    CO2 29 08/16/2019 05:39 AM    Anion gap 8 08/16/2019 05:39 AM    Glucose 107 (H) 08/16/2019 05:39 AM    Glucose 121 (H) 08/02/2019 05:06 AM    BUN 16 08/16/2019 05:39 AM    Creatinine 0.81 08/16/2019 05:39 AM    BUN/Creatinine ratio 20 08/16/2019 05:39 AM    GFR est AA >60 08/16/2019 05:39 AM    GFR est non-AA >60 08/16/2019 05:39 AM    Calcium 9.2 08/16/2019 05:39 AM    Bilirubin, total 1.0 08/08/2019 01:58 PM    AST (SGOT) 65 (H) 08/08/2019 01:58 PM    Alk.  phosphatase 124 (H) 08/08/2019 01:58 PM    Protein, total 6.5 08/08/2019 01:58 PM    Albumin 3.2 (L) 08/08/2019 01:58 PM    Globulin 3.3 08/08/2019 01:58 PM    A-G Ratio 1.0 (L) 08/08/2019 01:58 PM    ALT (SGPT) 68 08/08/2019 01:58 PM      Vitals:    08/17/19 0742 08/17/19 1146 08/17/19 1330 08/17/19 1549   BP: 100/71 99/66  102/69   Pulse: 92 86  94   Resp: 16 18  16   Temp: 97.8 °F (36.6 °C) 97.7 °F (36.5 °C)  97.9 °F (36.6 °C)   SpO2: 94%   96%   Weight:   77.6 kg (171 lb)

## 2019-08-17 NOTE — PROGRESS NOTES
Problem: Falls - Risk of  Goal: *Absence of Falls  Description  Document Shantelle Light Fall Risk and appropriate interventions in the flowsheet. Outcome: Progressing Towards Goal  Note:   Fall Risk Interventions:non slip socks  bed in low position  Mobility Interventions: Assess mobility with egress test    Mentation Interventions: Adequate sleep, hydration, pain control    Medication Interventions: Teach patient to arise slowly    Elimination Interventions: Toilet paper/wipes in reach    History of Falls Interventions: Door open when patient unattended         Problem: Altered Thought Process (Adult/Pediatric)  Goal: *STG: Decreased delusional thinking  Outcome: Not Progressing Towards Goal  Note:   Pt. Continues to have some confusion and disorganized thinking     focusing on \"bugs\"      Problem: Altered Thought Process (Adult/Pediatric)  Goal: Interventions  Outcome: Not Progressing Towards Goal  Note:   Will continue to monitor  on 15 min.  Checks for safety  assess thought process   medication compliance  effectiveness  encourage unit participation

## 2019-08-17 NOTE — INTERDISCIPLINARY ROUNDS
Behavioral Health Interdisciplinary Rounds     Patient Name: Joy Roldan  Age: 76 y.o. Room/Bed:  728/  Primary Diagnosis: <principal problem not specified>   Admission Status: Voluntary     Readmission within 30 days: no  Power of  in place: no  Patient requires a blocked bed: no          Reason for blocked bed:     VTE Prophylaxis: No    Mobility needs/Fall risk: no  Flu Vaccine : no   Nutritional Plan: no  Consults:          Labs/Testing due today?: no    Sleep hours: 8       Participation in Care/Groups:  yes  Medication Compliant?: Yes  PRNS (last 24 hours): Antipsychotic (PO) and Pain    Restraints (last 24 hours):  no     CIWA (range last 24 hours):     COWS (range last 24 hours):      Alcohol screening (AUDIT) completed -   AUDIT Score: 0     If applicable, date SBIRT discussed in treatment team AND documented:   AUDIT Screen Score: AUDIT Score: 0      Document Brief Intervention (corresponds directly with the 5 A's, Ask, Advise, Assess, Assist, and Arrange): At- Risk Patients (Score 7-15 for women; 8-15 for men)  Discuss concern patient is drinking at unhealthy levels known to increase risk of alcohol-related health problems. Is Patient ready to commit to change? If No:   Encourage reflection   Discuss short term and long term health risks of consuming alcohol   Barriers to change   Reaffirm willingness to help / Educational materials provided  If Yes:   Set goal  [employer] provided    Harmful use or Dependence (Score 16 or greater)   Discuss short term and long term health risks of consuming alcohol   Recommendations   Negotiate drinking goal   Recommend addiction specialist/center   Arrange follow-up appointments.     Tobacco - patient is a smoker: Have You Used Tobacco in the Past 30 Days: No  Illegal Drugs use: Have You Used Any Illegal Substances Over the Past 12 Months: No    24 hour chart check complete: yes     Patient goal(s) for today: Treatment team focus/goals:   Progress note     LOS:  3  Expected LOS:     Financial concerns/prescription coverage:    Date of last family contact:      Family requesting physician contact today:    Discharge plan:   Guns in the home:        Outpatient provider(s):     Participating treatment team members: Jackie Perdue, * (assigned SW),

## 2019-08-17 NOTE — BH NOTES
GROUP THERAPY PROGRESS NOTE    Zia Woodall is participating in [place].      Group time: 10 minutes    Personal goal for participation: \"speak w Doc\"    Goal orientation: personal    Group therapy participation: active    Therapeutic interventions reviewed and discussed:     Impression of participation:

## 2019-08-18 PROCEDURE — 65220000003 HC RM SEMIPRIVATE PSYCH

## 2019-08-18 PROCEDURE — 74011250637 HC RX REV CODE- 250/637: Performed by: HOSPITALIST

## 2019-08-18 RX ADMIN — ROSUVASTATIN CALCIUM 10 MG: 10 TABLET, COATED ORAL at 21:12

## 2019-08-18 RX ADMIN — RISPERIDONE 2 MG: 1 TABLET ORAL at 20:45

## 2019-08-18 RX ADMIN — VENLAFAXINE HYDROCHLORIDE 300 MG: 150 CAPSULE, EXTENDED RELEASE ORAL at 08:31

## 2019-08-18 RX ADMIN — ASPIRIN 81 MG: 81 TABLET, COATED ORAL at 17:13

## 2019-08-18 RX ADMIN — POLYETHYLENE GLYCOL 3350 17 G: 17 POWDER, FOR SOLUTION ORAL at 08:30

## 2019-08-18 RX ADMIN — TRAZODONE HYDROCHLORIDE 150 MG: 100 TABLET ORAL at 21:45

## 2019-08-18 RX ADMIN — FAMOTIDINE 20 MG: 20 TABLET ORAL at 08:31

## 2019-08-18 RX ADMIN — SENNOSIDES AND DOCUSATE SODIUM 1 TABLET: 8.6; 5 TABLET ORAL at 08:30

## 2019-08-18 NOTE — PROGRESS NOTES
Problem: Altered Thought Process (Adult/Pediatric)  Goal: *STG: Participates in treatment plan  Outcome: Progressing Towards Goal  Note:   Pt. Met in treatment team   discussed  \"bugs\" but less disorganized      Problem: Altered Thought Process (Adult/Pediatric)  Goal: *STG: Complies with medication therapy  Outcome: Progressing Towards Goal  Note:   Medication compliant  reviewed in treatment team     Problem: Altered Thought Process (Adult/Pediatric)  Goal: *STG: Decreased delusional thinking  Outcome: Progressing Towards Goal  Note:   Less delusional thinking      Problem: Falls - Risk of  Goal: *Absence of Falls  Description  Document [facility] Fall Risk and appropriate interventions in the flowsheet. Outcome: Progressing Towards Goal  Note:   Fall Risk Interventions:non slip socks  bed in low position  Mobility Interventions: Assess mobility with egress test    Mentation Interventions: Adequate sleep, hydration, pain control    Medication Interventions: Teach patient to arise slowly    Elimination Interventions: Toilet paper/wipes in reach    History of Falls Interventions: Door open when patient unattended         Problem: Altered Thought Process (Adult/Pediatric)  Goal: Interventions  Outcome: Progressing Towards Goal  Note:   Will continue to monitor  on 15 min.  Checks for safety  assess thought process  medication compliance  effectiveness    encourage groups

## 2019-08-18 NOTE — BH NOTES
GROUP THERAPY PROGRESS NOTE    Suzette Love is participating in [place].      Group time: 10 minutes    Personal goal for participation: \"add to being positive\"    Goal orientation: personal    Group therapy participation: active    Therapeutic interventions reviewed and discussed:     Impression of participation:

## 2019-08-18 NOTE — BH NOTES
Chief Complaint: \"I'm OK. \"        Interval History: Elise Shipley reports she is OK. Beliefs about bugs appear to be held with less conviction. Slept poorly last night due to migraines  Denies SI/HI. Denies AH/VH.        Mental Status Exam:  General:  Fair grooming  Eye contact. Good   Speech: soft and slow  Mood: OK  Affect: Constricted  Perception: Denies AH/VH  Thought process: Logical  Thought content: Paranoid  Cognition: Alert, awake and oriented X 3   Insight: Poor  Judgment: Fair        Assessment and Plan:  Delusional disorder    Continue the current medication regimen   Disposition planning to continue. I certify that this patients inpatient psychiatric hospital services furnished since the previous certification were, and continue to be, required for treatment that could reasonably be expected to improve the patient's condition, or for diagnostic study, and that the patient continues to need, on a daily basis, active treatment furnished directly by or requiring the supervision of inpatient psychiatric facility personnel. In addition, the hospital records show that services furnished were intensive treatment services, admission or related services, or equivalent services.      Current Facility-Administered Medications   Medication Dose Route Frequency    famotidine (PEPCID) tablet 20 mg  20 mg Oral DAILY    aspirin delayed-release tablet 81 mg  81 mg Oral QPM    furosemide (LASIX) tablet 20 mg  20 mg Oral DAILY    [Held by provider] lisinopril (PRINIVIL, ZESTRIL) tablet 2.5 mg  2.5 mg Oral DAILY    metoprolol succinate (TOPROL-XL) XL tablet 12.5 mg  12.5 mg Oral DAILY    polyethylene glycol (MIRALAX) packet 17 g  17 g Oral DAILY    risperiDONE (RisperDAL) tablet 2 mg  2 mg Oral QHS    rosuvastatin (CRESTOR) tablet 10 mg  10 mg Oral QHS    senna-docusate (PERICOLACE) 8.6-50 mg per tablet 1 Tab  1 Tab Oral DAILY    traZODone (DESYREL) tablet 150 mg  150 mg Oral QHS    venlafaxine-SR (EFFEXOR-XR) capsule 300 mg  300 mg Oral DAILY WITH BREAKFAST       Physical Exam:         Past Medical History:  Past Medical History:   Diagnosis Date    Arthritis     GENERALIZED IN JOINTS.  Breast cancer ([address]     Left Breast Cancer 1995    Cancer [facility])     left breast - surgery/radiation    Chronic pain     Back pain    Depression     Mood Swings, anxiety    GERD (gastroesophageal reflux disease)     Hiatal Hernia    Headache     Hypercholesterolemia     Hypertension     CONTROLLED BY MEDS., Pt states she is no liong er on BP med's    Ill-defined condition     Neuropathy bilat feet    Irregular heart beat     Other ill-defined conditions(799.89)     MIGRAINES    Other ill-defined conditions(799.89)     neuropathy of lower legs and feet    Other ill-defined conditions(799.89)     increased cholesterol    Other ill-defined conditions(799.89)     bronchitis    Psychotic disorder (HCC)     S/P radiation therapy     1995 Left Breast    Unspecified adverse effect of anesthesia     HEART PALPITATION. Labs:  Lab Results   Component Value Date/Time    WBC 10.1 08/08/2019 01:58 PM    HGB 12.5 08/08/2019 01:58 PM    HCT 41.6 08/08/2019 01:58 PM    PLATELET 260 51/37/9073 01:58 PM    MCV 87.6 08/08/2019 01:58 PM      Lab Results   Component Value Date/Time    Sodium 144 08/16/2019 05:39 AM    Potassium 4.3 08/16/2019 05:39 AM    Chloride 107 08/16/2019 05:39 AM    CO2 29 08/16/2019 05:39 AM    Anion gap 8 08/16/2019 05:39 AM    Glucose 107 (H) 08/16/2019 05:39 AM    Glucose 121 (H) 08/02/2019 05:06 AM    BUN 16 08/16/2019 05:39 AM    Creatinine 0.81 08/16/2019 05:39 AM    BUN/Creatinine ratio 20 08/16/2019 05:39 AM    GFR est AA >60 08/16/2019 05:39 AM    GFR est non-AA >60 08/16/2019 05:39 AM    Calcium 9.2 08/16/2019 05:39 AM    Bilirubin, total 1.0 08/08/2019 01:58 PM    AST (SGOT) 65 (H) 08/08/2019 01:58 PM    Alk.  phosphatase 124 (H) 08/08/2019 01:58 PM    Protein, total 6.5 08/08/2019 01:58 PM Albumin 3.2 (L) 08/08/2019 01:58 PM    Globulin 3.3 08/08/2019 01:58 PM    A-G Ratio 1.0 (L) 08/08/2019 01:58 PM    ALT (SGPT) 68 08/08/2019 01:58 PM      Vitals:    08/17/19 1549 08/17/19 1706 08/17/19 2143 08/18/19 0737   BP: 102/69  103/70 116/73   Pulse: 94  95 83   Resp: 16  18 16   Temp: 97.9 °F (36.6 °C)  98.1 °F (36.7 °C) 97.8 °F (36.6 °C)   SpO2: 96%  96% 99%   Weight:  77.8 kg (171 lb 9.6 oz)

## 2019-08-18 NOTE — BH NOTES
GROUP THERAPY PROGRESS NOTE    The patient Sonya Karmen a 76 y.o. female is participating in Relaxation Group.     Group time: 30 minutes    Personal goal for participation: Relax    Goal orientation: personal    Group therapy participation: active    Therapeutic interventions reviewed and discussed:  Yes    Impression of participation:     Noé Yeung  8/17/2019  8:57 PM

## 2019-08-18 NOTE — INTERDISCIPLINARY ROUNDS
Behavioral Health Interdisciplinary Rounds     Patient Name: Peng Cervantes  Age: 76 y.o. Room/Bed:  728/  Primary Diagnosis: <principal problem not specified>   Admission Status: Voluntary     Readmission within 30 days: no  Power of  in place: no  Patient requires a blocked bed: no          Reason for blocked bed:     VTE Prophylaxis: No    Mobility needs/Fall risk: no  Flu Vaccine : no   Nutritional Plan: no  Consults:          Labs/Testing due today?: no    Sleep hours:  6      Participation in Care/Groups:  no  Medication Compliant?: Yes  PRNS (last 24 hours): None    Restraints (last 24 hours):  no     CIWA (range last 24 hours):     COWS (range last 24 hours):      Alcohol screening (AUDIT) completed -   AUDIT Score: 0     If applicable, date SBIRT discussed in treatment team AND documented:   AUDIT Screen Score: AUDIT Score: 0      Document Brief Intervention (corresponds directly with the 5 A's, Ask, Advise, Assess, Assist, and Arrange): At- Risk Patients (Score 7-15 for women; 8-15 for men)  Discuss concern patient is drinking at unhealthy levels known to increase risk of alcohol-related health problems. Is Patient ready to commit to change? If No:   Encourage reflection   Discuss short term and long term health risks of consuming alcohol   Barriers to change   Reaffirm willingness to help / Educational materials provided  If Yes:   Set goal  [employer] provided    Harmful use or Dependence (Score 16 or greater)   Discuss short term and long term health risks of consuming alcohol   Recommendations   Negotiate drinking goal   Recommend addiction specialist/center   Arrange follow-up appointments.     Tobacco - patient is a smoker: Have You Used Tobacco in the Past 30 Days: No  Illegal Drugs use: Have You Used Any Illegal Substances Over the Past 12 Months: No    24 hour chart check complete: yes     Patient goal(s) for today:   Treatment team focus/goals: Progress note     LOS:  4  Expected LOS:     Financial concerns/prescription coverage:    Date of last family contact:      Family requesting physician contact today:    Discharge plan:   Guns in the home:        Outpatient provider(s):     Participating treatment team members: Peng Cervantes, * (assigned SW),

## 2019-08-19 PROCEDURE — 65220000003 HC RM SEMIPRIVATE PSYCH

## 2019-08-19 PROCEDURE — 74011250637 HC RX REV CODE- 250/637: Performed by: NURSE PRACTITIONER

## 2019-08-19 PROCEDURE — 74011250637 HC RX REV CODE- 250/637: Performed by: HOSPITALIST

## 2019-08-19 RX ADMIN — FAMOTIDINE 20 MG: 20 TABLET ORAL at 08:24

## 2019-08-19 RX ADMIN — SENNOSIDES AND DOCUSATE SODIUM 1 TABLET: 8.6; 5 TABLET ORAL at 08:24

## 2019-08-19 RX ADMIN — ROSUVASTATIN CALCIUM 10 MG: 10 TABLET, COATED ORAL at 21:14

## 2019-08-19 RX ADMIN — TRAZODONE HYDROCHLORIDE 150 MG: 100 TABLET ORAL at 21:14

## 2019-08-19 RX ADMIN — FUROSEMIDE 20 MG: 20 TABLET ORAL at 08:24

## 2019-08-19 RX ADMIN — VENLAFAXINE HYDROCHLORIDE 300 MG: 150 CAPSULE, EXTENDED RELEASE ORAL at 08:24

## 2019-08-19 RX ADMIN — ACETAMINOPHEN 650 MG: 325 TABLET ORAL at 17:22

## 2019-08-19 RX ADMIN — METOPROLOL SUCCINATE 12.5 MG: 25 TABLET, EXTENDED RELEASE ORAL at 08:24

## 2019-08-19 RX ADMIN — POLYETHYLENE GLYCOL 3350 17 G: 17 POWDER, FOR SOLUTION ORAL at 08:25

## 2019-08-19 RX ADMIN — RISPERIDONE 2 MG: 1 TABLET ORAL at 21:14

## 2019-08-19 RX ADMIN — ASPIRIN 81 MG: 81 TABLET, COATED ORAL at 17:22

## 2019-08-19 NOTE — BH NOTES
GROUP THERAPY PROGRESS NOTE    Evie Nielsen participated in the Acute Unit's Process Group, with a focus identifying feelings,  planning for the day, and defining aftercare needs. Group time: 45 minutes. Personal goal for participation: To increase the capacity to improve ones mood and structure. Goal orientation: The patient will be able to identify their feelings, develop a plan for structuring their day, and discharge planning. Group therapy participation: With prompting, this patient actively participated in the group. Therapeutic interventions reviewed and discussed: The group members were asked to introduce themselves to each other and to see if they could identify an emotion they are having and/or let the group know what they want to focus on for the day as they continue to make discharge plans. A handout was provided that had the image of thunder cloud to represent Janine Romero is looming in your life.  It also had raindrops standing for smaller obstacles. There was also a lightning bolt to stand for zaps to your ability to hold the umbrella of protection. The umbrella was where one might list those things that protect you or help one cope with the small obstacles in life and the drawing offers a place to identify Who holds up your umbrella?         Impression of participation: The patient said she wanted to, \"Finish her course in positive thinking;\" i.e., the reason for her re-admission to the hospital. She implied that she wanted to remain positive about her future as she actively participates in her aftercare and discharge plans. She was alert, generally oriented, cooperative, and engaged in the group process. Her thinking could be scattered at times. The patient expressed no current SI/HI and displayed no overt psychotic symptoms. She did not mention any \"bugs or scabs\" in this group and was more focused on getting her pet dog back from the .  Her affect was non-dysphoric, mildly euphoric, and slightly anxious. Her mood reflected her affect. She is in the process of practicing her coping skills while refining her aftercare and discharge plans.

## 2019-08-19 NOTE — PROGRESS NOTES
Problem: Altered Thought Process (Adult/Pediatric)  Goal: *STG: Seeks staff when feelings of anxiety and fear arise  Note:   Sought staff and spoke with writer and stated \"I don`t really want to go to [facility] my apartment has water damage to it. \"Patient encouraged to phone residence and check on status. Problem: Altered Thought Process (Adult/Pediatric)  Goal: *STG: Complies with medication therapy  Note:   Has been medication compliant. Stated in treatment team\"my medications are good this time. \"Discharge planning discussed . After treatment team patient stated to writer \"I don`t think the medications really work for me. \"     Problem: Altered Thought Process (Adult/Pediatric)  Goal: *STG: Decreased delusional thinking  Outcome: Progressing Towards Goal  Note:   Stated \"the bugs are gone and I have scabs now. \"

## 2019-08-19 NOTE — PROGRESS NOTES
Problem: Altered Thought Process (Adult/Pediatric)  Goal: *STG: Participates in treatment plan  Note:   Cooperative, Anxious. Med and meal compliant.

## 2019-08-19 NOTE — BH NOTES
GROUP THERAPY PROGRESS NOTE    The patient Sangita Bryant is participating in [employer]. Group time: 30 minutes    Personal goal for participation: to orient the patient to the unit.     Goal orientation: successful adoption of unit rules    Group therapy participation: active    Therapeutic interventions reviewed and discussed: Yes    Impression of participation:     Champ Lopez  8/19/2019 11:53 AM

## 2019-08-19 NOTE — PROGRESS NOTES
Pt resting with eyes closed, appears to be sleeping. NAD. Respirations even and unlabored. Will continue to monitor q15 for safety. Problem: Falls - Risk of  Goal: *Absence of Falls  Description  Document Louis Swain Fall Risk and appropriate interventions in the flowsheet. Outcome: Progressing Towards Goal  Note:   Fall Risk Interventions:  Mobility Interventions: Assess mobility with egress test    Mentation Interventions: Adequate sleep, hydration, pain control    Medication Interventions: Teach patient to arise slowly    Elimination Interventions:  Toilet paper/wipes in reach    History of Falls Interventions: Door open when patient unattended
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
## 2019-08-19 NOTE — PROGRESS NOTES
Problem: Altered Thought Process (Adult/Pediatric)  Goal: *STG: Participates in treatment plan  Outcome: Progressing Towards Goal     Problem: Altered Thought Process (Adult/Pediatric)  Goal: *STG: Seeks staff when feelings of anxiety and fear arise  Outcome: Progressing Towards Goal     Problem: Altered Thought Process (Adult/Pediatric)  Goal: *STG: Complies with medication therapy  Outcome: Progressing Towards Goal     Problem: Altered Thought Process (Adult/Pediatric)  Goal: *STG: Attends activities and groups  Outcome: Progressing Towards Goal  Note:   1000 Pt is calm and pleasant. Thoughts remain disorganized, pt states \"everything feels stuffed up. \" Compliant with meds and invega sustenna injection. Interacting with peers appropriately. No distress noted. Will monitor with Q 15 safety checks.

## 2019-08-19 NOTE — INTERDISCIPLINARY ROUNDS
Behavioral Health Interdisciplinary Rounds     Patient Name: Daphne Henson  Age: 76 y.o. Room/Bed:  728/  Primary Diagnosis: <principal problem not specified>   Admission Status: Voluntary     Readmission within 30 days: yes  Power of  in place: no  Patient requires a blocked bed: no          Reason for blocked bed:     VTE Prophylaxis: No    Mobility needs/Fall risk: no  Flu Vaccine : no   Nutritional Plan: no  Consults:          Labs/Testing due today?: no    Sleep hours:  7      Participation in Care/Groups:  yes  Medication Compliant?: Yes  PRNS (last 24 hours): None    Restraints (last 24 hours):  no     CIWA (range last 24 hours):     COWS (range last 24 hours):      Alcohol screening (AUDIT) completed -   AUDIT Score: 0     If applicable, date SBIRT discussed in treatment team AND documented:   AUDIT Screen Score: AUDIT Score: 0      Tobacco - patient is a smoker: Have You Used Tobacco in the Past 30 Days: No  Illegal Drugs use: Have You Used Any Illegal Substances Over the Past 12 Months: No    24 hour chart check complete: yes     Patient goal(s) for today:   Treatment team focus/goals: Plan for Invega long acting today. Plan to transfer to general.  Plan to get a PT consult. Progress note : She denies hallucinations. Still somewhat disorganized. LOS:  5  Expected LOS: TBD    Financial concerns/prescription coverage:  Medicare   Date of last family contact:  No family      Family requesting physician contact today:    Discharge plan: she will return home when ready for discharge.    Guns in the home: no         Outpatient provider(s): Dr. Macias Numbers     Participating treatment team members: Jeronimomonaga NatividadOdell, YADIRA- Niraj Cochran RN

## 2019-08-20 PROCEDURE — 74011250637 HC RX REV CODE- 250/637: Performed by: HOSPITALIST

## 2019-08-20 PROCEDURE — 65220000003 HC RM SEMIPRIVATE PSYCH

## 2019-08-20 PROCEDURE — 97161 PT EVAL LOW COMPLEX 20 MIN: CPT

## 2019-08-20 PROCEDURE — 74011250637 HC RX REV CODE- 250/637: Performed by: NURSE PRACTITIONER

## 2019-08-20 PROCEDURE — 97116 GAIT TRAINING THERAPY: CPT

## 2019-08-20 RX ADMIN — VENLAFAXINE HYDROCHLORIDE 300 MG: 150 CAPSULE, EXTENDED RELEASE ORAL at 09:43

## 2019-08-20 RX ADMIN — TRAZODONE HYDROCHLORIDE 150 MG: 100 TABLET ORAL at 20:45

## 2019-08-20 RX ADMIN — POLYETHYLENE GLYCOL 3350 17 G: 17 POWDER, FOR SOLUTION ORAL at 09:43

## 2019-08-20 RX ADMIN — ROSUVASTATIN CALCIUM 10 MG: 10 TABLET, COATED ORAL at 20:44

## 2019-08-20 RX ADMIN — FUROSEMIDE 20 MG: 20 TABLET ORAL at 09:44

## 2019-08-20 RX ADMIN — CALCIUM CARBONATE (ANTACID) CHEW TAB 500 MG 200 MG: 500 CHEW TAB at 20:51

## 2019-08-20 RX ADMIN — FAMOTIDINE 20 MG: 20 TABLET ORAL at 09:43

## 2019-08-20 RX ADMIN — ACETAMINOPHEN 650 MG: 325 TABLET ORAL at 19:02

## 2019-08-20 RX ADMIN — SENNOSIDES AND DOCUSATE SODIUM 1 TABLET: 8.6; 5 TABLET ORAL at 09:44

## 2019-08-20 RX ADMIN — RISPERIDONE 2 MG: 1 TABLET ORAL at 20:45

## 2019-08-20 RX ADMIN — ASPIRIN 81 MG: 81 TABLET, COATED ORAL at 17:03

## 2019-08-20 RX ADMIN — METOPROLOL SUCCINATE 12.5 MG: 25 TABLET, EXTENDED RELEASE ORAL at 09:44
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
## 2019-08-20 NOTE — PROGRESS NOTES
Problem: Discharge Planning  Goal: *Discharge to safe environment  Outcome: Progressing Towards Goal  Note:   Sh will return to her apartment  She has established outpatient follow up   Goal: *Knowledge of medication management  Outcome: Progressing Towards Goal  Note:   She is compliant with her medications   Goal: *Knowledge of discharge instructions  Outcome: Progressing Towards Goal  Note:   She is able to verbalize discharge instructions

## 2019-08-20 NOTE — PROGRESS NOTES
Problem: Altered Thought Process (Adult/Pediatric)  Goal: *STG: Participates in treatment plan  Outcome: Progressing Towards Goal  Note:   Pt. Met in treatment team  more organized less somatic thinking      discussed  possible  discharge this week     Problem: Altered Thought Process (Adult/Pediatric)  Goal: *STG: Complies with medication therapy  Outcome: Progressing Towards Goal  Note:   Medication compliant  reviewed in treatment team     Problem: Altered Thought Process (Adult/Pediatric)  Goal: *STG: Decreased delusional thinking  Outcome: Progressing Towards Goal  Note:   Less delusional  thinking       Problem: Falls - Risk of  Goal: *Absence of Falls  Description  Document Cecille  Fall Risk and appropriate interventions in the flowsheet. Outcome: Progressing Towards Goal  Note:   Fall Risk Interventions:non slip socks  bed in low position          PT consult ordered for \"weakness\"  Mobility Interventions: Assess mobility with egress test    Mentation Interventions: Adequate sleep, hydration, pain control    Medication Interventions: Teach patient to arise slowly    Elimination Interventions: Toilet paper/wipes in reach    History of Falls Interventions: Room close to nurse's station, Evaluate medications/consider consulting pharmacy, Door open when patient unattended         Problem: Altered Thought Process (Adult/Pediatric)  Goal: *STG: Complies with medication therapy  Outcome: Progressing Towards Goal  Note:   Medication compliant  reviewed in treatment team     Problem: Altered Thought Process (Adult/Pediatric)  Goal: Interventions  Outcome: Progressing Towards Goal  Note:   Will continue to monitor  on 15 min.  Checks for safety  assess thought process  medication compliance  effectiveness  encourage unit participation

## 2019-08-20 NOTE — BH NOTES
GROUP THERAPY PROGRESS NOTE    The patient Joey Harris is participating in [employer]. Group time: 30 minutes    Personal goal for participation: to orient the patient to the unit.     Goal orientation: successful adoption of unit rules    Group therapy participation: active    Therapeutic interventions reviewed and discussed: Yes    Impression of participation:     Caitlyn Soria  8/20/2019 11:05 AM

## 2019-08-20 NOTE — PROGRESS NOTES
Problem: Mobility Impaired (Adult and Pediatric)  Goal: *Acute Goals and Plan of Care (Insert Text)  Description  FUNCTIONAL STATUS PRIOR TO ADMISSION: Patient was independent and active without use of DME.    HOME SUPPORT PRIOR TO ADMISSION: The patient lived alone with no local support. Physical Therapy Goals  Initiated 8/20/2019  2. Patient will transfer from bed to chair and chair to bed with modified independence using the least restrictive device within 7 day(s). 3.  Patient will perform sit to stand with modified independence within 7 day(s). 4.  Patient will ambulate with modified independence for 300 feet with the least restrictive device within 7 day(s). Outcome: Progressing Towards Goal    PHYSICAL THERAPY EVALUATION  Patient: Dain Trimble (26 y.o. female)  Date: 8/20/2019  Primary Diagnosis: Delusional disorder ([facility] [F22]        Precautions:          ASSESSMENT  Based on the objective data described below, the patient presents likely near her baseline functional level following admission to [facility] for CHF and now to acute IP psych unit 2* delusions/hallucinations. Prior to admission, pt reports that she lived alone and was indep with ADLs and mobility. Greatest complaint and limitation today is SOB as well as chronic B knee and low back pain - likely OA related given no recent falls or MELVIN. Overall she was able to complete transfers with SUP and ambulate within her room and hallways without AD with up to [facility] for safety. Demos fluctuations in speed - from slow, shuffled steps to near normal pace/mechanics when distracted. No major LOB observed with head movements/directional changes and demonstrates good standing balance while completing full toileting routine and retrieving objects from floor level. Does appear SOB with activity - provided education on activity modifications and energy conservation techniques.  May benefit from trial use of rollator given new dx of CHF limiting sustained amb/activity. Will follow up for 1 additional visit to assess with rollator and order if indicated. Anticipate she will be appropriate for discharge home once medically cleared. Current Level of Function Impacting Discharge (mobility/balance): CGA/SUP; decr endurance 2* SOB and c/o B knee/lumbar pain    Functional Outcome Measure: The patient scored 22/28 on the Tinetti outcome measure which is indicative of moderate. Other factors to consider for discharge: lives alone without local support; frequent admissions     Patient will benefit from skilled therapy intervention to address the above noted impairments. PLAN :  Recommendations and Planned Interventions: transfer training, gait training, therapeutic exercises, patient and family training/education and therapeutic activities      Frequency/Duration: Patient will be followed by physical therapy:  3 times a week to address goals. Recommendation for discharge: (in order for the patient to meet his/her long term goals)  No skilled physical therapy/ follow up rehabilitation needs identified at this time. This discharge recommendation:  A follow-up discussion with the attending provider and/or case management is planned    Equipment recommendations for successful discharge (if) home: possibly a  rollator         SUBJECTIVE:   Patient stated my knees hurt and now my back is killing me.     OBJECTIVE DATA SUMMARY:   HISTORY:    Past Medical History:   Diagnosis Date    Arthritis     GENERALIZED IN JOINTS.     Breast cancer ([address]     Left Breast Cancer 1995    Cancer [facility])     left breast - surgery/radiation    Chronic pain     Back pain    Depression     Mood Swings, anxiety    GERD (gastroesophageal reflux disease)     Hiatal Hernia    Headache     Hypercholesterolemia     Hypertension     CONTROLLED BY MEDS., Pt states she is no liong er on BP med's    Ill-defined condition     Neuropathy bilat feet    Irregular heart beat     Other ill-defined conditions(799.89)     MIGRAINES    Other ill-defined conditions(799.89)     neuropathy of lower legs and feet    Other ill-defined conditions(799.89)     increased cholesterol    Other ill-defined conditions(799.89)     bronchitis    Psychotic disorder (HCC)     S/P radiation therapy     1995 Left Breast    Unspecified adverse effect of anesthesia     HEART PALPITATION. Past Surgical History:   Procedure Laterality Date    BREAST SURGERY PROCEDURE UNLISTED  7/1995    DCIS /BIOPSIES MULTIPLE. BREAST SURGERY PROCEDURE UNLISTED  1995    LUMPECTOMY WITH RADIATION INSITU    HX BREAST LUMPECTOMY Left     1995 - POSITIVE    HX ORTHOPAEDIC  2006    LEFT KNEE ARTHROSCOPY    HX ORTHOPAEDIC  4/2010    RIGHT KNEE ARTHROSCOPY, concrete nail placed    HX ORTHOPAEDIC      Hammertoe , doesnt know which foot    HX ORTHOPAEDIC      Small growth removed from between toes but doesnt know which foot       Personal factors and/or comorbidities impacting plan of care: PMHx    Home Situation  Home Environment: Independent living  # Steps to Enter: 0  One/Two Story Residence: One story  Living Alone: Yes  Support Systems: (ILF)  Patient Expects to be Discharged to[de-identified] Independent living facility  Current DME Used/Available at Home: None    EXAMINATION/PRESENTATION/DECISION MAKING:   Critical Behavior:  Neurologic State: Alert  Orientation Level: Oriented X4  Cognition: Follows commands     Hearing: Auditory  Auditory Impairment: None  Skin:    Edema: B LEs - pitting   Range Of Motion:  AROM: Within functional limits     Strength:    Strength: Within functional limits    Tone & Sensation:   Tone: Normal  Sensation: Intact        Coordination:  Coordination: Within functional limits  Vision:      Functional Mobility:  Bed Mobility:    Transfers:  Sit to Stand: Supervision  Stand to Sit: Supervision       Balance:   Sitting: Intact  Standing: Impaired; Without support  Standing - Static: Good  Standing - Dynamic : Good;Fair  Ambulation/Gait Training:  Distance (ft): 200 Feet (ft)  Ambulation - Level of Assistance: Contact guard assistance   Gait Description (WDL): Exceptions to WDL  Gait Abnormalities: Decreased step clearance;Shuffling gait  Speed/Janell: Fluctuations  Step Length: Right shortened;Left shortened     Functional Measure:  Tinetti test:    Sitting Balance: 1  Arises: 1  Attempts to Rise: 2  Immediate Standing Balance: 1  Standing Balance: 1  Nudged: 1  Eyes Closed: 1  Turn 360 Degrees - Continuous/Discontinuous: 1  Turn 360 Degrees - Steady/Unsteady: 1  Sitting Down: 1  Balance Score: 11 Balance total score  Indication of Gait: 1  R Step Length/Height: 1  L Step Length/Height: 1  R Foot Clearance: 1  L Foot Clearance: 1  Step Symmetry: 1  Step Continuity: 1  Path: 2  Trunk: 1  Walking Time: 1  Gait Score: 11 Gait total score  Total Score: 22/28 Overall total score         Tinetti Tool Score Risk of Falls  <19 = High Fall Risk  19-24 = Moderate Fall Risk  25-28 = Low Fall Risk  Tinetti ME. Performance-Oriented Assessment of Mobility Problems in Elderly Patients. [address]; [id number].  (Scoring Description: PT Bulletin Feb. 10, 1993)    Older adults: Alex Yuan et al, 2009; n = 1000 [facility] elderly evaluated with ABC, PRINCESS, ADL, and IADL)  · Mean PRINCESS score for males aged 69-68 years = 26.21(3.40)  · Mean PRINCESS score for females age 69-68 years = 25.16(4.30)  · Mean PRINCESS score for males over 80 years = 23.29(6.02)  · Mean PRINCESS score for females over 80 years = 17.20(8.32)       Physical Therapy Evaluation Charge Determination   History Examination Presentation Decision-Making   HIGH Complexity :3+ comorbidities / personal factors will impact the outcome/ POC  LOW Complexity : 1-2 Standardized tests and measures addressing body structure, function, activity limitation and / or participation in recreation  LOW Complexity : Stable, uncomplicated  Other outcome measures tinetti  MEDIUM      Based on the above components, the patient evaluation is determined to be of the following complexity level: LOW     Activity Tolerance:   observed SOB with activity  Please refer to the flowsheet for vital signs taken during this treatment. After treatment patient left in no apparent distress:   Sitting in chair    COMMUNICATION/EDUCATION:   The patients plan of care was discussed with: Registered Nurse. Fall prevention education was provided and the patient/caregiver indicated understanding., Patient/family have participated as able in goal setting and plan of care. and Patient/family agree to work toward stated goals and plan of care.     Thank you for this referral.  Aditya Bush, PT, DPT   Time Calculation: 17 mins

## 2019-08-20 NOTE — BH NOTES
Behavioral Health Interdisciplinary Rounds     Patient Name: Darius Yeung  Age: 76 y.o. Room/Bed:  728/  Primary Diagnosis: <principal problem not specified>   Admission Status: Voluntary     Readmission within 30 days: yes  Power of  in place: no  Patient requires a blocked bed: no          Reason for blocked bed:   Sleep hours:       Participation in Care/Groups:  yes  Medication Compliant?: Yes  PRNS (last 24 hours): None    Restraints (last 24 hours):  no     Alcohol screening (AUDIT) completed -  AUDIT Score: 0  If applicable, date SBIRT discussed in treatment team AND documented:    Tobacco - patient is a smoker: Have You Used Tobacco in the Past 30 Days: No  Illegal Drugs use: Have You Used Any Illegal Substances Over the Past 12 Months: No    24 hour chart check complete: yes     Patient goal(s) for today:   Treatment team focus/goals: Plan to continue to have patient work with PT, work on discharge plan and titrate her medications. Progress not: she has been coming out of her room more and participating in groups. She is less somatic and has been making calls about returning to her apartment.    LOS:  6  Expected LOS: TBD    Participating treatment team members: Darius Yeung,  Manuel Dobbins, YADIRA - Roque Rojas, RN

## 2019-08-20 NOTE — BH NOTES
PRN Medication Documentation    Specific patient behavior that led to need for PRN medication: Pt reporting neck pain  Staff interventions attempted prior to PRN being given: pt sitting quietly  PRN medication given: Tylenol 650mg PO  Patient response/effectiveness of PRN medication: will reassess

## 2019-08-20 NOTE — BH NOTES
GROUP THERAPY PROGRESS NOTE    Bev Kamara participated in the General Unit's Process Group, with a focus identifying feelings,  planning for the day, and describing ones situation with a pictogram.     Group time: 50 minutes. Personal goal for participation: To increase the capacity to improve ones mood and structure. Goal orientation: The patient will be able to identify their feelings, develop a plan for structuring their day, discuss discharge planning, and identify a pictogram that best describes their situation. Group therapy participation: With prompting, this patient actively participated in the group. Therapeutic interventions reviewed and discussed: The group members were asked to introduce themselves to each other and to see if they could identify an emotion they are having and/or let the group know what they want to focus on for the day as they continue to make discharge plans. They also were provided a handout of pictograms that focused on identifying a significant relationship. Impression of participation: The patient said she selected a couple of the pictograms. The first involved a couple of figures shaking hands and that this represented for her her need to \"get a best friend\" and how isolated and \"lonely\" she has felt even though she lives in a supervised apartment complex for persons 72 and older. The second selection she made had to do with an image of a figure holding by a leash another figure on all fours. She said this reminded her of her relationship with her dog, the closest relationship she has with a true and loyal . The patient was alert, generally oriented, and cooperative. The patient expressed no current SI/HI and displayed no overt psychotic symptoms. Her thinking was rather concrete. Her affect was anxious, depressed, and sad. Her mood reflected her affect. She continued to be interested in refining her aftercare and discharge needs.

## 2019-08-20 NOTE — BH NOTES
GROUP THERAPY PROGRESS NOTE    Mauri Isaac is participating in Reflections Group. Group time: 25 minutes    Personal goal for participation:     Goal orientation: relaxation    Group therapy participation: active    Therapeutic interventions reviewed and discussed: Review unit rules discussed during Community group, discuss if you accomplished goals, review ways to go to sleep, provide feedback if any questions, and provide a healthy night snack with relaxing music to promote sleep. Impression of participation: Patient was appropriate to staff and peers.

## 2019-08-20 NOTE — PROGRESS NOTES
Problem: Falls - Risk of  Goal: *Absence of Falls  Description  Document Marimar Morrow Fall Risk and appropriate interventions in the flowsheet. Outcome: Progressing Towards Goal  Note:   Fall Risk Interventions:  Mobility Interventions: Assess mobility with egress test    Mentation Interventions: Adequate sleep, hydration, pain control    Medication Interventions: Teach patient to arise slowly    Elimination Interventions:  Toilet paper/wipes in reach    History of Falls Interventions: Room close to nurse's station, Evaluate medications/consider consulting pharmacy, Door open when patient unattended    Lying quietly in bed with eyes closed, respirations even and unlabored , NAD noted   Q15 min safety monitoring continues

## 2019-08-20 NOTE — PROGRESS NOTES
Pt calm and cooperative. Med and meal compliant. Sitting quietly at table with peers. Encourage continued compliance. Continue q15 minute safety checks.      Problem: Altered Thought Process (Adult/Pediatric)  Goal: *STG: Participates in treatment plan  Outcome: Progressing Towards Goal  Goal: *STG: Complies with medication therapy  Outcome: Progressing Towards Goal  Goal: *STG: Demonstrates ability to understand and use improved judgment in daily activities and relationships  Outcome: Progressing Towards Goal

## 2019-08-21 PROCEDURE — 74011250637 HC RX REV CODE- 250/637: Performed by: NURSE PRACTITIONER

## 2019-08-21 PROCEDURE — 74011250637 HC RX REV CODE- 250/637: Performed by: HOSPITALIST

## 2019-08-21 PROCEDURE — 65220000003 HC RM SEMIPRIVATE PSYCH

## 2019-08-21 RX ORDER — MIRTAZAPINE 15 MG/1
15 TABLET, FILM COATED ORAL
Status: DISCONTINUED | OUTPATIENT
Start: 2019-08-21 | End: 2019-08-23 | Stop reason: HOSPADM

## 2019-08-21 RX ORDER — TRAZODONE HYDROCHLORIDE 50 MG/1
50 TABLET ORAL
Status: DISCONTINUED | OUTPATIENT
Start: 2019-08-21 | End: 2019-08-23 | Stop reason: HOSPADM

## 2019-08-21 RX ADMIN — RISPERIDONE 2 MG: 1 TABLET ORAL at 23:22

## 2019-08-21 RX ADMIN — METOPROLOL SUCCINATE 12.5 MG: 25 TABLET, EXTENDED RELEASE ORAL at 08:08

## 2019-08-21 RX ADMIN — MIRTAZAPINE 15 MG: 15 TABLET, FILM COATED ORAL at 23:22

## 2019-08-21 RX ADMIN — ACETAMINOPHEN 650 MG: 325 TABLET ORAL at 12:10

## 2019-08-21 RX ADMIN — FAMOTIDINE 20 MG: 20 TABLET ORAL at 08:08

## 2019-08-21 RX ADMIN — VENLAFAXINE HYDROCHLORIDE 300 MG: 150 CAPSULE, EXTENDED RELEASE ORAL at 08:07

## 2019-08-21 RX ADMIN — SENNOSIDES AND DOCUSATE SODIUM 1 TABLET: 8.6; 5 TABLET ORAL at 08:08

## 2019-08-21 RX ADMIN — FUROSEMIDE 20 MG: 20 TABLET ORAL at 08:07

## 2019-08-21 RX ADMIN — ROSUVASTATIN CALCIUM 10 MG: 10 TABLET, COATED ORAL at 23:22

## 2019-08-21 RX ADMIN — POLYETHYLENE GLYCOL 3350 17 G: 17 POWDER, FOR SOLUTION ORAL at 08:10

## 2019-08-21 RX ADMIN — ASPIRIN 81 MG: 81 TABLET, COATED ORAL at 16:45

## 2019-08-21 NOTE — BH NOTES
PRN Medication Documentation    Specific patient behavior that led to need for PRN medication: \"I have back pain #8\"  Staff interventions attempted prior to PRN being given:   PRN medication given: tylenol  650 mg po  Patient response/effectiveness of PRN medication:  Will continue to monitor

## 2019-08-21 NOTE — BH NOTES
TRANSFER - IN REPORT:    Verbal report received from [address] [place] [address] Nw (name) on Farhat Hyatt  being received from [facility] Acute (unit) for routine progression of care      Report consisted of patients Situation, Background, Assessment and   Recommendations(SBAR). Information from the following report(s) SBAR was reviewed with the receiving nurse. Opportunity for questions and clarification was provided. Assessment completed upon patients arrival to unit and care assumed.            [id number] arrived to the acute unit

## 2019-08-21 NOTE — PROGRESS NOTES
Dino actively participated in [address] [place] [address] about [facility] on [address] unit.     Yazan Alvarez, [facility] [place]   [phone])

## 2019-08-21 NOTE — BH NOTES
GROUP THERAPY PROGRESS NOTE    Mauri Isaac participated in a Process Group on the Acute Unit with a focus identifying feelings, planning for the day, and learning about using the [address] as a long-term personal treatment plan. .   Group time: 40 minutes. Personal goal for participation: To increase the capacity to improve ones mood, set personal goals, and understand   more about basic activities to successfully state and get ones needs met through personal treatment goal setting. Goal orientation: The patients will be able to identify their feelings and develop a goal for themselves for their day. The didactic portion of the session covered developing a personal short-term and long-term treatment plan for oneself. The group members were asked to consider filling in on their own after group the following elements of a DBT house drawing with multiple levels:    1) foundation - values that govern your life;   2) first floor with a door - behaviors would like to manage and feel more control over or areas you want to change;   the door represents an opportunity to list or draw things that you keep hidden from others;   3) second floor - list or draw emotions you want to experience more often, more fully, or in a more healthy fashion;   4) third floor - a list of things that make you happy or want to feel happy about;   5) attic - list or draw what  a Life René Garcias would look like. There is also a roof, where people and things that protect you can be listed. The chimney provides an opportunity to list ways in which you blow off steam.   The billboard allows one to post those things in one's life they are proud of and the walls of the house provide an opportunity to list those people and things that provide support. The patients were also provided copies of the [address] that they could complete on their own.      Group therapy participation: With prompting, this patient actively participated in the group. Therapeutic interventions reviewed and discussed: The group members were asked to identify an emotion they are having and/or let the group know what they want to focus on for the   day as they continue to make discharge plans. The group were informed of the elements of the [address] for them to complete in their free time. Impression of participation: The patient said she realized she needed to come into the hospital when her \"isolation and loneliness became too painful. Yassine Gusman Yassine Gusman I knew I needed to get some help. \" She complained a little about \"the bugs coming back\" and the irritation on her upper legs, but explained that she was able to get some lotion to help with the itchiness. When asked about socialization activities and a dining wall, the patient admitted that she stuck more to her self, \"in my room\" or going out to eat at a fast food restaurant when she has the money. She added that the [facility] has changed management recently and she felt some of the quality at the residence has deteriorated. The patient expressed no current SI/HI and it was not clear if the patient were still experiencing internal stimuli, by speaking about the bugs and her tendency to downplay her residence's support for active socialization, even at meal times. The patient also admitted that she does not do any of her own cooking. Her affect was practically non-dysphoric, except for hints of irritation and frustration about \"bugs\" and her struggle with painful loneliness. She presents a positive front and is otfen smiling. Her mood reflected her affect, as she continued to refine her aftercare and discharge needs.

## 2019-08-21 NOTE — BH NOTES
GROUP THERAPY PROGRESS NOTE    Key Turner is participating in Reflections Group. Group time: 15 minutes    Personal goal for participation:     Goal orientation: relaxation    Group therapy participation: active    Therapeutic interventions reviewed and discussed: Discussed patients day, mood, and how to promote healthy sleep. Impression of participation: Patient was appropriate with staff and peers. Relaxed and ready for sleep.

## 2019-08-21 NOTE — INTERDISCIPLINARY ROUNDS
Behavioral Health Interdisciplinary Rounds     Patient Name: Sarah Miller  Age: 76 y.o. Room/Bed:  728/  Primary Diagnosis: <principal problem not specified>   Admission Status: Voluntary     Readmission within 30 days: yes  Power of  in place: no  Patient requires a blocked bed: no          Reason for blocked bed:     VTE Prophylaxis: No    Mobility needs/Fall risk: no  Flu Vaccine : no   Nutritional Plan: no  Consults:         Labs/Testing due today?: no    Sleep hours:  8+      Participation in Care/Groups:  yes  Medication Compliant?: Yes  PRNS (last 24 hours): Pain    Restraints (last 24 hours):  no     CIWA (range last 24 hours):     COWS (range last 24 hours):      Alcohol screening (AUDIT) completed -   AUDIT Score: 0     If applicable, date SBIRT discussed in treatment team AND documented:   AUDIT Screen Score: AUDIT Score: 0    Tobacco - patient is a smoker: Have You Used Tobacco in the Past 30 Days: No  Illegal Drugs use: Have You Used Any Illegal Substances Over the Past 12 Months: No    24 hour chart check complete: yes    Patient goal(s) for today:   Treatment team focus/goals: Plan to titrate her medications   Progress note : She remains delusional and guarded. PT working with her. LOS:  7  Expected LOS: TBD    Financial concerns/prescription coverage: Medicare   Date of last family contact: no family involved / plan to contact [facility]. Family requesting physician contact today:   Discharge plan: She will return to her apartment at [facility] in the home: no      Outpatient provider(s): Dr. Mehdi Mckeon     Participating treatment team members: Sarah MillerDixie SW - Steffanie Carr [id number] Nickie Purdymas
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
## 2019-08-21 NOTE — PROGRESS NOTES
PSYCHIATRIC PROGRESS NOTE       Patient: Bernetta Libman MRN: [id number]  SSN: [id number]    YOB: 1950  Age: 76 y.o. Sex: female      Admit Date: 8/14/2019    LOS: 7 days       Chief Complaint:  I am not sleeping. Interval History:  Bernetta Libman reports difficulty of sleeping. She is fixated on bugs, states she sees them, feesl them in her vagina. \"I feel I'm trekking. \" Thought process somewhat disorganized. She is calm and social in the milieu despite her delusions. Compliant with her meds and denies side effects. Past Medical History:  Past Medical History:   Diagnosis Date    Arthritis     GENERALIZED IN JOINTS.  Breast cancer ([facility] [address]     Left Breast Cancer 1995    Cancer [facility])     left breast - surgery/radiation    Chronic pain     Back pain    Depression     Mood Swings, anxiety    GERD (gastroesophageal reflux disease)     Hiatal Hernia    Headache     Hypercholesterolemia     Hypertension     CONTROLLED BY MEDS., Pt states she is no liong er on BP med's    Ill-defined condition     Neuropathy bilat feet    Irregular heart beat     Other ill-defined conditions(799.89)     MIGRAINES    Other ill-defined conditions(799.89)     neuropathy of lower legs and feet    Other ill-defined conditions(799.89)     increased cholesterol    Other ill-defined conditions(799.89)     bronchitis    Psychotic disorder (HCC)     S/P radiation therapy     1995 Left Breast    Unspecified adverse effect of anesthesia     HEART PALPITATION. ALLERGIES:(reviewed/updated 8/21/2019)  Allergies   Allergen Reactions    Epinephrine Palpitations     \"Makes my heart  ' shudder'. \" Allergic to epinephrine with novocain. Plain epinephrine is ok.  Novocain [Procaine] Palpitations    Nsaids (Non-Steroidal Anti-Inflammatory Drug) Other (comments)     GASTRIC DISTRESS.     Ibuprofen Other (comments)     Gastric irritation       Laboratory report:  Lab Results   Component Value Date/Time    WBC 10.1 08/08/2019 01:58 PM    HGB 12.5 08/08/2019 01:58 PM    HCT 41.6 08/08/2019 01:58 PM    PLATELET 592 17/55/5026 01:58 PM    MCV 87.6 08/08/2019 01:58 PM      Lab Results   Component Value Date/Time    Sodium 144 08/16/2019 05:39 AM    Potassium 4.3 08/16/2019 05:39 AM    Chloride 107 08/16/2019 05:39 AM    CO2 29 08/16/2019 05:39 AM    Anion gap 8 08/16/2019 05:39 AM    Glucose 107 (H) 08/16/2019 05:39 AM    Glucose 121 (H) 08/02/2019 05:06 AM    BUN 16 08/16/2019 05:39 AM    Creatinine 0.81 08/16/2019 05:39 AM    BUN/Creatinine ratio 20 08/16/2019 05:39 AM    GFR est AA >60 08/16/2019 05:39 AM    GFR est non-AA >60 08/16/2019 05:39 AM    Calcium 9.2 08/16/2019 05:39 AM    Bilirubin, total 1.0 08/08/2019 01:58 PM    AST (SGOT) 65 (H) 08/08/2019 01:58 PM    Alk.  phosphatase 124 (H) 08/08/2019 01:58 PM    Protein, total 6.5 08/08/2019 01:58 PM    Albumin 3.2 (L) 08/08/2019 01:58 PM    Globulin 3.3 08/08/2019 01:58 PM    A-G Ratio 1.0 (L) 08/08/2019 01:58 PM    ALT (SGPT) 68 08/08/2019 01:58 PM      Vitals:    08/20/19 0741 08/20/19 1536 08/20/19 2049 08/21/19 0737   BP: 107/71 99/67 110/78 109/72   Pulse: 92 85 83 96   Resp: 16 16 16 16   Temp: 98.1 °F (36.7 °C) 98.1 °F (36.7 °C) 98 °F (36.7 °C) 98.2 °F (36.8 °C)   SpO2: 92% 99% 97% 96%   Weight:           No results found for: VALF2, VALAC, VALP, VALPR, DS6, CRBAM, CRBAMP, CARB2, XCRBAM  No results found for: LITHM    Vital Signs  Patient Vitals for the past 24 hrs:   Temp Pulse Resp BP SpO2   08/21/19 0737 98.2 °F (36.8 °C) 96 16 109/72 96 %   08/20/19 2049 98 °F (36.7 °C) 83 16 110/78 97 %   08/20/19 1536 98.1 °F (36.7 °C) 85 16 99/67 99 %     Wt Readings from Last 3 Encounters:   08/17/19 77.8 kg (171 lb 9.6 oz)   08/14/19 77.8 kg (171 lb 8 oz)   08/04/19 86.5 kg (190 lb 12.8 oz)     Temp Readings from Last 3 Encounters:   08/21/19 98.2 °F (36.8 °C)   08/14/19 97.9 °F (36.6 °C)   08/06/19 97.4 °F (36.3 °C)     BP Readings from Last 3 Encounters:   08/21/19 109/72 08/14/19 98/62   08/06/19 136/86     Pulse Readings from Last 3 Encounters:   08/21/19 96   08/14/19 81   08/06/19 98       Radiology (reviewed/updated 8/21/2019)  Ct Head Wo Cont    Result Date: 8/8/2019  EXAM:  CT HEAD WO CONT INDICATION: Confusion. COMPARISON: CT 7/12/2016 TECHNIQUE: Axial noncontrast head CT from foramen magnum to vertex. Coronal and sagittal reformatted images were obtained. CT dose reduction was achieved through use of a standardized protocol tailored for this examination and automatic exposure control for dose modulation. Adaptive statistical iterative reconstruction (ASIR) was utilized. FINDINGS:  There is diffuse age-related parenchymal volume loss. The ventricles and sulci are age-appropriate without hydrocephalus. There is no mass effect or midline shift. There is no intracranial hemorrhage or extra-axial fluid collection. There is no significant white matter disease. The gray-white matter differentiation is maintained. The basal cisterns are patent. The osseous structures are intact. The visualized paranasal sinuses and mastoid air cells are clear. IMPRESSION: No acute intracranial abnormality. Xr Chest Port    Result Date: 8/9/2019  PORTABLE CHEST RADIOGRAPH/S: 8/9/2019 2:20 PM INDICATION: Heart failure. COMPARISON: None. TECHNIQUE: Portable frontal semiupright radiograph/s of the chest. FINDINGS: There are small bilateral pleural effusions and passive atelectasis. The central airways are patent. No pneumothorax. There is pulmonary vascular congestion without peripheral septal Illene Oh) lines to further suggest interstitial edema. IMPRESSION: Small bilateral pleural effusions and pulmonary vascular congestion. Side Effects: (reviewed/updated 8/21/2019)  None reported or admitted to.     Review of Systems: (reviewed/updated 8/21/2019)  Appetite: good  Sleep: good   All other Review of Systems: negative    Mental Status Exam:  Eye contact: Good eye contact  Psychomotor activity: relaxed  Speech is spontaneous  Thought process: organized  Mood is \"ok\"  Affect: Blunted  Perception: +avh  Suicidal ideation: No si  Homicidal ideation: No hi  Insight/judgment: Poor  Cognition is grossly intact      Physical Exam:  Musculoskeletal system: steady gait  Tremor not present  Cog wheeling not present      Assessment and Plan:  Bernetta Libman meets criteria for a diagnosis of Delusional disorder. Start remeron 15mg qhs. Decrease trazodone to 50mg and change it to prn. Continue rest of medications as prescribed. We will closely monitor for safety. We will encourage reality orientation. Disposition planning. I certify that this patients inpatient psychiatric hospital services furnished since the previous certification were, and continue to be, required for treatment that could reasonably be expected to improve the patient's condition, or for diagnostic study, and that the patient continues to need, on a daily basis, active treatment furnished directly by or requiring the supervision of inpatient psychiatric facility personnel. In addition, the hospital records show that services furnished were intensive treatment services, admission or related services, or equivalent services.       Signed:  Roz Reyna NP  8/21/2019

## 2019-08-21 NOTE — BH NOTES
GROUP THERAPY PROGRESS NOTE    The patient Zhen Kumar is participating in [employer]. Group time: 30 minutes    Personal goal for participation: to orient the patient to the unit.     Goal orientation: successful adoption of unit rules    Group therapy participation: active    Therapeutic interventions reviewed and discussed: Yes    Impression of participation:     Yari Mathews  8/21/2019 10:57 AM

## 2019-08-21 NOTE — BH NOTES
PRN Medication Documentation      Specific patient behavior that led to need for PRN medication: Pt complains of upset stomach, indigestion  Staff interventions attempted prior to PRN being given: education  PRN medication given: PO calcium carbonate 200mg elemental  Patient response/effectiveness of PRN medication: will continue to assess

## 2019-08-22 PROCEDURE — 74011250637 HC RX REV CODE- 250/637: Performed by: NURSE PRACTITIONER

## 2019-08-22 PROCEDURE — 65220000003 HC RM SEMIPRIVATE PSYCH

## 2019-08-22 PROCEDURE — 74011250637 HC RX REV CODE- 250/637: Performed by: HOSPITALIST

## 2019-08-22 RX ADMIN — RISPERIDONE 2 MG: 1 TABLET ORAL at 22:09

## 2019-08-22 RX ADMIN — SENNOSIDES AND DOCUSATE SODIUM 1 TABLET: 8.6; 5 TABLET ORAL at 08:36

## 2019-08-22 RX ADMIN — FAMOTIDINE 20 MG: 20 TABLET ORAL at 08:36

## 2019-08-22 RX ADMIN — METOPROLOL SUCCINATE 12.5 MG: 25 TABLET, EXTENDED RELEASE ORAL at 08:36

## 2019-08-22 RX ADMIN — FUROSEMIDE 20 MG: 20 TABLET ORAL at 08:36

## 2019-08-22 RX ADMIN — VENLAFAXINE HYDROCHLORIDE 300 MG: 150 CAPSULE, EXTENDED RELEASE ORAL at 08:36

## 2019-08-22 RX ADMIN — ROSUVASTATIN CALCIUM 10 MG: 10 TABLET, COATED ORAL at 22:10

## 2019-08-22 RX ADMIN — POLYETHYLENE GLYCOL 3350 17 G: 17 POWDER, FOR SOLUTION ORAL at 08:36

## 2019-08-22 RX ADMIN — ASPIRIN 81 MG: 81 TABLET, COATED ORAL at 17:10

## 2019-08-22 RX ADMIN — MIRTAZAPINE 15 MG: 15 TABLET, FILM COATED ORAL at 22:10

## 2019-08-22 RX ADMIN — ACETAMINOPHEN 650 MG: 325 TABLET ORAL at 18:29

## 2019-08-22 NOTE — BH NOTES
GROUP THERAPY PROGRESS NOTE    Zhen Kumar is participating in [place].      Group time: 20 minutes    Personal goal for participation: have a good day    Goal orientation: community    Group therapy participation: minimal    Therapeutic interventions reviewed and discussed: yes    Impression of participation: limited

## 2019-08-22 NOTE — INTERDISCIPLINARY ROUNDS
Behavioral Health Interdisciplinary Rounds     Patient Name: Sonya Peraza  Age: 76 y.o.   Room/Bed:  725/  Primary Diagnosis: <principal problem not specified>   Admission Status: Voluntary     Readmission within 30 days: yes  Power of  in place: no  Patient requires a blocked bed: no          Reason for blocked bed:   Sleep hours: 6      Participation in Care/Groups:  yes  Medication Compliant?: Yes  PRNS (last 24 hours): Pain    Restraints (last 24 hours):  no     Alcohol screening (AUDIT) completed -  AUDIT Score: 0  If applicable, date SBIRT discussed in treatment team AND documented:    Tobacco - patient is a smoker: Have You Used Tobacco in the Past 30 Days: No  Illegal Drugs use: Have You Used Any Illegal Substances Over the Past 12 Months: No    24 hour chart check complete: yes     Patient goal(s) for today:   Treatment team focus/goals: plan for discharge on Friday   Progress note : She     LOS:  8  Expected LOS: discharge on Friday     Participating treatment team members: Sonya PerazaGerardo, [id number] Marquita Bolaños RN -   Gina Tyler

## 2019-08-22 NOTE — BH NOTES
GROUP THERAPY PROGRESS NOTE    Mavis Setting is participating in Reflections. Group time: 30 minutes    Personal goal for participation: unit orientation and daily progress    Goal orientation: personal    Group therapy participation: passive    Therapeutic interventions reviewed and discussed: yes    Impression of participation: seems in fair spirits.  Comments throughout shift focused on need for discharge from hospital.

## 2019-08-22 NOTE — PROGRESS NOTES
Problem: Altered Thought Process (Adult/Pediatric)  Goal: *STG: Participates in treatment plan  Outcome: Progressing Towards Goal  Note:   Patient would like to be discharged soon. Most likely will be discharged tomorrow. Patient affect brighter. Patient continues to have fixed delusions. Also patient may need some more assistance while at home caring for self. Mood euthymic. Denies SI  Goal: Interventions  Note:   Medication management, Q 15 min safety rounds. Group therapy.

## 2019-08-22 NOTE — BH NOTES
GROUP THERAPY PROGRESS NOTE    Travis Knapp participated in a Morning Process Group on the General Unit with a focus on identifying feelings, planning for the day, and learning more about grief and loss. Group time: 75 minutes. Personal goal for participation: To identify feelings, increase the capacity to manage ones ability to recognize and cope with grief and loss, and to continue to define and work on aftercare needs and coping skills. Goal orientation: The patient will be able to introduce themselves to their peers, identify their feelings, and consider the importance of grief and loss for themselves in their recovery process. Group therapy participation: With prompting, this patient actively participated in the group. Therapeutic interventions reviewed and discussed: The patients were encouraged to identify themselves by their first names, speak to their feelings, and review a handout on LEÓ[employer] 8 Five Stages of Grief: Denial, Anger, Bargaining, Depression, and Acceptance. They were also provided a handout on The Grief Loop, which outlined how grief has an acute phase and that complicated grief often gets recycled during events like holidays, anniversaries, birthdays, etc.     Impression of participation: The patient said she planned on leaving the hospital later today and picking up her dog at the vet's. She added that she might need to also go to the grocery store to  some soup and tuna fish for dinner. The patient was alert, generally oriented, and engaged in the group. She expressed no current SI/HI and no overt psychotic symptoms. She did not mention anything about \"bugs\" or other delusions in this group. Her affect was non-dysphoric, with slight occasional smiles. Her mood reflected her affect. She appeared to be in the process of finalizing her discharge plans, with the help of her treatment team, especially nursing.

## 2019-08-22 NOTE — PROGRESS NOTES
PSYCHIATRIC PROGRESS NOTE       Patient: Bettina Becerril MRN: [id number]  SSN: [id number]    YOB: 1950  Age: 76 y.o. Sex: female      Admit Date: 8/14/2019    LOS: 8 days       Chief Complaint:  Tired from late activities last night. Interval History:  Bettina Becerril reports sleeping better. She is requesting to go home today, but she is not safe to go home by herself. She does not have a tight disposition planning. She is worried about her 15year old dog who might passed away before she sees her. She seems adjusting fairly well here in the general unit. Her delusions about bugs and fire are evident. She is calm and pleasantly psychotic. Denies si or hi. Past Medical History:  Past Medical History:   Diagnosis Date    Arthritis     GENERALIZED IN JOINTS.  Breast cancer ([facility] [address]     Left Breast Cancer 1995    Cancer [facility])     left breast - surgery/radiation    Chronic pain     Back pain    Depression     Mood Swings, anxiety    GERD (gastroesophageal reflux disease)     Hiatal Hernia    Headache     Hypercholesterolemia     Hypertension     CONTROLLED BY MEDS., Pt states she is no liong er on BP med's    Ill-defined condition     Neuropathy bilat feet    Irregular heart beat     Other ill-defined conditions(799.89)     MIGRAINES    Other ill-defined conditions(799.89)     neuropathy of lower legs and feet    Other ill-defined conditions(799.89)     increased cholesterol    Other ill-defined conditions(799.89)     bronchitis    Psychotic disorder (HCC)     S/P radiation therapy     1995 Left Breast    Unspecified adverse effect of anesthesia     HEART PALPITATION. ALLERGIES:(reviewed/updated 8/22/2019)  Allergies   Allergen Reactions    Epinephrine Palpitations     \"Makes my heart  ' shudder'. \" Allergic to epinephrine with novocain. Plain epinephrine is ok.     Novocain [Procaine] Palpitations    Nsaids (Non-Steroidal Anti-Inflammatory Drug) Other (comments)     GASTRIC DISTRESS.  Ibuprofen Other (comments)     Gastric irritation       Laboratory report:  Lab Results   Component Value Date/Time    WBC 10.1 08/08/2019 01:58 PM    HGB 12.5 08/08/2019 01:58 PM    HCT 41.6 08/08/2019 01:58 PM    PLATELET 536 77/02/5505 01:58 PM    MCV 87.6 08/08/2019 01:58 PM      Lab Results   Component Value Date/Time    Sodium 144 08/16/2019 05:39 AM    Potassium 4.3 08/16/2019 05:39 AM    Chloride 107 08/16/2019 05:39 AM    CO2 29 08/16/2019 05:39 AM    Anion gap 8 08/16/2019 05:39 AM    Glucose 107 (H) 08/16/2019 05:39 AM    Glucose 121 (H) 08/02/2019 05:06 AM    BUN 16 08/16/2019 05:39 AM    Creatinine 0.81 08/16/2019 05:39 AM    BUN/Creatinine ratio 20 08/16/2019 05:39 AM    GFR est AA >60 08/16/2019 05:39 AM    GFR est non-AA >60 08/16/2019 05:39 AM    Calcium 9.2 08/16/2019 05:39 AM    Bilirubin, total 1.0 08/08/2019 01:58 PM    AST (SGOT) 65 (H) 08/08/2019 01:58 PM    Alk.  phosphatase 124 (H) 08/08/2019 01:58 PM    Protein, total 6.5 08/08/2019 01:58 PM    Albumin 3.2 (L) 08/08/2019 01:58 PM    Globulin 3.3 08/08/2019 01:58 PM    A-G Ratio 1.0 (L) 08/08/2019 01:58 PM    ALT (SGPT) 68 08/08/2019 01:58 PM      Vitals:    08/21/19 0737 08/21/19 1556 08/21/19 1936 08/22/19 0818   BP: 109/72 100/64 98/66 107/68   Pulse: 96 83 82 77   Resp: 16 16 18 16   Temp: 98.2 °F (36.8 °C) 98.1 °F (36.7 °C) 98.2 °F (36.8 °C) 98.3 °F (36.8 °C)   SpO2: 96% 98% 98% 96%   Weight:           No results found for: VALF2, VALAC, VALP, VALPR, DS6, CRBAM, CRBAMP, CARB2, XCRBAM  No results found for: LITHM    Vital Signs  Patient Vitals for the past 24 hrs:   Temp Pulse Resp BP SpO2   08/22/19 0818 98.3 °F (36.8 °C) 77 16 107/68 96 %   08/21/19 1936 98.2 °F (36.8 °C) 82 18 98/66 98 %   08/21/19 1556 98.1 °F (36.7 °C) 83 16 100/64 98 %     Wt Readings from Last 3 Encounters:   08/17/19 77.8 kg (171 lb 9.6 oz)   08/14/19 77.8 kg (171 lb 8 oz)   08/04/19 86.5 kg (190 lb 12.8 oz)     Temp Readings from Last 3 Encounters: 08/22/19 98.3 °F (36.8 °C)   08/14/19 97.9 °F (36.6 °C)   08/06/19 97.4 °F (36.3 °C)     BP Readings from Last 3 Encounters:   08/22/19 107/68   08/14/19 98/62   08/06/19 136/86     Pulse Readings from Last 3 Encounters:   08/22/19 77   08/14/19 81   08/06/19 98       Radiology (reviewed/updated 8/22/2019)  Ct Head Wo Cont    Result Date: 8/8/2019  EXAM:  CT HEAD WO CONT INDICATION: Confusion. COMPARISON: CT 7/12/2016 TECHNIQUE: Axial noncontrast head CT from foramen magnum to vertex. Coronal and sagittal reformatted images were obtained. CT dose reduction was achieved through use of a standardized protocol tailored for this examination and automatic exposure control for dose modulation. Adaptive statistical iterative reconstruction (ASIR) was utilized. FINDINGS:  There is diffuse age-related parenchymal volume loss. The ventricles and sulci are age-appropriate without hydrocephalus. There is no mass effect or midline shift. There is no intracranial hemorrhage or extra-axial fluid collection. There is no significant white matter disease. The gray-white matter differentiation is maintained. The basal cisterns are patent. The osseous structures are intact. The visualized paranasal sinuses and mastoid air cells are clear. IMPRESSION: No acute intracranial abnormality. Xr Chest Port    Result Date: 8/9/2019  PORTABLE CHEST RADIOGRAPH/S: 8/9/2019 2:20 PM INDICATION: Heart failure. COMPARISON: None. TECHNIQUE: Portable frontal semiupright radiograph/s of the chest. FINDINGS: There are small bilateral pleural effusions and passive atelectasis. The central airways are patent. No pneumothorax. There is pulmonary vascular congestion without peripheral septal Chauncey Amis) lines to further suggest interstitial edema. IMPRESSION: Small bilateral pleural effusions and pulmonary vascular congestion. Side Effects: (reviewed/updated 8/22/2019)  None reported or admitted to.     Review of Systems: (reviewed/updated 8/22/2019)  Appetite: good  Sleep: good   All other Review of Systems: negative    Mental Status Exam:  Eye contact: Good eye contact  Psychomotor activity: relaxed  Speech is spontaneous  Thought process: organized  Mood is \"ok\"  Affect: Blunted  Perception: +avh  Suicidal ideation: No si  Homicidal ideation: No hi  Insight/judgment: Poor  Cognition is grossly intact      Physical Exam:  Musculoskeletal system: steady gait  Tremor not present  Cog wheeling not present      Assessment and Plan:  Sonya Karmen meets criteria for a diagnosis of Delusional disorder. Continue current medications as prescribed. We will closely monitor for safety. We will encourage reality orientation. Disposition planning. I certify that this patients inpatient psychiatric hospital services furnished since the previous certification were, and continue to be, required for treatment that could reasonably be expected to improve the patient's condition, or for diagnostic study, and that the patient continues to need, on a daily basis, active treatment furnished directly by or requiring the supervision of inpatient psychiatric facility personnel. In addition, the hospital records show that services furnished were intensive treatment services, admission or related services, or equivalent services.       Signed:  Yeimi Willingham NP  8/22/2019

## 2019-08-22 NOTE — PROGRESS NOTES
Problem: Falls - Risk of  Goal: *Absence of Falls  Description  Document Emily Luther Fall Risk and appropriate interventions in the flowsheet. Note:   Fall Risk Interventions:  Mobility Interventions: Assess mobility with egress test    Mentation Interventions: Adequate sleep, hydration, pain control    Medication Interventions: Teach patient to arise slowly    Elimination Interventions:  Toilet paper/wipes in reach    History of Falls Interventions: Door open when patient unattended    Lying quietly in bed with eyes closed, respirations even and unlabored , NAD noted   Q15 min safety monitoring continues

## 2019-08-22 NOTE — BH NOTES
PRN Medication Documentation    Specific patient behavior that led to need for PRN medication: patient complaining pain 7/10 in lower back  Staff interventions attempted prior to PRN being given: redirection; coping skills  PRN medication given: 650 mg acetaminophen PO   Patient response/effectiveness of PRN medication: will continue to monitor

## 2019-08-22 NOTE — PROGRESS NOTES
Patient attended Spirituality Group on 8/22/2019. Rev.  Vangie Taylor MDiv, [facility], [address]   paging service: [phone])

## 2019-08-23 VITALS
TEMPERATURE: 98.1 F | WEIGHT: 171.6 LBS | SYSTOLIC BLOOD PRESSURE: 112 MMHG | HEART RATE: 83 BPM | OXYGEN SATURATION: 98 % | DIASTOLIC BLOOD PRESSURE: 77 MMHG | BODY MASS INDEX: 31.39 KG/M2 | RESPIRATION RATE: 16 BRPM

## 2019-08-23 PROCEDURE — 74011250637 HC RX REV CODE- 250/637: Performed by: NURSE PRACTITIONER

## 2019-08-23 PROCEDURE — 74011250637 HC RX REV CODE- 250/637: Performed by: HOSPITALIST

## 2019-08-23 RX ORDER — POLYETHYLENE GLYCOL 3350 17 G/17G
17 POWDER, FOR SOLUTION ORAL DAILY
Qty: 30 PACKET | Refills: 0 | Status: SHIPPED | OUTPATIENT
Start: 2019-08-24

## 2019-08-23 RX ORDER — ASPIRIN 81 MG/1
81 TABLET ORAL EVERY EVENING
Qty: 30 TAB | Refills: 0 | Status: SHIPPED | OUTPATIENT
Start: 2019-08-23

## 2019-08-23 RX ORDER — FUROSEMIDE 20 MG/1
TABLET ORAL
Qty: 30 TAB | Refills: 0 | Status: SHIPPED | OUTPATIENT
Start: 2019-08-24 | End: 2019-09-16 | Stop reason: ALTCHOICE

## 2019-08-23 RX ORDER — ROSUVASTATIN CALCIUM 10 MG/1
10 TABLET, COATED ORAL
Qty: 30 TAB | Refills: 0 | Status: SHIPPED | OUTPATIENT
Start: 2019-08-23 | End: 2022-09-05

## 2019-08-23 RX ORDER — VENLAFAXINE HYDROCHLORIDE 150 MG/1
300 CAPSULE, EXTENDED RELEASE ORAL
Qty: 60 CAP | Refills: 0 | Status: SHIPPED | OUTPATIENT
Start: 2019-08-23 | End: 2019-11-25 | Stop reason: SDUPTHER

## 2019-08-23 RX ORDER — METOPROLOL SUCCINATE 25 MG/1
12.5 TABLET, EXTENDED RELEASE ORAL DAILY
Qty: 15 TAB | Refills: 0 | Status: SHIPPED | OUTPATIENT
Start: 2019-08-24

## 2019-08-23 RX ORDER — FAMOTIDINE 20 MG/1
20 TABLET, FILM COATED ORAL DAILY
Qty: 30 TAB | Refills: 0 | Status: SHIPPED | OUTPATIENT
Start: 2019-08-23

## 2019-08-23 RX ORDER — MIRTAZAPINE 15 MG/1
15 TABLET, FILM COATED ORAL
Qty: 30 TAB | Refills: 0 | Status: SHIPPED | OUTPATIENT
Start: 2019-08-23 | End: 2019-09-16 | Stop reason: SDUPTHER

## 2019-08-23 RX ADMIN — ACETAMINOPHEN 650 MG: 325 TABLET ORAL at 08:23

## 2019-08-23 RX ADMIN — FAMOTIDINE 20 MG: 20 TABLET ORAL at 08:23

## 2019-08-23 RX ADMIN — METOPROLOL SUCCINATE 12.5 MG: 25 TABLET, EXTENDED RELEASE ORAL at 08:23

## 2019-08-23 RX ADMIN — POLYETHYLENE GLYCOL 3350 17 G: 17 POWDER, FOR SOLUTION ORAL at 08:24

## 2019-08-23 RX ADMIN — SENNOSIDES AND DOCUSATE SODIUM 1 TABLET: 8.6; 5 TABLET ORAL at 08:24

## 2019-08-23 RX ADMIN — VENLAFAXINE HYDROCHLORIDE 300 MG: 150 CAPSULE, EXTENDED RELEASE ORAL at 08:24

## 2019-08-23 RX ADMIN — FUROSEMIDE 20 MG: 20 TABLET ORAL at 08:24

## 2019-08-23 NOTE — PROGRESS NOTES
Problem: Falls - Risk of  Goal: *Absence of Falls  Description  Document Milta Raring Fall Risk and appropriate interventions in the flowsheet. Outcome: Progressing Towards Goal  Note:   Fall Risk Interventions:  Mobility Interventions: Assess mobility with egress test    Mentation Interventions: Adequate sleep, hydration, pain control    Medication Interventions: Teach patient to arise slowly    Elimination Interventions:  Toilet paper/wipes in reach    History of Falls Interventions: Door open when patient unattended    Lying quietly in bed with eyes closed, respirations even and unlabored   Q15 min safety monitoring  continues

## 2019-08-23 NOTE — PROGRESS NOTES
Problem: Altered Thought Process (Adult/Pediatric)  Goal: *STG: Participates in treatment plan  Outcome: Progressing Towards Goal  Pt participates in all therapeutic activities and complies with meds. She is alert and oriented to all spheres.

## 2019-08-23 NOTE — BH NOTES
GROUP THERAPY PROGRESS NOTE    Versa Jennifer participated in a Process Group on the General Unit with a focus identifying feelings, planning for the day, and learning more about DBT concepts of \"Elf Help\" and the importance of self-care. Group time: 75 minutes. Personal goal for participation: To identify feelings and increase the capacity to take care of oneself first as a primary coping skill. Goal orientation: The patient will be able to introduce themselves to their peers, identify their feelings, and consider the importance of taking time for ones self-care as an important part of life planning and coping skill development. Group therapy participation: This patient actively participated in the group. Therapeutic interventions reviewed and discussed: The group members were asked to begin by introducing themselves and sharing about their feelings. They were then asked to  take turns reading from an outline of twenty-two behavioral suggestions from DBT, called the [address] [place] [address], were reviewed with the group members and discussed as a group. The suggestions came with drawings of elves engaged in the activities described. These suggestions included: 1) trusting yourself (wise mind); 2) take a day off to retreat (observe and describe); 3) talk and play with children or adults (participate); 4) when you cant stop thinking, feel (non-judgmental stance); 5) stay in the present by recognizing when anxiety gets you caught up in a future or a past (one mindfully); 6) take time to think before just jumping in to solve a problem (efficiently);  7) when angry, let yourself feel the anger (the function of emotion); 8) make time for play (adult pleasant activities); 9) when sad, think about what would be comforting (accepting and engaging the opposite); 10) put yourself first (self-soothe); 11) when uncomfortable being alone, think about being with others and decide whether to make it happen (improve imagery); 12) take time to wonder (improve meaning); 13) carve out time to be lazy (improve relaxation); 14) notice the sun and the moon as they rise and set (improve one thing at a time); 15) nothing is usually the hardest thing to do - but often it is the best (guidelines for accepting reality); 16) when things are in chaos and one is in a frenzy, ask yourself Lenice Bill is right about now?  (turning your mind); 17) learn to stand back and let others take their turn as leaders (radical acceptance); 18) when someone turns you down, it usually has to do with them and not you - ask someone else for what you need (using objective effectiveness); 19) when wanting to talk with someone new and are scared, breathe - dont rehearse, just plunge and if it doesnt go well, you can stop (using relationship effectiveness); 20) when you see someone elses hurt face, breathe - you are not responsible for making other people happy (no apologies); 21) when you want something from someone else, ask - asking is being true to yourself (be truthful); 22) when you are hurt, tell the person who hurt you (situations for interpersonal effectiveness). The group members were provided a summary sheet of the DBT information discussed, which they could also review on their own time. Impression of participation: The patient said she was \"happy\" to be going home today and realized that her tendency to isolate was a problem she might actively need to consider addressing. (After the session she was provided an information sheet on local [employer]. ) She also mentioned how much she missed her dog, Lea. The patient was alert, generally oriented, and pleasant. The patient expressed no current SI/HI and displayed no overt psychotic symptoms. Her affect appeared to be slightly anxious and her mood reflected her affect.  The patient looked like she was in the process of finalizing her discharge plans with the help of her treatment team, especially nursing.

## 2019-08-23 NOTE — DISCHARGE INSTRUCTIONS
DISCHARGE SUMMARY    NAME:Saba Porras  : 1950  MRN: [id number]    The patient Nita Campbell exhibits the ability to control behavior in a less restrictive environment. Patient's level of functioning is improving. No assaultive/destructive behavior has been observed for the past 24 hours. No suicidal/homicidal threat or behavior has been observed for the past 24 hours. There is no evidence of serious medication side effects. Patient has not been in physical or protective restraints for at least the past 24 hours. If weapons involved, how are they secured? No weapons involved     Is patient aware of and in agreement with discharge plan? Patient is aware of discharge and is requesting discharge. Arrangements for medication:  Prescriptions filled at [facility] . Copy of discharge instructions to provider?:  Yes, fax to all outpatient providers  [facility] - [phone] ( fax )      Arrangements for transportation home: [place]     Keep all follow up appointments as scheduled, continue to take prescribed medications per physician instructions. Mental health crisis number:  [phone] or your local mental health crisis line number at [facility] from Nurse    PATIENT INSTRUCTIONS:    What to do at Home:  Recommended activity: Activity as tolerated,     If you experience any of the following symptoms Anxiety, depression thoughts about hurting yourself or others, please follow up with 911 or your local mental health crisis line number at [phone]     *  Please give a list of your current medications to your Primary Care Provider. *  Please update this list whenever your medications are discontinued, doses are      changed, or new medications (including over-the-counter products) are added. *  Please carry medication information at all times in case of emergency situations.     These are general instructions for a healthy lifestyle:    No smoking/ No tobacco products/ Avoid exposure to second hand smoke  Surgeon General's Warning:  Quitting smoking now greatly reduces serious risk to your health. Obesity, smoking, and sedentary lifestyle greatly increases your risk for illness    A healthy diet, regular physical exercise & weight monitoring are important for maintaining a healthy lifestyle    You may be retaining fluid if you have a history of heart failure or if you experience any of the following symptoms:  Weight gain of 3 pounds or more overnight or 5 pounds in a week, increased swelling in our hands or feet or shortness of breath while lying flat in bed. Please call your doctor as soon as you notice any of these symptoms; do not wait until your next office visit. The discharge information has been reviewed with the patient. The patient verbalized understanding. Discharge medications reviewed with the patient and appropriate educational materials and side effects teaching were provided.   ___________________________________________________________________________________________________________________________________

## 2019-08-23 NOTE — DISCHARGE SUMMARY
PSYCHIATRIC DISCHARGE SUMMARY      Patient: Yohan Peters MRN: [id number]  SSN: [id number]    YOB: 1950  Age: 76 y.o. Sex: female        Date of Admission: 8/14/2019  Date of Discharge:8/23/2019       Type of Discharge:  REGULAR     Admission data:  CHIEF COMPLAINT:  \"The bugs are biting me. \"     HISTORY OF PRESENTING ILLNESS:  The patient is a 55-year-old female who is admitted at [address] [place] [address] on a voluntary basis. She is well known to this provider as a result of recent inpatient psychiatric admission at [facility]. She was last under my care beginning of this month after almost a 1 week of inpatient stay. She was diagnosed with delusional disorder. She also has been diagnosed with severe depression, anxiety. After 2 days, she presented herself back to the emergency room feeling very confused, not able to care for herself. She actually did not fill any of her prescriptions. She was then admitted to the medical unit for further evaluation and treatment. While she was in the medical unit, her echocardiogram showed 20% to 25% EF and grade 3 diastolic dysfunction. She has been diagnosed with systolic and diastolic heart failure. Her medications have been adjusted. She was then transferred back here at [facility] after she was medically cleared in the medical unit. I saw her for a psychiatric consultation in the medical unit, and she stated she has been feeling overwhelmed, she misses her dog. She was very confused. She could not tell initially where her dog was. She also could not tell where she was staying at. She continues to have visual hallucinations of bugs but there was none. She believes that she is being bitten on her legs and all over her body. She was actually pointing these bugs at me. She is supposed to have a followup appointment with Dr. Victor Manuel Valdes tomorrow for continuity of care. Today, she continues to have persecutory delusions.   She states that she is feeling bugs all over her body. She wants the [facility] [employer]. \"  She stated that she would like to see it being debugged. She also states that her dog has bugs right now. She denies suicidal ideation or homicidal ideation.     PAST MEDICAL HISTORY:  See H and P.     PAST PSYCHIATRIC HOSPITALIZATIONS:  This is her 4th psychiatric inpatient admission. She was recently under my care earlier this month. She was also admitted at the [facility]. She will have her first appointment with Dr. Ayaz Sellers tomorrow, but this would be rescheduled. She was previously seeing Dr. Jake Rojas:  She is a . She has no children. She is currently receiving Social Security Disability checks. She resides at [address] [place] [address]:  She is alert and oriented in all spheres. She is dressed in hospital apparel. She reports her mood is not good. Affect is mildly constricted. Speech, soft and slow. Thought process is logical.  She denies suicidal ideation and homicidal ideation, but hallucinations and paranoia are evident. Memory is intact. Intelligence seems average. Insight is poor. Judgment is poor. Hospital Course:    Patient was admitted to the Psychiatric services for acute psychiatric stabilization in regards to symptomatology as described in the HPI above and placed on Q15 minute checks and suicide precautions. She was started back on her usual medication regimen as well as PRN medications including asa, lasix, toprol, risperdal, and effexor. Due to medication compliance, she was given 2 loading doses of invega sustenna. Subsequent to admission, she was delusional, fixated on bugs, depressed, and was sleeping poorly. While on the unit Karly Herrera was involved in individual, group, occupational and milieu therapy. She improved gradually and was able to integrate into the milieu with help from the nursing staff.  Patients symptoms improved gradually including no delusions, denies vh or tactile hallucinations from bugs, she is alert and oriented x4, less depressed, thought process clearer, no side effects from meds. She was appropriate in her interactions, and cooperative with medications and the unit routine. Please see individual progress notes for more specific details regarding patient's hospitalization course. Patient was discharged as per the plan. She had been doing well on the unit as per the report of the nursing staff and my observations. No PRN medication for agitation, seclusion or restraints were required during the last 48 hours of her stay. Rylie Zhou had improved progressively to the point of being stable for discharge and outpatient FU. At this time she did not offer any complaints. Patient denied any SI or HI. Denied any AH or VH. She denied any delusions. Was not considered a danger to self or to others and is safe for discharge. Will FU with her appointments and remains motivated to be in treatment. The patient verbalized understanding of her discharge instructions. Some parts of the discharge summary are from the initial Psychiatric interview that was done on admission by the admitting psychiatrist.       Allergies:(reviewed/updated 8/23/2019)  Allergies   Allergen Reactions    Epinephrine Palpitations     \"Makes my heart  ' shudder'. \" Allergic to epinephrine with novocain. Plain epinephrine is ok.  Novocain [Procaine] Palpitations    Nsaids (Non-Steroidal Anti-Inflammatory Drug) Other (comments)     GASTRIC DISTRESS.  Ibuprofen Other (comments)     Gastric irritation       Side Effects: (reviewed/updated 8/23/2019)  None reported or admitted to.     Vital Signs:  Patient Vitals for the past 24 hrs:   Temp Pulse Resp BP SpO2   08/23/19 0813 98.1 °F (36.7 °C) 83 16 112/77 98 %   08/22/19 1542 98.1 °F (36.7 °C) 83 16 96/66 98 %     Wt Readings from Last 3 Encounters:   08/17/19 77.8 kg (171 lb 9.6 oz)   08/14/19 77.8 kg (171 lb 8 oz)   08/04/19 86.5 kg (190 lb 12.8 oz)     Temp Readings from Last 3 Encounters:   08/23/19 98.1 °F (36.7 °C)   08/14/19 97.9 °F (36.6 °C)   08/06/19 97.4 °F (36.3 °C)     BP Readings from Last 3 Encounters:   08/23/19 112/77   08/14/19 98/62   08/06/19 136/86     Pulse Readings from Last 3 Encounters:   08/23/19 83   08/14/19 81   08/06/19 98       Labs: (reviewed/updated 8/23/2019)  No results found for this or any previous visit (from the past 24 hour(s)). No results found for: VALF2, VALAC, VALP, VALPR, DS6, CRBAM, CRBAMP, CARB2, XCRBAM  No results found for: [facility]    Radiology (reviewed/updated 8/23/2019)  Ct Head Wo Cont    Result Date: 8/8/2019  EXAM:  CT HEAD WO CONT INDICATION: Confusion. COMPARISON: CT 7/12/2016 TECHNIQUE: Axial noncontrast head CT from foramen magnum to vertex. Coronal and sagittal reformatted images were obtained. CT dose reduction was achieved through use of a standardized protocol tailored for this examination and automatic exposure control for dose modulation. Adaptive statistical iterative reconstruction (ASIR) was utilized. FINDINGS:  There is diffuse age-related parenchymal volume loss. The ventricles and sulci are age-appropriate without hydrocephalus. There is no mass effect or midline shift. There is no intracranial hemorrhage or extra-axial fluid collection. There is no significant white matter disease. The gray-white matter differentiation is maintained. The basal cisterns are patent. The osseous structures are intact. The visualized paranasal sinuses and mastoid air cells are clear. IMPRESSION: No acute intracranial abnormality. Xr Chest Port    Result Date: 8/9/2019  PORTABLE CHEST RADIOGRAPH/S: 8/9/2019 2:20 PM INDICATION: Heart failure. COMPARISON: None. TECHNIQUE: Portable frontal semiupright radiograph/s of the chest. FINDINGS: There are small bilateral pleural effusions and passive atelectasis. The central airways are patent. No pneumothorax. There is pulmonary vascular congestion without peripheral septal Lilly Janae) lines to further suggest interstitial edema. IMPRESSION: Small bilateral pleural effusions and pulmonary vascular congestion. Mental Status Exam on Discharge:  General appearance:   Sonya Peraza is a 76 y.o. WHITE OR  female who is well groomed, psychomotor activity is WNL  Eye contact: makes good eye contact  Speech: Spontaneous and coherent  Affect : Euthymic  Mood: \"OK\"  Thought Process: Logical, goal directed  Perception: Denies any AH or VH. Thought Content: Denies any SI or Plan  Insight: Partial  Judgement: Fair  Cognition: Intact grossly. Discharge Diagnosis:   Delusional disorder. Current Discharge Medication List      START taking these medications    Details   furosemide (LASIX) 20 mg tablet Take 20mg in the morning  Indications: high blood pressure  Qty: 30 Tab, Refills: 0      metoprolol succinate (TOPROL-XL) 25 mg XL tablet Take 0.5 Tabs by mouth daily. Indications: high blood pressure  Qty: 15 Tab, Refills: 0      mirtazapine (REMERON) 15 mg tablet Take 1 Tab by mouth nightly. Indications: major depressive disorder  Qty: 30 Tab, Refills: 0      polyethylene glycol (MIRALAX) 17 gram packet Take 1 Packet by mouth daily. Indications: constipation  Qty: 30 Packet, Refills: 0      paliperidone palmitate (INVEGA SUSTENNA) 117 mg/0.75 mL syrg injection 0.75 mL by IntraMUSCular route every thirty (30) days. Indications: delusions  Qty: 0.75 mL, Refills: 0         CONTINUE these medications which have CHANGED    Details   famotidine (PEPCID) 20 mg tablet Take 1 Tab by mouth daily. Indications: heartburn  Qty: 30 Tab, Refills: 0      rosuvastatin (CRESTOR) 10 mg tablet Take 1 Tab by mouth nightly. Indications: high cholesterol  Qty: 30 Tab, Refills: 0      aspirin delayed-release 81 mg tablet Take 1 Tab by mouth every evening.  Indications: prevention of transient ischemic attack  Qty: 30 Tab, Refills: 0 venlafaxine-SR (EFFEXOR-XR) 150 mg capsule Take 2 Caps by mouth daily (with breakfast). Indications: Anxiousness associated with Depression  Qty: 60 Cap, Refills: 0         STOP taking these medications       hydrOXYzine HCl (ATARAX) 25 mg tablet Comments:   Reason for Stopping:         traZODone (DESYREL) 150 mg tablet Comments:   Reason for Stopping:         risperiDONE (RISPERDAL) 1 mg tablet Comments:   Reason for Stopping:         bisacodyl (DULCOLAX, BISACODYL,) 5 mg EC tablet Comments:   Reason for Stopping:         melatonin tab tablet Comments:   Reason for Stopping:         vitamin D3/vitamin K2, MK4, (K2 PLUS D3 PO) Comments:   Reason for Stopping:         diphenhydrAMINE-acetaminophen (TYLENOL PM EXTRA STRENGTH)  mg tab Comments:   Reason for Stopping:         almotriptan (AXERT) 12.5 mg tablet Comments:   Reason for Stopping:         loperamide (IMODIUM) 2 mg capsule Comments:   Reason for Stopping:         diphenhydrAMINE (BENADRYL) 25 mg capsule Comments:   Reason for Stopping:         multivitamin, tx-iron-ca-min (THERA-M W/ IRON) 9 mg iron-400 mcg tab tablet Comments:   Reason for Stopping:         celecoxib (CELEBREX) 100 mg capsule Comments:   Reason for Stopping: Follow-up Information     Follow up With Specialties Details Why Contact Info    Sathya  (paliperidone long-acting injectable)  On 9/16/2019 117mg IM monthly     Briana Chamorro MD [address] [facility]  [phone]          WOUND CARE: none needed. Prognosis:   Good / Lamar Aramis based on nature of patient's pathology/ies and treatment compliance issues. Prognosis is greatly dependent upon patient's ability to  follow up on psychiatric/psychotherapy appointments as well as to comply with psychiatric medications as prescribed.        I certify that this patients inpatient psychiatric hospital services furnished since the previous certification were, and continue to be, required for treatment that could reasonably be expected to improve the patient's condition, or for diagnostic study, and that the patient continues to need, on a daily basis, active treatment furnished directly by or requiring the supervision of inpatient psychiatric facility personnel. In addition, the hospital records show that services furnished were intensive treatment services, admission or related services, or equivalent services.      Signed:  Amish Rojas NP  8/23/2019

## 2019-08-23 NOTE — PROGRESS NOTES
Problem: Altered Thought Process (Adult/Pediatric)  Goal: *STG: Complies with medication therapy  Note:   Medication meal compliant. Smiling engaged. Denies delusions related to bugs. Denies complaints or side effects. Plan discharge home today. Problem: Altered Thought Process (Adult/Pediatric)  Goal: *STG: Attends activities and groups  Outcome: Progressing Towards Goal  Note:   Participating  in activities. Problem: Altered Thought Process (Adult/Pediatric)  Goal: Interventions  Outcome: Progressing Towards Goal     Pt received discharge instructions, follow up information, prescriptions filled, contact information provided. opportunity for questions and clarification provided. Pt verbalizes understanding of information provided.

## 2019-08-23 NOTE — PROGRESS NOTES
Pharmacist Discharge Medication Reconciliation    Discharging Provider: Fatmata De Luna NP    Significant PMH:   Past Medical History:   Diagnosis Date    Arthritis     GENERALIZED IN JOINTS.  Breast cancer ([address]     Left Breast Cancer 1995    Cancer [facility])     left breast - surgery/radiation    Chronic pain     Back pain    Depression     Mood Swings, anxiety    GERD (gastroesophageal reflux disease)     Hiatal Hernia    Headache     Hypercholesterolemia     Hypertension     CONTROLLED BY MEDS., Pt states she is no liong er on BP med's    Ill-defined condition     Neuropathy bilat feet    Irregular heart beat     Other ill-defined conditions(799.89)     MIGRAINES    Other ill-defined conditions(799.89)     neuropathy of lower legs and feet    Other ill-defined conditions(799.89)     increased cholesterol    Other ill-defined conditions(799.89)     bronchitis    Psychotic disorder (HCC)     S/P radiation therapy     1995 Left Breast    Unspecified adverse effect of anesthesia     HEART PALPITATION. Chief Complaint for this Admission: No chief complaint on file. Allergies: Epinephrine; Novocain [procaine]; Nsaids (non-steroidal anti-inflammatory drug); and Ibuprofen    Discharge Medications:   Current Discharge Medication List        START taking these medications    Details   furosemide (LASIX) 20 mg tablet Take 20mg in the morning  Indications: high blood pressure  Qty: 30 Tab, Refills: 0      metoprolol succinate (TOPROL-XL) 25 mg XL tablet Take 0.5 Tabs by mouth daily. Indications: high blood pressure  Qty: 15 Tab, Refills: 0      mirtazapine (REMERON) 15 mg tablet Take 1 Tab by mouth nightly. Indications: major depressive disorder  Qty: 30 Tab, Refills: 0      polyethylene glycol (MIRALAX) 17 gram packet Take 1 Packet by mouth daily.  Indications: constipation  Qty: 30 Packet, Refills: 0      paliperidone palmitate (INVEGA SUSTENNA) 117 mg/0.75 mL syrg injection 0.75 mL by IntraMUSCular route every thirty (30) days. Indications: delusions  Qty: 0.75 mL, Refills: 0           CONTINUE these medications which have CHANGED    Details   famotidine (PEPCID) 20 mg tablet Take 1 Tab by mouth daily. Indications: heartburn  Qty: 30 Tab, Refills: 0      rosuvastatin (CRESTOR) 10 mg tablet Take 1 Tab by mouth nightly. Indications: high cholesterol  Qty: 30 Tab, Refills: 0      aspirin delayed-release 81 mg tablet Take 1 Tab by mouth every evening. Indications: prevention of transient ischemic attack  Qty: 30 Tab, Refills: 0      venlafaxine-SR (EFFEXOR-XR) 150 mg capsule Take 2 Caps by mouth daily (with breakfast). Indications: Anxiousness associated with Depression  Qty: 60 Cap, Refills: 0           STOP taking these medications       hydrOXYzine HCl (ATARAX) 25 mg tablet Comments:   Reason for Stopping:         traZODone (DESYREL) 150 mg tablet Comments:   Reason for Stopping:         risperiDONE (RISPERDAL) 1 mg tablet Comments:   Reason for Stopping:         bisacodyl (DULCOLAX, BISACODYL,) 5 mg EC tablet Comments:   Reason for Stopping:         melatonin tab tablet Comments:   Reason for Stopping:         vitamin D3/vitamin K2, MK4, (K2 PLUS D3 PO) Comments:   Reason for Stopping:         diphenhydrAMINE-acetaminophen (TYLENOL PM EXTRA STRENGTH)  mg tab Comments:   Reason for Stopping:         almotriptan (AXERT) 12.5 mg tablet Comments:   Reason for Stopping:         loperamide (IMODIUM) 2 mg capsule Comments:   Reason for Stopping:         diphenhydrAMINE (BENADRYL) 25 mg capsule Comments:   Reason for Stopping:         multivitamin, tx-iron-ca-min (THERA-M W/ IRON) 9 mg iron-400 mcg tab tablet Comments:   Reason for Stopping:         celecoxib (CELEBREX) 100 mg capsule Comments:   Reason for Stopping:               Next Invega Sustenna 117mg IM injection due 8/16/19.     The patient's chart, MAR and AVS were reviewed by Jorge Montez PHARMD.

## 2019-08-23 NOTE — BH NOTES
Behavioral Health Transition Record to Provider    Patient Name: Rick Enriquez  YOB: 1950  Medical Record Number: [id number]  Date of Admission: 8/14/2019  Date of Discharge: 8/23/2019     Attending Provider: Xiao Shearer MD  Discharging Provider: Magaly Aguila   To contact this individual call [phone] and ask the  to page. If unavailable, ask to be transferred to [address] [place] [address] Provider on call. [facility] Provider will be available on call 24/7 and during holidays. Primary Care Provider: Linda Waters MD    Allergies   Allergen Reactions    Epinephrine Palpitations     \"Makes my heart  ' shudder'. \" Allergic to epinephrine with novocain. Plain epinephrine is ok.  Novocain [Procaine] Palpitations    Nsaids (Non-Steroidal Anti-Inflammatory Drug) Other (comments)     GASTRIC DISTRESS.  Ibuprofen Other (comments)     Gastric irritation       Reason for Admission: CHIEF COMPLAINT:  \"The bugs are biting me. \"     HISTORY OF PRESENTING ILLNESS:  The patient is a 44-year-old female who is admitted at [address] [place] [address] on a voluntary basis. She is well known to this provider as a result of recent inpatient psychiatric admission at [facility]. She was last under my care beginning of this month after almost a 1 week of inpatient stay. She was diagnosed with delusional disorder. She also has been diagnosed with severe depression, anxiety. After 2 days, she presented herself back to the emergency room feeling very confused, not able to care for herself. She actually did not fill any of her prescriptions. She was then admitted to the medical unit for further evaluation and treatment. While she was in the medical unit, her echocardiogram showed 20% to 25% EF and grade 3 diastolic dysfunction. She has been diagnosed with systolic and diastolic heart failure. Her medications have been adjusted.   She was then transferred back here at [facility] after she was medically cleared in the medical unit. Admission Diagnosis: Delusional disorder ([facility] [address] [F22]    * No surgery found *    Results for orders placed or performed during the hospital encounter of 08/14/19   MAGNESIUM   Result Value Ref Range    Magnesium 2.0 1.6 - 2.4 mg/dL   METABOLIC PANEL, BASIC   Result Value Ref Range    Sodium 143 136 - 145 mmol/L    Potassium 4.8 3.5 - 5.1 mmol/L    Chloride 106 97 - 108 mmol/L    CO2 31 21 - 32 mmol/L    Anion gap 6 5 - 15 mmol/L    Glucose 109 (H) 65 - 100 mg/dL    BUN 15 6 - 20 MG/DL    Creatinine 0.82 0.55 - 1.02 MG/DL    BUN/Creatinine ratio 18 12 - 20      GFR est AA >60 >60 ml/min/1.73m2    GFR est non-AA >60 >60 ml/min/1.73m2    Calcium 9.8 8.5 - 79.1 MG/DL   METABOLIC PANEL, BASIC   Result Value Ref Range    Sodium 144 136 - 145 mmol/L    Potassium 4.3 3.5 - 5.1 mmol/L    Chloride 107 97 - 108 mmol/L    CO2 29 21 - 32 mmol/L    Anion gap 8 5 - 15 mmol/L    Glucose 107 (H) 65 - 100 mg/dL    BUN 16 6 - 20 MG/DL    Creatinine 0.81 0.55 - 1.02 MG/DL    BUN/Creatinine ratio 20 12 - 20      GFR est AA >60 >60 ml/min/1.73m2    GFR est non-AA >60 >60 ml/min/1.73m2    Calcium 9.2 8.5 - 10.1 MG/DL       Immunizations administered during this encounter:   Immunization History   Administered Date(s) Administered    (RETIRED) Pneumococcal Vaccine (Unspecified Type) 09/01/2011    Influenza Vaccine Whole 09/01/2011       Screening for Metabolic Disorders for Patients on Antipsychotic Medications  (Data obtained from the EMR)    Estimated Body Mass Index  Estimated body mass index is 31.39 kg/m² as calculated from the following:    Height as of 8/13/19: 5' 2\" (1.575 m). Weight as of this encounter: 77.8 kg (171 lb 9.6 oz).      Vital Signs/Blood Pressure  Visit Vitals  /77   Pulse 83   Temp 98.1 °F (36.7 °C)   Resp 16   Wt 77.8 kg (171 lb 9.6 oz)   SpO2 98%   BMI 31.39 kg/m²       Blood Glucose/Hemoglobin A1c  Lab Results   Component Value Date/Time    Glucose 107 (H) 08/16/2019 05:39 AM    Glucose 121 (H) 08/02/2019 05:06 AM    Glucose (POC) 124 (H) 11/26/2018 08:29 AM       Lab Results   Component Value Date/Time    Hemoglobin A1c 5.8 02/16/2012 02:15 PM        Lipid Panel  Lab Results   Component Value Date/Time    Cholesterol, total 104 08/02/2019 05:06 AM    HDL Cholesterol 37 08/02/2019 05:06 AM    LDL, calculated 54.6 08/02/2019 05:06 AM    Triglyceride 62 08/02/2019 05:06 AM    CHOL/HDL Ratio 2.8 08/02/2019 05:06 AM        Discharge Diagnosis: Delusional disorder. Discharge Plan: she will return to [facility] with psych follow up and home health referral.      The patient Key Turner exhibits the ability to control behavior in a less restrictive environment. Patient's level of functioning is improving. No assaultive/destructive behavior has been observed for the past 24 hours. No suicidal/homicidal threat or behavior has been observed for the past 24 hours. There is no evidence of serious medication side effects. Patient has not been in physical or protective restraints for at least the past 24 hours. If weapons involved, how are they secured? No weapons involved     Is patient aware of and in agreement with discharge plan? Patient is aware of discharge and is requesting discharge. Arrangements for medication:  Prescriptions filled at [address] [place] [address] . Copy of discharge instructions to provider?:  Yes, fax to all outpatient providers  Alexis Mamq - [phone] ( fax )      Arrangements for transportation home: [place]     Keep all follow up appointments as scheduled, continue to take prescribed medications per physician instructions.   Mental health crisis number:  [phone] or your local mental health crisis line number at [phone]       Discharge Medication List and Instructions:   Current Discharge Medication List      START taking these medications    Details   furosemide (LASIX) 20 mg tablet Take 20mg in the morning  Indications: high blood pressure  Qty: 30 Tab, Refills: 0      metoprolol succinate (TOPROL-XL) 25 mg XL tablet Take 0.5 Tabs by mouth daily. Indications: high blood pressure  Qty: 15 Tab, Refills: 0      mirtazapine (REMERON) 15 mg tablet Take 1 Tab by mouth nightly. Indications: major depressive disorder  Qty: 30 Tab, Refills: 0      polyethylene glycol (MIRALAX) 17 gram packet Take 1 Packet by mouth daily. Indications: constipation  Qty: 30 Packet, Refills: 0      paliperidone palmitate (INVEGA SUSTENNA) 117 mg/0.75 mL syrg injection 0.75 mL by IntraMUSCular route every thirty (30) days. Indications: delusions  Qty: 0.75 mL, Refills: 0         CONTINUE these medications which have CHANGED    Details   famotidine (PEPCID) 20 mg tablet Take 1 Tab by mouth daily. Indications: heartburn  Qty: 30 Tab, Refills: 0      rosuvastatin (CRESTOR) 10 mg tablet Take 1 Tab by mouth nightly. Indications: high cholesterol  Qty: 30 Tab, Refills: 0      aspirin delayed-release 81 mg tablet Take 1 Tab by mouth every evening. Indications: prevention of transient ischemic attack  Qty: 30 Tab, Refills: 0      venlafaxine-SR (EFFEXOR-XR) 150 mg capsule Take 2 Caps by mouth daily (with breakfast).  Indications: Anxiousness associated with Depression  Qty: 60 Cap, Refills: 0         STOP taking these medications       hydrOXYzine HCl (ATARAX) 25 mg tablet Comments:   Reason for Stopping:         traZODone (DESYREL) 150 mg tablet Comments:   Reason for Stopping:         risperiDONE (RISPERDAL) 1 mg tablet Comments:   Reason for Stopping:         bisacodyl (DULCOLAX, BISACODYL,) 5 mg EC tablet Comments:   Reason for Stopping:         melatonin tab tablet Comments:   Reason for Stopping:         vitamin D3/vitamin K2, MK4, (K2 PLUS D3 PO) Comments:   Reason for Stopping:         diphenhydrAMINE-acetaminophen (TYLENOL PM EXTRA STRENGTH)  mg tab Comments:   Reason for Stopping:         almotriptan (AXERT) 12.5 mg tablet Comments:   Reason for Stopping:         loperamide (IMODIUM) 2 mg capsule Comments:   Reason for Stopping:         diphenhydrAMINE (BENADRYL) 25 mg capsule Comments:   Reason for Stopping:         multivitamin, tx-iron-ca-min (THERA-M W/ IRON) 9 mg iron-400 mcg tab tablet Comments:   Reason for Stopping:         celecoxib (CELEBREX) 100 mg capsule Comments:   Reason for Stopping:               Unresulted Labs (24h ago, onward)    None        To obtain results of studies pending at discharge, please contact [phone]    Follow-up Information     Follow up With Specialties Details Why Contact [place]    Gamal Gen (paliperidone long-acting injectable)  On 9/16/2019 117mg IM monthly     Koki Chamorro MD Family Practice Schedule an appointment as soon as possible for a visit  [address] On 8/24/2019 they will call and come out to see you at [address] [place] [address], [address] [facility] [address]    Erin Dodd MD Psychiatry On 9/16/2019 you have a 3:00 appointment with Dr. Crouch [address]   Call on 8/23/2019 Please call to have your pet brought home  [facility]  Hours:   Open · Closes 7pm  Phone: [phone]    SW called and updated Silvia Officer / resident  regarding discharge from [facility]   On 8/23/2019            Advanced Directive:   Does the patient have an appointed surrogate decision maker? No  Does the patient have a Medical Advance Directive? No  Does the patient have a Psychiatric Advance Directive?  No  If the patient does not have a surrogate or Medical Advance Directive AND Psychiatric Advance Directive, the patient was offered information on these advance directives Patient declined to complete    Patient Instructions: Please continue all medications until otherwise directed by physician. Tobacco Cessation Discharge Plan:   Is the patient a smoker and needs referral for smoking cessation? No  Patient referred to the following for smoking cessation with an appointment? No     Patient was offered medication to assist with smoking cessation at discharge? No  Was education for smoking cessation added to the discharge instructions? Yes    Alcohol/Substance Abuse Discharge Plan:   Does the patient have a history of substance/alcohol abuse and requires a referral for treatment? No  Patient referred to the following for substance/alcohol abuse treatment with an appointment? No  Patient was offered medication to assist with alcohol cessation at discharge? No  Was education for substance/alcohol abuse added to discharge instructions? No    Patient discharged to Home; discussed with patient/caregiver and provided to the patient/caregiver either in hard copy or electronically.

## 2019-08-23 NOTE — INTERDISCIPLINARY ROUNDS
Behavioral Health Interdisciplinary Rounds     Patient Name: Evie Nielsen  Age: 76 y.o.   Room/Bed:  725/  Primary Diagnosis: <principal problem not specified>   Admission Status: Voluntary     Readmission within 30 days: yes  Power of  in place: no  Patient requires a blocked bed: no          Reason for blocked bed:   Sleep hours: 6+      Participation in Care/Groups:  yes  Medication Compliant?: Yes  PRNS (last 24 hours): Pain    Restraints (last 24 hours):  no     Alcohol screening (AUDIT) completed -  AUDIT Score: 0  If applicable, date SBIRT discussed in treatment team AND documented:    Tobacco - patient is a smoker: Have You Used Tobacco in the Past 30 Days: No  Illegal Drugs use: Have You Used Any Illegal Substances Over the Past 12 Months: No    24 hour chart check complete: yes     Patient goal(s) for today:   Treatment team focus/goals: Plan for discharge home with home health   Progress note : she will be discharged today     LOS:  9  Expected LOS: today    Participating treatment team members: Brittonriky Nielsen,  [address] , PharmD. - Benjamin Larsen MSW - Jose Marte

## 2019-08-25 ENCOUNTER — APPOINTMENT (OUTPATIENT)
Dept: GENERAL RADIOLOGY | Age: 69
End: 2019-08-25
Attending: EMERGENCY MEDICINE
Payer: MEDICARE

## 2019-08-25 ENCOUNTER — HOSPITAL ENCOUNTER (EMERGENCY)
Age: 69
Discharge: HOME | End: 2019-08-25
Attending: EMERGENCY MEDICINE
Payer: MEDICARE

## 2019-08-25 VITALS
HEART RATE: 98 BPM | TEMPERATURE: 98.1 F | SYSTOLIC BLOOD PRESSURE: 144 MMHG | DIASTOLIC BLOOD PRESSURE: 85 MMHG | OXYGEN SATURATION: 97 % | RESPIRATION RATE: 22 BRPM

## 2019-08-25 DIAGNOSIS — R06.02 SOB (SHORTNESS OF BREATH): Primary | ICD-10-CM

## 2019-08-25 DIAGNOSIS — R60.9 EDEMA, UNSPECIFIED TYPE: ICD-10-CM

## 2019-08-25 LAB
ANION GAP SERPL CALC-SCNC: 10 MMOL/L (ref 5–15)
BASOPHILS # BLD: 0.1 K/UL (ref 0–0.1)
BASOPHILS NFR BLD: 1 % (ref 0–1)
BUN SERPL-MCNC: 14 MG/DL (ref 6–20)
BUN/CREAT SERPL: 18 (ref 12–20)
CALCIUM SERPL-MCNC: 9.1 MG/DL (ref 8.5–10.1)
CHLORIDE SERPL-SCNC: 109 MMOL/L (ref 97–108)
CO2 SERPL-SCNC: 25 MMOL/L (ref 21–32)
CREAT SERPL-MCNC: 0.8 MG/DL (ref 0.55–1.02)
DIFFERENTIAL METHOD BLD: ABNORMAL
EOSINOPHIL # BLD: 0.1 K/UL (ref 0–0.4)
EOSINOPHIL NFR BLD: 1 % (ref 0–7)
ERYTHROCYTE [DISTWIDTH] IN BLOOD BY AUTOMATED COUNT: 17.1 % (ref 11.5–14.5)
GLUCOSE SERPL-MCNC: 147 MG/DL (ref 65–100)
HCT VFR BLD AUTO: 43.8 % (ref 35–47)
HGB BLD-MCNC: 13.2 G/DL (ref 11.5–16)
IMM GRANULOCYTES # BLD AUTO: 0 K/UL (ref 0–0.04)
IMM GRANULOCYTES NFR BLD AUTO: 0 % (ref 0–0.5)
LYMPHOCYTES # BLD: 2.2 K/UL (ref 0.8–3.5)
LYMPHOCYTES NFR BLD: 25 % (ref 12–49)
MCH RBC QN AUTO: 26.1 PG (ref 26–34)
MCHC RBC AUTO-ENTMCNC: 30.1 G/DL (ref 30–36.5)
MCV RBC AUTO: 86.6 FL (ref 80–99)
MONOCYTES # BLD: 1.1 K/UL (ref 0–1)
MONOCYTES NFR BLD: 13 % (ref 5–13)
NEUTS SEG # BLD: 5.1 K/UL (ref 1.8–8)
NEUTS SEG NFR BLD: 60 % (ref 32–75)
NRBC # BLD: 0 K/UL (ref 0–0.01)
NRBC BLD-RTO: 0 PER 100 WBC
PLATELET # BLD AUTO: 189 K/UL (ref 150–400)
PMV BLD AUTO: 11.7 FL (ref 8.9–12.9)
POTASSIUM SERPL-SCNC: 3.6 MMOL/L (ref 3.5–5.1)
RBC # BLD AUTO: 5.06 M/UL (ref 3.8–5.2)
SODIUM SERPL-SCNC: 144 MMOL/L (ref 136–145)
TROPONIN I SERPL-MCNC: <0.05 NG/ML
WBC # BLD AUTO: 8.6 K/UL (ref 3.6–11)

## 2019-08-25 PROCEDURE — 74011250636 HC RX REV CODE- 250/636: Performed by: EMERGENCY MEDICINE

## 2019-08-25 PROCEDURE — 99285 EMERGENCY DEPT VISIT HI MDM: CPT

## 2019-08-25 PROCEDURE — 36415 COLL VENOUS BLD VENIPUNCTURE: CPT

## 2019-08-25 PROCEDURE — 71046 X-RAY EXAM CHEST 2 VIEWS: CPT

## 2019-08-25 PROCEDURE — 96375 TX/PRO/DX INJ NEW DRUG ADDON: CPT

## 2019-08-25 PROCEDURE — 84484 ASSAY OF TROPONIN QUANT: CPT

## 2019-08-25 PROCEDURE — 80048 BASIC METABOLIC PNL TOTAL CA: CPT

## 2019-08-25 PROCEDURE — 93005 ELECTROCARDIOGRAM TRACING: CPT

## 2019-08-25 PROCEDURE — 96374 THER/PROPH/DIAG INJ IV PUSH: CPT

## 2019-08-25 PROCEDURE — 85025 COMPLETE CBC W/AUTO DIFF WBC: CPT

## 2019-08-25 RX ORDER — FUROSEMIDE 10 MG/ML
40 INJECTION INTRAMUSCULAR; INTRAVENOUS
Status: COMPLETED | OUTPATIENT
Start: 2019-08-25 | End: 2019-08-25

## 2019-08-25 RX ORDER — LORAZEPAM 2 MG/ML
1 INJECTION INTRAMUSCULAR
Status: COMPLETED | OUTPATIENT
Start: 2019-08-25 | End: 2019-08-25

## 2019-08-25 RX ADMIN — FUROSEMIDE 40 MG: 10 INJECTION, SOLUTION INTRAMUSCULAR; INTRAVENOUS at 20:22

## 2019-08-25 RX ADMIN — LORAZEPAM 1 MG: 2 INJECTION INTRAMUSCULAR; INTRAVENOUS at 19:41

## 2019-08-25 NOTE — ED PROVIDER NOTES
76 y.o. female with past medical history significant for arthritis, heart palpitation, irregular heart beat, migraines, neuropathy of lower legs and feet, increased cholesterol, bronchitis, headache, psychotic disorder, hypercholesterolemia, HTN, cancer, breast cancer, chronic back pain, hiatal hernia, and depression who presents from [facility] via EMS with chief complaint of anxious. Patient arrives to ED with cc of anxiety accompanied by SOB. EMS states the patient is on 98% on room air and has a BP of 130/80s. Patient was recently discharged from the hospital yesterday for psychiatric treatment. Patient has not taken any of her medication since her discharged yesterday. Patient affirms anxiety and SOB. Patient denies any suicidal ideations. There are no other acute medical concerns at this time. Social hx: Never smoker  PCP: Brenna Mitchell MD    Note written by Fransisco Ortiz, as dictated by Mackenzie Dickinson MD 6:44 PM      The history is provided by the patient and the EMS personnel. No  was used. Past Medical History:   Diagnosis Date    Arthritis     GENERALIZED IN JOINTS.     Breast cancer ([address]     Left Breast Cancer 1995    Cancer [facility])     left breast - surgery/radiation    Chronic pain     Back pain    Depression     Mood Swings, anxiety    GERD (gastroesophageal reflux disease)     Hiatal Hernia    Headache     Hypercholesterolemia     Hypertension     CONTROLLED BY MEDS., Pt states she is no liong er on BP med's    Ill-defined condition     Neuropathy bilat feet    Irregular heart beat     Other ill-defined conditions(799.89)     MIGRAINES    Other ill-defined conditions(799.89)     neuropathy of lower legs and feet    Other ill-defined conditions(799.89)     increased cholesterol    Other ill-defined conditions(799.89)     bronchitis    Psychotic disorder (HCC)     S/P radiation therapy     1995 Left Breast    Unspecified adverse effect of anesthesia     HEART PALPITATION. Past Surgical History:   Procedure Laterality Date    BREAST SURGERY PROCEDURE UNLISTED  7/1995    DCIS /BIOPSIES MULTIPLE.     BREAST SURGERY PROCEDURE UNLISTED  1995    LUMPECTOMY WITH RADIATION INSITU    HX BREAST LUMPECTOMY Left     1995 - POSITIVE    HX ORTHOPAEDIC  2006    LEFT KNEE ARTHROSCOPY    HX ORTHOPAEDIC  4/2010    RIGHT KNEE ARTHROSCOPY, concrete nail placed    HX ORTHOPAEDIC      Hammertoe , doesnt know which foot    HX ORTHOPAEDIC      Small growth removed from between toes but doesnt know which foot         Family History:   Problem Relation Age of Onset    Cancer Mother         BLADDER CA    Heart Disease Father     Lung Disease Father        Social History     Socioeconomic History    Marital status:      Spouse name: Not on file    Number of children: Not on file    Years of education: Not on file    Highest education level: Not on file   Occupational History    Not on file   Social Needs    Financial resource strain: Not on file    Food insecurity:     Worry: Not on file     Inability: Not on file    Transportation needs:     Medical: Not on file     Non-medical: Not on file   Tobacco Use    Smoking status: Never Smoker    Smokeless tobacco: Never Used   Substance and Sexual Activity    Alcohol use: No    Drug use: No    Sexual activity: Never   Lifestyle    Physical activity:     Days per week: Not on file     Minutes per session: Not on file    Stress: Not on file   Relationships    Social connections:     Talks on phone: Not on file     Gets together: Not on file     Attends Anabaptist service: Not on file     Active member of club or organization: Not on file     Attends meetings of clubs or organizations: Not on file     Relationship status: Not on file    Intimate partner violence:     Fear of current or ex partner: Not on file     Emotionally abused: Not on file     Physically abused: Not on file     Forced sexual activity: Not on file   Other Topics Concern     Service Not Asked    Blood Transfusions Not Asked    Caffeine Concern Not Asked    Occupational Exposure Not Asked    Hobby Hazards Not Asked    Sleep Concern Not Asked    Stress Concern Not Asked    Weight Concern Not Asked    Special Diet Not Asked    Back Care Not Asked    Exercise Not Asked    Bike Helmet Not Asked   [address] Not Asked    Self-Exams Not Asked   Social History Narrative    Not on file         ALLERGIES: Epinephrine; Novocain [procaine]; Nsaids (non-steroidal anti-inflammatory drug); and Ibuprofen    Review of Systems   Constitutional: Negative for fever. HENT: Negative for facial swelling and nosebleeds. Eyes: Negative for pain. Respiratory: Positive for shortness of breath. Negative for cough and chest tightness. Cardiovascular: Negative for chest pain and leg swelling. Gastrointestinal: Negative for abdominal pain, diarrhea and vomiting. Endocrine: Negative for polyuria. Genitourinary: Negative for difficulty urinating and flank pain. Musculoskeletal: Negative for arthralgias and back pain. Skin: Negative for color change. Allergic/Immunologic: Negative for immunocompromised state. Neurological: Negative for dizziness and headaches. Hematological: Does not bruise/bleed easily. Psychiatric/Behavioral: Negative for agitation and suicidal ideas. The patient is nervous/anxious. All other systems reviewed and are negative. There were no vitals filed for this visit. Physical Exam   Constitutional: She is oriented to person, place, and time. She appears well-developed and well-nourished. No distress. HENT:   Head: Normocephalic and atraumatic. Right Ear: External ear normal.   Left Ear: External ear normal.   Eyes: Conjunctivae and EOM are normal.   Neck: Normal range of motion. Neck supple. No tracheal deviation present. No thyromegaly present.    Cardiovascular: Normal rate, regular rhythm, normal heart sounds and intact distal pulses. Exam reveals no gallop and no friction rub. No murmur heard. Pulmonary/Chest: Effort normal and breath sounds normal. No respiratory distress. She has no wheezes. She has no rales. She exhibits no tenderness. 100% on room air   Abdominal: Soft. Bowel sounds are normal. She exhibits no distension. There is no tenderness. Musculoskeletal: Normal range of motion. She exhibits no edema, tenderness or deformity. Neurological: She is alert and oriented to person, place, and time. She displays normal reflexes. No cranial nerve deficit. She exhibits normal muscle tone. Coordination normal.   Skin: Skin is warm and dry. No rash noted. She is not diaphoretic. No erythema. Psychiatric: Her behavior is normal. Judgment and thought content normal. Her mood appears anxious. Note written by Fransisco Donohue, as dictated by Stiven Reddy MD 6:44 PM        MDM  Number of Diagnoses or Management Options  Diagnosis management comments: 80-year-old white female presents to the emergency department with shortness of breath. She appears anxious. Lungs are clear. O2 sats 100% on room air. Will check EKG, basic blood work. Will check chest x-ray. If these are normal, will treat with Ativan and anxiety medications and PCP follow-up. Amount and/or Complexity of Data Reviewed  Clinical lab tests: ordered and reviewed  Tests in the radiology section of CPT®: ordered and reviewed  Tests in the medicine section of CPT®: ordered and reviewed  Decide to obtain previous medical records or to obtain history from someone other than the patient: yes  Review and summarize past medical records: yes  Independent visualization of images, tracings, or specimens: yes           Procedures  ED EKG interpretation: 19:07  Rhythm: sinus tachycardia; and regular . Rate (approx.): 106; Axis: normal; ST/T wave: No ST elevation or depression;   Note written by Lisa Montoya. Black, Scribe, as dictated by Afsaneh Castellon MD 7:20 PM     8:07 PM  Patient has mild interstitial edema on her chest x-ray, but does not look much different than previous. She is 99% on room air. I do not think she needs to be admitted to the hospital.  Will give 1 dose of Lasix IV here. Waiting on remainder of labs. PROGRESS NOTE:  8:19 PM  Patient has negative troponin. 99% on room air.  After treatment with Lasix, patient will be discharged with PCP follow up

## 2019-08-25 NOTE — ED TRIAGE NOTES
Pt arrives via ems from home ([facility]). Pt arrives c/o shortness of breath and anxiety. Pt was discharged yesterday and was given evening meds. Pt has not taken any medications today. Pt states \"can he just give me something for anxiety\"   Ems reports 98% RA /80 all other VSS.

## 2019-08-26 NOTE — ED NOTES
Verbal shift change report given to [address] (oncoming nurse) by Carin Dillon RN  (offgoing nurse). Report included the following information SBAR, ED Summary, MAR and Recent Results.

## 2019-08-26 NOTE — DISCHARGE INSTRUCTIONS
Patient Education        Leg and Ankle Edema: Care Instructions  Your Care Instructions  Swelling in the legs, ankles, and feet is called edema. It is common after you sit or stand for a while. Long plane flights or car rides often cause swelling in the legs and feet. You may also have swelling if you have to stand for long periods of time at your job. Problems with the veins in the legs (varicose veins) and changes in hormones can also cause swelling. Sometimes the swelling in the ankles and feet is caused by a more serious problem, such as heart failure, infection, blood clots, or liver or kidney disease. Follow-up care is a key part of your treatment and safety. Be sure to make and go to all appointments, and call your doctor if you are having problems. It's also a good idea to know your test results and keep a list of the medicines you take. How can you care for yourself at home? · If your doctor gave you medicine, take it as prescribed. Call your doctor if you think you are having a problem with your medicine. · Whenever you are resting, raise your legs up. Try to keep the swollen area higher than the level of your heart. · Take breaks from standing or sitting in one position. ? Walk around to increase the blood flow in your lower legs. ? Move your feet and ankles often while you stand, or tighten and relax your leg muscles. · Wear support stockings. Put them on in the morning, before swelling gets worse. · Eat a balanced diet. Lose weight if you need to. · Limit the amount of salt (sodium) in your diet. Salt holds fluid in the body and may increase swelling. When should you call for help? Call 911 anytime you think you may need emergency care. For example, call if:    · You have symptoms of a blood clot in your lung (called a pulmonary embolism). These may include:  ? Sudden chest pain. ? Trouble breathing. ?  Coughing up blood.    Call your doctor now or seek immediate medical care if:    · You have signs of a blood clot, such as:  ? Pain in your calf, back of the knee, thigh, or groin. ? Redness and swelling in your leg or groin.     · You have symptoms of infection, such as:  ? Increased pain, swelling, warmth, or redness. ? Red streaks or pus. ? A fever.    Watch closely for changes in your health, and be sure to contact your doctor if:    · Your swelling is getting worse.     · You have new or worsening pain in your legs.     · You do not get better as expected. Where can you learn more? Go to http://wicho-ben.info/. Enter K358 in the search box to learn more about \"Leg and Ankle Edema: Care Instructions. \"  Current as of: September 23, 2018  Content Version: 12.1  © 7007-8714 [employer]. Care instructions adapted under license by [employer] (which disclaims liability or warranty for this information). If you have questions about a medical condition or this instruction, always ask your healthcare professional. [place] [address] any warranty or liability for your use of this information. Patient Education        Shortness of Breath: Care Instructions  Your Care Instructions  Shortness of breath has many causes. Sometimes conditions such as anxiety can lead to shortness of breath. Some people get mild shortness of breath when they exercise. Trouble breathing also can be a symptom of a serious problem, such as asthma, lung disease, emphysema, heart problems, and pneumonia. If your shortness of breath continues, you may need tests and treatment. Watch for any changes in your breathing and other symptoms. Follow-up care is a key part of your treatment and safety. Be sure to make and go to all appointments, and call your doctor if you are having problems. It's also a good idea to know your test results and keep a list of the medicines you take. How can you care for yourself at home?   · Do not smoke or allow others to smoke around you. If you need help quitting, talk to your doctor about stop-smoking programs and medicines. These can increase your chances of quitting for good. · Get plenty of rest and sleep. · Take your medicines exactly as prescribed. Call your doctor if you think you are having a problem with your medicine. · Find healthy ways to deal with stress. ? Exercise daily. ? Get plenty of sleep. ? Eat regularly and well. When should you call for help? Call 911 anytime you think you may need emergency care. For example, call if:    · You have severe shortness of breath.     · You have symptoms of a heart attack. These may include:  ? Chest pain or pressure, or a strange feeling in the chest.  ? Sweating. ? Shortness of breath. ? Nausea or vomiting. ? Pain, pressure, or a strange feeling in the back, neck, jaw, or upper belly or in one or both shoulders or arms. ? Lightheadedness or sudden weakness. ? A fast or irregular heartbeat. After you call 911, the  may tell you to chew 1 adult-strength or 2 to 4 low-dose aspirin. Wait for an ambulance. Do not try to drive yourself.    Call your doctor now or seek immediate medical care if:    · Your shortness of breath gets worse or you start to wheeze. Wheezing is a high-pitched sound when you breathe.     · You wake up at night out of breath or have to prop your head up on several pillows to breathe.     · You are short of breath after only light activity or while at rest.    Watch closely for changes in your health, and be sure to contact your doctor if:    · You do not get better over the next 1 to 2 days. Where can you learn more? Go to http://wicho-ben.info/. Enter S780 in the search box to learn more about \"Shortness of Breath: Care Instructions. \"  Current as of: September 5, 2018  Content Version: 12.1  © 3938-0093 [employer].  Care instructions adapted under license by [employer] (which disclaims liability or warranty for this information). If you have questions about a medical condition or this instruction, always ask your healthcare professional. [place] [address] any warranty or liability for your use of this information.

## 2019-08-26 NOTE — ED NOTES
Discharge instructions given and reviewed with pt. Pt. Verbalized understanding. Assisted to waiting room to await ride. Consuelo arranged via [employer].

## 2019-08-27 LAB
ATRIAL RATE: 106 BPM
CALCULATED P AXIS, ECG09: 36 DEGREES
CALCULATED R AXIS, ECG10: -2 DEGREES
CALCULATED T AXIS, ECG11: 80 DEGREES
DIAGNOSIS, 93000: NORMAL
P-R INTERVAL, ECG05: 136 MS
Q-T INTERVAL, ECG07: 344 MS
QRS DURATION, ECG06: 80 MS
QTC CALCULATION (BEZET), ECG08: 456 MS
VENTRICULAR RATE, ECG03: 106 BPM

## 2019-09-16 ENCOUNTER — OFFICE VISIT (OUTPATIENT)
Dept: BEHAVIORAL/MENTAL HEALTH CLINIC | Age: 69
End: 2019-09-16

## 2019-09-16 VITALS
HEIGHT: 62 IN | SYSTOLIC BLOOD PRESSURE: 117 MMHG | HEART RATE: 104 BPM | WEIGHT: 162 LBS | DIASTOLIC BLOOD PRESSURE: 83 MMHG | BODY MASS INDEX: 29.81 KG/M2

## 2019-09-16 DIAGNOSIS — F60.9 PERSONALITY DISORDER (HCC): ICD-10-CM

## 2019-09-16 DIAGNOSIS — F22 DELUSIONAL DISORDER (HCC): Primary | ICD-10-CM

## 2019-09-16 DIAGNOSIS — F41.8 DEPRESSION WITH ANXIETY: ICD-10-CM

## 2019-09-16 RX ORDER — MIRTAZAPINE 15 MG/1
15 TABLET, FILM COATED ORAL
Qty: 30 TAB | Refills: 4 | Status: SHIPPED | OUTPATIENT
Start: 2019-09-16 | End: 2019-11-25 | Stop reason: SDUPTHER

## 2019-09-16 RX ORDER — HYDROXYZINE HYDROCHLORIDE 10 MG/1
10 TABLET, FILM COATED ORAL
Qty: 60 TAB | Refills: 4 | Status: SHIPPED | OUTPATIENT
Start: 2019-09-16 | End: 2019-09-26

## 2019-09-16 RX ORDER — BUMETANIDE 0.5 MG/1
0.5 TABLET ORAL DAILY
COMMUNITY
End: 2022-09-05

## 2019-09-16 NOTE — PROGRESS NOTES
Ariana Becker Chiquita  1950  76 y.o.  female  [facility]    Chief Complaint   Patient presents with   [facility] Follow Up     Patient recently discharged from [facility]    Depression     Report significant depression    Anxiety     Report significant anxiety    Delusional     Patient denies but hospitalized back to back 3 times with a diagnosis of delusional disorder related to bed bugs       [place]:   This is a 76years old  female with past psychiatric history of depression with anxiety, personality disorder, and recent diagnosis of delusional disorder who was under care of Dr. Sully Mondragon last seen on July 2, 2019 when she was prescribed Trileptal 300 mg daily, Effexor  mg daily, trazodone 150 mg at bedtime as needed for initial insomnia, melatonin 10 mg at bedtime as needed for initial insomnia, and Klonopin 1 mg 3 times a day was prescribed #90 with 3 refills on July 2, 2019. Patient presented on time today for medication management. She was alert awake oriented to time person and place and was calm cooperative polite and pleasant. She was not observed to be psychotic or delusional but reported having 3 separate hospitalizations in past month at [facility] with a diagnosis of delusional disorder in the context of having bed bugs. She ordered something from [place] which was brand-new but started to have bedbugs and was prescribed something by her PCP which did not work so she kept going to ED and was hospitalized. Per her last discharge note by Abhinav maravilla who is admitted at [facility] on a voluntary basis. She is well known to this provider as a result of recent inpatient psychiatric admission at [facility]. Jamie García was last under my care beginning of this month after almost a 1 week of inpatient stay. Jamie García was diagnosed with delusional disorder.  She also has been diagnosed with severe depression, anxiety.   After 2 days, she presented herself back to the emergency room feeling very confused, not able to care for herself. Amanda Solorzano actually did not fill any of her prescriptions.  She was then admitted to the medical unit for further evaluation and treatment\".      Patient has been discharged on mirtazapine 15 mg at bedtime, venlafaxine X are 150 mg 2 tablets daily, and paliperidone 117 mg injection every 4 weeks. She denies receiving any injection but report compliance with her other 2 medications and her chief complaint today is significant anxiety for which she requested medication but her benzodiazepine was discontinued while she was in the hospital so after discussing several medications that she decided to try hydroxyzine 10 mg up to 3 times a day as needed for anxiety which can also help her with itching related to her complaint of bed bugs. She reported that she had 4 acute inpatient psychiatric admission. She was also admitted at the [facility]. She is a . Amanda Solorzano has no children. Amanda Solorzano is currently receiving Social Security Disability checks. Amanda Solorzano resides at [facility]. SUBSTANCE USE HISTORY:   Patient denies. MEDICAL HISTORY:    has a past medical history of Arthritis, Breast cancer ([address] Cancer ([address] Chronic pain, Depression, GERD (gastroesophageal reflux disease), Headache, Hypercholesterolemia, Hypertension, Ill-defined condition, Irregular heart beat, Other ill-defined conditions(799.89), Other ill-defined conditions(799.89), Other ill-defined conditions(799.89), Other ill-defined conditions(799.89), Psychotic disorder ([facility] S/P radiation therapy, and Unspecified adverse effect of anesthesia. ALLERGIES:   Allergies   Allergen Reactions    Epinephrine Palpitations     \"Makes my heart  ' shudder'. \" Allergic to epinephrine with novocain. Plain epinephrine is ok.  Novocain [Procaine] Palpitations    Nsaids (Non-Steroidal Anti-Inflammatory Drug) Other (comments)     GASTRIC DISTRESS.     Ibuprofen Other (comments)     Gastric irritation       VITAL SIGNS:  /83 (BP 1 Location: Right arm, BP Patient Position: Sitting)   Pulse (!) 104   Ht 5' 2\" (1.575 m)   Wt 73.5 kg (162 lb)   BMI 29.63 kg/m²     Current Outpatient Medications   Medication Sig Dispense Refill    bumetanide (BUMEX) 0.5 mg tablet Take 0.5 mg by mouth daily.  mirtazapine (REMERON) 15 mg tablet Take 1 Tab by mouth nightly. Indications: major depressive disorder 30 Tab 4    hydrOXYzine HCl (ATARAX) 10 mg tablet Take 1 Tab by mouth three (3) times daily as needed for Itching for up to 10 days. 60 Tab 4    famotidine (PEPCID) 20 mg tablet Take 1 Tab by mouth daily. Indications: heartburn 30 Tab 0    rosuvastatin (CRESTOR) 10 mg tablet Take 1 Tab by mouth nightly. Indications: high cholesterol 30 Tab 0    aspirin delayed-release 81 mg tablet Take 1 Tab by mouth every evening. Indications: prevention of transient ischemic attack 30 Tab 0    venlafaxine-SR (EFFEXOR-XR) 150 mg capsule Take 2 Caps by mouth daily (with breakfast). Indications: Anxiousness associated with Depression 60 Cap 0    metoprolol succinate (TOPROL-XL) 25 mg XL tablet Take 0.5 Tabs by mouth daily. Indications: high blood pressure 15 Tab 0    polyethylene glycol (MIRALAX) 17 gram packet Take 1 Packet by mouth daily. Indications: constipation 30 Packet 0    paliperidone palmitate (INVEGA SUSTENNA) 117 mg/0.75 mL syrg injection 0.75 mL by IntraMUSCular route every thirty (30) days.  Indications: delusions 0.75 mL 0       ROS:  Constitutional: Negative for fever  Eyes: Negative for contacts/glasses  ENT: Negative for hearing loss and tinnitus  Respiratory: Negative for cough or wheezing  Cardiovascular: She has history of acute on chronic congestive heart failure  Neurological: Positive for memory problems  Behavioral/psych: Positive for insomnia, anxiety, depression, negative for SI/HI  Endocrine: Negative for diabetic symptoms including polyuria and polydipsia    MENTAL STATUS EXAMINATION:   Patient is a 76 y.o. female [facility] who looks who looks her stated age. Patient appearance is appropriately dressed with fair hygiene. Speech is regular rate and rhythm, fluent language, and thought process is linear, logical, and goal directed. Reported mood is depressed and anxious with mood congruent depressed and anxious affect. Patient denies any suicidal ideation, homicidal ideation, and auditory visual hallucination. Not observed to be delusional or paranoid. Insight, judgement, reliability and impulse control is limited. Her cognition is within normal limit and she is observed to be reliable. DIAGNOSIS:  Encounter Diagnoses   Name Primary?  Delusional disorder ([facility] Yes    Depression with anxiety     Personality disorder ([facility]        PLAN:  1. MEDICATION:   1. Delusional disorder: Patient has been discharge from [facility] on paliperidone palmitate 117 mg every 4 weeks. Per record she is going to get her injection today. 2.  Depression and anxiety: Effexor  mg daily. 3.  Depression and anxiety: Remeron 15 mg at bedtime, she was recently discharged from [facility] with addition of Remeron. 4.  Anxiety: Hydroxyzine 10 mg up to 3 times a day as needed for anxiety. Patient was provided with psycho education, discussed risk/benefits, and expectations from medication changes. Patient agrees with plan. 2. PSYCHOTHERAPY: Patient was provided with supportive therapy, strongly encourage to seek psychotherapy. 3. MEDICAL CARE: Patient was strongly encourage to take their medical medications and follow up with their PCP on regular basis. Her weight today is 162 pounds, blood pressure is 117/83 and pulse is 104. She suffers with acute on chronic congestive heart failure with ejection fraction of 25%. She has arthritis of right knee and chronic back pain.   She has hyperlipidemia, history of carcinoma in situ of breast, severe obesity, atypical glandular cells on cervical Pap smear, and history of breast cancer in 1995 left breast resection and radiation. 4. SUBSTANCE ABUSE CARE: Patient denies a smoking, drinking, abusing any illicit drugs. 5. FOLLOW UP:   Follow-up and Dispositions    · Return in about 3 months (around 12/16/2019) for Medication management.

## 2019-11-25 ENCOUNTER — OFFICE VISIT (OUTPATIENT)
Dept: BEHAVIORAL/MENTAL HEALTH CLINIC | Age: 69
End: 2019-11-25

## 2019-11-25 VITALS
SYSTOLIC BLOOD PRESSURE: 134 MMHG | DIASTOLIC BLOOD PRESSURE: 69 MMHG | WEIGHT: 182 LBS | BODY MASS INDEX: 33.49 KG/M2 | HEART RATE: 89 BPM | HEIGHT: 62 IN

## 2019-11-25 DIAGNOSIS — F60.9 PERSONALITY DISORDER (HCC): ICD-10-CM

## 2019-11-25 DIAGNOSIS — F41.8 DEPRESSION WITH ANXIETY: ICD-10-CM

## 2019-11-25 DIAGNOSIS — F22 DELUSIONAL DISORDER (HCC): Primary | ICD-10-CM

## 2019-11-25 RX ORDER — VENLAFAXINE HYDROCHLORIDE 150 MG/1
300 CAPSULE, EXTENDED RELEASE ORAL
Qty: 180 CAP | Refills: 2 | Status: SHIPPED | OUTPATIENT
Start: 2019-11-25 | End: 2019-12-04 | Stop reason: SDUPTHER

## 2019-11-25 RX ORDER — MIRTAZAPINE 15 MG/1
15 TABLET, FILM COATED ORAL
Qty: 90 TAB | Refills: 2 | Status: SHIPPED | OUTPATIENT
Start: 2019-11-25 | End: 2020-03-31 | Stop reason: CLARIF

## 2019-11-25 NOTE — PROGRESS NOTES
Amish Porras  1950  71 y.o.  female  [facility]    Chief Complaint   Patient presents with    Depression     Patient report increased depression since last seen in the context of not being able to get her discharge medication from hospital    Anxiety     Increase anxiety    Insomnia     Trouble with sleeping even on combination of Remeron and Effexor    Medication Problem     Patient was not able to get her Bettejane Punch injection on September 16 and for the past 3 months    Stress     Report social isolation and loneliness and poor primary support system       [place]:   This is a 71years old  female with past psychiatric history of depression with anxiety, personality disorder, and recent diagnosis of delusional disorder who was under care of Dr. Childers Cons last seen on July 2, 2019 when she was prescribed Trileptal 300 mg daily, Effexor  mg daily, trazodone 150 mg at bedtime as needed for initial insomnia, melatonin 10 mg at bedtime as needed for initial insomnia, and Klonopin 1 mg 3 times a day was prescribed #90 with 3 refills on July 2, 2019.     Patient was seen for the first and only time on September 16, 2019 after she was discharged from hospital and agreed to continue her discharge treatment plan and return in 3 months for evaluation. She presented on time today for medication management. She was alert awake oriented to time person and place and was calm cooperative polite and pleasant. She was not observed to be overtly psychotic or delusional but reported having 3 separate hospitalizations in 3 months period at [facility] with a diagnosis of delusional disorder in the context of having bed bugs.       Per report compliance with mirtazapine 15 mg at bedtime and venlafaxine  mg 2 tablets daily but has not be able to get paliperidone 117 mg injection since her discharge.  She denies any auditory visual hallucination and was not overtly psychotic but does report increasing depression, very much difficulty with sleep, and at times she was observed to be vaguely delusional so she agreed to restart her Invega injection. She is a . Britni Cobb has no children. Britni Cobb is currently receiving Social Security Disability checks. Britni Cobb resides at [address] [place] [address]:   Patient denies. MEDICAL HISTORY:    has a past medical history of Arthritis, Breast cancer ([facility] Cancer ([facility] Chronic pain, Depression, GERD (gastroesophageal reflux disease), Headache, Hypercholesterolemia, Hypertension, Ill-defined condition, Irregular heart beat, Other ill-defined conditions(799.89), Other ill-defined conditions(799.89), Other ill-defined conditions(799.89), Other ill-defined conditions(799.89), Psychotic disorder ([facility] S/P radiation therapy, and Unspecified adverse effect of anesthesia. ALLERGIES:   Allergies   Allergen Reactions    Epinephrine Palpitations     \"Makes my heart  ' shudder'. \" Allergic to epinephrine with novocain. Plain epinephrine is ok.  Novocain [Procaine] Palpitations    Nsaids (Non-Steroidal Anti-Inflammatory Drug) Other (comments)     GASTRIC DISTRESS.  Ibuprofen Other (comments)     Gastric irritation       VITAL SIGNS:  /69 (BP 1 Location: Left arm, BP Patient Position: Sitting)   Pulse 89   Ht 5' 2\" (1.575 m)   Wt 82.6 kg (182 lb)   BMI 33.29 kg/m²     Current Outpatient Medications   Medication Sig Dispense Refill    venlafaxine-SR (EFFEXOR-XR) 150 mg capsule Take 2 Caps by mouth daily (with breakfast). Indications: Anxiousness associated with Depression 180 Cap 2    mirtazapine (REMERON) 15 mg tablet Take 1 Tab by mouth nightly. Indications: major depressive disorder 90 Tab 2    paliperidone palmitate (INVEGA SUSTENNA) 117 mg/0.75 mL syrg injection 0.75 mL by IntraMUSCular route every thirty (30) days. Indications: delusions 0.75 mL 5    bumetanide (BUMEX) 0.5 mg tablet Take 0.5 mg by mouth daily.       famotidine (PEPCID) 20 mg tablet Take 1 Tab by mouth daily. Indications: heartburn 30 Tab 0    rosuvastatin (CRESTOR) 10 mg tablet Take 1 Tab by mouth nightly. Indications: high cholesterol 30 Tab 0    aspirin delayed-release 81 mg tablet Take 1 Tab by mouth every evening. Indications: prevention of transient ischemic attack 30 Tab 0    metoprolol succinate (TOPROL-XL) 25 mg XL tablet Take 0.5 Tabs by mouth daily. Indications: high blood pressure 15 Tab 0    polyethylene glycol (MIRALAX) 17 gram packet Take 1 Packet by mouth daily. Indications: constipation 30 Packet 0       ROS:  Constitutional: Negative for fever  Eyes: Negative for contacts/glasses  ENT: Negative for hearing loss and tinnitus  Respiratory: Negative for cough or wheezing  Cardiovascular: She has history of acute on chronic congestive heart failure  Neurological: Positive for memory problems  Behavioral/psych: Positive for insomnia, anxiety, depression, negative for SI/HI  Endocrine: Negative for diabetic symptoms including polyuria and polydipsia     MENTAL STATUS EXAMINATION:   Patient is a 71 y.o. female [facility] who looks who looks her stated age. Patient appearance is appropriately dressed with fair hygiene. Speech is regular rate and rhythm, fluent language, and thought process is linear, logical, and goal directed. Reported mood is depressed and anxious with mood congruent depressed and anxious affect. Patient denies any suicidal ideation, homicidal ideation, and auditory visual hallucination. Not observed to be delusional or paranoid. Insight, judgement, reliability and impulse control is limited. Her cognition is within normal limit and she is observed to be reliable. DIAGNOSIS:  Encounter Diagnoses   Name Primary?  Delusional disorder ([facility] Yes    Depression with anxiety     Personality disorder ([facility]        PLAN:  1. MEDICATION:   1.   Delusional disorder: Patient has been discharge from [facility] on paliperidone palmitate 117 mg every 4 weeks.        2. Depression and anxiety: Effexor  mg daily.       3. Depression and anxiety: Remeron 15 mg at bedtime, she was recently discharged from [facility] with addition of Remeron.       4. Anxiety: Hydroxyzine 10 mg up to 3 times a day as needed for anxiety. Patient was provided with psycho education, discussed risk/benefits, and expectations from medication changes. Patient agrees with plan.      2. PSYCHOTHERAPY: Patient was provided with supportive therapy, strongly encourage to seek psychotherapy.     3. MEDICAL CARE: Patient was strongly encourage to take their medical medications and follow up with their PCP on regular basis. Her weight today is 182 pounds, it was 162 pounds on September 16, 2019. Her blood pressure is 134/69 and pulse is 89. She suffers with acute on chronic congestive heart failure with ejection fraction of 25%. She has arthritis of right knee and chronic back pain. She has hyperlipidemia, history of carcinoma in situ of breast, severe obesity, atypical glandular cells on cervical Pap smear, and history of breast cancer in 1995 left breast resection and radiation.     4. SUBSTANCE ABUSE CARE: Patient denies a smoking, drinking, abusing any illicit drugs. 5. FOLLOW UP:   Follow-up and Dispositions    · Return in about 3 months (around 2/25/2020) for Medication management. Called and spoke with pharmacist at [facility] and sent prescription of Invega sustained 117 mg to be provided today and every 4 weeks.

## 2019-12-04 RX ORDER — VENLAFAXINE HYDROCHLORIDE 150 MG/1
300 CAPSULE, EXTENDED RELEASE ORAL
Qty: 180 CAP | Refills: 2 | Status: SHIPPED | OUTPATIENT
Start: 2019-12-04 | End: 2020-03-31 | Stop reason: SDUPTHER

## 2019-12-16 ENCOUNTER — TELEPHONE (OUTPATIENT)
Dept: BEHAVIORAL/MENTAL HEALTH CLINIC | Age: 69
End: 2019-12-16

## 2019-12-16 NOTE — TELEPHONE ENCOUNTER
Returned call. Pt reports she has been dealing with chronic anxiety that is currently exacerbated due to increased stressors. Pt states she is dealing with significant financial stressors. Informed her I could not make medication changes until I have the opportunity to fully assess her. Pt voiced understanding. Discussed coping strategies such as use of food bank.  Informed pt we would put her on our cancellation list.

## 2019-12-16 NOTE — TELEPHONE ENCOUNTER
Pt left  - said she is having short panic attacks and crying. Tightness and dull ache in chest and around heart bc of congestive heart failure. She said she is having these because she is short on cash and with the holidays, her anxiety is high. Pt is requesting a medication to calm her anxiety down. Said she is scared when her heart starts hurting. Pt said she also called her cardiologist too. She also stated she got the shot Dr. Ele Henriquez requested. Pt asking for a call back.     Scheduled in March for her 3 month transfer appt from Dr. Ele Henriquez.    [phone]

## 2020-03-06 ENCOUNTER — HOSPITAL ENCOUNTER (OUTPATIENT)
Dept: MAMMOGRAPHY | Age: 70
Discharge: HOME | End: 2020-03-06
Attending: SURGERY
Payer: MEDICARE

## 2020-03-06 DIAGNOSIS — Z12.31 SCREENING MAMMOGRAM FOR HIGH-RISK PATIENT: ICD-10-CM

## 2020-03-06 DIAGNOSIS — Z86.000 HX OF CARCINOMA IN SITU OF BREAST: ICD-10-CM

## 2020-03-06 PROCEDURE — 77063 BREAST TOMOSYNTHESIS BI: CPT

## 2020-03-31 ENCOUNTER — VIRTUAL VISIT (OUTPATIENT)
Dept: BEHAVIORAL/MENTAL HEALTH CLINIC | Age: 70
End: 2020-03-31

## 2020-03-31 DIAGNOSIS — F22 DELUSIONAL DISORDER (HCC): ICD-10-CM

## 2020-03-31 DIAGNOSIS — F41.8 DEPRESSION WITH ANXIETY: Primary | ICD-10-CM

## 2020-03-31 DIAGNOSIS — F60.9 PERSONALITY DISORDER (HCC): ICD-10-CM

## 2020-03-31 RX ORDER — TRAZODONE HYDROCHLORIDE 150 MG/1
150 TABLET ORAL
Qty: 30 TAB | Refills: 5 | Status: SHIPPED | OUTPATIENT
Start: 2020-03-31 | End: 2020-10-20 | Stop reason: SDUPTHER

## 2020-03-31 RX ORDER — VENLAFAXINE HYDROCHLORIDE 150 MG/1
300 CAPSULE, EXTENDED RELEASE ORAL
Qty: 180 CAP | Refills: 5 | Status: SHIPPED | OUTPATIENT
Start: 2020-03-31 | End: 2021-05-12 | Stop reason: SDUPTHER

## 2020-03-31 NOTE — PROGRESS NOTES
INITIAL PSYCHIATRIC EVALUATION    IDENTIFICATION:      A. Name: Daniel Reyes. Age:     71 y.o.      C.   MRN: [id number]       D.   CSN:      [id number]      E.   :     1950          CC: \"I'm glad to get to meet you. I was seeing Dr Sydney Carlin for depression\". HISTORY OF PRESENT ILLNESS:    The patient John Pulido is a 71 y.o.  female with a history of depression, anxiety. She reports the following psychiatric symptoms:  depression and anxiety. The symptoms have been present for years and are of moderate to high severity. The symptoms are chronic in nature. She reports they started in childhood and shared \"I had a pretty tough childhood\". She reports things worsened after the death of her mother and . She states she is alone with limited social connections and support. She states she stopped taking \"the shot because I couldn't afford it\". She followed this by stating \"I don't have psychosis\". Additional symptoms include anxiety, anxiety attacks, avoidance of crowds, concern about health problems, depression worse, difficulty sleeping, fearfulness, poor concentration, relationship difficulties and trauma recollections. Symptoms are precipitated by psychosocial stressors. The divorce of her parents and limited attachment/support from her father. Symptoms made worse by changes to medications and limited social support. PAST HISTORY:  Psychiatric:  Past Psychiatric Hospitalization:  Per her last discharge note by Breonna Espitiaew female who is admitted at [facility] on a voluntary basis. She is well known to this provider as a result of recent inpatient psychiatric admission at [facility]. Thu Enciso was last under my care beginning of this month after almost a 1 week of inpatient stay. Thu Enciso was diagnosed with delusional disorder.  She also has been diagnosed with severe depression, anxiety.   After 2 days, she presented herself back to the emergency room feeling very confused, not able to care for herself. Atif Iniguez actually did not fill any of her prescriptions. Atif Iniguez was then admitted to the medical unit for further evaluation and treatment\".       Approximately 4-5 admissions for delusional disorder, has been at [facility] and [facility]    Past Outpatient Providers:  Dr Tyrell Monroy  Past Psychiatric Medications: invega, hydroxyzine, klonopin, mirtazapine, venlafaxine, trileptal  Medical:  Active Ambulatory Problems     Diagnosis Date Noted    Arthritis of knee, right 02/20/2012    Depression with anxiety 09/11/2012    Chronic lower back pain 11/19/2015    Hyperlipidemia 12/02/2015    Personality disorder ([facility] 03/02/2017    Hx of carcinoma in situ of breast 04/09/2018    Severe obesity with body mass index (BMI) of 35.0 to 39.9 with serious comorbidity ([facility] 07/03/2018    Atypical glandular cells of undetermined significance (BASILIO) on cervical Pap smear 11/26/2018    Failure to thrive (0-17) 08/08/2019    Acute CHF (congestive heart failure) ([address] 08/11/2019    Delusional disorder ([facility] 08/14/2019     Resolved Ambulatory Problems     Diagnosis Date Noted    Breast pain 07/22/2016     Past Medical History:   Diagnosis Date    Arthritis     Breast cancer ([address]     Cancer ([facility]     Chronic pain     Depression     GERD (gastroesophageal reflux disease)     Headache     Hypercholesterolemia     Hypertension     Ill-defined condition     Irregular heart beat     Other ill-defined conditions(799.89)     Other ill-defined conditions(799.89)     Other ill-defined conditions(799.89)     Other ill-defined conditions(799.89)     Psychotic disorder (HCC)     S/P radiation therapy     Unspecified adverse effect of anesthesia      Substance Use:   Social History     Socioeconomic History    Marital status:      Spouse name: Not on file    Number of children: Not on file    Years of education: Not on file    Highest education level: Not on file Tobacco Use    Smoking status: Never Smoker    Smokeless tobacco: Never Used   Substance and Sexual Activity    Alcohol use: No    Drug use: No    Sexual activity: Never   Other Topics Concern     Social:  Marital Status:, lives alone at [facility], states it is hard to make friends there, was very close to her mother Dash Garcia best friend\" and her   Children:none  Educational Level:  unknown  Work History: on diability  Legal History: denies  Pertinent Childhood History: raised initially by mother and father, parents , she was close with her mom, did not have a close relationship with her father and felt abandoned, has some cousins but they \"don't want me to call\"  Family:  Family history of mental, medial or substance use history reported: unknown    MEDICATIONS:  Current Outpatient Medications   Medication Sig Dispense Refill    traZODone (DESYREL) 150 mg tablet Take 1 Tab by mouth nightly as needed for Sleep. 30 Tab 5    venlafaxine-SR (EFFEXOR-XR) 150 mg capsule Take 2 Caps by mouth daily (with breakfast). Indications: anxiousness associated with depression 180 Cap 5    bumetanide (BUMEX) 0.5 mg tablet Take 0.5 mg by mouth daily.  famotidine (PEPCID) 20 mg tablet Take 1 Tab by mouth daily. Indications: heartburn 30 Tab 0    rosuvastatin (CRESTOR) 10 mg tablet Take 1 Tab by mouth nightly. Indications: high cholesterol 30 Tab 0    aspirin delayed-release 81 mg tablet Take 1 Tab by mouth every evening. Indications: prevention of transient ischemic attack 30 Tab 0    metoprolol succinate (TOPROL-XL) 25 mg XL tablet Take 0.5 Tabs by mouth daily. Indications: high blood pressure 15 Tab 0    polyethylene glycol (MIRALAX) 17 gram packet Take 1 Packet by mouth daily. Indications: constipation 30 Packet 0       ALLERGIES:  Allergies   Allergen Reactions    Epinephrine Palpitations     \"Makes my heart  ' shudder'. \" Allergic to epinephrine with novocain. Plain epinephrine is ok.     Novocain [Procaine] Palpitations    Nsaids (Non-Steroidal Anti-Inflammatory Drug) Other (comments)     GASTRIC DISTRESS.  Ibuprofen Other (comments)     Gastric irritation       REVIEW OF SYSTEMS:  Pt reports feeling depression and anxiety. All other Systems reviewed and are as noted. considered negative    MENTAL STATUS EXAM:    FINDINGS WITHIN NORMAL LIMITS (WNL) UNLESS OTHERWISE STATED BELOW:    Orientation oriented to time, place and person   Vital Signs (BP,Pulse, Temp) See below (reviewed)   Gait and Station Within normal limits   Abnormal Muscular Movements/Tone/Behavior No EPS, no Tardive Dyskinesia, no abnormal muscular movements; wnl tone   Relations cooperative   General Appearance:  age appropriate and casually dressed   Language No aphasia or dysarthria   Speech:  monotone   Thought Processes Grossly logical, wnl rate of thoughts, good abstract reasoning and computation   Thought Associations goal directed   Thought Content Delusions by hx, free of delusions and free of hallucinations   Suicidal Ideations no intention   Homicidal Ideations no intention   Mood:  anxious and depressed   Affect:  anxious and depressed   Memory recent  adequate   Memory remote:  adequate   Concentration/Attention:  adequate   Fund of Knowledge Fair/average   Insight:  fair and limited   Reliability fair and limited   Judgment:  fair and limited     VITALS:     There were no vitals taken for this visit. PERTINENT DATA:  No visits with results within 2 Day(s) from this visit.    Latest known visit with results is:   Admission on 08/25/2019, Discharged on 08/25/2019   Component Date Value Ref Range Status    WBC 08/25/2019 8.6  3.6 - 11.0 K/uL Final    RBC 08/25/2019 5.06  3.80 - 5.20 M/uL Final    HGB 08/25/2019 13.2  11.5 - 16.0 g/dL Final    HCT 08/25/2019 43.8  35.0 - 47.0 % Final    MCV 08/25/2019 86.6  80.0 - 99.0 FL Final    MCH 08/25/2019 26.1  26.0 - 34.0 PG Final    MCHC 08/25/2019 30.1  30.0 - 36.5 g/dL Final    RDW 08/25/2019 17.1* 11.5 - 14.5 % Final    PLATELET 23/28/3147 615  150 - 400 K/uL Final    MPV 08/25/2019 11.7  8.9 - 12.9 FL Final    NRBC 08/25/2019 0.0  0  WBC Final    ABSOLUTE NRBC 08/25/2019 0.00  0.00 - 0.01 K/uL Final    NEUTROPHILS 08/25/2019 60  32 - 75 % Final    LYMPHOCYTES 08/25/2019 25  12 - 49 % Final    MONOCYTES 08/25/2019 13  5 - 13 % Final    EOSINOPHILS 08/25/2019 1  0 - 7 % Final    BASOPHILS 08/25/2019 1  0 - 1 % Final    IMMATURE GRANULOCYTES 08/25/2019 0  0.0 - 0.5 % Final    ABS. NEUTROPHILS 08/25/2019 5.1  1.8 - 8.0 K/UL Final    ABS. LYMPHOCYTES 08/25/2019 2.2  0.8 - 3.5 K/UL Final    ABS. MONOCYTES 08/25/2019 1.1* 0.0 - 1.0 K/UL Final    ABS. EOSINOPHILS 08/25/2019 0.1  0.0 - 0.4 K/UL Final    ABS. BASOPHILS 08/25/2019 0.1  0.0 - 0.1 K/UL Final    ABS. IMM.  GRANS. 08/25/2019 0.0  0.00 - 0.04 K/UL Final    DF 08/25/2019 AUTOMATED    Final    Sodium 08/25/2019 144  136 - 145 mmol/L Final    Potassium 08/25/2019 3.6  3.5 - 5.1 mmol/L Final    Chloride 08/25/2019 109* 97 - 108 mmol/L Final    CO2 08/25/2019 25  21 - 32 mmol/L Final    Anion gap 08/25/2019 10  5 - 15 mmol/L Final    Glucose 08/25/2019 147* 65 - 100 mg/dL Final    BUN 08/25/2019 14  6 - 20 MG/DL Final    Creatinine 08/25/2019 0.80  0.55 - 1.02 MG/DL Final    BUN/Creatinine ratio 08/25/2019 18  12 - 20   Final    GFR est AA 08/25/2019 >60  >60 ml/min/1.73m2 Final    GFR est non-AA 08/25/2019 >60  >60 ml/min/1.73m2 Final    Calcium 08/25/2019 9.1  8.5 - 10.1 MG/DL Final    Ventricular Rate 08/25/2019 106  BPM Final    Atrial Rate 08/25/2019 106  BPM Final    P-R Interval 08/25/2019 136  ms Final    QRS Duration 08/25/2019 80  ms Final    Q-T Interval 08/25/2019 344  ms Final    QTC Calculation (Bezet) 08/25/2019 456  ms Final    Calculated P Axis 08/25/2019 36  degrees Final    Calculated R Axis 08/25/2019 -2  degrees Final    Calculated T Axis 08/25/2019 80  degrees Final    Diagnosis 08/25/2019    Final                    Value:Sinus tachycardia  Nonspecific T wave abnormality  When compared with ECG of 12-AUG-2019 11:05,  Vent. rate has increased BY  36 BPM  Nonspecific T wave abnormality no longer evident in Inferior leads  Nonspecific T wave abnormality no longer evident in Anterior leads  Confirmed by Tracie Kuo ([id number]) on 8/27/2019 3:11:36 PM      Troponin-I, Qt. 08/25/2019 <0.05  <0.05 ng/mL Final       XR Results (most recent):  Results from Hospital Encounter encounter on 08/25/19   XR CHEST PA LAT    Narrative EXAM: XR CHEST PA LAT    INDICATION: Shortness of breath and anxiety. COMPARISON: Chest x-ray 8/9/2019. Roc Walsh FINDINGS: PA and lateral radiographs of the chest demonstrate trace  persistent/recurrent pleural effusions bilaterally with pulmonary vascular  congestion and mild interstitial prominence with Kerley B line's. The cardiac  and mediastinal contours are normal. The chest wall structures and visualized  upper abdomen show no acute findings with incidental note of degenerative spine  and shoulder changes. Impression IMPRESSION: Congestion, interstitial edema, and bilateral pleural effusions  compatible with congestive failure. ASSESSMENT/PLAN:  The patient Margie Aguilera is a 71 y.o.  female who presents at this time for treatment of the following diagnoses:    ICD-10-CM ICD-9-CM    1. Depression with anxiety F41.8 300.4    2. Delusional disorder ([facility] F22 297.1    3. [facility] disorder ([facility] F60.9 301.9        Assessment:   Margie Aguilera is a 71 y.o. female and presents with hx of depression, anxiety and delusions. Plan:  1. Medications:  Current Outpatient Medications   Medication Sig Dispense Refill    traZODone (DESYREL) 150 mg tablet Take 1 Tab by mouth nightly as needed for Sleep. 30 Tab 5    venlafaxine-SR (EFFEXOR-XR) 150 mg capsule Take 2 Caps by mouth daily (with breakfast).  Indications: anxiousness associated with depression 180 Cap 5    bumetanide (BUMEX) 0.5 mg tablet Take 0.5 mg by mouth daily.  famotidine (PEPCID) 20 mg tablet Take 1 Tab by mouth daily. Indications: heartburn 30 Tab 0    rosuvastatin (CRESTOR) 10 mg tablet Take 1 Tab by mouth nightly. Indications: high cholesterol 30 Tab 0    aspirin delayed-release 81 mg tablet Take 1 Tab by mouth every evening. Indications: prevention of transient ischemic attack 30 Tab 0    metoprolol succinate (TOPROL-XL) 25 mg XL tablet Take 0.5 Tabs by mouth daily. Indications: high blood pressure 15 Tab 0    polyethylene glycol (MIRALAX) 17 gram packet Take 1 Packet by mouth daily. Indications: constipation 30 Packet 0      The following regarding treatment was addressed with patient: pt reports she stopped taking invega as \"I don't have psychosis\", will dc mirtazapine and re-try trazodone, will monitor for delusions/psychosis. the risks and benefits of the proposed medication, instructions for management, education and risk factor reduction. The patient was given the opportunity to ask questions. Informed consent given to the use of the above medications. Adjust psychiatric medications as needed based upon diagnoses and response to treatment. 2.  Review previous labs and medical tests in the EHR and or transferring hospital documents. I will continue to order blood tests/labs and diagnostic tests as deemed appropriate and review results as they become available (see orders for details). 3.  Review old psychiatric and medical records available in the EHR. I will order additional psychiatric records from other institutions/providers as appropriate. 4.  Gather additional collateral information as appropriate. 5.  Supportive and/or Individual Therapy    Jc Currie is a 71 y.o. female who was seen by synchronous (real-time) audio-video technology on 3/31/2020.       Consent:  She and/or her healthcare decision maker is aware that this patient-initiated Telehealth encounter is a billable service, with coverage as determined by her insurance carrier. She is aware that she may receive a bill and has provided verbal consent to proceed: Yes    I was at home while conducting this encounter. We discussed the expected course, resolution and complications of the diagnosis(es) in detail. Medication risks, benefits, costs, interactions, and alternatives were discussed as indicated. I advised her to contact the office if her condition worsens, changes or fails to improve as anticipated. She expressed understanding with the diagnosis(es) and plan. Pursuant to the emergency declaration under the [facility], [facility] waiver authority and the [employer] Act, this Virtual  Visit was conducted, with patient's consent, to reduce the patient's risk of exposure to COVID-19 and provide continuity of care for an established patient. Services were provided through a video synchronous discussion virtually to substitute for in-person clinic visit.         STRENGTHS:  Stable place to live        SIGNED:    Polo Olsen NP  3/31/2020

## 2020-04-02 ENCOUNTER — TELEPHONE (OUTPATIENT)
Dept: BEHAVIORAL/MENTAL HEALTH CLINIC | Age: 70
End: 2020-04-02

## 2020-04-02 RX ORDER — PALIPERIDONE 1.5 MG/1
1.5 TABLET, EXTENDED RELEASE ORAL DAILY
Qty: 30 TAB | Refills: 1 | Status: SHIPPED | OUTPATIENT
Start: 2020-04-02 | End: 2020-06-05

## 2020-04-02 NOTE — TELEPHONE ENCOUNTER
Pt had stopped the Invega shot   She said you all had spoken about restarting this injection  She thinks she is ready to start again-she has some $ saved up. If this is ok can she restart the injection?      She may need renewal at the pharmacy

## 2020-04-02 NOTE — TELEPHONE ENCOUNTER
Returned call. Pt has been off of Invega for a couple of months and so informed her we could not re-start injection. Will restart oral paliperidone and then convert to ALVAREZ. Pt in agreement so will send to her pharmacy.

## 2020-06-05 RX ORDER — PALIPERIDONE 1.5 MG/1
1.5 TABLET, EXTENDED RELEASE ORAL DAILY
Qty: 30 TAB | Refills: 2 | Status: SHIPPED | OUTPATIENT
Start: 2020-06-05 | End: 2020-08-31

## 2020-08-31 RX ORDER — PALIPERIDONE 1.5 MG/1
1.5 TABLET, EXTENDED RELEASE ORAL DAILY
Qty: 30 TAB | Refills: 0 | Status: SHIPPED | OUTPATIENT
Start: 2020-08-31 | End: 2020-10-01

## 2020-09-28 RX ORDER — PALIPERIDONE 1.5 MG/1
1.5 TABLET, EXTENDED RELEASE ORAL DAILY
Qty: 30 TAB | Refills: 0 | OUTPATIENT
Start: 2020-09-28

## 2020-10-01 RX ORDER — PALIPERIDONE 1.5 MG/1
1.5 TABLET, EXTENDED RELEASE ORAL DAILY
Qty: 30 TAB | Refills: 0 | Status: SHIPPED | OUTPATIENT
Start: 2020-10-01 | End: 2020-10-20 | Stop reason: SDUPTHER

## 2020-10-20 ENCOUNTER — VIRTUAL VISIT (OUTPATIENT)
Dept: BEHAVIORAL/MENTAL HEALTH CLINIC | Age: 70
End: 2020-10-20
Payer: MEDICARE

## 2020-10-20 DIAGNOSIS — F41.8 DEPRESSION WITH ANXIETY: Primary | ICD-10-CM

## 2020-10-20 DIAGNOSIS — F22 DELUSIONAL DISORDER (HCC): ICD-10-CM

## 2020-10-20 PROCEDURE — 99442 PR PHYS/QHP TELEPHONE EVALUATION 11-20 MIN: CPT | Performed by: NURSE PRACTITIONER

## 2020-10-20 RX ORDER — TRAZODONE HYDROCHLORIDE 100 MG/1
200 TABLET ORAL
Qty: 60 TAB | Refills: 4 | Status: SHIPPED | OUTPATIENT
Start: 2020-10-20 | End: 2021-03-22

## 2020-10-20 RX ORDER — PALIPERIDONE 1.5 MG/1
1.5 TABLET, EXTENDED RELEASE ORAL DAILY
Qty: 30 TAB | Refills: 4 | Status: SHIPPED | OUTPATIENT
Start: 2020-10-20 | End: 2020-12-09 | Stop reason: SDUPTHER

## 2020-10-20 NOTE — PROGRESS NOTES
Brian Claudio is a 71 y.o. female, evaluated via audio-only technology on 10/20/2020 for Medication Management  . Assessment & Plan:   Management of depression, anxiety and delusional d/o. Pt reports she is doing fairly well overall. She shared that the social isolation has been getting to her at times. She does get once a week for grocery store trip. Overall she is doing well on current medications and symptoms are stable. She has some intermittent sadness but is able to \"think about other things\" which helps. She is working on having good structure on a daily basis and finds having her dog is beneficial. Will cont with with venlafaxine dose and invega dose. Will increase trazodone as she continues to have some problems with sleep. Pt asked about clarification of each med. Reviewed with her and answered her questions. Also, reinforced positive work she is doing maintaining positive structure. 12  Subjective:   Pt reports she is doing well overall. She states mood and and anxiety are satble with current medications. She denies alterations in thought processes. Pt states she likes the pill form of medication better than injection. She is having some problems with sleep but appetite is good. She denies SE's. Prior to Admission medications    Medication Sig Start Date End Date Taking? Authorizing Provider   traZODone (DESYREL) 100 mg tablet Take 2 Tabs by mouth nightly as needed for Sleep. 10/20/20  Yes Wendy Calles NP   paliperidone (INVEGA) 1.5 mg ER tablet Take 1 Tab by mouth daily. 10/20/20  Yes Wendy Calles NP   paliperidone (INVEGA) 1.5 mg ER tablet Take 1 Tab by mouth daily. 10/1/20 10/20/20  Wendy Calles NP   venlafaxine-SR [facility] 150 mg capsule Take 2 Caps by mouth daily (with breakfast). Indications: anxiousness associated with depression 3/31/20   Jermaine Bocanegra NP   traZODone (DESYREL) 150 mg tablet Take 1 Tab by mouth nightly as needed for Sleep.  3/31/20 10/20/20  Marli Jennifer Haynes NP   bumetanide (BUMEX) 0.5 mg tablet Take 0.5 mg by mouth daily. Provider, Historical   famotidine (PEPCID) 20 mg tablet Take 1 Tab by mouth daily. Indications: heartburn 8/23/19   Nadia SAN NP   rosuvastatin (CRESTOR) 10 mg tablet Take 1 Tab by mouth nightly. Indications: high cholesterol 8/23/19   Nadia SAN NP   aspirin delayed-release 81 mg tablet Take 1 Tab by mouth every evening. Indications: prevention of transient ischemic attack 8/23/19   Nadia SAN NP   metoprolol succinate (TOPROL-XL) 25 mg XL tablet Take 0.5 Tabs by mouth daily. Indications: high blood pressure 8/24/19   Jocelin Busby NP   polyethylene glycol (MIRALAX) 17 gram packet Take 1 Packet by mouth daily. Indications: constipation 8/24/19   Nadia SAN NP       ICD-10-CM ICD-9-CM    1. Depression with anxiety  F41.8 300.4    2. [facility] ([facility]  F22 297.1        ROS: depression, anxiety, delusions: stable/mild to moderate severity    No flowsheet data found. Jasen Subramanian, who was evaluated through a patient-initiated, synchronous (real-time) audio only encounter, and/or her healthcare decision maker, is aware that it is a billable service, with coverage as determined by her insurance carrier. She provided verbal consent to proceed: Yes. She has not had a related appointment within my department in the past 7 days or scheduled within the next 24 hours.       Total Time: minutes: 11-20 minutes    Marely Mcmahon NP

## 2020-12-09 ENCOUNTER — VIRTUAL VISIT (OUTPATIENT)
Dept: BEHAVIORAL/MENTAL HEALTH CLINIC | Age: 70
End: 2020-12-09
Payer: MEDICARE

## 2020-12-09 DIAGNOSIS — F41.8 DEPRESSION WITH ANXIETY: ICD-10-CM

## 2020-12-09 DIAGNOSIS — F22 DELUSIONAL DISORDER (HCC): Primary | ICD-10-CM

## 2020-12-09 PROCEDURE — 99442 PR PHYS/QHP TELEPHONE EVALUATION 11-20 MIN: CPT | Performed by: NURSE PRACTITIONER

## 2020-12-09 RX ORDER — PALIPERIDONE 3 MG/1
3 TABLET, EXTENDED RELEASE ORAL DAILY
Qty: 30 TAB | Refills: 2 | Status: SHIPPED | OUTPATIENT
Start: 2020-12-09 | End: 2021-03-24

## 2020-12-09 NOTE — PROGRESS NOTES
Liza Tellez is a 79 y.o. female, evaluated via audio-only technology on 12/9/2020 for Medication Management  . Assessment & Plan:   Managment of mood and anxiety conditions. Pt reports symptoms consistent with bipolar disorder today. She states she went approx 48 hours without sleep and she has noticed some changes to energy levels as well as irritability. 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 There may be a mood component to symptoms so will cont to monitor and fine tune dx. Pt states she is doing better on invega. Will increase dose today. May also have to lower dose of venlafaxine. Will monitor mood and sleep. Discussed possibility of finding a counselor that will do phone visits. 12  Subjective:   Pt reports significant problems with sleep and mood. She states invega \"started to help\" but now reports exacerbated symptoms. Pt reports she is feeling more depressed as well too and states the isolation is overwhelming. Prior to Admission medications    Medication Sig Start Date End Date Taking? Authorizing Provider   paliperidone (INVEGA) 3 mg SR tablet Take 1 Tab by mouth daily. 12/9/20  Yes Stacy Lee NP   traZODone (DESYREL) 100 mg tablet Take 2 Tabs by mouth nightly as needed for Sleep. 10/20/20   Andrews Calles NP   paliperidone (INVEGA) 1.5 mg ER tablet Take 1 Tab by mouth daily. 10/20/20 12/9/20  Andrews Calles NP   venlafaxine-SR [facility] 150 mg capsule Take 2 Caps by mouth daily (with breakfast). Indications: anxiousness associated with depression 3/31/20   Andrews Calles NP   bumetanide (BUMEX) 0.5 mg tablet Take 0.5 mg by mouth daily. Provider, Historical   famotidine (PEPCID) 20 mg tablet Take 1 Tab by mouth daily. Indications: heartburn 8/23/19   Nohelia SAN NP   rosuvastatin (CRESTOR) 10 mg tablet Take 1 Tab by mouth nightly.  Indications: high cholesterol 8/23/19   Nohelia SAN NP   aspirin delayed-release 81 mg tablet Take 1 Tab by mouth every evening. Indications: prevention of transient ischemic attack 8/23/19   Nathen SAN NP   metoprolol succinate (TOPROL-XL) 25 mg XL tablet Take 0.5 Tabs by mouth daily. Indications: high blood pressure 8/24/19   Jocelin Busby NP   polyethylene glycol (MIRALAX) 17 gram packet Take 1 Packet by mouth daily. Indications: constipation 8/24/19   Nathen SAN NP       ICD-10-CM ICD-9-CM    1. [facility] ([facility]  F22 297.1    2. Depression with anxiety  F41.8 300.4        ROS: depression/irritability: exacerbated, moderate severity, insomnia: exacerbated, severe, possible mixed features will cont to monitor    No flowsheet data found. Claire Renee, who was evaluated through a patient-initiated, synchronous (real-time) audio only encounter, and/or her healthcare decision maker, is aware that it is a billable service, with coverage as determined by her insurance carrier. She provided verbal consent to proceed: Yes. She has not had a related appointment within my department in the past 7 days or scheduled within the next 24 hours.       Total Time: minutes: 11-20 minutes    Khushi Gannon NP

## 2020-12-10 ENCOUNTER — TELEPHONE (OUTPATIENT)
Dept: BEHAVIORAL/MENTAL HEALTH CLINIC | Age: 70
End: 2020-12-10

## 2020-12-10 NOTE — TELEPHONE ENCOUNTER
----- Message from Anmol Yi NP sent at 12/9/2020  4:00 PM EST -----  Thad Muñoz,  Would you please call pt and inform her that she may be able to call [employer] in [place] and ask about their [employer]. They may be able to call her once a week of so for a check in.  Thank you, Angela Ortiz

## 2021-01-04 NOTE — INTERDISCIPLINARY ROUNDS
Behavioral Health Interdisciplinary Rounds Patient Name: Yvette Setting  Age: 76 y.o. Room/Bed:  729/ Primary Diagnosis: <principal problem not specified> Admission Status: Voluntary Readmission within 30 days: no 
Power of  in place: no 
Patient requires a blocked bed: no          Reason for blocked bed: VTE Prophylaxis: No 
 
Mobility needs/Fall risk: no 
Flu Vaccine : no  
Nutritional Plan: no 
Consults:         
Labs/Testing due today?: no 
 
Sleep hours: 7.0 Participation in Care/Groups:  yes Medication Compliant?: Yes PRNS (last 24 hours): Pain Restraints (last 24 hours):  no 
  
CIWA (range last 24 hours): CIWA-Ar Total: 0  
COWS (range last 24 hours): Alcohol screening (AUDIT) completed -   AUDIT Score: 0 If applicable, date SBIRT discussed in treatment team AND documented:  
AUDIT Screen Score: AUDIT Score: 0 Document Brief Intervention (corresponds directly with the 5 A's, Ask, Advise, Assess, Assist, and Arrange): At- Risk Patients (Score 7-15 for women; 8-15 for men) Discuss concern patient is drinking at unhealthy levels known to increase risk of alcohol-related health problems. Is Patient ready to commit to change? If No: 
? Encourage reflection ? Discuss short term and long term health risks of consuming alcohol ? Barriers to change ? Reaffirm willingness to help / Educational materials provided If Yes: 
? Set goal 
? Plan 
? Educational materials provided Harmful use or Dependence (Score 16 or greater) ? Discuss short term and long term health risks of consuming alcohol ? Recommendations ? Negotiate drinking goal 
? Recommend addiction specialist/center ? Arrange follow-up appointments. Tobacco - patient is a smoker: Have You Used Tobacco in the Past 30 Days: Never a smoker Illegal Drugs use: Have You Used Any Illegal Substances Over the Past 12 Months: No 
 
24 hour chart check complete:   
 
Patient goal(s) for today: Treatment team focus/goals: 
Progress note LOS:  3  Expected LOS: 
 
Financial concerns/prescription coverage:   
Date of last family contact:     
Family requesting physician contact today:   
Discharge plan: 
Guns in the home: Outpatient provider(s): 
 
Participating treatment team members: Alida Dumont, * (assigned SW), 
 Erythromycin Pregnancy And Lactation Text: This medication is Pregnancy Category B and is considered safe during pregnancy. It is also excreted in breast milk.

## 2021-03-22 RX ORDER — TRAZODONE HYDROCHLORIDE 100 MG/1
200 TABLET ORAL
Qty: 60 TAB | Refills: 0 | Status: SHIPPED | OUTPATIENT
Start: 2021-03-22 | End: 2021-04-26

## 2021-03-24 RX ORDER — PALIPERIDONE 3 MG/1
3 TABLET, EXTENDED RELEASE ORAL DAILY
Qty: 30 TAB | Refills: 0 | Status: SHIPPED | OUTPATIENT
Start: 2021-03-24 | End: 2021-04-23

## 2021-03-29 ENCOUNTER — TELEPHONE (OUTPATIENT)
Dept: BEHAVIORAL/MENTAL HEALTH CLINIC | Age: 71
End: 2021-03-29

## 2021-04-23 RX ORDER — PALIPERIDONE 3 MG/1
3 TABLET, EXTENDED RELEASE ORAL DAILY
Qty: 30 TAB | Refills: 0 | Status: SHIPPED | OUTPATIENT
Start: 2021-04-23 | End: 2021-05-12 | Stop reason: SDUPTHER

## 2021-04-26 RX ORDER — TRAZODONE HYDROCHLORIDE 100 MG/1
200 TABLET ORAL
Qty: 60 TAB | Refills: 0 | Status: SHIPPED | OUTPATIENT
Start: 2021-04-26 | End: 2021-05-12 | Stop reason: SDUPTHER

## 2021-04-26 RX ORDER — PALIPERIDONE 1.5 MG/1
1.5 TABLET, EXTENDED RELEASE ORAL DAILY
Qty: 30 TAB | Refills: 0 | OUTPATIENT
Start: 2021-04-26

## 2021-05-12 ENCOUNTER — VIRTUAL VISIT (OUTPATIENT)
Dept: BEHAVIORAL/MENTAL HEALTH CLINIC | Age: 71
End: 2021-05-12
Payer: MEDICARE

## 2021-05-12 DIAGNOSIS — F22 DELUSIONAL DISORDER (HCC): Primary | ICD-10-CM

## 2021-05-12 DIAGNOSIS — F41.8 DEPRESSION WITH ANXIETY: ICD-10-CM

## 2021-05-12 PROCEDURE — 99442 PR PHYS/QHP TELEPHONE EVALUATION 11-20 MIN: CPT | Performed by: NURSE PRACTITIONER

## 2021-05-12 RX ORDER — PALIPERIDONE 3 MG/1
3 TABLET, EXTENDED RELEASE ORAL DAILY
Qty: 90 TAB | Refills: 1 | Status: SHIPPED | OUTPATIENT
Start: 2021-05-12 | End: 2021-12-16

## 2021-05-12 RX ORDER — TRAZODONE HYDROCHLORIDE 100 MG/1
200 TABLET ORAL
Qty: 180 TAB | Refills: 1 | Status: SHIPPED | OUTPATIENT
Start: 2021-05-12 | End: 2021-12-16

## 2021-05-12 RX ORDER — VENLAFAXINE HYDROCHLORIDE 150 MG/1
300 CAPSULE, EXTENDED RELEASE ORAL
Qty: 180 CAP | Refills: 1 | Status: SHIPPED | OUTPATIENT
Start: 2021-05-12 | End: 2022-02-10 | Stop reason: SDUPTHER

## 2021-05-12 NOTE — PROGRESS NOTES
CHIEF COMPLAINT:  Miguelito Tony is a 79 y.o. female and was seen today for follow-up of psychiatric condition and psychotropic medication management. HPI:    Betina Wray reports the following psychiatric symptoms by hx:  depression, anxiety and psychosis. Overall symptoms have been present for months. Currently symptoms are of moderate/high severity. The symptoms occur intermittently. Pt reports medications are beneficial overall. Met with pt via audio telehealth for appt today. FAMILY/SOCIAL HX: psychosocial stressors    REVIEW OF SYSTEMS:  Psychiatric:  depression, anxiety, anxiety, psychosis  Appetite:good   Sleep: improved overall, states it is fitful at times  Neuro: no changes or updates    Side Effects:  none    MENTAL STATUS EXAM:   Sensorium  oriented to time, place and person   Relations cooperative   Appearance:  Not assessed   Motor Behavior:  not assessed   Speech:  normal volume   Thought Process: goal directed   Thought Content delusions and free of hallucinations   Suicidal ideations no intention   Homicidal ideations no intention   Mood:  sad and within normal limits   Affect:  Not observed   Memory recent  adequate   Memory remote:  adequate   Concentration:  adequate   Abstraction:  abstract/concrete   Insight:  fair and limited   Reliability fair and limited   Judgment:  fair and limited     MEDICAL DECISION MAKING:  Problems addressed today:    ICD-10-CM ICD-9-CM    1. Delusional disorder ([facility] [address]  F22 297.1    2. Depression with anxiety  F41.8 300.4        Assessment:   Betina Wray is responding to treatment. Symptoms are stable but are chronic and moderate to mod/high severity at baseline. Discussed current medications and dosages. Pt reports she has been taking medications regularly. No changes required at this time. Reviewed treatment goals and target symptoms to monitor for. Discussed options for increased structure. Will explore possibility of [place]. Plan:   1.     Current Outpatient Medications   Medication Sig Dispense Refill    traZODone (DESYREL) 100 mg tablet Take 2 Tabs by mouth nightly as needed for Sleep. 60 Tab 0    paliperidone (INVEGA) 3 mg SR tablet Take 1 Tab by mouth daily. 30 Tab 0    venlafaxine-SR (EFFEXOR-XR) 150 mg capsule Take 2 Caps by mouth daily (with breakfast). Indications: anxiousness associated with depression 180 Cap 5    bumetanide (BUMEX) 0.5 mg tablet Take 0.5 mg by mouth daily.  famotidine (PEPCID) 20 mg tablet Take 1 Tab by mouth daily. Indications: heartburn 30 Tab 0    rosuvastatin (CRESTOR) 10 mg tablet Take 1 Tab by mouth nightly. Indications: high cholesterol 30 Tab 0    aspirin delayed-release 81 mg tablet Take 1 Tab by mouth every evening. Indications: prevention of transient ischemic attack 30 Tab 0    metoprolol succinate (TOPROL-XL) 25 mg XL tablet Take 0.5 Tabs by mouth daily. Indications: high blood pressure 15 Tab 0    polyethylene glycol (MIRALAX) 17 gram packet Take 1 Packet by mouth daily. Indications: constipation 30 Packet 0          medication changes made today: cont trazodone, invega, venlafaxine    2. Counseling and coordination of care including instructions for treatment, risks/benefits, risk factor reduction and patient/family education. She agrees with the plan. Patient instructed to call with any side effects, questions or issues. 3.    Follow-up and Dispositions    · Return in about 3 months (around 8/12/2021). Phoebe Fields is a 79 y.o. female, evaluated via audio-only technology on 5/12/2021 for Medication Management    2:10pm: Called pt to review [facility] admission. Informed pt she will need to tour and get required paperwork. Advised pt to contact her  to get some support for a tour. Informed her we could assist with the paperwork. Pt will call back with more information.      Phoebe Fields, who was evaluated through a patient-initiated, synchronous (real-time) audio only encounter, and/or her healthcare decision maker, is aware that it is a billable service, with coverage as determined by her insurance carrier. She provided verbal consent to proceed: Yes. She has not had a related appointment within my department in the past 7 days or scheduled within the next 24 hours.       Total Time: minutes: 11-20 minutes        5/12/2021  Elgin Mckeon NP

## 2021-05-14 ENCOUNTER — DOCUMENTATION ONLY (OUTPATIENT)
Dept: BEHAVIORAL/MENTAL HEALTH CLINIC | Age: 71
End: 2021-05-14

## 2021-12-01 NOTE — PROGRESS NOTES
OFFICE VISIT      Patient: Denver Beavers   : 1969 MRN: [id number]    SUBJECTIVE:  Chief Complaint   Patient presents with   • Office Visit     follow up, wants blood test for liver/kidney panel, not fasting       A 51 year old male presents today for office visit.    Patient has given consent to record this visit for documentation in their clinical record.      HISTORY OF PRESENT ILLNESS:    Hyperlipidemia associated with type 2 diabetes mellitus (CMS/HCC): On Zocor 40 mg.     Coronary artery disease involving native coronary artery of native heart with angina pectoris (CMS/HCC): Has history of CAD.    S/P CABG x 4: Has history of CABG.     Hyperlipidemia LDL goal <70: Due for labs.     Hypertension associated with type 2 diabetes mellitus (CMS/HCC): On Zestril 20 mg.     Type 2 diabetes mellitus with hyperglycemia, with long-term current use of insulin (CMS/HCC): Due for labs.     Overweight with body mass index (BMI) of 28 to 28.9 in adult: weight is 178 lb.    Elevated transaminase level: Due for labs.     Anemia, unspecified type: Due for labs.     Anxiety and depression: On medication.     Primary insomnia: On medication.     Need for shingles vaccine: Due for vaccine.     Influenza vaccine needed: Due for vaccine.     Encounter for screening fecal occult blood testing/Colonoscopy refused: Due for labs.     PAST MEDICAL HISTORY:  Past Medical History:   Diagnosis Date   • Alcohol abuse 2017   • Anxiety    • Coronary artery disease    • Depression    • Depression 2018   • Diabetes mellitus, type 2 (CMS/HCC)     Insulin dependent   • Ecchymosis 2021   • Essential (primary) hypertension    • Fall from slipping on ice 2021   • Headache    • High cholesterol    • Hyperlipoproteinemia    • Locking finger joint 2019   • Musculoskeletal chest pain 2020   • Myocardial infarction (CMS/HCC)    • Need for influenza vaccination 2021   • Need for Tdap vaccination 2020   • NSTEMI  Problem: Altered Thought Process (Adult/Pediatric)  Goal: *STG: Complies with medication therapy  Note:   Received pt resting quietly in room. Greets oncoming staff. No acute distress noted at this time. Staff will continue to monitor q 15 min checks. (non-ST elevated myocardial infarction) (CMS/[facility]) 8/27/2020   • Refused influenza vaccine 12/27/2019   • Type 2 diabetes mellitus, uncontrolled (CMS/[facility]) 5/18/2017     MEDICATIONS:  Current Outpatient Medications   Medication Sig   • lisinopril (ZESTRIL) 20 MG tablet Take 1 tablet by mouth daily.   • BD Pen Needle Amita 2nd Gen 32G X 4 MM Misc PLEASE SEE ATTACHED FOR DETAILED DIRECTIONS   • insulin lispro 100 UNIT/ML injectable solution Inject 12 Units into the skin 3 times daily (before meals).   • OneTouch Verio test strip TEST BLOOD SUGAR ONE TIMES DAILY AS DIRECTED. DIAGNOSIS DM. METER   • Lancets (OneTouch Delica Plus Hyjcil00M) Misc    • glipiZIDE (GLUCOTROL) 10 MG tablet Take 1 tablet by mouth 2 times daily (before meals).   • metformin (GLUCOPHAGE) 1000 MG tablet Take 1 tablet by mouth 2 times daily (with meals).   • empagliflozin (Jardiance) 25 MG tablet Take 1 tablet by mouth daily (before breakfast).   • Insulin Syringe-Needle U-100 (B-D INSULIN SYRINGE ULTRAFINE) 30G X 1/2\" 1 ML Misc USE AS DIRECTED 4 TIMES DAILY   • buPROPion XL (WELLBUTRIN XL) 300 MG 24 hr tablet Take 1 tablet by mouth daily.   • simvastatin (ZOCOR) 40 MG tablet Take 1 tablet by mouth nightly.   • sitaGLIPtin (Januvia) 100 MG tablet Take 1 tablet by mouth daily.   • sertraline (ZOLOFT) 100 MG tablet TAKE 1.5 TABLETS BY MOUTH DAILY   • busPIRone (BUSPAR) 10 MG tablet Take 1 tablet by mouth 2 times daily.   • ALPRAZolam (XANAX) 1 MG tablet Take 1 tablet by mouth daily (before breakfast). As needed   • metoPROLOL succinate (Toprol XL) 100 MG 24 hr tablet Take 1 tablet by mouth every morning.   • metoPROLOL succinate (Toprol XL) 50 MG 24 hr tablet Take 1 tablet by mouth daily.   • aspirin 81 MG chewable tablet Chew 1 tablet by mouth daily.   • insulin glargine (Basaglar KwikPen) 100 UNIT/ML pen-injector 50 units qam and 36 units qpm   • Vitamin D, Cholecalciferol, 50 mcg (2,000 units) capsule Take 1 capsule by mouth daily.   • clopidogrel  (PLAVIX) 75 MG tablet Take 1 tablet by mouth daily.   • zolpidem (AMBIEN) 10 MG tablet Take 1 tablet by mouth nightly as needed for Sleep.     No current facility-administered medications for this visit.     ALLERGIES:  ALLERGIES:  No Known Allergies  PAST SURGICAL HISTORY:  Past Surgical History:   Procedure Laterality Date   • Coronary artery bypass graft  2021   • Rudy/cardioversion  2020   • Umbilical hernia repair       FAMILY HISTORY:  Family History   Problem Relation Age of Onset   • Anxiety disorder Mother    • Coronary Artery Disease Father    • Diabetes Maternal Uncle    • Diabetes Maternal Grandmother      SOCIAL HISTORY:  Social History     Tobacco Use   Smoking Status Former Smoker   • Packs/day: 1.50   • Types: Cigarettes   • Start date:    • Quit date:    • Years since quittin.9   Smokeless Tobacco Never Used     Social History     Substance and Sexual Activity   Alcohol Use Yes    Comment: occassionally       Review of Systems  Constitutional: Negative.  Negative for chills, diaphoresis, fever and malaise/fatigue.  HENT: Negative for congestion, ear discharge, ear pain, hearing loss, nosebleeds, sinus pain and sore throat.    Eyes: Negative for blurred vision, pain, discharge and redness.  Respiratory: Negative for cough, shortness of breath and wheezing.    Cardiovascular: Negative for chest pain and palpitations.  Gastrointestinal: Negative for constipation, diarrhea, nausea and vomiting.  Genitourinary: Negative for dysuria.  Musculoskeletal: Negative for myalgias.  Skin: Negative for itching and rash.  Neurological: Negative for dizziness and headaches.  Psychiatric/Behavioral: Negative.       All other systems reviewed and are negative.       OBJECTIVE:  Vitals:    21 1429   BP: 105/62   Pulse: 80   Resp: 16   Temp: 96.3 °F (35.7 °C)   Weight: 81 kg (178 lb 9.2 oz)   Height: 5' 6\" (1.676 m)   PainSc:  0       Physical Exam  Constitutional:       General: He is not in  acute distress.     Appearance: He is well-developed. He is not diaphoretic.   HENT:      Head: Normocephalic and atraumatic.      Right Ear: External ear normal.      Left Ear: External ear normal.      Nose: Nose normal.      Mouth/Throat:      Pharynx: No oropharyngeal exudate.   Eyes:      General: No scleral icterus.        Right eye: No discharge.         Left eye: No discharge.      Conjunctiva/sclera: Conjunctivae normal.      Pupils: Pupils are equal, round, and reactive to light.   Neck:      Thyroid: No thyromegaly.      Vascular: No JVD.      Trachea: No tracheal deviation.   Cardiovascular:      Rate and Rhythm: Normal rate and regular rhythm.      Heart sounds: Normal heart sounds. No murmur heard.  No friction rub. No gallop.    Pulmonary:      Effort: Pulmonary effort is normal. No respiratory distress.      Breath sounds: Normal breath sounds. No stridor. No wheezing or rales.   Chest:      Chest wall: No tenderness.   Abdominal:      General: Bowel sounds are normal. There is no distension.      Palpations: Abdomen is soft. There is no mass.      Tenderness: There is no abdominal tenderness. There is no guarding or rebound.   Musculoskeletal:         General: Normal range of motion.      Cervical back: Normal range of motion and neck supple.   Lymphadenopathy:      Cervical: No cervical adenopathy.   Skin:     General: Skin is warm.      Coloration: Skin is not pale.      Findings: No erythema or rash.   Neurological:      Mental Status: He is alert and oriented to person, place, and time.   Psychiatric:         Behavior: Behavior normal.         Thought Content: Thought content normal.         Judgment: Judgment normal.           DIAGNOSTIC STUDIES:  LAB RESULTS:  No visits with results within 1 Month(s) from this visit.   Latest known visit with results is:   Office Visit on 07/26/2021   Component Date Value Ref Range Status   • Sodium 07/26/2021 141  135 - 145 mmol/L Final   • Potassium  07/26/2021 3.8  3.4 - 5.1 mmol/L Final   • Chloride 07/26/2021 110* 98 - 107 mmol/L Final   • Carbon Dioxide 07/26/2021 23  21 - 32 mmol/L Final   • Anion Gap 07/26/2021 12  10 - 20 mmol/L Final   • Glucose 07/26/2021 158* 65 - 99 mg/dL Final   • BUN 07/26/2021 13  6 - 20 mg/dL Final   • Creatinine 07/26/2021 0.77  0.67 - 1.17 mg/dL Final   • Glomerular Filtration Rate 07/26/2021 >90  >90 mL/min/1.73m2 Final   • BUN/ Creatinine Ratio 07/26/2021 17  7 - 25 Final   • Calcium 07/26/2021 9.6  8.4 - 10.2 mg/dL Final   • Bilirubin, Total 07/26/2021 0.9  0.2 - 1.0 mg/dL Final   • GOT/AST 07/26/2021 26  <=37 Units/L Final   • GPT/ALT 07/26/2021 44  <64 Units/L Final   • Alkaline Phosphatase 07/26/2021 67  45 - 117 Units/L Final   • Albumin 07/26/2021 4.2  3.6 - 5.1 g/dL Final   • Protein, Total 07/26/2021 7.1  6.4 - 8.2 g/dL Final   • Globulin 07/26/2021 2.9  2.0 - 4.0 g/dL Final   • A/G Ratio 07/26/2021 1.4  1.0 - 2.4 Final   • Folate 07/26/2021 21.8  >=5.5 ng/mL Final   • Hemoglobin A1C 07/26/2021 6.9* 4.5 - 5.6 % Final   • Cholesterol 07/26/2021 202* <=199 mg/dL Final   • Triglycerides 07/26/2021 268* <=149 mg/dL Final   • HDL 07/26/2021 52  >=40 mg/dL Final   • LDL 07/26/2021 96  <=129 mg/dL Final   • Non-HDL Cholesterol 07/26/2021 150  mg/dL Final   • Cholesterol/ HDL Ratio 07/26/2021 3.9  <=4.4 Final   • Vitamin D, 25-Hydroxy 07/26/2021 24.7* 30.0 - 100.0 ng/mL Final   • COLOR, URINALYSIS 07/26/2021 Yellow   Final   • APPEARANCE, URINALYSIS 07/26/2021 Clear   Final   • GLUCOSE, URINALYSIS 07/26/2021 >=500* Negative mg/dL Final   • BILIRUBIN, URINALYSIS 07/26/2021 Negative  Negative Final   • KETONES, URINALYSIS 07/26/2021 Negative  Negative mg/dL Final   • SPECIFIC GRAVITY, URINALYSIS 07/26/2021 >1.030* 1.005 - 1.030 Final   • OCCULT BLOOD, URINALYSIS 07/26/2021 Negative  Negative Final   • PH, URINALYSIS 07/26/2021 6.0  5.0 - 7.0 Final   • PROTEIN, URINALYSIS 07/26/2021 Negative  Negative mg/dL Final   •  UROBILINOGEN, URINALYSIS 07/26/2021 0.2  0.2, 1.0 mg/dL Final   • NITRITE, URINALYSIS 07/26/2021 Negative  Negative Final   • LEUKOCYTE ESTERASE, URINALYSIS 07/26/2021 Negative  Negative Final   • TSH 07/26/2021 1.066  0.350 - 5.000 mcUnits/mL Final   • Microalbumin, Urine 07/26/2021 1.63  mg/dL Final   • Creatinine, Urine 07/26/2021 94.00  mg/dL Final   • Microalbumin/ Creatinine Ratio 07/26/2021 17.3  <30.0 mg/g Final   • Vitamin B12 07/26/2021 429  211 - 911 pg/mL Final   • WBC 07/26/2021 6.0  4.2 - 11.0 K/mcL Final   • RBC 07/26/2021 5.23  4.50 - 5.90 mil/mcL Final   • HGB 07/26/2021 16.1  13.0 - 17.0 g/dL Final   • HCT 07/26/2021 47.6  39.0 - 51.0 % Final   • MCV 07/26/2021 91.0  78.0 - 100.0 fl Final   • MCH 07/26/2021 30.8  26.0 - 34.0 pg Final   • MCHC 07/26/2021 33.8  32.0 - 36.5 g/dL Final   • RDW-CV 07/26/2021 12.3  11.0 - 15.0 % Final   • RDW-SD 07/26/2021 40.8  39.0 - 50.0 fL Final   • PLT 07/26/2021 185  140 - 450 K/mcL Final   • NRBC 07/26/2021 0  <=0 /100 WBC Final   • Neutrophil, Percent 07/26/2021 63  % Final   • Lymphocytes, Percent 07/26/2021 26  % Final   • Mono, Percent 07/26/2021 8  % Final   • Eosinophils, Percent 07/26/2021 2  % Final   • Basophils, Percent 07/26/2021 0  % Final   • Immature Granulocytes 07/26/2021 1  % Final   • Absolute Neutrophils 07/26/2021 3.8  1.8 - 7.7 K/mcL Final   • Absolute Lymphocytes 07/26/2021 1.6  1.0 - 4.0 K/mcL Final   • Absolute Monocytes 07/26/2021 0.5  0.3 - 0.9 K/mcL Final   • Absolute Eosinophils  07/26/2021 0.1  0.0 - 0.5 K/mcL Final   • Absolute Basophils 07/26/2021 0.0  0.0 - 0.3 K/mcL Final   • Absolute Immmature Granulocytes 07/26/2021 0.1  0.0 - 0.2 K/mcL Final   • HM DILATED EYE EXAM 04/26/2021 No Retinopathy   Final       ASSESSMENT AND PLAN:  This is a 51 year old male who presents with :  1. Hyperlipidemia associated with type 2 diabetes mellitus (CMS/[facility])    2. Coronary artery disease involving native coronary artery of native heart with  angina pectoris (CMS/[facility])    3. S/P CABG x 4    4. Hyperlipidemia LDL goal <70    5. Hypertension associated with type 2 diabetes mellitus (CMS/[facility])    6. Type 2 diabetes mellitus with hyperglycemia, with long-term current use of insulin (CMS/[facility])    7. Overweight with body mass index (BMI) of 28 to 28.9 in adult    8. Elevated transaminase level    9. Anemia, unspecified type    10. Anxiety and depression    11. Primary insomnia    12. Need for shingles vaccine    13. Influenza vaccine needed    14. Encounter for screening fecal occult blood testing    15. Colonoscopy refused        Orders Placed This Encounter   • ZOSTER SHINGLES RECOMBINANT ADJUVANTED IM(SHINGRIX)   • INFLUENZA QUADRIVALENT SPLIT PRES FREE 0.5 ML VACC, IM (FLULAVAL,FLUARIX,FLUZONE)   • CBC with Automated Differential   • Comprehensive Metabolic Panel   • Folate   • Glycohemoglobin   • Lipid Panel With Reflex   • Vitamin D -25 Hydroxy   • Vitamin B12   • Urinalysis & Reflex Microscopy With Culture If Indicated   • Thyroid Stimulating Hormone Reflex   • Microalbumin Urine Random   • Occult Blood - iFOB (aka FIT)       Plan:    Hyperlipidemia associated with type 2 diabetes mellitus (CMS/[facility]):  Continue current medication. Refills provided.  Ordered labs. Refer to orders. Reports will be notified.     Coronary artery disease involving native coronary artery of native heart with angina pectoris (CMS/[facility]):  Continue to monitor.     S/P CABG x 4:  Continue to monitor.     Hyperlipidemia LDL goal <70:  Ordered labs. Refer to orders. Reports will be notified.     Hypertension associated with type 2 diabetes mellitus (CMS/[facility]):  Continue current medication. Refills provided.    Type 2 diabetes mellitus with hyperglycemia, with long-term current use of insulin (CMS/[facility]):  Ordered labs. Refer to orders. Reports will be notified.     Overweight with body mass index (BMI) of 28 to 28.9 in adult:  Continue to monitor.     Elevated transaminase level:  Ordered  labs. Refer to orders. Reports will be notified.     Anemia, unspecified type:  Ordered labs. Refer to orders. Reports will be notified.     Anxiety and depression:  Continue current medication. Refills provided.    Primary insomnia:  Continue current medication. Refills provided.    Need for shingles vaccine:  Administered shingles vaccine in office today without any complications; guidelines, significance and side effects explained.    Influenza vaccine needed:  Administered influenza vaccine in office today without any complications; guidelines, significance and side effects explained.    Encounter for screening fecal occult blood testing/Colonoscopy refused:  Ordered labs. Refer to orders. Reports will be notified.     Refer to orders.  Medical compliance with plan discussed and risks of non-compliance reviewed.  Patient education completed on disease process, etiology & prognosis.  Proper usage and side effects of medications reviewed & discussed.  Patient understands and agrees with the plan.  Return to clinic as clinically indicated as discussed with patient who verbalized understanding of the plan and is in agreement with the plan.    I,  Dr. Nohelia Granado, have created a visit summary document based on the audio recording between Dr. Darnell Bhatt MD and this patient for the physician to review, edit as needed, and authenticate.  Creation Date: 12/1/2021     I have reviewed and edited the visit summary above and attest that it is accurate.

## 2021-12-03 NOTE — ED NOTES
Chief complaint:   Chief Complaint   Patient presents with   • Consultation     hemorrhoids       Vitals:  Visit Vitals  /76 (BP Location: RUE - Right upper extremity, Patient Position: Sitting, Cuff Size: Large Adult)   Pulse 69   Temp 97.2 °F (36.2 °C) (Temporal)   Resp 16   Wt 95.6 kg (210 lb 12.2 oz)   LMP 09/14/2014   SpO2 98%   BMI 37.33 kg/m²       HISTORY OF PRESENT ILLNESS     I am seeing the patient at the request of Dr. Laguna in consultation. Chief complaint is hemorrhoids.  The patient is a 61 year old female who presents for formal consultation regarding hemorrhoid problems.  She has had off and on problems for many years.  She recently had some irritation and blood on the tissue and the toilet.  She is doing fiber daily in the morning.  Bowel movements are about 3 times a day, somewhat irregular with occasional constipation.  She had a colonoscopy this past year which was essentially negative.  She had seen Dr. Rocha in the past for hemorrhoids, and anal fistula.  She does do straining with bowel movements.  Denies any other GI symptoms.  There is not a family history of colorectal disease or cancer.      Other significant problems:  Patient Active Problem List    Diagnosis Date Noted   • PAT (paroxysmal atrial tachycardia) (CMS/HCC) 03/27/2019     Priority: High   • PAC (premature atrial contraction) 02/18/2019     Priority: High   • PONV (postoperative nausea and vomiting) 04/29/2019     Priority: Low   • Chest pain      Priority: Low   • Anal fistual s/p seton placement 5/7/2019 01/21/2019     Priority: Low   • Chronic midline low back pain without sciatica 06/07/2018     Priority: Low   • Internal and external hemorrhoids without complication 12/31/2015     Priority: Low   • AD (atopic dermatitis) 01/12/2015     Priority: Low   • Unspecified hypothyroidism 05/02/2012     Priority: Low       PAST MEDICAL, FAMILY AND SOCIAL HISTORY     Medications:  Current Outpatient Medications   Medication  Discharge instructions reviewed with pt by Dr. Amanuel Herndon. pt able to return/verbalize discharge instructions. Copy of discharge instructions given. patient condition stable, respiratory status within normal limits, neuro status intact.  ambulatory out of er 
   • levothyroxine 88 MCG tablet   • Psyllium 100 % Powder   • UNKNOWN TO PATIENT   • Emollient (CERAVE) Cream   • traMADol (ULTRAM) 50 MG tablet   • clobetasol (TEMOVATE) 0.05 % ointment   • hydroCORTisone valerate (WESTCORT) 0.2 % cream   • ketoconazole (NIZORAL) 2 % cream   • docusate sodium (COLACE) 100 MG capsule   • Ketoconazole (NIZORAL A-D) 1 % Shampoo     No current facility-administered medications for this visit.       Allergies:  ALLERGIES:   Allergen Reactions   • Scopolamine VISUAL DISTURBANCE     Blurry vision with scopolamine patch, chest pressure   • Pollen Other (See Comments)     Gets reaction of itchy eyes per patient       Past Medical  History/Surgeries:  Past Medical History:   Diagnosis Date   • Allergic conjunctivitis    • Allergic rhinitis, seasonal    • Eczema     legs, dermatologist   • Fracture 1988    tip of finger left index on hand   • Hemorrhoids    • Hypothyroid 01/01/1990   • Medication management 10/16/2018    fasting glucose 103; ; HDL 67   • Motion sickness     on car rides   • Periodic health assessment, general screening, adult 02/09/2018    through work. Glu 99 HDL 64    • PONV (postoperative nausea and vomiting)    • Skin lesion 08/12/2019    left cheek   • Varicose vein     ligation in 2013       Past Surgical History:   Procedure Laterality Date   • Abdominoplasty 2.5hrs  2002   • Colonoscopy  06/17/2011    normal, repeat 10 years, Dr. Hernandez   • Colonoscopy diagnostic  08/13/2021    Affi screening, no bx recall 10yrs   • Knee arthroscopy w/ partial medial meniscectomy Right 09/30/2021   • Placement of seton  05/07/2019    Dr. Rocha - for anal fistula   • Varicose vein surgery Bilateral 2013    on legs       Family History:  Family History   Problem Relation Age of Onset   • Hypertension Mother         99 in 2021 - lives at her home with help with son   • Thyroid Mother    • High cholesterol Father    • Dementia/Alzheimers Father    • NEGATIVE FAMILY HX OF Sister          hysterectomy   • NEGATIVE FAMILY HX OF Brother         varicose veins   • NEGATIVE FAMILY HX OF Brother         varicose vein   • NEGATIVE FAMILY HX OF Brother    • Gastrointestinal Daughter         GERD   • Endocrine Disorder Daughter         pre diabetic   • Patient is unaware of any medical problems Daughter    DENIES ANY OTHER FAMILY HISTORY OF HEART DISEASE, CANCER, BLEEDING DISORDERS OR REACTIONS TO ANESTHESIA.    Social History:  Social History     Tobacco Use   • Smoking status: Never Smoker   • Smokeless tobacco: Never Used   Substance Use Topics   • Alcohol use: Yes     Alcohol/week: 0.0 standard drinks     Comment: rare, few glasses per year       REVIEW OF SYSTEMS     Review of Systems   Constitutional: Negative for chills, diaphoresis, fever and unexpected weight change.   HENT: Negative for congestion, ear discharge, ear pain, hearing loss, nosebleeds, sinus pressure, sore throat, tinnitus, trouble swallowing and voice change.    Eyes: Negative for discharge, redness and visual disturbance.        GLASSES   Respiratory: Negative for cough, shortness of breath, wheezing and stridor.    Cardiovascular: Negative for chest pain, palpitations and leg swelling.   Gastrointestinal: Negative for abdominal distention, abdominal pain, anal bleeding, blood in stool, constipation, diarrhea, nausea and vomiting.   Genitourinary: Negative for difficulty urinating, dysuria, frequency and hematuria.        DENIES INCONTINENCE OF URINE   Musculoskeletal: Negative for arthralgias, back pain and joint swelling.   Skin: Negative for color change and rash.        DENIES ANY NEW OR CHANGING SKIN LESIONS   Neurological: Negative for dizziness, seizures, syncope, weakness, light-headedness, numbness and headaches.   Hematological: Negative for adenopathy. Does not bruise/bleed easily.   Psychiatric/Behavioral: Negative for behavioral problems and sleep disturbance. The patient is not nervous/anxious.         DENIES  DEPRESSION  DENIES STRESS       PHYSICAL EXAM     Physical Exam  Constitutional:       Appearance: Normal appearance. She is well-developed.   HENT:      Right Ear: External ear normal.      Left Ear: External ear normal.      Nose: Nose normal.   Eyes:      General: Lids are normal.      Conjunctiva/sclera: Conjunctivae normal.      Pupils: Pupils are equal, round, and reactive to light.   Neck:      Thyroid: No thyroid mass or thyromegaly.   Cardiovascular:      Rate and Rhythm: Normal rate and regular rhythm.      Heart sounds: Normal heart sounds. No murmur heard.      Pulmonary:      Effort: Pulmonary effort is normal.      Breath sounds: Normal breath sounds.   Chest:   Breasts:      Right: No supraclavicular adenopathy.      Left: No supraclavicular adenopathy.       Abdominal:      Palpations: Abdomen is soft. There is no mass.      Tenderness: There is no abdominal tenderness.   Genitourinary:     Comments: On exam, with her straining, there is some prominence of the external hemorrhoids, right greater than left.  There is no significant prolapse of internal hemorrhoids at this time.  There is no evidence of an anal fissure.  There is no other evidence of any other perianal pathology.      Musculoskeletal:      Cervical back: Neck supple.   Lymphadenopathy:      Cervical: No cervical adenopathy.      Upper Body:      Right upper body: No supraclavicular adenopathy.      Left upper body: No supraclavicular adenopathy.   Skin:     General: Skin is warm and dry.   Neurological:      Mental Status: She is alert and oriented to person, place, and time.   Psychiatric:         Speech: Speech normal.         Behavior: Behavior normal. Behavior is cooperative.         ASSESSMENT/PLAN     At this point, I see no direct indications for surgery.  I would recommend consideration for medical management.  Consider increasing bulk fiber in the diet, and I discussed the difference between bulk fiber and roughage.  Consider  bulk fiber supplementation (see AVS).  When the hemorrhoids are irritated, I would recommend hot Sitz baths.  Phone follow-up in 1 month.  Follow-up sooner if any increased symptoms or any other concerns.

## 2021-12-16 RX ORDER — TRAZODONE HYDROCHLORIDE 100 MG/1
TABLET ORAL
Qty: 180 TABLET | Refills: 1 | Status: SHIPPED | OUTPATIENT
Start: 2021-12-16 | End: 2022-09-05

## 2021-12-16 RX ORDER — PALIPERIDONE 3 MG/1
TABLET, EXTENDED RELEASE ORAL
Qty: 90 TABLET | Refills: 1 | Status: SHIPPED | OUTPATIENT
Start: 2021-12-16

## 2022-02-10 ENCOUNTER — VIRTUAL VISIT (OUTPATIENT)
Dept: BEHAVIORAL/MENTAL HEALTH CLINIC | Age: 72
End: 2022-02-10
Payer: MEDICARE

## 2022-02-10 DIAGNOSIS — F22 DELUSIONAL DISORDER (HCC): ICD-10-CM

## 2022-02-10 DIAGNOSIS — F41.8 DEPRESSION WITH ANXIETY: Primary | ICD-10-CM

## 2022-02-10 PROCEDURE — 99442 PR PHYS/QHP TELEPHONE EVALUATION 11-20 MIN: CPT | Performed by: NURSE PRACTITIONER

## 2022-02-10 RX ORDER — VENLAFAXINE HYDROCHLORIDE 150 MG/1
300 CAPSULE, EXTENDED RELEASE ORAL
Qty: 180 CAPSULE | Refills: 1 | Status: SHIPPED | OUTPATIENT
Start: 2022-02-10 | End: 2022-09-05

## 2022-02-10 RX ORDER — VENLAFAXINE HYDROCHLORIDE 150 MG/1
300 CAPSULE, EXTENDED RELEASE ORAL
Qty: 180 CAPSULE | Refills: 1 | Status: SHIPPED | OUTPATIENT
Start: 2022-02-10 | End: 2022-02-10 | Stop reason: SDUPTHER

## 2022-02-10 NOTE — PROGRESS NOTES
CHIEF COMPLAINT:  Niels Urias is a 70 y.o. female and was seen today for follow-up of psychiatric condition and psychotropic medication management. HPI:    Nahed Soni reports the following psychiatric symptoms by hx:  depression, delusions and anxiety. Overall symptoms have been present for years. Currently symptoms are of moderate to moderate/high severity. The symptoms occur daily. Pt reports medications are of benefit. Pt states she has been dealing with stressors. Met with pt via audio telehealth for appt today to review current treatment plan. FAMILY/SOCIAL HX: psychosocial stressors, provided contact information for [address]:  Psychiatric symptoms being monitored for:  depression, anxiety, delusions  Appetite:decreased, reports 1 meal per day   Sleep: increased more than normal   Neuro: no updates    There were no vitals taken for this visit.: virtual    Side Effects:  none    MENTAL STATUS EXAM:   Sensorium  oriented to time, place and person   Relations cooperative and passive   Appearance:  Not assessed   Motor Behavior:  not assessed but reports she goes down to lobby at times   Speech:  monotone and normal volume   Thought Process: goal directed   Thought Content free of delusions and free of hallucinations   Suicidal ideations no intention   Homicidal ideations no intention   Mood:  depressed   Affect:  depressed   Memory recent  adequate   Memory remote:  adequate   Concentration:  adequate and impaired   Abstraction:  concrete   Insight:  fair and limited   Reliability fair and limited   Judgment:  fair and limited     MEDICAL DECISION MAKING:  Problems addressed today:    ICD-10-CM ICD-9-CM    1. Depression with anxiety  F41.8 300.4    2. [facility] ([facility]  F22 297.1        Assessment:   Nahed Soni is not fully responding to treatment. Symptoms are exacerbated. Pt has been dealing with significant stressors. Discussed current medications and dosages.  No changes made today. Discussed some options to increase structure. Reviewed treatment goals and target symptoms to monitor for. Plan:   1. Current Outpatient Medications   Medication Sig Dispense Refill    venlafaxine-SR (EFFEXOR-XR) 150 mg capsule Take 2 Capsules by mouth daily (with breakfast). Indications: anxiousness associated with depression 180 Capsule 1    paliperidone (INVEGA) 3 mg SR tablet Take 1 tablet by mouth daily. 90 Tablet 1    traZODone (DESYREL) 100 mg tablet Take 2 Tablets by mouth nightly as needed for Sleep. 180 Tablet 1    bumetanide (BUMEX) 0.5 mg tablet Take 0.5 mg by mouth daily.  famotidine (PEPCID) 20 mg tablet Take 1 Tab by mouth daily. Indications: heartburn 30 Tab 0    rosuvastatin (CRESTOR) 10 mg tablet Take 1 Tab by mouth nightly. Indications: high cholesterol 30 Tab 0    aspirin delayed-release 81 mg tablet Take 1 Tab by mouth every evening. Indications: prevention of transient ischemic attack 30 Tab 0    metoprolol succinate (TOPROL-XL) 25 mg XL tablet Take 0.5 Tabs by mouth daily. Indications: high blood pressure 15 Tab 0    polyethylene glycol (MIRALAX) 17 gram packet Take 1 Packet by mouth daily. Indications: constipation 30 Packet 0          medication changes made today: cont effexor, invega, trazodone    2. Counseling and coordination of care including instructions for treatment, risks/benefits, risk factor reduction and patient/family education. She agrees with the plan. Patient instructed to call with any side effects, questions or issues. 3.    Follow-up and Dispositions    · Return in about 3 months (around 5/10/2022).        Robson Madden is a 70 y.o. female, evaluated via audio-only technology on 2/10/2022 for Medication Management    Robson Madden, who was evaluated through a patient-initiated, synchronous (real-time) audio only encounter, and/or her healthcare decision maker, is aware that it is a billable service, which includes applicable co-pays, with coverage as determined by her insurance carrier. She provided verbal consent to proceed. She has not had a related appointment within my department in the past 7 days or scheduled within the next 24 hours. The patient was located at home in a state where the provider was licensed to provide care. On this date 02/10/2022 I have spent 15 minutes reviewing previous notes, test results and face to face (virtual) with the patient discussing the diagnosis and importance of compliance with the treatment plan as well as documenting on the day of the visit.       2/10/2022  Herman Francis NP

## 2022-02-22 ENCOUNTER — TELEPHONE (OUTPATIENT)
Dept: BEHAVIORAL/MENTAL HEALTH CLINIC | Age: 72
End: 2022-02-22

## 2022-02-22 NOTE — TELEPHONE ENCOUNTER
----- Message from Adela Dyer sent at 2/21/2022  4:24 PM EST -----  Regarding: Update  Russell Dean (sp?), Occupational Therapist at [facility] called to share information about patient.   # [phone]

## 2022-02-22 NOTE — TELEPHONE ENCOUNTER
Returned call. She was seen by OT and her therapist noted pt has not been maintaining ADL's and/or cleaning her apartment. Reviewed possibility that she may need to go Rehab.

## 2022-03-19 PROBLEM — Z86.000 HX OF CARCINOMA IN SITU OF BREAST: Status: ACTIVE | Noted: 2018-04-09

## 2022-03-19 PROBLEM — I50.9 ACUTE CHF (CONGESTIVE HEART FAILURE) (HCC): Status: ACTIVE | Noted: 2019-08-11

## 2022-03-19 PROBLEM — F60.9 PERSONALITY DISORDER (HCC): Status: ACTIVE | Noted: 2017-03-02

## 2022-03-19 PROBLEM — R87.619 ATYPICAL GLANDULAR CELLS OF UNDETERMINED SIGNIFICANCE (AGUS) ON CERVICAL PAP SMEAR: Status: ACTIVE | Noted: 2018-11-26

## 2022-03-19 PROBLEM — F22 DELUSIONAL DISORDER (HCC): Status: ACTIVE | Noted: 2019-08-14

## 2022-03-20 PROBLEM — E66.01 SEVERE OBESITY WITH BODY MASS INDEX (BMI) OF 35.0 TO 39.9 WITH SERIOUS COMORBIDITY (HCC): Status: ACTIVE | Noted: 2018-07-03

## 2022-09-05 ENCOUNTER — APPOINTMENT (OUTPATIENT)
Dept: GENERAL RADIOLOGY | Age: 72
DRG: 871 | End: 2022-09-05
Attending: EMERGENCY MEDICINE
Payer: MEDICARE

## 2022-09-05 ENCOUNTER — APPOINTMENT (OUTPATIENT)
Dept: CT IMAGING | Age: 72
DRG: 871 | End: 2022-09-05
Attending: EMERGENCY MEDICINE
Payer: MEDICARE

## 2022-09-05 ENCOUNTER — HOSPITAL ENCOUNTER (INPATIENT)
Age: 72
LOS: 8 days | Discharge: NURSING, REHAB OR LONG-TERM CARE | DRG: 871 | End: 2022-09-13
Attending: EMERGENCY MEDICINE | Admitting: HOSPITALIST
Payer: MEDICARE

## 2022-09-05 ENCOUNTER — APPOINTMENT (OUTPATIENT)
Dept: GENERAL RADIOLOGY | Age: 72
DRG: 871 | End: 2022-09-05
Attending: PHYSICIAN ASSISTANT
Payer: MEDICARE

## 2022-09-05 ENCOUNTER — APPOINTMENT (OUTPATIENT)
Dept: CT IMAGING | Age: 72
DRG: 871 | End: 2022-09-05
Attending: PHYSICIAN ASSISTANT
Payer: MEDICARE

## 2022-09-05 ENCOUNTER — APPOINTMENT (OUTPATIENT)
Dept: GENERAL RADIOLOGY | Age: 72
DRG: 871 | End: 2022-09-05
Attending: INTERNAL MEDICINE
Payer: MEDICARE

## 2022-09-05 DIAGNOSIS — N30.00 ACUTE CYSTITIS WITHOUT HEMATURIA: Primary | ICD-10-CM

## 2022-09-05 PROBLEM — R31.9 HEMATURIA: Status: ACTIVE | Noted: 2022-09-05

## 2022-09-05 PROBLEM — A41.9 SEPSIS (HCC): Status: ACTIVE | Noted: 2022-09-05

## 2022-09-05 PROBLEM — U07.1 COVID-19: Status: ACTIVE | Noted: 2022-09-05

## 2022-09-05 PROBLEM — R06.02 SHORTNESS OF BREATH: Status: ACTIVE | Noted: 2022-09-05

## 2022-09-05 LAB
ALBUMIN SERPL-MCNC: 2.6 G/DL (ref 3.5–5)
ALBUMIN/GLOB SERPL: 0.6 {RATIO} (ref 1.1–2.2)
ALP SERPL-CCNC: 109 U/L (ref 45–117)
ALT SERPL-CCNC: 15 U/L (ref 12–78)
ANION GAP SERPL CALC-SCNC: 6 MMOL/L (ref 5–15)
APPEARANCE UR: ABNORMAL
ARTERIAL PATENCY WRIST A: YES
AST SERPL W P-5'-P-CCNC: 28 U/L (ref 15–37)
ATRIAL RATE: 124 BPM
BACTERIA URNS QL MICRO: NEGATIVE /HPF
BACTERIA URNS QL MICRO: NEGATIVE /HPF
BASE DEFICIT BLDA-SCNC: 0.4 MMOL/L
BASOPHILS # BLD: 0.1 K/UL (ref 0–0.1)
BASOPHILS NFR BLD: 1 % (ref 0–1)
BDY SITE: ABNORMAL
BILIRUB SERPL-MCNC: 0.5 MG/DL (ref 0.2–1)
BILIRUB UR QL: NEGATIVE
BODY TEMPERATURE: 95.5
BUN SERPL-MCNC: 13 MG/DL (ref 6–20)
BUN/CREAT SERPL: 9 (ref 12–20)
CA-I BLD-MCNC: 9.6 MG/DL (ref 8.5–10.1)
CALCULATED P AXIS, ECG09: 65 DEGREES
CALCULATED R AXIS, ECG10: -10 DEGREES
CALCULATED T AXIS, ECG11: 88 DEGREES
CHLORIDE SERPL-SCNC: 102 MMOL/L (ref 97–108)
CO2 SERPL-SCNC: 25 MMOL/L (ref 21–32)
COHGB MFR BLD: 0.3 % (ref 1–2)
COLOR UR: ABNORMAL
COVID-19 RAPID TEST, COVR: DETECTED
CREAT SERPL-MCNC: 1.37 MG/DL (ref 0.55–1.02)
D DIMER PPP FEU-MCNC: 3.76 UG/ML(FEU)
DIAGNOSIS, 93000: NORMAL
DIFFERENTIAL METHOD BLD: ABNORMAL
EOSINOPHIL # BLD: 0 K/UL (ref 0–0.4)
EOSINOPHIL NFR BLD: 0 % (ref 0–7)
ERYTHROCYTE [DISTWIDTH] IN BLOOD BY AUTOMATED COUNT: 18 % (ref 11.5–14.5)
FIO2 ON VENT: 85 %
GAS FLOW.O2 SETTING OXYMISER: 14 L/MIN
GLOBULIN SER CALC-MCNC: 4.5 G/DL (ref 2–4)
GLUCOSE BLD STRIP.AUTO-MCNC: 135 MG/DL (ref 65–100)
GLUCOSE SERPL-MCNC: 200 MG/DL (ref 65–100)
GLUCOSE UR STRIP.AUTO-MCNC: NEGATIVE MG/DL
HCO3 BLDA-SCNC: 23 MMOL/L (ref 22–26)
HCT VFR BLD AUTO: 35.7 % (ref 35–47)
HGB BLD-MCNC: 11.4 G/DL (ref 11.5–16)
HGB UR QL STRIP: ABNORMAL
IMM GRANULOCYTES # BLD AUTO: 0.1 K/UL (ref 0–0.04)
IMM GRANULOCYTES NFR BLD AUTO: 0 % (ref 0–0.5)
IPAP/PIP, IPAPIP: 14
KETONES UR QL STRIP.AUTO: 15 MG/DL
LACTATE SERPL-SCNC: 1.7 MMOL/L (ref 0.4–2)
LACTATE SERPL-SCNC: 2.4 MMOL/L (ref 0.4–2)
LACTATE SERPL-SCNC: 3.7 MMOL/L (ref 0.4–2)
LEUKOCYTE ESTERASE UR QL STRIP.AUTO: ABNORMAL
LYMPHOCYTES # BLD: 1.4 K/UL (ref 0.8–3.5)
LYMPHOCYTES NFR BLD: 9 % (ref 12–49)
MCH RBC QN AUTO: 28.1 PG (ref 26–34)
MCHC RBC AUTO-ENTMCNC: 31.9 G/DL (ref 30–36.5)
MCV RBC AUTO: 87.9 FL (ref 80–99)
METHGB MFR BLD: 0.2 % (ref 0–1.4)
MONOCYTES # BLD: 0.9 K/UL (ref 0–1)
MONOCYTES NFR BLD: 6 % (ref 5–13)
NEUTS SEG # BLD: 12.7 K/UL (ref 1.8–8)
NEUTS SEG NFR BLD: 84 % (ref 32–75)
NITRITE UR QL STRIP.AUTO: NEGATIVE
NRBC # BLD: 0 K/UL (ref 0–0.01)
NRBC BLD-RTO: 0 PER 100 WBC
OXYHGB MFR BLD: 97.5 % (ref 95–99)
P-R INTERVAL, ECG05: 126 MS
PCO2 BLDA: 33 MMHG (ref 35–45)
PEEP RESPIRATORY: 6 CM[H2O]
PERFORMED BY, TECHID: ABNORMAL
PERFORMED BY, TECHID: ABNORMAL
PH BLDA: 7.47 [PH] (ref 7.35–7.45)
PH UR STRIP: 5 [PH]
PLATELET # BLD AUTO: 348 K/UL (ref 150–400)
PMV BLD AUTO: 10.3 FL (ref 8.9–12.9)
PO2 BLDA: 110 MMHG (ref 80–100)
POTASSIUM SERPL-SCNC: 4.7 MMOL/L (ref 3.5–5.1)
PROCALCITONIN SERPL-MCNC: 6.82 NG/ML
PROT SERPL-MCNC: 7.1 G/DL (ref 6.4–8.2)
PROT UR STRIP-MCNC: 30 MG/DL
Q-T INTERVAL, ECG07: 324 MS
QRS DURATION, ECG06: 68 MS
QTC CALCULATION (BEZET), ECG08: 465 MS
RBC # BLD AUTO: 4.06 M/UL (ref 3.8–5.2)
RBC #/AREA URNS HPF: >100 /HPF (ref 0–5)
RBC #/AREA URNS HPF: >100 /HPF (ref 0–5)
SAO2 % BLD: 98 % (ref 95–99)
SAO2% DEVICE SAO2% SENSOR NAME: ABNORMAL
SODIUM SERPL-SCNC: 133 MMOL/L (ref 136–145)
SPECIMEN SITE: ABNORMAL
UROBILINOGEN UR QL STRIP.AUTO: 0.1 EU/DL (ref 0.1–1)
VENTRICULAR RATE, ECG03: 124 BPM
WBC # BLD AUTO: 15.2 K/UL (ref 3.6–11)
WBC URNS QL MICRO: >100 /HPF (ref 0–4)
WBC URNS QL MICRO: >100 /HPF (ref 0–4)

## 2022-09-05 PROCEDURE — 82803 BLOOD GASES ANY COMBINATION: CPT

## 2022-09-05 PROCEDURE — 77010033711 HC HIGH FLOW OXYGEN

## 2022-09-05 PROCEDURE — 71045 X-RAY EXAM CHEST 1 VIEW: CPT

## 2022-09-05 PROCEDURE — 36600 WITHDRAWAL OF ARTERIAL BLOOD: CPT

## 2022-09-05 PROCEDURE — 74011250636 HC RX REV CODE- 250/636

## 2022-09-05 PROCEDURE — 74011250636 HC RX REV CODE- 250/636: Performed by: INTERNAL MEDICINE

## 2022-09-05 PROCEDURE — 85025 COMPLETE CBC W/AUTO DIFF WBC: CPT

## 2022-09-05 PROCEDURE — 74011000250 HC RX REV CODE- 250: Performed by: PHYSICIAN ASSISTANT

## 2022-09-05 PROCEDURE — 99223 1ST HOSP IP/OBS HIGH 75: CPT | Performed by: INTERNAL MEDICINE

## 2022-09-05 PROCEDURE — 80053 COMPREHEN METABOLIC PANEL: CPT

## 2022-09-05 PROCEDURE — 87635 SARS-COV-2 COVID-19 AMP PRB: CPT

## 2022-09-05 PROCEDURE — 87040 BLOOD CULTURE FOR BACTERIA: CPT

## 2022-09-05 PROCEDURE — 71275 CT ANGIOGRAPHY CHEST: CPT

## 2022-09-05 PROCEDURE — 82962 GLUCOSE BLOOD TEST: CPT

## 2022-09-05 PROCEDURE — 70450 CT HEAD/BRAIN W/O DYE: CPT

## 2022-09-05 PROCEDURE — 94660 CPAP INITIATION&MGMT: CPT

## 2022-09-05 PROCEDURE — 74011250636 HC RX REV CODE- 250/636: Performed by: EMERGENCY MEDICINE

## 2022-09-05 PROCEDURE — 83605 ASSAY OF LACTIC ACID: CPT

## 2022-09-05 PROCEDURE — 74011000250 HC RX REV CODE- 250: Performed by: HOSPITALIST

## 2022-09-05 PROCEDURE — 99285 EMERGENCY DEPT VISIT HI MDM: CPT

## 2022-09-05 PROCEDURE — 74011000250 HC RX REV CODE- 250: Performed by: EMERGENCY MEDICINE

## 2022-09-05 PROCEDURE — 74176 CT ABD & PELVIS W/O CONTRAST: CPT

## 2022-09-05 PROCEDURE — 84145 PROCALCITONIN (PCT): CPT

## 2022-09-05 PROCEDURE — 87086 URINE CULTURE/COLONY COUNT: CPT

## 2022-09-05 PROCEDURE — 74011000258 HC RX REV CODE- 258: Performed by: PHYSICIAN ASSISTANT

## 2022-09-05 PROCEDURE — 74011250636 HC RX REV CODE- 250/636: Performed by: PHYSICIAN ASSISTANT

## 2022-09-05 PROCEDURE — 74011000636 HC RX REV CODE- 636: Performed by: EMERGENCY MEDICINE

## 2022-09-05 PROCEDURE — 74011250637 HC RX REV CODE- 250/637: Performed by: HOSPITALIST

## 2022-09-05 PROCEDURE — 85379 FIBRIN DEGRADATION QUANT: CPT

## 2022-09-05 PROCEDURE — 65610000006 HC RM INTENSIVE CARE

## 2022-09-05 PROCEDURE — 5A09357 ASSISTANCE WITH RESPIRATORY VENTILATION, LESS THAN 24 CONSECUTIVE HOURS, CONTINUOUS POSITIVE AIRWAY PRESSURE: ICD-10-PCS | Performed by: INTERNAL MEDICINE

## 2022-09-05 PROCEDURE — 81001 URINALYSIS AUTO W/SCOPE: CPT

## 2022-09-05 PROCEDURE — 93005 ELECTROCARDIOGRAM TRACING: CPT

## 2022-09-05 PROCEDURE — 87449 NOS EACH ORGANISM AG IA: CPT

## 2022-09-05 RX ORDER — ACETAMINOPHEN 650 MG/1
650 SUPPOSITORY RECTAL
Status: DISCONTINUED | OUTPATIENT
Start: 2022-09-05 | End: 2022-09-13 | Stop reason: HOSPADM

## 2022-09-05 RX ORDER — SODIUM CHLORIDE 0.9 % (FLUSH) 0.9 %
5-40 SYRINGE (ML) INJECTION AS NEEDED
Status: DISCONTINUED | OUTPATIENT
Start: 2022-09-05 | End: 2022-09-13 | Stop reason: HOSPADM

## 2022-09-05 RX ORDER — METOPROLOL SUCCINATE 50 MG/1
25 TABLET, EXTENDED RELEASE ORAL DAILY
Status: DISCONTINUED | OUTPATIENT
Start: 2022-09-05 | End: 2022-09-12

## 2022-09-05 RX ORDER — ONDANSETRON 4 MG/1
4 TABLET, ORALLY DISINTEGRATING ORAL
Status: DISCONTINUED | OUTPATIENT
Start: 2022-09-05 | End: 2022-09-13 | Stop reason: HOSPADM

## 2022-09-05 RX ORDER — ATORVASTATIN CALCIUM 40 MG/1
40 TABLET, FILM COATED ORAL DAILY
COMMUNITY
Start: 2022-08-01

## 2022-09-05 RX ORDER — GABAPENTIN 100 MG/1
100 CAPSULE ORAL 2 TIMES DAILY
Status: DISCONTINUED | OUTPATIENT
Start: 2022-09-05 | End: 2022-09-13 | Stop reason: HOSPADM

## 2022-09-05 RX ORDER — ASPIRIN 81 MG/1
81 TABLET ORAL EVERY EVENING
Status: DISCONTINUED | OUTPATIENT
Start: 2022-09-05 | End: 2022-09-13 | Stop reason: HOSPADM

## 2022-09-05 RX ORDER — NOREPINEPHRINE BIT/0.9 % NACL 8 MG/250ML
.5-3 INFUSION BOTTLE (ML) INTRAVENOUS
Status: DISCONTINUED | OUTPATIENT
Start: 2022-09-05 | End: 2022-09-12

## 2022-09-05 RX ORDER — DEXAMETHASONE SODIUM PHOSPHATE 4 MG/ML
4 INJECTION, SOLUTION INTRA-ARTICULAR; INTRALESIONAL; INTRAMUSCULAR; INTRAVENOUS; SOFT TISSUE EVERY 12 HOURS
Status: DISCONTINUED | OUTPATIENT
Start: 2022-09-05 | End: 2022-09-13

## 2022-09-05 RX ORDER — FUROSEMIDE 10 MG/ML
40 INJECTION INTRAMUSCULAR; INTRAVENOUS ONCE
Status: COMPLETED | OUTPATIENT
Start: 2022-09-05 | End: 2022-09-05

## 2022-09-05 RX ORDER — FAMOTIDINE 20 MG/1
20 TABLET, FILM COATED ORAL DAILY
Status: DISCONTINUED | OUTPATIENT
Start: 2022-09-05 | End: 2022-09-13 | Stop reason: HOSPADM

## 2022-09-05 RX ORDER — SODIUM CHLORIDE 0.9 % (FLUSH) 0.9 %
5-40 SYRINGE (ML) INJECTION EVERY 8 HOURS
Status: DISCONTINUED | OUTPATIENT
Start: 2022-09-05 | End: 2022-09-13 | Stop reason: HOSPADM

## 2022-09-05 RX ORDER — FUROSEMIDE 10 MG/ML
INJECTION INTRAMUSCULAR; INTRAVENOUS
Status: COMPLETED
Start: 2022-09-05 | End: 2022-09-05

## 2022-09-05 RX ORDER — ONDANSETRON 2 MG/ML
4 INJECTION INTRAMUSCULAR; INTRAVENOUS
Status: DISCONTINUED | OUTPATIENT
Start: 2022-09-05 | End: 2022-09-13 | Stop reason: HOSPADM

## 2022-09-05 RX ORDER — TRAZODONE HYDROCHLORIDE 50 MG/1
50 TABLET ORAL
Status: DISCONTINUED | OUTPATIENT
Start: 2022-09-05 | End: 2022-09-13 | Stop reason: HOSPADM

## 2022-09-05 RX ORDER — POLYETHYLENE GLYCOL 3350 17 G/17G
17 POWDER, FOR SOLUTION ORAL DAILY
Status: DISCONTINUED | OUTPATIENT
Start: 2022-09-05 | End: 2022-09-13 | Stop reason: HOSPADM

## 2022-09-05 RX ORDER — MORPHINE SULFATE 2 MG/ML
1 INJECTION, SOLUTION INTRAMUSCULAR; INTRAVENOUS ONCE
Status: COMPLETED | OUTPATIENT
Start: 2022-09-05 | End: 2022-09-05

## 2022-09-05 RX ORDER — PALIPERIDONE 3 MG/1
3 TABLET, EXTENDED RELEASE ORAL DAILY
Status: DISCONTINUED | OUTPATIENT
Start: 2022-09-05 | End: 2022-09-13 | Stop reason: HOSPADM

## 2022-09-05 RX ORDER — ACETAMINOPHEN 325 MG/1
650 TABLET ORAL
Status: DISCONTINUED | OUTPATIENT
Start: 2022-09-05 | End: 2022-09-13 | Stop reason: HOSPADM

## 2022-09-05 RX ORDER — MORPHINE SULFATE 2 MG/ML
INJECTION, SOLUTION INTRAMUSCULAR; INTRAVENOUS
Status: COMPLETED
Start: 2022-09-05 | End: 2022-09-05

## 2022-09-05 RX ORDER — TRAZODONE HYDROCHLORIDE 50 MG/1
50 TABLET ORAL
COMMUNITY
Start: 2022-07-03

## 2022-09-05 RX ORDER — ATORVASTATIN CALCIUM 40 MG/1
40 TABLET, FILM COATED ORAL DAILY
Status: DISCONTINUED | OUTPATIENT
Start: 2022-09-05 | End: 2022-09-13 | Stop reason: HOSPADM

## 2022-09-05 RX ORDER — PALIPERIDONE 3 MG/1
3 TABLET, EXTENDED RELEASE ORAL DAILY
Status: DISCONTINUED | OUTPATIENT
Start: 2022-09-05 | End: 2022-09-05

## 2022-09-05 RX ORDER — VANCOMYCIN/0.9 % SOD CHLORIDE 1.5G/250ML
1500 PLASTIC BAG, INJECTION (ML) INTRAVENOUS ONCE
Status: COMPLETED | OUTPATIENT
Start: 2022-09-05 | End: 2022-09-05

## 2022-09-05 RX ORDER — GABAPENTIN 100 MG/1
100 CAPSULE ORAL 2 TIMES DAILY
COMMUNITY
Start: 2022-07-03

## 2022-09-05 RX ADMIN — DEXAMETHASONE SODIUM PHOSPHATE 4 MG: 4 INJECTION, SOLUTION INTRA-ARTICULAR; INTRALESIONAL; INTRAMUSCULAR; INTRAVENOUS; SOFT TISSUE at 13:06

## 2022-09-05 RX ADMIN — FUROSEMIDE 40 MG: 10 INJECTION INTRAMUSCULAR; INTRAVENOUS at 11:15

## 2022-09-05 RX ADMIN — AZITHROMYCIN DIHYDRATE 500 MG: 500 INJECTION, POWDER, LYOPHILIZED, FOR SOLUTION INTRAVENOUS at 20:53

## 2022-09-05 RX ADMIN — Medication 1500 MG: at 13:06

## 2022-09-05 RX ADMIN — MORPHINE SULFATE 1 MG: 2 INJECTION, SOLUTION INTRAMUSCULAR; INTRAVENOUS at 11:15

## 2022-09-05 RX ADMIN — ACETAMINOPHEN 650 MG: 325 TABLET, FILM COATED ORAL at 06:06

## 2022-09-05 RX ADMIN — PIPERACILLIN AND TAZOBACTAM 3.38 G: 3; .375 INJECTION, POWDER, FOR SOLUTION INTRAVENOUS at 16:18

## 2022-09-05 RX ADMIN — SODIUM CHLORIDE 1000 ML: 9 INJECTION, SOLUTION INTRAVENOUS at 08:24

## 2022-09-05 RX ADMIN — SODIUM CHLORIDE, PRESERVATIVE FREE 10 ML: 5 INJECTION INTRAVENOUS at 06:00

## 2022-09-05 RX ADMIN — FUROSEMIDE 40 MG: 10 INJECTION, SOLUTION INTRAMUSCULAR; INTRAVENOUS at 11:15

## 2022-09-05 RX ADMIN — IOPAMIDOL 100 ML: 755 INJECTION, SOLUTION INTRAVENOUS at 03:10

## 2022-09-05 RX ADMIN — SODIUM CHLORIDE 1813 ML: 9 INJECTION, SOLUTION INTRAVENOUS at 01:05

## 2022-09-05 RX ADMIN — Medication 4 MCG/MIN: at 18:13

## 2022-09-05 RX ADMIN — SODIUM CHLORIDE 500 ML: 9 INJECTION, SOLUTION INTRAVENOUS at 00:55

## 2022-09-05 RX ADMIN — PIPERACILLIN SODIUM AND TAZOBACTAM SODIUM 4.5 G: 4; .5 INJECTION, POWDER, LYOPHILIZED, FOR SOLUTION INTRAVENOUS at 09:19

## 2022-09-05 RX ADMIN — CEFTRIAXONE SODIUM 1 G: 1 INJECTION, POWDER, FOR SOLUTION INTRAMUSCULAR; INTRAVENOUS at 01:40

## 2022-09-05 RX ADMIN — SODIUM CHLORIDE, PRESERVATIVE FREE 10 ML: 5 INJECTION INTRAVENOUS at 21:00

## 2022-09-05 RX ADMIN — DEXAMETHASONE SODIUM PHOSPHATE 4 MG: 4 INJECTION, SOLUTION INTRA-ARTICULAR; INTRALESIONAL; INTRAMUSCULAR; INTRAVENOUS; SOFT TISSUE at 20:53

## 2022-09-05 RX ADMIN — ACETAMINOPHEN 650 MG: 650 SUPPOSITORY RECTAL at 16:17

## 2022-09-05 RX ADMIN — SODIUM CHLORIDE, PRESERVATIVE FREE 5 ML: 5 INJECTION INTRAVENOUS at 13:14

## 2022-09-05 NOTE — PROGRESS NOTES
Vancomycin Dosing Consult  Day #1 of vancomycin therapy  Consult ordered by Rogelio Talbert PA-C for this 70 y.o. female for indication of sepsis.   Antibiotic regimen: Vancomycin + Zosyn    Temp (24hrs), Av.8 °F (37.7 °C), Min:98.3 °F (36.8 °C), Max:101.4 °F (38.6 °C)    Recent Labs     22  0034   WBC 15.2*   CREA 1.37*   BUN 13     Est CrCl: 35-40 mL/min  Concomitant nephrotoxic drugs: None    Cultures:    Blood: Pending    MRSA Swab: Pending    Target range: Trough 10-15 mcg/mL      Assessment/Plan:   Febrile, tachycardic, hypotensive, leukocytic, lactic acidosis present  ANISH, baseline SCr from 2019 was ~0.8, will pulse dose for now  Initiate therapy with a loading dose of 1500 mg and check a random level tomorrow morning  Antimicrobial stop date TBD

## 2022-09-05 NOTE — PROGRESS NOTES
Pt received from ER. Pt on HFNC Alert to place and self. Pt complaining SOB. Thick frothy sputum suctioned coarse rales heard throughout. Dr Bam Thomas notified.

## 2022-09-05 NOTE — ED NOTES
While patient was being transported for CT, patient was alert but not responding to questions. MD called to evaluate patient. New orders received for CT head. Patient transported to CT by staff.

## 2022-09-05 NOTE — Clinical Note
[facility] EMERGENCY DEPT  [address] [address]  [phone]    Work/School Note    Date: 9/5/2022     To Whom It May concern:    Shannon Reich was evaluated by the following provider(s):  Attending Provider: Dilcia Pope [address] [place] [address] virus is suspected. Per the CDC guidelines we recommend home isolation until the following conditions are all met:    1. At least five days have passed since symptoms first appeared and/or had a close exposure,   2. After home isolation for five days, wearing a mask around others for the next five days,  3. At least 24 have passed since last fever without the use of fever-reducing medications and  4.  Symptoms (eg cough, shortness of breath) have improved      Sincerely,          Kobe Sheehan MD

## 2022-09-05 NOTE — CONSULTS
Consult Date: 9/5/2022    Consults  Sepsis    Subjective   This is a 70year old female with multiple co-morbidities including breast cancer, CKD, domiciled at [facility] brought to ED  because of SOB with hypoxia. She is Covid positive. She had low grade temperature with tachycardic and tachypnea. He WBC was 15,200 with elevated lactic acid. Urinalysis showed marked pyuria but no bacteria. CXR was unremarkable  CT Chest showed mild patchy groundglass opacity in the left lung base may represent early infection or inflammatory process. Also showed distended gallbladder (LFTs normal). Images reviewed by me. CT Head also unremarkable. Blood and urine cultures were ordered. Patient was placed on nasal cannula then high flow nasal cannula. She was started on IV Vancomycin and Zosyn. ID has been consulted for this reason. Pulmonary also consulted as well as Orthopedics for ankle fracture and Urology for hematuria. Patient seen in the ICU where she is now on BIPAP with limited commuication. She has now tested positive for Covid-19 here. Past Medical History:   Diagnosis Date    Arthritis     GENERALIZED IN JOINTS.     Breast cancer ([address]     Left Breast Cancer 1995    Cancer [facility])     left breast - surgery/radiation    Chronic kidney disease     Chronic pain     Back pain    Depression     Mood Swings, anxiety    GERD (gastroesophageal reflux disease)     Hiatal Hernia    Headache     Heart failure (HCC)     Hypercholesterolemia     Hypertension     CONTROLLED BY MEDS., Pt states she is no liong er on BP med's    Ill-defined condition     Neuropathy bilat feet    Irregular heart beat     Other ill-defined conditions(799.89)     MIGRAINES    Other ill-defined conditions(799.89)     neuropathy of lower legs and feet    Other ill-defined conditions(799.89)     increased cholesterol    Other ill-defined conditions(799.89)     bronchitis    Psychotic disorder (HCC)     S/P radiation therapy     1995 Left Breast Unspecified adverse effect of anesthesia     HEART PALPITATION. Past Surgical History:   Procedure Laterality Date    HX BREAST LUMPECTOMY Left     1995 - POSITIVE    HX ORTHOPAEDIC  2006    LEFT KNEE ARTHROSCOPY    HX ORTHOPAEDIC  4/2010    RIGHT KNEE ARTHROSCOPY, concrete nail placed    HX ORTHOPAEDIC      Hammertoe , doesnt know which foot    HX ORTHOPAEDIC      Small growth removed from between toes but doesnt know which foot    FL BREAST SURGERY PROCEDURE UNLISTED  7/1995    DCIS /BIOPSIES MULTIPLE.     FL BREAST SURGERY PROCEDURE UNLISTED  1995    LUMPECTOMY WITH RADIATION INSITU     Family History   Problem Relation Age of Onset    Cancer Mother         BLADDER CA    Heart Disease Father     Lung Disease Father       Social History     Tobacco Use    Smoking status: Never    Smokeless tobacco: Never   Substance Use Topics    Alcohol use: No       Current Facility-Administered Medications   Medication Dose Route Frequency Provider Last Rate Last Admin    famotidine (PEPCID) tablet 20 mg  20 mg Oral DAILY Joan Matute MD        aspirin delayed-release tablet 81 mg  81 mg Oral QPM Joan Matute MD        metoprolol succinate (TOPROL-XL) XL tablet 25 mg  25 mg Oral DAILY Joan Matute MD        polyethylene glycol (MIRALAX) packet 17 g  17 g Oral DAILY Joan Matute MD        gabapentin (NEURONTIN) capsule 100 mg  100 mg Oral BID Joan Matute MD        atorvastatin (LIPITOR) tablet 40 mg  40 mg Oral DAILY Joan Matute MD        traZODone (DESYREL) tablet 50 mg  50 mg Oral QHS Joan Matute MD        sodium chloride (NS) flush 5-40 mL  5-40 mL IntraVENous Q8H Joan Matute MD   10 mL at 09/05/22 0600    sodium chloride (NS) flush 5-40 mL  5-40 mL IntraVENous PRN Joan Matute MD        acetaminophen (TYLENOL) tablet 650 mg  650 mg Oral Q6H PRN Joan Matute MD   650 mg at 09/05/22 0606    Or    acetaminophen (TYLENOL) suppository 650 mg  650 mg Rectal Q6H PRN Sydney Medina MD        ondansetron (ZOFRAN ODT) tablet 4 mg  4 mg Oral Q6H PRN Sydney Medina MD        Or    ondansetron (ZOFRAN) injection 4 mg  4 mg IntraVENous Q6H PRN Sydney Medina MD        sodium chloride 0.9 % bolus infusion 1,000 mL  1,000 mL IntraVENous ONCE Jose F Ritchie PA-C 1,000 mL/hr at 09/05/22 0824 1,000 mL at 09/05/22 0824    paliperidone (INVEGA) SR tablet 3 mg  3 mg Oral DAILY Sydney Medina MD        piperacillin-tazobactam (ZOSYN) 3.375 g in 0.9% sodium chloride (MBP/ADV) 100 mL MBP  3.375 g IntraVENous Q8H Westley Ritchie PA-C        vancomycin (VANCOCIN) 1,000 mg in 0.9% sodium chloride 250 mL ([id number])  1,000 mg IntraVENous Q12H Jose F Ritchie PA-C         Current Outpatient Medications   Medication Sig Dispense Refill    gabapentin (NEURONTIN) 100 mg capsule Take 100 mg by mouth two (2) times a day. atorvastatin (LIPITOR) 40 mg tablet Take 40 mg by mouth daily. traZODone (DESYREL) 50 mg tablet Take 50 mg by mouth nightly. paliperidone (INVEGA) 3 mg SR tablet Take 1 tablet by mouth daily. 90 Tablet 1    famotidine (PEPCID) 20 mg tablet Take 1 Tab by mouth daily. Indications: heartburn 30 Tab 0    aspirin delayed-release 81 mg tablet Take 1 Tab by mouth every evening. Indications: prevention of transient ischemic attack 30 Tab 0    metoprolol succinate (TOPROL-XL) 25 mg XL tablet Take 0.5 Tabs by mouth daily. Indications: high blood pressure 15 Tab 0    polyethylene glycol (MIRALAX) 17 gram packet Take 1 Packet by mouth daily.  Indications: constipation 30 Packet 0        Review of Systems   Unable to perform ROS: Acuity of condition     Objective     Vital signs for last 24 hours:  Visit Vitals  BP (!) 89/59   Pulse 90   Temp 98.3 °F (36.8 °C)   Resp 25   Ht 5' 2\" (1.575 m)   Wt 170 lb (77.1 kg)   SpO2 92%   BMI 31.09 kg/m²       Intake/Output this shift:  Current Shift: No intake/output data recorded. Last 3 Shifts: 09/03 1901 - 09/05 0700  In: 2313 [I.V.:2313]  Out: -     Data Review:   Recent Results (from the past 24 hour(s))   EKG, 12 LEAD, INITIAL    Collection Time: 09/05/22 12:29 AM   Result Value Ref Range    Ventricular Rate 124 BPM    Atrial Rate 124 BPM    P-R Interval 126 ms    QRS Duration 68 ms    Q-T Interval 324 ms    QTC Calculation (Bezet) 465 ms    Calculated P Axis 65 degrees    Calculated R Axis -10 degrees    Calculated T Axis 88 degrees    Diagnosis       Sinus tachycardia  Low voltage QRS  Borderline ECG  No previous ECGs available  Confirmed by Ha Trujillo MD, Suly Keller ([id number]) on 9/5/2022 9:06:20 AM     CBC WITH AUTOMATED DIFF    Collection Time: 09/05/22 12:34 AM   Result Value Ref Range    WBC 15.2 (H) 3.6 - 11.0 K/uL    RBC 4.06 3.80 - 5.20 M/uL    HGB 11.4 (L) 11.5 - 16.0 g/dL    HCT 35.7 35.0 - 47.0 %    MCV 87.9 80.0 - 99.0 FL    MCH 28.1 26.0 - 34.0 PG    MCHC 31.9 30.0 - 36.5 g/dL    RDW 18.0 (H) 11.5 - 14.5 %    PLATELET 775 737 - 139 K/uL    MPV 10.3 8.9 - 12.9 FL    NRBC 0.0 0.0  WBC    ABSOLUTE NRBC 0.00 0.00 - 0.01 K/uL    NEUTROPHILS 84 (H) 32 - 75 %    LYMPHOCYTES 9 (L) 12 - 49 %    MONOCYTES 6 5 - 13 %    EOSINOPHILS 0 0 - 7 %    BASOPHILS 1 0 - 1 %    IMMATURE GRANULOCYTES 0 0 - 0.5 %    ABS. NEUTROPHILS 12.7 (H) 1.8 - 8.0 K/UL    ABS. LYMPHOCYTES 1.4 0.8 - 3.5 K/UL    ABS. MONOCYTES 0.9 0.0 - 1.0 K/UL    ABS. EOSINOPHILS 0.0 0.0 - 0.4 K/UL    ABS. BASOPHILS 0.1 0.0 - 0.1 K/UL    ABS. IMM.  GRANS. 0.1 (H) 0.00 - 0.04 K/UL    DF AUTOMATED     METABOLIC PANEL, COMPREHENSIVE    Collection Time: 09/05/22 12:34 AM   Result Value Ref Range    Sodium 133 (L) 136 - 145 mmol/L    Potassium 4.7 3.5 - 5.1 mmol/L    Chloride 102 97 - 108 mmol/L    CO2 25 21 - 32 mmol/L    Anion gap 6 5 - 15 mmol/L    Glucose 200 (H) 65 - 100 mg/dL    BUN 13 6 - 20 mg/dL    Creatinine 1.37 (H) 0.55 - 1.02 mg/dL    BUN/Creatinine ratio 9 (L) 12 - 20      GFR est AA 46 (L) >60 ml/min/1.73m2    GFR est non-AA 38 (L) >60 ml/min/1.73m2    Calcium 9.6 8.5 - 10.1 mg/dL    Bilirubin, total 0.5 0.2 - 1.0 mg/dL    AST (SGOT) 28 15 - 37 U/L    ALT (SGPT) 15 12 - 78 U/L    Alk. phosphatase 109 45 - 117 U/L    Protein, total 7.1 6.4 - 8.2 g/dL    Albumin 2.6 (L) 3.5 - 5.0 g/dL    Globulin 4.5 (H) 2.0 - 4.0 g/dL    A-G Ratio 0.6 (L) 1.1 - 2.2     LACTIC ACID    Collection Time: 09/05/22 12:34 AM   Result Value Ref Range    Lactic acid 2.4 (HH) 0.4 - 2.0 mmol/L   D DIMER    Collection Time: 09/05/22 12:34 AM   Result Value Ref Range    D DIMER 3.76 (H) <0.50 ug/ml(FEU)   URINALYSIS W/ RFLX MICROSCOPIC    Collection Time: 09/05/22  1:05 AM   Result Value Ref Range    Color Yellow/Straw      Appearance Cloudy (A) Clear      pH (UA) 5.0      Protein 30 (A) Negative mg/dL    Glucose Negative Negative mg/dL    Ketone 15 (A) Negative mg/dL    Bilirubin Negative Negative      Blood Large (A) Negative      Urobilinogen 0.1 0.1 - 1.0 EU/dL    Nitrites Negative Negative      Leukocyte Esterase Large (A) Negative      WBC >100 (H) 0 - 4 /hpf    RBC >100 (H) 0 - 5 /hpf    Bacteria Negative Negative /hpf   URINE MICROSCOPIC    Collection Time: 09/05/22  1:05 AM   Result Value Ref Range    WBC >100 (H) 0 - 4 /hpf    RBC >100 (H) 0 - 5 /hpf    Bacteria Negative Negative /hpf   LACTIC ACID    Collection Time: 09/05/22  2:28 AM   Result Value Ref Range    Lactic acid 3.7 (HH) 0.4 - 2.0 mmol/L     CT Chest (9/5)        Physical Exam  Vitals and nursing note reviewed. Constitutional:       General: She is in acute distress. Appearance: She is ill-appearing. HENT:      Head: Normocephalic and atraumatic. Right Ear: External ear normal.      Left Ear: External ear normal.      Nose:      Comments: BIPAP mask  Eyes:      Pupils: Pupils are equal, round, and reactive to light. Cardiovascular:      Rate and Rhythm: Regular rhythm. Tachycardia present. Heart sounds: No murmur heard.   Pulmonary: Effort: No respiratory distress. Breath sounds: Rhonchi present. Abdominal:      General: Bowel sounds are normal. There is no distension. Palpations: Abdomen is soft. Tenderness: There is no abdominal tenderness. Genitourinary:     Comments: External urinary device  Musculoskeletal:      Cervical back: Neck supple. Right lower leg: No edema. Left lower leg: No edema. Comments: Left calf/foot in hard cast   Skin:     Findings: No rash. Neurological:      Mental Status: She is alert. Comments: Unable to assess   Psychiatric:      Comments: Unable to assess       ASSESSMENT/PLAN    Severe sepsis with fever, tachycardia, tachypnea, leukocytosis, elevated lactic acid and procal  UTI with marked pyuria  3. Covid-19 infection  4. Acute hypoxic respiratory failure on BIPAP  5. Cardiomyopathy  6. Left ankle fracture, casted  7. Distended gallbladder    Comment:  SOB may have multifactorial etiology. No significant ground glass changes on CT to indicated Covid-19. Agree with IV Zosyn for probable UTI. Reasonable to discontinue Vancomycin at this time. Agree with Dexamethasone and would start Baricitinib. Continue IV Zosyn   Discontinue Vancomycin for now  Continue Dexamethasone  Start Azithromycin and Bariticinib IV  Follow-up blood and urine cultures  In am, repeat CBC, lactic acid, procal , check CRP, LDH, ferritin, Mycoplasma IgM   Urine for Legionella antigen  8. Watch for QT prolongation (DDI with Azithromycin and Invega)    Clifton A. Antone Homans, MD

## 2022-09-05 NOTE — ED PROVIDER NOTES
EMERGENCY DEPARTMENT HISTORY AND PHYSICAL EXAM      Date: 9/5/2022  Patient Name: Alia Coppola    History of Presenting Illness     Chief Complaint   Patient presents with    Shortness of Breath       History Provided By: Patient    HPI: Alia Coppola, 70 y.o. female with a known past medical history significant for hypercholesterolemia and hypertension presents with shortness of breath that began earlier. Patient's oxygen saturations for EMS were in the 80s. Patient's been maintained on 2 L since arriving in the emergency room. She denies any fever, chills, nausea, vomiting, chest pain, rash, diarrhea, headache, night sweats symptoms are mild to moderate, constant but not progressive. She says the symptoms started earlier today without any propagating factors. There are no other complaints, changes, or physical findings at this time. PCP: Catalina Hull MD    Current Facility-Administered Medications   Medication Dose Route Frequency Provider Last Rate Last Admin    cefTRIAXone (ROCEPHIN) 1 g in sterile water (preservative free) 10 mL IV syringe  1 g IntraVENous Q24H Aashish Acosta MD   1 g at 09/05/22 0140     Current Outpatient Medications   Medication Sig Dispense Refill    venlafaxine-SR (EFFEXOR-XR) 150 mg capsule Take 2 Capsules by mouth daily (with breakfast). Indications: anxiousness associated with depression 180 Capsule 1    paliperidone (INVEGA) 3 mg SR tablet Take 1 tablet by mouth daily. 90 Tablet 1    traZODone (DESYREL) 100 mg tablet Take 2 Tablets by mouth nightly as needed for Sleep. 180 Tablet 1    bumetanide (BUMEX) 0.5 mg tablet Take 0.5 mg by mouth daily. famotidine (PEPCID) 20 mg tablet Take 1 Tab by mouth daily. Indications: heartburn 30 Tab 0    rosuvastatin (CRESTOR) 10 mg tablet Take 1 Tab by mouth nightly. Indications: high cholesterol 30 Tab 0    aspirin delayed-release 81 mg tablet Take 1 Tab by mouth every evening.  Indications: prevention of transient ischemic attack 30 Tab 0    metoprolol succinate (TOPROL-XL) 25 mg XL tablet Take 0.5 Tabs by mouth daily. Indications: high blood pressure 15 Tab 0    polyethylene glycol (MIRALAX) 17 gram packet Take 1 Packet by mouth daily. Indications: constipation 30 Packet 0       Past History   Past Medical History:  Past Medical History:   Diagnosis Date    Arthritis     GENERALIZED IN JOINTS. Breast cancer ([address]     Left Breast Cancer 1995    Cancer [facility])     left breast - surgery/radiation    Chronic kidney disease     Chronic pain     Back pain    Depression     Mood Swings, anxiety    GERD (gastroesophageal reflux disease)     Hiatal Hernia    Headache     Heart failure (HCC)     Hypercholesterolemia     Hypertension     CONTROLLED BY MEDS., Pt states she is no liong er on BP med's    Ill-defined condition     Neuropathy bilat feet    Irregular heart beat     Other ill-defined conditions(799.89)     MIGRAINES    Other ill-defined conditions(799.89)     neuropathy of lower legs and feet    Other ill-defined conditions(799.89)     increased cholesterol    Other ill-defined conditions(799.89)     bronchitis    Psychotic disorder (HCC)     S/P radiation therapy     1995 Left Breast    Unspecified adverse effect of anesthesia     HEART PALPITATION. Past Surgical History:  Past Surgical History:   Procedure Laterality Date    HX BREAST LUMPECTOMY Left     1995 - POSITIVE    HX ORTHOPAEDIC  2006    LEFT KNEE ARTHROSCOPY    HX ORTHOPAEDIC  4/2010    RIGHT KNEE ARTHROSCOPY, concrete nail placed    HX ORTHOPAEDIC      Hammertoe , doesnt know which foot    HX ORTHOPAEDIC      Small growth removed from between toes but doesnt know which foot    MD BREAST SURGERY PROCEDURE UNLISTED  7/1995    DCIS /BIOPSIES MULTIPLE.     MD BREAST SURGERY PROCEDURE UNLISTED  1995    LUMPECTOMY WITH RADIATION INSITU       Family History:  Family History   Problem Relation Age of Onset    Cancer Mother         BLADDER CA    Heart Disease Father     Lung Disease Father        Social History:  Social History     Tobacco Use    Smoking status: Never    Smokeless tobacco: Never   Substance Use Topics    Alcohol use: No    Drug use: No       Allergies: Allergies   Allergen Reactions    Epinephrine Palpitations     \"Makes my heart  ' shudder'. \" Allergic to epinephrine with novocain. Plain epinephrine is ok. Esomeprazole Other (comments)    Naproxen Other (comments)    Novocain [Procaine] Palpitations    Nsaids (Non-Steroidal Anti-Inflammatory Drug) Other (comments)     GASTRIC DISTRESS. Aspirin Other (comments)     Gastric irritation    Ibuprofen Other (comments)     Gastric irritation     Review of Systems   Review of Systems   Constitutional: Negative. Negative for appetite change, chills, fatigue and fever. HENT: Negative. Negative for congestion and sinus pain. Eyes: Negative. Negative for pain and visual disturbance. Respiratory:  Positive for shortness of breath. Negative for chest tightness. Cardiovascular: Negative. Negative for chest pain. Gastrointestinal: Negative. Negative for abdominal pain, diarrhea, nausea and vomiting. Genitourinary: Negative. Negative for difficulty urinating. No discharge   Musculoskeletal: Negative. Negative for arthralgias. Skin: Negative. Negative for rash. Neurological: Negative. Negative for weakness and headaches. Hematological: Negative. Psychiatric/Behavioral: Negative. Negative for agitation. The patient is not nervous/anxious. All other systems reviewed and are negative. Physical Exam   Physical Exam  Vitals and nursing note reviewed. Constitutional:       General: She is not in acute distress. Appearance: She is well-developed. HENT:      Head: Normocephalic and atraumatic. Nose: Nose normal.      Mouth/Throat:      Mouth: Mucous membranes are moist.      Pharynx: Oropharynx is clear. No oropharyngeal exudate.    Eyes:      General:         Right eye: No discharge. Left eye: No discharge. Conjunctiva/sclera: Conjunctivae normal.      Pupils: Pupils are equal, round, and reactive to light. Cardiovascular:      Rate and Rhythm: Regular rhythm. Tachycardia present. Chest Wall: PMI is not displaced. No thrill. Heart sounds: Normal heart sounds. No murmur heard. No friction rub. No gallop. Pulmonary:      Effort: Pulmonary effort is normal. No respiratory distress. Breath sounds: Normal breath sounds. No wheezing or rales. Chest:      Chest wall: No tenderness. Abdominal:      General: Bowel sounds are normal. There is no distension. Palpations: Abdomen is soft. There is no mass. Tenderness: There is no abdominal tenderness. There is no guarding or rebound. Musculoskeletal:         General: Normal range of motion. Cervical back: Normal range of motion and neck supple. Lymphadenopathy:      Cervical: No cervical adenopathy. Skin:     General: Skin is warm and dry. Capillary Refill: Capillary refill takes less than 2 seconds. Findings: No erythema or rash. Neurological:      Mental Status: She is alert and oriented to person, place, and time. Cranial Nerves: No cranial nerve deficit.       Coordination: Coordination normal.   Psychiatric:         Mood and Affect: Mood normal.         Behavior: Behavior normal.       Lab and Diagnostic Study Results   Labs -     Recent Results (from the past 12 hour(s))   CBC WITH AUTOMATED DIFF    Collection Time: 09/05/22 12:34 AM   Result Value Ref Range    WBC 15.2 (H) 3.6 - 11.0 K/uL    RBC 4.06 3.80 - 5.20 M/uL    HGB 11.4 (L) 11.5 - 16.0 g/dL    HCT 35.7 35.0 - 47.0 %    MCV 87.9 80.0 - 99.0 FL    MCH 28.1 26.0 - 34.0 PG    MCHC 31.9 30.0 - 36.5 g/dL    RDW 18.0 (H) 11.5 - 14.5 %    PLATELET 119 217 - 064 K/uL    MPV 10.3 8.9 - 12.9 FL    NRBC 0.0 0.0  WBC    ABSOLUTE NRBC 0.00 0.00 - 0.01 K/uL    NEUTROPHILS 84 (H) 32 - 75 %    LYMPHOCYTES 9 (L) 12 - 49 %    MONOCYTES 6 5 - 13 %    EOSINOPHILS 0 0 - 7 %    BASOPHILS 1 0 - 1 %    IMMATURE GRANULOCYTES 0 0 - 0.5 %    ABS. NEUTROPHILS 12.7 (H) 1.8 - 8.0 K/UL    ABS. LYMPHOCYTES 1.4 0.8 - 3.5 K/UL    ABS. MONOCYTES 0.9 0.0 - 1.0 K/UL    ABS. EOSINOPHILS 0.0 0.0 - 0.4 K/UL    ABS. BASOPHILS 0.1 0.0 - 0.1 K/UL    ABS. IMM. GRANS. 0.1 (H) 0.00 - 0.04 K/UL    DF AUTOMATED     METABOLIC PANEL, COMPREHENSIVE    Collection Time: 09/05/22 12:34 AM   Result Value Ref Range    Sodium 133 (L) 136 - 145 mmol/L    Potassium 4.7 3.5 - 5.1 mmol/L    Chloride 102 97 - 108 mmol/L    CO2 25 21 - 32 mmol/L    Anion gap 6 5 - 15 mmol/L    Glucose 200 (H) 65 - 100 mg/dL    BUN 13 6 - 20 mg/dL    Creatinine 1.37 (H) 0.55 - 1.02 mg/dL    BUN/Creatinine ratio 9 (L) 12 - 20      GFR est AA 46 (L) >60 ml/min/1.73m2    GFR est non-AA 38 (L) >60 ml/min/1.73m2    Calcium 9.6 8.5 - 10.1 mg/dL    Bilirubin, total 0.5 0.2 - 1.0 mg/dL    AST (SGOT) 28 15 - 37 U/L    ALT (SGPT) 15 12 - 78 U/L    Alk.  phosphatase 109 45 - 117 U/L    Protein, total 7.1 6.4 - 8.2 g/dL    Albumin 2.6 (L) 3.5 - 5.0 g/dL    Globulin 4.5 (H) 2.0 - 4.0 g/dL    A-G Ratio 0.6 (L) 1.1 - 2.2     LACTIC ACID    Collection Time: 09/05/22 12:34 AM   Result Value Ref Range    Lactic acid 2.4 (HH) 0.4 - 2.0 mmol/L   D DIMER    Collection Time: 09/05/22 12:34 AM   Result Value Ref Range    D DIMER 3.76 (H) <0.50 ug/ml(FEU)   URINALYSIS W/ RFLX MICROSCOPIC    Collection Time: 09/05/22  1:05 AM   Result Value Ref Range    Color Yellow/Straw      Appearance Cloudy (A) Clear      pH (UA) 5.0      Protein 30 (A) Negative mg/dL    Glucose Negative Negative mg/dL    Ketone 15 (A) Negative mg/dL    Bilirubin Negative Negative      Blood Large (A) Negative      Urobilinogen 0.1 0.1 - 1.0 EU/dL    Nitrites Negative Negative      Leukocyte Esterase Large (A) Negative      WBC >100 (H) 0 - 4 /hpf    RBC >100 (H) 0 - 5 /hpf    Bacteria Negative Negative /hpf   URINE MICROSCOPIC    Collection Time: 09/05/22  1:05 AM   Result Value Ref Range    WBC >100 (H) 0 - 4 /hpf    RBC >100 (H) 0 - 5 /hpf    Bacteria Negative Negative /hpf   LACTIC ACID    Collection Time: 09/05/22  2:28 AM   Result Value Ref Range    Lactic acid 3.7 (HH) 0.4 - 2.0 mmol/L       Radiologic Studies -   [unfilled]  CT Results  (Last 48 hours)                 09/05/22 0310  CTA CHEST W OR W WO CONT Final result    Impression:      1. Limited study as above. No evidence of pulmonary embolus. 2.  No acute cardiopulmonary process. Mild patchy groundglass opacity in the   left lung base may represent early infection or inflammatory process. 3.  The gallbladder is distended and not well evaluated on this study. Correlate   for signs and symptoms of acute cholecystitis           Narrative:  EXAM:  CTA CHEST W OR W WO CONT       INDICATION: Rule out PE.       COMPARISON: None. TECHNIQUE: Helical thin section chest CT following uneventful intravenous   administration of nonionic contrast 100 mL of isovue 370 according to   departmental PE protocol. Coronal and sagittal reformats were performed. 3D post   processing was performed. CT dose reduction was achieved through the use of a   standardized protocol tailored for this examination and automatic exposure   control for dose modulation. FINDINGS: This is a limited quality study for the evaluation of pulmonary   embolism to the proximal segmental arterial level. Evaluation is limited by   motion particularly in the lung bases. No large pulmonary embolus is identified. No axillary or supraclavicular adenopathy           THYROID: No nodule. MEDIASTINUM: No mass or lymphadenopathy. SOLITARIO: No mass or lymphadenopathy. THORACIC AORTA: No aneurysm. HEART: Coronary atherosclerotic disease   ESOPHAGUS: No wall thickening or dilatation. TRACHEA/BRONCHI: Patent. PLEURA: No effusion or pneumothorax. LUNGS: Mild patchy ground glass opacity in the left lung base. UPPER ABDOMEN: Gallbladder is distended   BONES: No aggressive bone lesion or fracture. 09/05/22 0310  CT HEAD WO CONT Final result    Impression:  No acute intracranial abnormality. Narrative:  EXAM: CT HEAD WO CONT       INDICATION: ams       COMPARISON: 8/8/2019. CONTRAST: None. TECHNIQUE: Unenhanced CT of the head was performed using 5 mm images. Brain and   bone windows were generated. Coronal and sagittal reformats. CT dose reduction   was achieved through use of a standardized protocol tailored for this   examination and automatic exposure control for dose modulation. FINDINGS:   The ventricles and sulci are normal in size, shape and configuration. . There is   no significant white matter disease. There is no intracranial hemorrhage,   extra-axial collection, or mass effect. The basilar cisterns are open. No CT   evidence of acute infarct. The bone windows demonstrate no abnormalities. The visualized portions of the   paranasal sinuses and mastoid air cells are clear. CXR Results  (Last 48 hours)                 09/05/22 0035  XR CHEST SNGL V Final result    Impression:  No acute cardiopulmonary process. Narrative:  EXAM: XR CHEST SNGL V       INDICATION: cough/sob       COMPARISON: 8/25/2019       FINDINGS: A portable AP radiograph of the chest was obtained at 0033 hours. The   patient is on a cardiac monitor. The lungs are clear. The cardiac and   mediastinal contours and pulmonary vascularity are normal.  The bones and soft   tissues are grossly within normal limits. Medical Decision Making and ED Course   Differential Diagnosis & Medical Decision Making Provider Note:   CS, arrhythmia, pleural effusion, pneumonia, congestive heart failure, PE. Will assess with basic cardiac labs chest x-ray EKG and D-dimer    - I am the first and primary provider for this patient.  I reviewed the vital signs, available nursing notes, past medical history, past surgical history, family history and social history. The patient's presenting problems have been discussed, and the staff are in agreement with the care plan formulated and outlined with them. I have encouraged them to ask questions as they arise throughout their visit. Vital Signs-Reviewed the patient's vital signs. Patient Vitals for the past 12 hrs:   Temp Pulse Resp BP SpO2   09/05/22 0315 -- (!) 119 28 126/83 95 %   09/05/22 0308 -- (!) 115 28 118/65 99 %   09/05/22 0245 -- (!) 123 21 (!) 135/95 98 %   09/05/22 0230 -- (!) 129 25 (!) 151/97 99 %   09/05/22 0148 -- (!) 125 23 (!) 148/97 97 %   09/05/22 0143 99.2 °F (37.3 °C) -- -- -- --   09/05/22 0133 -- (!) 116 17 130/75 95 %   09/05/22 0115 -- (!) 113 18 127/74 93 %   09/05/22 0112 -- (!) 112 17 -- --   09/05/22 0100 -- -- -- 123/73 94 %   09/05/22 0023 100.1 °F (37.8 °C) (!) 127 28 111/71 92 %       EKG interpretation: (Preliminary): EKG Interpreted by me. Shows regular rate of 124 bpm,  ms, QRS of 68 ms, QTC of 465 ms. Interpretation: Sinus tach with low voltage QRS, borderline EKG. ED Course:   ED Course as of 09/05/22 0403   Mon Sep 05, 2022   3847 Nursing conveys the patient was recently diagnosed with COVID. She is febrile to 100.1 in the emergency room. [CS]   4605 Patient has elevated D-dimer. Will assess with CT of the chest [CS]      ED Course User Index  [CS] Chelo Gomez MD       IMPRESSION:  1.  Acute cystitis without hematuria        - Broad Spectrum Antibiotics ordered: Ceftriaxone  - Repeat lactic acid ordered for time 0128    - Re-assessment performed at time 0208 and clinical condition improving.    - Hypotension or Lactic Acidosis present (SBP<90, MAP<65, Lactate >4): YES IVF:  30cc/kg actual Body Weight  - Persistent Hypotension despite IVF resuscitation: NO  Vasopressors: Not indicated due to septic shock not present    Plan:  Admit to Step down    Total critical care time spent exclusive of procedures:  54 minutes    Beck Ulrich MD     Procedures and Critical Care     Performed by: Beck Ulrich MD  Procedures      CRITICAL CARE NOTE :    12:39 AM    IMPENDING DETERIORATION -Metabolic  ASSOCIATED RISK FACTORS - Metabolic changes  MANAGEMENT- Bedside Assessment and Supervision of Care  INTERPRETATION -  Xrays  INTERVENTIONS - Metobolic interventions  CASE REVIEW - Hospitalist/Intensivist  TREATMENT RESPONSE -Stable  PERFORMED BY - Self    NOTES   :  I have spent 55 minutes of critical care time involved in lab review, consultations with specialist, family decision- making, bedside attention and documentation. This time excludes time spent in any separate billed procedures. During this entire length of time I was immediately available to the patient . Beck Ulrich MD    Disposition   Disposition: Condition stable        Diagnosis/Clinical Impression     Clinical Impression:   1. Acute cystitis without hematuria        Attestations: Jayden Lan MD, am the primary clinician of record. Please note that this dictation was completed with [employer]ago, the computer voice recognition software. Quite often unanticipated grammatical, syntax, homophones, and other interpretive errors are inadvertently transcribed by the computer software. Please disregard these errors. Please excuse any errors that have escaped final proofreading. Thank you.

## 2022-09-05 NOTE — PROGRESS NOTES
Dr Antonio Jensen to see pt for hematuria consult. Pt has not had any hematuria since admission. Pt voiding cl yellow urine. Per report from ER nurse there was no hematuria seen other then with straight cath. Dr Antonio Jensen aware.  Reasoner PA notified ok to d/c urology consult

## 2022-09-05 NOTE — CONSULTS
Pulmonary/ CC Consult    Subjective:   Date of Consultation:  September 5, 2022  Referring Physician: Serena Beltran MD    Ms. Pinzon Ours 70years old  female who is known to have history of depression/mood disorder, left breast cancer diagnosed 1995 status postsurgery and radiation treatment. History of hypertension and hyperlipidemia. Apparently she had ankle surgery done in June 2022 and was placed in cast.  He was not getting her rehab in the [address] [place] [address]. She was found to be hypoxic there along with shortness of breath. Noted with COVID-19 infection. On evaluation in ER she was found to be significantly hypoxic and septic. She was noted to have elevated WBC count with urine showing a number of WBCs. COVID test is positive again in the ER. She dropped her oxygen saturation in the ER placed on high flow nasal cannula oxygen at 70% with 55 L of flow. She was given 3 L of normal saline in the ER for diagnosis of sepsis. Transferred to ICU where she was noted to be tachypneic has significant congestion coarse rales. It is noted that she previously 1 to have cardiomyopathy with ejection fraction of around 21%.    Her admitting CT scan of chest did not show any evidence of pulmonary embolism however she is noted to have early glass changes at lung bases   Patient Active Problem List   Diagnosis Code    Arthritis of knee, right M17.11    Depression with anxiety F41.8    Chronic lower back pain M54.50, G89.29    Hyperlipidemia E78.5    Personality disorder ([facility] [address] F60.9    Hx of carcinoma in situ of breast Z86.000    Severe obesity with body mass index (BMI) of 35.0 to 39.9 with serious comorbidity (HCC) E66.01    Atypical glandular cells of undetermined significance (BASILIO) on cervical Pap smear R87.619    Failure to thrive (0-17) R62.51    Acute CHF (congestive heart failure) ([facility]) I50.9    Delusional disorder ([facility]) F22    Shortness of breath R06.02    COVID-19 U07.1    Hematuria R31.9    Sepsis ([facility] [address] A41.9     Past Medical History:   Diagnosis Date    Arthritis     GENERALIZED IN JOINTS. Breast cancer ([facility] [address]     Left Breast Cancer 1995    Cancer [facility])     left breast - surgery/radiation    Chronic kidney disease     Chronic pain     Back pain    Depression     Mood Swings, anxiety    GERD (gastroesophageal reflux disease)     Hiatal Hernia    Headache     Heart failure (HCC)     Hypercholesterolemia     Hypertension     CONTROLLED BY MEDS., Pt states she is no liong er on BP med's    Ill-defined condition     Neuropathy bilat feet    Irregular heart beat     Other ill-defined conditions(799.89)     MIGRAINES    Other ill-defined conditions(799.89)     neuropathy of lower legs and feet    Other ill-defined conditions(799.89)     increased cholesterol    Other ill-defined conditions(799.89)     bronchitis    Psychotic disorder (HCC)     S/P radiation therapy     1995 Left Breast    Unspecified adverse effect of anesthesia     HEART PALPITATION. Family History   Problem Relation Age of Onset    Cancer Mother         BLADDER CA    Heart Disease Father     Lung Disease Father       Social History     Tobacco Use    Smoking status: Never    Smokeless tobacco: Never   Substance Use Topics    Alcohol use: No     Past Surgical History:   Procedure Laterality Date    HX BREAST LUMPECTOMY Left     1995 - POSITIVE    HX ORTHOPAEDIC  2006    LEFT KNEE ARTHROSCOPY    HX ORTHOPAEDIC  4/2010    RIGHT KNEE ARTHROSCOPY, concrete nail placed    HX ORTHOPAEDIC      Hammertoe , doesnt know which foot    HX ORTHOPAEDIC      Small growth removed from between toes but doesnt know which foot    RI BREAST SURGERY PROCEDURE UNLISTED  7/1995    DCIS /BIOPSIES MULTIPLE. RI BREAST SURGERY PROCEDURE UNLISTED  1995    LUMPECTOMY WITH RADIATION INSITU      Prior to Admission medications    Medication Sig Start Date End Date Taking?  Authorizing Provider   gabapentin (NEURONTIN) 100 mg capsule Take 100 mg by mouth two (2) times a day. 7/3/22   Provider, Historical   atorvastatin (LIPITOR) 40 mg tablet Take 40 mg by mouth daily. 22   Provider, Historical   traZODone (DESYREL) 50 mg tablet Take 50 mg by mouth nightly. 7/3/22   Provider, Historical   paliperidone (INVEGA) 3 mg SR tablet Take 1 tablet by mouth daily. 21   Aracely Calles NP   famotidine (PEPCID) 20 mg tablet Take 1 Tab by mouth daily. Indications: heartburn 19   Lobo SAN NP   aspirin delayed-release 81 mg tablet Take 1 Tab by mouth every evening. Indications: prevention of transient ischemic attack 19   Lobo SAN NP   metoprolol succinate (TOPROL-XL) 25 mg XL tablet Take 0.5 Tabs by mouth daily. Indications: high blood pressure 19   Jocelin Busby NP   polyethylene glycol (MIRALAX) 17 gram packet Take 1 Packet by mouth daily. Indications: constipation 19   Lobo SAN NP     Allergies   Allergen Reactions    Esomeprazole Other (comments)    Aspirin Other (comments)     Gastric irritation    Epinephrine Palpitations     \"Makes my heart  ' shudder'. \" Allergic to epinephrine with novocain. Plain epinephrine is ok. Ibuprofen Other (comments)     Gastric irritation    Novocain [Procaine] Palpitations    Nsaids (Non-Steroidal Anti-Inflammatory Drug) Other (comments)     GASTRIC DISTRESS. Review of Systems:    Comprehensive review of system was done and reported above. Rest of the examination review essentially unremarkable    Objective:   Blood pressure 114/76, pulse 93, temperature 98.3 °F (36.8 °C), resp. rate 25, height 5' 2\" (1.575 m), weight 77.1 kg (170 lb), SpO2 95 %. Temp (24hrs), Av.6 °F (37.6 °C), Min:98.3 °F (36.8 °C), Max:101.4 °F (38.6 °C)    CT ABD PELV WO CONT   Final Result      1. Patchy nodular bilateral basilar airspace disease likely infectious or   inflammatory. Given nodularity recommend follow-up in 2-3 months postpartum.    Treatment to exclude underlying mass lesion      XR CHEST PORT   Final Result   No change. CTA CHEST W OR W WO CONT   Final Result      1. Limited study as above. No evidence of pulmonary embolus. 2.  No acute cardiopulmonary process. Mild patchy groundglass opacity in the   left lung base may represent early infection or inflammatory process. 3.  The gallbladder is distended and not well evaluated on this study. Correlate   for signs and symptoms of acute cholecystitis         CT HEAD WO CONT   Final Result   No acute intracranial abnormality. XR CHEST SNGL V   Final Result   No acute cardiopulmonary process. XR CHEST PORT    (Results Pending)      Data Review: CBC:   Recent Labs     09/05/22 0034   WBC 15.2*   RBC 4.06   HGB 11.4*   HCT 35.7      GRANS 84*   LYMPH 9*   EOS 0     CMP:   Recent Labs     09/05/22 0034   *   *   K 4.7      CO2 25   BUN 13   CREA 1.37*   CA 9.6   AGAP 6   BUCR 9*      TP 7.1   ALB 2.6*   GLOB 4.5*   AGRAT 0.6*     Liver Enzymes:   Recent Labs     09/05/22 0034   TP 7.1   ALB 2.6*        ABGs: No results for input(s): PH, PCO2, PO2, HCO3 in the last 72 hours.   Inflammation studies: No results found for: SR, ESRA, CRP  Current Facility-Administered Medications   Medication Dose Route Frequency    morphine 2 mg/mL injection        furosemide (LASIX) 10 mg/mL injection        famotidine (PEPCID) tablet 20 mg  20 mg Oral DAILY    aspirin delayed-release tablet 81 mg  81 mg Oral QPM    [Held by provider] metoprolol succinate (TOPROL-XL) XL tablet 25 mg  25 mg Oral DAILY    polyethylene glycol (MIRALAX) packet 17 g  17 g Oral DAILY    gabapentin (NEURONTIN) capsule 100 mg  100 mg Oral BID    atorvastatin (LIPITOR) tablet 40 mg  40 mg Oral DAILY    traZODone (DESYREL) tablet 50 mg  50 mg Oral QHS    sodium chloride (NS) flush 5-40 mL  5-40 mL IntraVENous Q8H    sodium chloride (NS) flush 5-40 mL  5-40 mL IntraVENous PRN acetaminophen (TYLENOL) tablet 650 mg  650 mg Oral Q6H PRN    Or    acetaminophen (TYLENOL) suppository 650 mg  650 mg Rectal Q6H PRN    ondansetron (ZOFRAN ODT) tablet 4 mg  4 mg Oral Q6H PRN    Or    ondansetron (ZOFRAN) injection 4 mg  4 mg IntraVENous Q6H PRN    paliperidone (INVEGA) SR tablet 3 mg  3 mg Oral DAILY    NOREPINephrine (LEVOPHED) 8 mg in 0.9% NS 250ml infusion  0.5-30 mcg/min IntraVENous TITRATE    Vancomycin - Pharmacy to Dose   Other Rx Dosing/Monitoring    vancomycin (VANCOCIN) 1500 mg in  ml infusion  1,500 mg IntraVENous ONCE    piperacillin-tazobactam (ZOSYN) 3.375 g in 0.9% sodium chloride (MBP/ADV) 100 mL MBP  3.375 g IntraVENous Q8H    [START ON 9/6/2022] Vancomycin - Please draw random level 9/6 @ 0400   Other ONCE    morphine injection 1 mg  1 mg IntraVENous ONCE    furosemide (LASIX) injection 40 mg  40 mg IntraVENous ONCE        Exam:      This is an elderly female who is currently in respiratory distress. She is on high flow nasal cannula oxygen at 55 L with 70% FiO2. Saturating 99%. She is restless and confused  Tachycardic , running fever. Tachypneic  Has congestion and neck area and chest congestion, inspiratory crackles appreciated on auscultation. No significant wheezing  Heart: Tachycardic  Abdomen: Soft, nontender, no visceromegaly  Extremities: Has hard cast in her right lower leg   Neuro: No focal motor deficit      Impression:   Ms. Donnie Bruno 70years old  female who is known to have history of depression/mood disorder, left breast cancer diagnosed 1995 status postsurgery and radiation treatment. History of hypertension and hyperlipidemia. Apparently she had ankle surgery done in June 2022 and was placed in cast.  He was not getting her rehab in the [address] [place] [address]. She was found to be hypoxic there along with shortness of breath. Noted with COVID-19 infection.   On evaluation in ER she was found to be significantly hypoxic and septic. She was noted to have elevated WBC count with urine showing a number of WBCs. COVID test is positive again in the ER. She dropped her oxygen saturation in the ER placed on high flow nasal cannula oxygen at 70% with 55 L of flow. She was given 3 L of normal saline in the ER for diagnosis of sepsis. Transferred to ICU where she was noted to be tachypneic has significant congestion coarse rales. It is noted that she in 2019 had echocardiogram done which showed cardiomyopathy with ejection fraction of around 21-25%. Plan:   Acute hypoxic respiratory failure  Initially it resulted because of COVID-19 infection. Now patient has developed pulm edema because of 3 L of fluid that was given for sepsis in the ER. As mentioned above patient has ejection fraction of around 21 to 25% as per echocardiogram done in 2019  Patient is currently on high flow nasal cannula oxygen at 70% FiO2 with 55 L flow. She is restless tachypneic. Will switch her to BiPAP at setting of 14/6 with 80% FiO2. She was given 2 mg of morphine. Lasix 40 mg IV was given stat. Chest x-ray is ordered  We will get a blood gas. #2. COVID-19 infection:  Patient got started on systemic steroids  Will treat her with IV Zosyn and IV vancomycin. 3.  UTI:  Has elevated WBC count and urine. She is already on IV Zosyn  4. Cardiomyopathy:  She has known ejection fraction of 21 to 25% in 2019. Will get 2D echocardiogram done to further evaluate her left ventricular systolic dysfunction  She will need evaluation by cardiology service as well. Will benefit from dobutamine if EF is found to be low again. Patient's condition is tenuous. If develop further respiratory compromise and she may require intubation.     Management is discussed with covering nurse  More than 60 minutes were spent in patient's evaluation, review of lab data, imaging studies decision making      Mari Piedra MD  [facility]

## 2022-09-05 NOTE — ED TRIAGE NOTES
Pt from [facility]. Facility called for difficulity breathing they stated her room air stat was in the 70's upon ems arrival pt was around 83% placed on 2L NC and up to 96% pt denies any complaints at this time.  Pt is COVID +

## 2022-09-05 NOTE — H&P
Hospitalist History & Physical Notes. [facility]. Name : Cecille Rios      MRN number : [id number]     YOB: 1950     Subjective :   Chief Complaint : Difficulty breathing with shortness of breath, found with hypoxia. Source of information : Mostly from the ED provider, staff. Patient is in good deep sleep, unable to wake her up to get information. History of present illness:   70 y.o. female with history of depression/mood disorder, left breast cancer 1995 with surgery and radiation, hypertension and hyperlipidemia recently had right ankle surgery with cast since June 2022 and presents to the emergency room from [address] [place] [address] as they found her with significant shortness of breath with hypoxia. She was recently positive for COVID-19. Per ED physician she talk to him on arrival, when I seen her she is not sleep, resting comfortably on oxygen nasal cannula. When I try to wake her up but she open eyes but unable to stay awake. No respiratory distress is noted. Initially due to shortness of breath and hypoxia plus tachycardia ordered for a CTA of the chest, which came back negative for pulmonary embolism. No evidence of pneumonia. But lab work with signs of sepsis with hematuria and elevated WBC count. Suspected of urinary tract infection even though negative bacteria admitted after starting antibiotics. Past Medical History:   Diagnosis Date    Arthritis     GENERALIZED IN JOINTS.     Breast cancer ([address]     Left Breast Cancer 1995 treated with surgery and radiation    Chronic kidney disease     Chronic back pain     Depression with mood swings and anxiety     GERD (gastroesophageal reflux disease)     Hiatal Hernia    Headache     Hypercholesterolemia     Hypertension     CONTROLLED BY MEDS., Pt states she is no liong er on BP med's    Neuropathy bilat feet    Irregular heart beat     MIGRAINES     Past Surgical History: Procedure Laterality Date    HX BREAST LUMPECTOMY Left     1995 - POSITIVE    HX ORTHOPAEDIC  2006    LEFT KNEE ARTHROSCOPY    HX ORTHOPAEDIC  4/2010    RIGHT KNEE ARTHROSCOPY, concrete nail placed    HX ORTHOPAEDIC      Asaele , doesnt know which foot    HX ORTHOPAEDIC      Small growth removed from between toes but doesnt know which foot    MS BREAST SURGERY PROCEDURE UNLISTED  7/1995    DCIS /BIOPSIES MULTIPLE. MS BREAST SURGERY PROCEDURE UNLISTED  1995    LUMPECTOMY WITH RADIATION INSITU     Family History   Problem Relation Age of Onset    Cancer Mother         BLADDER CA    Heart Disease Father     Lung Disease Father       Social History     Tobacco Use    Smoking status: Never    Smokeless tobacco: Never   Substance Use Topics    Alcohol use: No       Prior to Admission medications    Medication Sig Start Date End Date Taking? Authorizing Provider   gabapentin (NEURONTIN) 100 mg capsule Take 100 mg by mouth two (2) times a day. 7/3/22   Provider, Historical   atorvastatin (LIPITOR) 40 mg tablet Take 40 mg by mouth daily. 8/1/22   Provider, Historical   traZODone (DESYREL) 50 mg tablet Take 50 mg by mouth nightly. 7/3/22   Provider, Historical   paliperidone (INVEGA) 3 mg SR tablet Take 1 tablet by mouth daily. 12/16/21   Annia Calles NP   famotidine (PEPCID) 20 mg tablet Take 1 Tab by mouth daily. Indications: heartburn 8/23/19   Inés SAN NP   aspirin delayed-release 81 mg tablet Take 1 Tab by mouth every evening. Indications: prevention of transient ischemic attack 8/23/19   Inés SAN NP   metoprolol succinate (TOPROL-XL) 25 mg XL tablet Take 0.5 Tabs by mouth daily. Indications: high blood pressure 8/24/19   Jocelin Busby NP   polyethylene glycol (MIRALAX) 17 gram packet Take 1 Packet by mouth daily.  Indications: constipation 8/24/19   Inés SAN NP     Allergies   Allergen Reactions    Esomeprazole Other (comments)    Aspirin Other (comments) Gastric irritation    Epinephrine Palpitations     \"Makes my heart  ' shudder'. \" Allergic to epinephrine with novocain. Plain epinephrine is ok. Ibuprofen Other (comments)     Gastric irritation    Novocain [Procaine] Palpitations    Nsaids (Non-Steroidal Anti-Inflammatory Drug) Other (comments)     GASTRIC DISTRESS. Review of Systems: As she is sleeping comfortably I did not wake her up. Vitals:   Patient Vitals for the past 12 hrs:   Temp Pulse Resp BP SpO2   09/05/22 0400 -- (!) 117 26 116/78 --   09/05/22 0315 -- (!) 119 28 126/83 95 %   09/05/22 0308 -- (!) 115 28 118/65 99 %   09/05/22 0245 -- (!) 123 21 (!) 135/95 98 %   09/05/22 0230 -- (!) 129 25 (!) 151/97 99 %   09/05/22 0148 -- (!) 125 23 (!) 148/97 97 %   09/05/22 0143 99.2 °F (37.3 °C) -- -- -- --   09/05/22 0133 -- (!) 116 17 130/75 95 %   09/05/22 0115 -- (!) 113 18 127/74 93 %   09/05/22 0112 -- (!) 112 17 -- --   09/05/22 0100 -- -- -- 123/73 94 %   09/05/22 0023 100.1 °F (37.8 °C) (!) 127 28 111/71 92 %       Physical Exam:   General : Sleeping comfortably with no distress on oxygen nasal cannula. HEENT :  atraumatic normocephalic, Normal ear and nose. Neck : Supple, no JVD, no masses noted, no carotid bruit. Lungs : Breath sounds with moderate air entry bilaterally, no wheezes or rales, no accessory muscle use. CVS : Rhythm rate regular, S1+, S2+, no murmur or gallop. Monitor showing sinus rhythm with tachycardia  Abdomen : Soft, nontender, bowel sounds active. Extremities : Right lower extremity in the cast from below knee. Left foot no edema, pedal pulses not palpable. Musculoskeletal :  no joint swelling or effusion, muscle tone and power appears normal.   Skin : Dry and warm. No pathological rash. Lymphatic : No cervical lymphadenopathy. Neurological : Denies comfortable sleep. Per ED provider she was communicating on arrival with no deficits. Psychiatric : Unable to evaluate.         Data Review:   Recent Results (from the past 24 hour(s))   CBC WITH AUTOMATED DIFF    Collection Time: 09/05/22 12:34 AM   Result Value Ref Range    WBC 15.2 (H) 3.6 - 11.0 K/uL    RBC 4.06 3.80 - 5.20 M/uL    HGB 11.4 (L) 11.5 - 16.0 g/dL    HCT 35.7 35.0 - 47.0 %    MCV 87.9 80.0 - 99.0 FL    MCH 28.1 26.0 - 34.0 PG    MCHC 31.9 30.0 - 36.5 g/dL    RDW 18.0 (H) 11.5 - 14.5 %    PLATELET 251 996 - 801 K/uL    MPV 10.3 8.9 - 12.9 FL    NRBC 0.0 0.0  WBC    ABSOLUTE NRBC 0.00 0.00 - 0.01 K/uL    NEUTROPHILS 84 (H) 32 - 75 %    LYMPHOCYTES 9 (L) 12 - 49 %    MONOCYTES 6 5 - 13 %    EOSINOPHILS 0 0 - 7 %    BASOPHILS 1 0 - 1 %    IMMATURE GRANULOCYTES 0 0 - 0.5 %    ABS. NEUTROPHILS 12.7 (H) 1.8 - 8.0 K/UL    ABS. LYMPHOCYTES 1.4 0.8 - 3.5 K/UL    ABS. MONOCYTES 0.9 0.0 - 1.0 K/UL    ABS. EOSINOPHILS 0.0 0.0 - 0.4 K/UL    ABS. BASOPHILS 0.1 0.0 - 0.1 K/UL    ABS. IMM. GRANS. 0.1 (H) 0.00 - 0.04 K/UL    DF AUTOMATED     METABOLIC PANEL, COMPREHENSIVE    Collection Time: 09/05/22 12:34 AM   Result Value Ref Range    Sodium 133 (L) 136 - 145 mmol/L    Potassium 4.7 3.5 - 5.1 mmol/L    Chloride 102 97 - 108 mmol/L    CO2 25 21 - 32 mmol/L    Anion gap 6 5 - 15 mmol/L    Glucose 200 (H) 65 - 100 mg/dL    BUN 13 6 - 20 mg/dL    Creatinine 1.37 (H) 0.55 - 1.02 mg/dL    BUN/Creatinine ratio 9 (L) 12 - 20      GFR est AA 46 (L) >60 ml/min/1.73m2    GFR est non-AA 38 (L) >60 ml/min/1.73m2    Calcium 9.6 8.5 - 10.1 mg/dL    Bilirubin, total 0.5 0.2 - 1.0 mg/dL    AST (SGOT) 28 15 - 37 U/L    ALT (SGPT) 15 12 - 78 U/L    Alk.  phosphatase 109 45 - 117 U/L    Protein, total 7.1 6.4 - 8.2 g/dL    Albumin 2.6 (L) 3.5 - 5.0 g/dL    Globulin 4.5 (H) 2.0 - 4.0 g/dL    A-G Ratio 0.6 (L) 1.1 - 2.2     LACTIC ACID    Collection Time: 09/05/22 12:34 AM   Result Value Ref Range    Lactic acid 2.4 (HH) 0.4 - 2.0 mmol/L   D DIMER    Collection Time: 09/05/22 12:34 AM   Result Value Ref Range    D DIMER 3.76 (H) <0.50 ug/ml(FEU)   URINALYSIS W/ RFLX MICROSCOPIC    Collection Time: 09/05/22  1:05 AM   Result Value Ref Range    Color Yellow/Straw      Appearance Cloudy (A) Clear      pH (UA) 5.0      Protein 30 (A) Negative mg/dL    Glucose Negative Negative mg/dL    Ketone 15 (A) Negative mg/dL    Bilirubin Negative Negative      Blood Large (A) Negative      Urobilinogen 0.1 0.1 - 1.0 EU/dL    Nitrites Negative Negative      Leukocyte Esterase Large (A) Negative      WBC >100 (H) 0 - 4 /hpf    RBC >100 (H) 0 - 5 /hpf    Bacteria Negative Negative /hpf   URINE MICROSCOPIC    Collection Time: 09/05/22  1:05 AM   Result Value Ref Range    WBC >100 (H) 0 - 4 /hpf    RBC >100 (H) 0 - 5 /hpf    Bacteria Negative Negative /hpf   LACTIC ACID    Collection Time: 09/05/22  2:28 AM   Result Value Ref Range    Lactic acid 3.7 (HH) 0.4 - 2.0 mmol/L       Radiologic Studies :   CT Results  (Last 48 hours)                 09/05/22 0310  CTA CHEST W OR W WO CONT Final result    Impression:      1. Limited study as above. No evidence of pulmonary embolus. 2.  No acute cardiopulmonary process. Mild patchy groundglass opacity in the   left lung base may represent early infection or inflammatory process. 3.  The gallbladder is distended and not well evaluated on this study. Correlate   for signs and symptoms of acute cholecystitis           Narrative:  EXAM:  CTA CHEST W OR W WO CONT       INDICATION: Rule out PE.       COMPARISON: None. TECHNIQUE: Helical thin section chest CT following uneventful intravenous   administration of nonionic contrast 100 mL of isovue 370 according to   departmental PE protocol. Coronal and sagittal reformats were performed. 3D post   processing was performed. CT dose reduction was achieved through the use of a   standardized protocol tailored for this examination and automatic exposure   control for dose modulation.        FINDINGS: This is a limited quality study for the evaluation of pulmonary   embolism to the proximal segmental arterial level. Evaluation is limited by   motion particularly in the lung bases. No large pulmonary embolus is identified. No axillary or supraclavicular adenopathy           THYROID: No nodule. MEDIASTINUM: No mass or lymphadenopathy. SOLITARIO: No mass or lymphadenopathy. THORACIC AORTA: No aneurysm. HEART: Coronary atherosclerotic disease   ESOPHAGUS: No wall thickening or dilatation. TRACHEA/BRONCHI: Patent. PLEURA: No effusion or pneumothorax. LUNGS: Mild patchy ground glass opacity in the left lung base. UPPER ABDOMEN: Gallbladder is distended   BONES: No aggressive bone lesion or fracture. 09/05/22 0310  CT HEAD WO CONT Final result    Impression:  No acute intracranial abnormality. Narrative:  EXAM: CT HEAD WO CONT       INDICATION: ams       COMPARISON: 8/8/2019. CONTRAST: None. TECHNIQUE: Unenhanced CT of the head was performed using 5 mm images. Brain and   bone windows were generated. Coronal and sagittal reformats. CT dose reduction   was achieved through use of a standardized protocol tailored for this   examination and automatic exposure control for dose modulation. FINDINGS:   The ventricles and sulci are normal in size, shape and configuration. . There is   no significant white matter disease. There is no intracranial hemorrhage,   extra-axial collection, or mass effect. The basilar cisterns are open. No CT   evidence of acute infarct. The bone windows demonstrate no abnormalities. The visualized portions of the   paranasal sinuses and mastoid air cells are clear. CXR Results  (Last 48 hours)                 09/05/22 0035  XR CHEST SNGL V Final result    Impression:  No acute cardiopulmonary process. Narrative:  EXAM: XR CHEST SNGL V       INDICATION: cough/sob       COMPARISON: 8/25/2019       FINDINGS: A portable AP radiograph of the chest was obtained at 0033 hours. The   patient is on a cardiac monitor.  The lungs are clear. The cardiac and   mediastinal contours and pulmonary vascularity are normal.  The bones and soft   tissues are grossly within normal limits. Assessment and Plan :     Shortness of breath with hypoxia: Most likely related to the COVID-19 she recently had    Hematuria: Etiology unclear, will consult urology and follow-up with recommendations. SIRS -most likely secondary to infection: Source most likely urine but need to follow-up. Started on ceftriaxone in the emergency room which we will continue    Benign essential hypertension: Fair, tachycardia. Metoprolol increased to 25 mg from 12.5 mg to help with the heart rate control    Recent history of fracture ankle that had ORIF. Healed well, she also has some nonoperable fractures still in cast.  We will consult orthopedics while she is here to see if she needed any evaluation. History of psychiatric problems with depression/anxiety/mood disorder: On treatment we will continue the home medications    Admitted to medical telemetry, full CODE STATUS, unable to discuss CODE STATUS with the patient. Home medications reviewed with the [facility] from the facility. Did not start on any anticoagulation for VTE prophylaxis due to hematuria. CC : Heike Dos Santos MD  Signed By: Chi Szymanski MD     September 5, 2022      This dictation was done by dragon, computer voice recognition software. Often unanticipated grammatical, syntax, [place] phones and other interpretive errors are inadvertently transcribed. Please excuse errors that have escaped final proofreading.

## 2022-09-05 NOTE — PROGRESS NOTES
Hospitalist Progress Note    Subjective:   Daily Progress Note: 9/5/2022 9:10 AM    With profound shortness of breath, mild confusion and mild abdominal pain. Patient with acute respiratory failure currently on high flow oxygen. Clearly in respiratory distress and most likely from urinary sepsis. Incidentally found to have COVID-19    Current Facility-Administered Medications   Medication Dose Route Frequency    famotidine (PEPCID) tablet 20 mg  20 mg Oral DAILY    aspirin delayed-release tablet 81 mg  81 mg Oral QPM    metoprolol succinate (TOPROL-XL) XL tablet 25 mg  25 mg Oral DAILY    polyethylene glycol (MIRALAX) packet 17 g  17 g Oral DAILY    gabapentin (NEURONTIN) capsule 100 mg  100 mg Oral BID    atorvastatin (LIPITOR) tablet 40 mg  40 mg Oral DAILY    traZODone (DESYREL) tablet 50 mg  50 mg Oral QHS    sodium chloride (NS) flush 5-40 mL  5-40 mL IntraVENous Q8H    sodium chloride (NS) flush 5-40 mL  5-40 mL IntraVENous PRN    acetaminophen (TYLENOL) tablet 650 mg  650 mg Oral Q6H PRN    Or    acetaminophen (TYLENOL) suppository 650 mg  650 mg Rectal Q6H PRN    ondansetron (ZOFRAN ODT) tablet 4 mg  4 mg Oral Q6H PRN    Or    ondansetron (ZOFRAN) injection 4 mg  4 mg IntraVENous Q6H PRN    sodium chloride 0.9 % bolus infusion 1,000 mL  1,000 mL IntraVENous ONCE    paliperidone (INVEGA) SR tablet 3 mg  3 mg Oral DAILY    piperacillin-tazobactam (ZOSYN) 3.375 g in 0.9% sodium chloride (MBP/ADV) 100 mL MBP  3.375 g IntraVENous Q8H    NOREPINephrine (LEVOPHED) 8 mg in 0.9% NS 250ml infusion  0.5-30 mcg/min IntraVENous TITRATE     Current Outpatient Medications   Medication Sig    gabapentin (NEURONTIN) 100 mg capsule Take 100 mg by mouth two (2) times a day. atorvastatin (LIPITOR) 40 mg tablet Take 40 mg by mouth daily. traZODone (DESYREL) 50 mg tablet Take 50 mg by mouth nightly. paliperidone (INVEGA) 3 mg SR tablet Take 1 tablet by mouth daily.     famotidine (PEPCID) 20 mg tablet Take 1 Tab by mouth daily. Indications: heartburn    aspirin delayed-release 81 mg tablet Take 1 Tab by mouth every evening. Indications: prevention of transient ischemic attack    metoprolol succinate (TOPROL-XL) 25 mg XL tablet Take 0.5 Tabs by mouth daily. Indications: high blood pressure    polyethylene glycol (MIRALAX) 17 gram packet Take 1 Packet by mouth daily. Indications: constipation        Review of Systems  Review of Systems   Constitutional:  Positive for malaise/fatigue. HENT: Negative. Respiratory:  Positive for cough and shortness of breath. Cardiovascular:  Negative for chest pain and leg swelling. Gastrointestinal:  Positive for abdominal pain, nausea and vomiting. Genitourinary:  Positive for dysuria. Musculoskeletal: Negative. Neurological:  Positive for weakness. Psychiatric/Behavioral:          Mild confusion          Objective:     Visit Vitals  BP (!) 89/59   Pulse 90   Temp 98.3 °F (36.8 °C)   Resp 25   Ht 5' 2\" (1.575 m)   Wt 77.1 kg (170 lb)   SpO2 92%   BMI 31.09 kg/m²    O2 Flow Rate (L/min): 60 l/min O2 Device: Hi flow nasal cannula, Heated, Humidifier    Temp (24hrs), Av.8 °F (37.7 °C), Min:98.3 °F (36.8 °C), Max:101.4 °F (38.6 °C)      No intake/output data recorded.    1901 -  0700  In: 2313 [I.V.:2313]  Out: -     Recent Results (from the past 24 hour(s))   EKG, 12 LEAD, INITIAL    Collection Time: 22 12:29 AM   Result Value Ref Range    Ventricular Rate 124 BPM    Atrial Rate 124 BPM    P-R Interval 126 ms    QRS Duration 68 ms    Q-T Interval 324 ms    QTC Calculation (Bezet) 465 ms    Calculated P Axis 65 degrees    Calculated R Axis -10 degrees    Calculated T Axis 88 degrees    Diagnosis       Sinus tachycardia  Low voltage QRS  Borderline ECG  No previous ECGs available  Confirmed by Adeola Pedersen MD, Wilson Berg ([id number]) on 2022 9:06:20 AM     CBC WITH AUTOMATED DIFF    Collection Time: 22 12:34 AM   Result Value Ref Range    WBC 15.2 (H) 3.6 - 11.0 K/uL RBC 4.06 3.80 - 5.20 M/uL    HGB 11.4 (L) 11.5 - 16.0 g/dL    HCT 35.7 35.0 - 47.0 %    MCV 87.9 80.0 - 99.0 FL    MCH 28.1 26.0 - 34.0 PG    MCHC 31.9 30.0 - 36.5 g/dL    RDW 18.0 (H) 11.5 - 14.5 %    PLATELET 400 656 - 528 K/uL    MPV 10.3 8.9 - 12.9 FL    NRBC 0.0 0.0  WBC    ABSOLUTE NRBC 0.00 0.00 - 0.01 K/uL    NEUTROPHILS 84 (H) 32 - 75 %    LYMPHOCYTES 9 (L) 12 - 49 %    MONOCYTES 6 5 - 13 %    EOSINOPHILS 0 0 - 7 %    BASOPHILS 1 0 - 1 %    IMMATURE GRANULOCYTES 0 0 - 0.5 %    ABS. NEUTROPHILS 12.7 (H) 1.8 - 8.0 K/UL    ABS. LYMPHOCYTES 1.4 0.8 - 3.5 K/UL    ABS. MONOCYTES 0.9 0.0 - 1.0 K/UL    ABS. EOSINOPHILS 0.0 0.0 - 0.4 K/UL    ABS. BASOPHILS 0.1 0.0 - 0.1 K/UL    ABS. IMM. GRANS. 0.1 (H) 0.00 - 0.04 K/UL    DF AUTOMATED     METABOLIC PANEL, COMPREHENSIVE    Collection Time: 09/05/22 12:34 AM   Result Value Ref Range    Sodium 133 (L) 136 - 145 mmol/L    Potassium 4.7 3.5 - 5.1 mmol/L    Chloride 102 97 - 108 mmol/L    CO2 25 21 - 32 mmol/L    Anion gap 6 5 - 15 mmol/L    Glucose 200 (H) 65 - 100 mg/dL    BUN 13 6 - 20 mg/dL    Creatinine 1.37 (H) 0.55 - 1.02 mg/dL    BUN/Creatinine ratio 9 (L) 12 - 20      GFR est AA 46 (L) >60 ml/min/1.73m2    GFR est non-AA 38 (L) >60 ml/min/1.73m2    Calcium 9.6 8.5 - 10.1 mg/dL    Bilirubin, total 0.5 0.2 - 1.0 mg/dL    AST (SGOT) 28 15 - 37 U/L    ALT (SGPT) 15 12 - 78 U/L    Alk.  phosphatase 109 45 - 117 U/L    Protein, total 7.1 6.4 - 8.2 g/dL    Albumin 2.6 (L) 3.5 - 5.0 g/dL    Globulin 4.5 (H) 2.0 - 4.0 g/dL    A-G Ratio 0.6 (L) 1.1 - 2.2     LACTIC ACID    Collection Time: 09/05/22 12:34 AM   Result Value Ref Range    Lactic acid 2.4 (HH) 0.4 - 2.0 mmol/L   D DIMER    Collection Time: 09/05/22 12:34 AM   Result Value Ref Range    D DIMER 3.76 (H) <0.50 ug/ml(FEU)   URINALYSIS W/ RFLX MICROSCOPIC    Collection Time: 09/05/22  1:05 AM   Result Value Ref Range    Color Yellow/Straw      Appearance Cloudy (A) Clear      pH (UA) 5.0      Protein 30 (A) Negative mg/dL    Glucose Negative Negative mg/dL    Ketone 15 (A) Negative mg/dL    Bilirubin Negative Negative      Blood Large (A) Negative      Urobilinogen 0.1 0.1 - 1.0 EU/dL    Nitrites Negative Negative      Leukocyte Esterase Large (A) Negative      WBC >100 (H) 0 - 4 /hpf    RBC >100 (H) 0 - 5 /hpf    Bacteria Negative Negative /hpf   URINE MICROSCOPIC    Collection Time: 09/05/22  1:05 AM   Result Value Ref Range    WBC >100 (H) 0 - 4 /hpf    RBC >100 (H) 0 - 5 /hpf    Bacteria Negative Negative /hpf   LACTIC ACID    Collection Time: 09/05/22  2:28 AM   Result Value Ref Range    Lactic acid 3.7 (HH) 0.4 - 2.0 mmol/L        CTA CHEST W OR W WO CONT   Final Result      1. Limited study as above. No evidence of pulmonary embolus. 2.  No acute cardiopulmonary process. Mild patchy groundglass opacity in the   left lung base may represent early infection or inflammatory process. 3.  The gallbladder is distended and not well evaluated on this study. Correlate   for signs and symptoms of acute cholecystitis         CT HEAD WO CONT   Final Result   No acute intracranial abnormality. XR CHEST SNGL V   Final Result   No acute cardiopulmonary process. XR CHEST PORT    (Results Pending)   CT ABD PELV WO CONT    (Results Pending)        PHYSICAL EXAM:    Physical Exam  Vitals reviewed. Constitutional:       General: She is in acute distress. Appearance: She is ill-appearing. HENT:      Head: Normocephalic and atraumatic. Mouth/Throat:      Pharynx: Oropharynx is clear. Eyes:      Conjunctiva/sclera: Conjunctivae normal.   Cardiovascular:      Rate and Rhythm: Regular rhythm. Tachycardia present. Heart sounds: Normal heart sounds. Pulmonary:      Breath sounds: Normal breath sounds. Comments: Tachypnea noted  Abdominal:      General: Abdomen is flat. Palpations: Abdomen is soft.       Comments: Hypoactive bowel sounds  Tenderness in the suprapubic region Musculoskeletal:         General: Normal range of motion. Cervical back: Normal range of motion and neck supple. Skin:     General: Skin is warm and dry. Neurological:      General: No focal deficit present. Mental Status: She is alert. She is disoriented. Psychiatric:         Mood and Affect: Mood normal.        Data Review    Recent Results (from the past 24 hour(s))   EKG, 12 LEAD, INITIAL    Collection Time: 09/05/22 12:29 AM   Result Value Ref Range    Ventricular Rate 124 BPM    Atrial Rate 124 BPM    P-R Interval 126 ms    QRS Duration 68 ms    Q-T Interval 324 ms    QTC Calculation (Bezet) 465 ms    Calculated P Axis 65 degrees    Calculated R Axis -10 degrees    Calculated T Axis 88 degrees    Diagnosis       Sinus tachycardia  Low voltage QRS  Borderline ECG  No previous ECGs available  Confirmed by Dorian Chaudhry MD, Ofelia Burger ([id number]) on 9/5/2022 9:06:20 AM     CBC WITH AUTOMATED DIFF    Collection Time: 09/05/22 12:34 AM   Result Value Ref Range    WBC 15.2 (H) 3.6 - 11.0 K/uL    RBC 4.06 3.80 - 5.20 M/uL    HGB 11.4 (L) 11.5 - 16.0 g/dL    HCT 35.7 35.0 - 47.0 %    MCV 87.9 80.0 - 99.0 FL    MCH 28.1 26.0 - 34.0 PG    MCHC 31.9 30.0 - 36.5 g/dL    RDW 18.0 (H) 11.5 - 14.5 %    PLATELET 892 572 - 189 K/uL    MPV 10.3 8.9 - 12.9 FL    NRBC 0.0 0.0  WBC    ABSOLUTE NRBC 0.00 0.00 - 0.01 K/uL    NEUTROPHILS 84 (H) 32 - 75 %    LYMPHOCYTES 9 (L) 12 - 49 %    MONOCYTES 6 5 - 13 %    EOSINOPHILS 0 0 - 7 %    BASOPHILS 1 0 - 1 %    IMMATURE GRANULOCYTES 0 0 - 0.5 %    ABS. NEUTROPHILS 12.7 (H) 1.8 - 8.0 K/UL    ABS. LYMPHOCYTES 1.4 0.8 - 3.5 K/UL    ABS. MONOCYTES 0.9 0.0 - 1.0 K/UL    ABS. EOSINOPHILS 0.0 0.0 - 0.4 K/UL    ABS. BASOPHILS 0.1 0.0 - 0.1 K/UL    ABS. IMM.  GRANS. 0.1 (H) 0.00 - 0.04 K/UL    DF AUTOMATED     METABOLIC PANEL, COMPREHENSIVE    Collection Time: 09/05/22 12:34 AM   Result Value Ref Range    Sodium 133 (L) 136 - 145 mmol/L    Potassium 4.7 3.5 - 5.1 mmol/L    Chloride 102 97 - 108 mmol/L    CO2 25 21 - 32 mmol/L    Anion gap 6 5 - 15 mmol/L    Glucose 200 (H) 65 - 100 mg/dL    BUN 13 6 - 20 mg/dL    Creatinine 1.37 (H) 0.55 - 1.02 mg/dL    BUN/Creatinine ratio 9 (L) 12 - 20      GFR est AA 46 (L) >60 ml/min/1.73m2    GFR est non-AA 38 (L) >60 ml/min/1.73m2    Calcium 9.6 8.5 - 10.1 mg/dL    Bilirubin, total 0.5 0.2 - 1.0 mg/dL    AST (SGOT) 28 15 - 37 U/L    ALT (SGPT) 15 12 - 78 U/L    Alk. phosphatase 109 45 - 117 U/L    Protein, total 7.1 6.4 - 8.2 g/dL    Albumin 2.6 (L) 3.5 - 5.0 g/dL    Globulin 4.5 (H) 2.0 - 4.0 g/dL    A-G Ratio 0.6 (L) 1.1 - 2.2     LACTIC ACID    Collection Time: 09/05/22 12:34 AM   Result Value Ref Range    Lactic acid 2.4 (HH) 0.4 - 2.0 mmol/L   D DIMER    Collection Time: 09/05/22 12:34 AM   Result Value Ref Range    D DIMER 3.76 (H) <0.50 ug/ml(FEU)   URINALYSIS W/ RFLX MICROSCOPIC    Collection Time: 09/05/22  1:05 AM   Result Value Ref Range    Color Yellow/Straw      Appearance Cloudy (A) Clear      pH (UA) 5.0      Protein 30 (A) Negative mg/dL    Glucose Negative Negative mg/dL    Ketone 15 (A) Negative mg/dL    Bilirubin Negative Negative      Blood Large (A) Negative      Urobilinogen 0.1 0.1 - 1.0 EU/dL    Nitrites Negative Negative      Leukocyte Esterase Large (A) Negative      WBC >100 (H) 0 - 4 /hpf    RBC >100 (H) 0 - 5 /hpf    Bacteria Negative Negative /hpf   URINE MICROSCOPIC    Collection Time: 09/05/22  1:05 AM   Result Value Ref Range    WBC >100 (H) 0 - 4 /hpf    RBC >100 (H) 0 - 5 /hpf    Bacteria Negative Negative /hpf   LACTIC ACID    Collection Time: 09/05/22  2:28 AM   Result Value Ref Range    Lactic acid 3.7 (HH) 0.4 - 2.0 mmol/L        Assessment/Plan:     Active Problems:    Shortness of breath (9/5/2022)      COVID-19 (9/5/2022)      Hematuria (9/5/2022)      Sepsis ([address] (9/5/2022)      Hospital course:     This 70-year-old female admitted on 9/5/2022 with a history of depression\mood disorder, left breast cancer with surgery and radiation, essential hypertension, hyperlipidemia who underwent right ankle surgery and currently has a cast in place who presented from [address] [place] [address]. She presented with shortness of breath and hypoxia and was found to have a urine consistent with a urinary tract infection as well as sepsis with a white count of 15,000. Unfortunately she has declined even further and now is in acute respiratory distress with hypoxia requiring high flow oxygen and is hypotensive and Levophed has been ordered. Patient to be transferred to the intensive care unit. Lactic acidosis is worsening even after the fluid bolus of 30 mL/kg. CT scan of the abdomen pelvis pending. Repeat stat chest x-ray pending patient has a previous history of severe systolic heart failure with an EF of 21 to 25% with left ventricular global hypokinesis and grade 3 diastolic dysfunction. CTA of the chest was performed and no pulmonary emboli was noted. There was mild patchy groundglass opacities in the left lung base although not enough to explain her ongoing hypoxia. The gallbladder was also distended without enough visualization of the gallbladder to diagnose acute cholecystitis. Blood cultures and urine cultures pending. Impression\plan:    1.   Severe sepsis  Change antibiotics from Rocephin to vancomycin and Zosyn  Maintain oxygen saturation greater than 92%, high risk for intubation and pulmonary consultation placed, currently on high flow oxygen at 60 L  UA consistent with acute cystitis   Abdominal pain unclear if there is an abdominal process, CT of the abdomen pelvis pending  Transfer to the intensive care unit  Infectious disease consult  Patient already received 30 mL/kg  Covid 19 rapid pending  Repeat CXR now due to worsening respiratory status  Leukocytosis with a white count of 15,000  CT of the chest without PE and mild patchy groundglass opacities in the left lung noted most likely not the cause of patient's respiratory distress more likely sepsis  Blood culture pending    2. Acute cystitis  Urinalysis consistent with urinary tract infection  Urine culture pending  Currently on Zosyn no previous history of urinary cultures with resistant organism    3. Chronic systolic heart failure  EF of 21% on previous echocardiogram from 2019  Repeat echocardiogram when appropriate    4. Lactic acidosis secondary to sepsis  Trend lactic acid  Lactic acid worsening from 2.4--> 3.7    5. ANISH  Most likely from sepsis and prerenal causes  Continue to trend    6. Essential hypertension  Home metoprolol  Patient currently with hypotension and holding medications    7. Acute respiratory failure with hypoxia  Most likely driven from sepsis  Repeat chest x-ray now for reversible causes    8. Mild hyponatremia  Continue IV fluids unless chest x-ray shows pulmonary edema    9. COVID-19  Appears mild in nature  Again sepsis most likely from urinary infection versus abdominal process  Not enough etiology in the CTA to constitute acute respiratory failure with hypoxia    CODE STATUS: Full code    DVT prophylaxis: Lovenox  Ulcer prophylaxis: Protonix    Critical care time 60 minutes    Attempted to reach family in regards to the patient's guarded status due to worsening condition and only contact currently is with [employer].     Care Plan discussed with: Patient/Family

## 2022-09-05 NOTE — ROUTINE PROCESS
TRANSFER - OUT REPORT:    Verbal report given to kelton sanchez(name) on Reema Kirkpatrick  being transferred to [facility](unit) for routine progression of care       Report consisted of patients Situation, Background, Assessment and   Recommendations(SBAR). Information from the following report(s) SBAR, ED Summary, MAR, and Recent Results was reviewed with the receiving nurse. Lines:   Peripheral IV 09/05/22 Left Wrist (Active)   Site Assessment Clean, dry, & intact 09/05/22 0047   Phlebitis Assessment 0 09/05/22 0047   Infiltration Assessment 0 09/05/22 0047   Dressing Status Clean, dry, & intact 09/05/22 0047   Dressing Type Transparent 09/05/22 0047   Hub Color/Line Status Patent 09/05/22 0047   Action Taken Blood drawn 09/05/22 0047       Peripheral IV 09/05/22 Left Antecubital (Active)   Site Assessment Clean, dry, & intact 09/05/22 0228   Phlebitis Assessment 0 09/05/22 0228   Infiltration Assessment 0 09/05/22 0228   Dressing Status Clean, dry, & intact 09/05/22 0228   Dressing Type Transparent 09/05/22 0228   Hub Color/Line Status Patent 09/05/22 0228   Action Taken Blood drawn 09/05/22 4476        Opportunity for questions and clarification was provided.       Patient transported with:   RN, RT, hi flow NC, monitor

## 2022-09-06 ENCOUNTER — APPOINTMENT (OUTPATIENT)
Dept: NON INVASIVE DIAGNOSTICS | Age: 72
DRG: 871 | End: 2022-09-06
Attending: INTERNAL MEDICINE
Payer: MEDICARE

## 2022-09-06 ENCOUNTER — APPOINTMENT (OUTPATIENT)
Dept: GENERAL RADIOLOGY | Age: 72
DRG: 871 | End: 2022-09-06
Attending: PHYSICIAN ASSISTANT
Payer: MEDICARE

## 2022-09-06 ENCOUNTER — APPOINTMENT (OUTPATIENT)
Dept: GENERAL RADIOLOGY | Age: 72
DRG: 871 | End: 2022-09-06
Attending: INTERNAL MEDICINE
Payer: MEDICARE

## 2022-09-06 LAB
ALBUMIN SERPL-MCNC: 2.4 G/DL (ref 3.5–5)
ALBUMIN SERPL-MCNC: 2.4 G/DL (ref 3.5–5)
ALBUMIN/GLOB SERPL: 0.7 {RATIO} (ref 1.1–2.2)
ALP SERPL-CCNC: 84 U/L (ref 45–117)
ALT SERPL-CCNC: 14 U/L (ref 12–78)
ANION GAP SERPL CALC-SCNC: 11 MMOL/L (ref 5–15)
ANION GAP SERPL CALC-SCNC: 11 MMOL/L (ref 5–15)
ARTERIAL PATENCY WRIST A: YES
AST SERPL W P-5'-P-CCNC: 29 U/L (ref 15–37)
BASE DEFICIT BLDA-SCNC: 2.1 MMOL/L
BASOPHILS # BLD: 0 K/UL (ref 0–0.1)
BASOPHILS NFR BLD: 0 % (ref 0–1)
BDY SITE: ABNORMAL
BILIRUB SERPL-MCNC: 0.5 MG/DL (ref 0.2–1)
BODY TEMPERATURE: 98.9
BUN SERPL-MCNC: 23 MG/DL (ref 6–20)
BUN SERPL-MCNC: 24 MG/DL (ref 6–20)
BUN/CREAT SERPL: 19 (ref 12–20)
BUN/CREAT SERPL: 19 (ref 12–20)
CA-I BLD-MCNC: 8.7 MG/DL (ref 8.5–10.1)
CA-I BLD-MCNC: 8.7 MG/DL (ref 8.5–10.1)
CHLORIDE SERPL-SCNC: 109 MMOL/L (ref 97–108)
CHLORIDE SERPL-SCNC: 109 MMOL/L (ref 97–108)
CO2 SERPL-SCNC: 21 MMOL/L (ref 21–32)
CO2 SERPL-SCNC: 22 MMOL/L (ref 21–32)
COHGB MFR BLD: 0.3 % (ref 1–2)
CREAT SERPL-MCNC: 1.24 MG/DL (ref 0.55–1.02)
CREAT SERPL-MCNC: 1.27 MG/DL (ref 0.55–1.02)
CRP SERPL-MCNC: 24.1 MG/DL (ref 0–0.6)
DIFFERENTIAL METHOD BLD: ABNORMAL
ECHO AO ASC DIAM: 2.6 CM
ECHO AO ASCENDING AORTA INDEX: 1.6 CM/M2
ECHO AO DESC DIAM: 1.9 CM
ECHO AO DESCENDING AORTA INDEX: 1.17 CM/M2
ECHO AO ROOT DIAM: 2.6 CM
ECHO AO ROOT INDEX: 1.6 CM/M2
ECHO AV AREA PEAK VELOCITY: 1 CM2
ECHO AV AREA VTI: 1 CM2
ECHO AV AREA/BSA PEAK VELOCITY: 0.6 CM2/M2
ECHO AV AREA/BSA VTI: 0.6 CM2/M2
ECHO AV MEAN GRADIENT: 12 MMHG
ECHO AV MEAN VELOCITY: 1.6 M/S
ECHO AV PEAK GRADIENT: 23 MMHG
ECHO AV PEAK VELOCITY: 2.4 M/S
ECHO AV VELOCITY RATIO: 0.33
ECHO AV VTI: 57.4 CM
ECHO EST RA PRESSURE: 3 MMHG
ECHO LA AREA 4C: 14.1 CM2
ECHO LA DIAMETER INDEX: 2.28 CM/M2
ECHO LA DIAMETER: 3.7 CM
ECHO LA MAJOR AXIS: 4.8 CM
ECHO LA TO AORTIC ROOT RATIO: 1.42
ECHO LV E' LATERAL VELOCITY: 6 CM/S
ECHO LV E' SEPTAL VELOCITY: 5 CM/S
ECHO LV EDV A4C: 72 ML
ECHO LV EDV INDEX A4C: 44 ML/M2
ECHO LV EJECTION FRACTION A2C: 54 %
ECHO LV EJECTION FRACTION A4C: 46 %
ECHO LV ESV A4C: 39 ML
ECHO LV ESV INDEX A4C: 24 ML/M2
ECHO LV FRACTIONAL SHORTENING: 29 % (ref 28–44)
ECHO LV INTERNAL DIMENSION DIASTOLE INDEX: 2.78 CM/M2
ECHO LV INTERNAL DIMENSION DIASTOLIC: 4.5 CM (ref 3.9–5.3)
ECHO LV INTERNAL DIMENSION SYSTOLIC INDEX: 1.98 CM/M2
ECHO LV INTERNAL DIMENSION SYSTOLIC: 3.2 CM
ECHO LV IVSD: 0.9 CM (ref 0.6–0.9)
ECHO LV MASS 2D: 123.1 G (ref 67–162)
ECHO LV MASS INDEX 2D: 76 G/M2 (ref 43–95)
ECHO LV POSTERIOR WALL DIASTOLIC: 0.8 CM (ref 0.6–0.9)
ECHO LV RELATIVE WALL THICKNESS RATIO: 0.36
ECHO LVOT AREA: 3.1 CM2
ECHO LVOT AV VTI INDEX: 0.33
ECHO LVOT DIAM: 2 CM
ECHO LVOT MEAN GRADIENT: 1 MMHG
ECHO LVOT PEAK GRADIENT: 2 MMHG
ECHO LVOT PEAK VELOCITY: 0.8 M/S
ECHO LVOT STROKE VOLUME INDEX: 36.4 ML/M2
ECHO LVOT SV: 59 ML
ECHO LVOT VTI: 18.8 CM
ECHO MV A VELOCITY: 0.94 M/S
ECHO MV AREA VTI: 1.8 CM2
ECHO MV E DECELERATION TIME (DT): 190 MS
ECHO MV E VELOCITY: 0.53 M/S
ECHO MV E/A RATIO: 0.56
ECHO MV E/E' LATERAL: 8.83
ECHO MV E/E' RATIO (AVERAGED): 9.72
ECHO MV E/E' SEPTAL: 10.6
ECHO MV LVOT VTI INDEX: 1.76
ECHO MV MAX VELOCITY: 1.1 M/S
ECHO MV MEAN GRADIENT: 1 MMHG
ECHO MV MEAN VELOCITY: 0.5 M/S
ECHO MV PEAK GRADIENT: 5 MMHG
ECHO MV REGURGITANT PEAK GRADIENT: 31 MMHG
ECHO MV REGURGITANT PEAK VELOCITY: 2.8 M/S
ECHO MV VTI: 33.1 CM
ECHO RIGHT VENTRICULAR SYSTOLIC PRESSURE (RVSP): 25 MMHG
ECHO RV TAPSE: 1.7 CM (ref 1.7–?)
ECHO TV REGURGITANT MAX VELOCITY: 2.32 M/S
ECHO TV REGURGITANT PEAK GRADIENT: 22 MMHG
EOSINOPHIL # BLD: 0 K/UL (ref 0–0.4)
EOSINOPHIL NFR BLD: 0 % (ref 0–7)
ERYTHROCYTE [DISTWIDTH] IN BLOOD BY AUTOMATED COUNT: 18.4 % (ref 11.5–14.5)
FERRITIN SERPL-MCNC: 194 NG/ML (ref 8–252)
FIO2 ON VENT: 35 %
GAS FLOW.O2 SETTING OXYMISER: 14 L/MIN
GLOBULIN SER CALC-MCNC: 3.6 G/DL (ref 2–4)
GLUCOSE SERPL-MCNC: 231 MG/DL (ref 65–100)
GLUCOSE SERPL-MCNC: 231 MG/DL (ref 65–100)
HCO3 BLDA-SCNC: 21 MMOL/L (ref 22–26)
HCT VFR BLD AUTO: 34.1 % (ref 35–47)
HGB BLD-MCNC: 10.8 G/DL (ref 11.5–16)
IMM GRANULOCYTES # BLD AUTO: 0.2 K/UL (ref 0–0.04)
IMM GRANULOCYTES NFR BLD AUTO: 1 % (ref 0–0.5)
IPAP/PIP, IPAPIP: 16
LACTATE SERPL-SCNC: 1.6 MMOL/L (ref 0.4–2)
LDH SERPL L TO P-CCNC: 247 U/L (ref 81–246)
LYMPHOCYTES # BLD: 1.6 K/UL (ref 0.8–3.5)
LYMPHOCYTES NFR BLD: 6 % (ref 12–49)
M PNEUMO IGM SER IA-ACNC: REACTIVE
MCH RBC QN AUTO: 27.9 PG (ref 26–34)
MCHC RBC AUTO-ENTMCNC: 31.7 G/DL (ref 30–36.5)
MCV RBC AUTO: 88.1 FL (ref 80–99)
METHGB MFR BLD: 0.3 % (ref 0–1.4)
MONOCYTES # BLD: 0.4 K/UL (ref 0–1)
MONOCYTES NFR BLD: 1 % (ref 5–13)
NEUTS SEG # BLD: 25 K/UL (ref 1.8–8)
NEUTS SEG NFR BLD: 92 % (ref 32–75)
NRBC # BLD: 0 K/UL (ref 0–0.01)
NRBC BLD-RTO: 0 PER 100 WBC
OXYHGB MFR BLD: 95 % (ref 95–99)
PCO2 BLDA: 31 MMHG (ref 35–45)
PEEP RESPIRATORY: 6 CM[H2O]
PERFORMED BY, TECHID: ABNORMAL
PH BLDA: 7.45 [PH] (ref 7.35–7.45)
PHOSPHATE SERPL-MCNC: 4 MG/DL (ref 2.6–4.7)
PLATELET # BLD AUTO: 307 K/UL (ref 150–400)
PMV BLD AUTO: 10.3 FL (ref 8.9–12.9)
PO2 BLDA: 82 MMHG (ref 80–100)
POTASSIUM SERPL-SCNC: 3.8 MMOL/L (ref 3.5–5.1)
POTASSIUM SERPL-SCNC: 3.8 MMOL/L (ref 3.5–5.1)
PROCALCITONIN SERPL-MCNC: 18.5 NG/ML
PROT SERPL-MCNC: 6 G/DL (ref 6.4–8.2)
RBC # BLD AUTO: 3.87 M/UL (ref 3.8–5.2)
SAO2 % BLD: 96 % (ref 95–99)
SAO2% DEVICE SAO2% SENSOR NAME: ABNORMAL
SODIUM SERPL-SCNC: 141 MMOL/L (ref 136–145)
SODIUM SERPL-SCNC: 142 MMOL/L (ref 136–145)
SPECIMEN SITE: ABNORMAL
TSH SERPL DL<=0.05 MIU/L-ACNC: 1.37 UIU/ML (ref 0.36–3.74)
VENTILATION MODE VENT: ABNORMAL
WBC # BLD AUTO: 27.3 K/UL (ref 3.6–11)

## 2022-09-06 PROCEDURE — 74011000250 HC RX REV CODE- 250: Performed by: PHYSICIAN ASSISTANT

## 2022-09-06 PROCEDURE — 77010033678 HC OXYGEN DAILY

## 2022-09-06 PROCEDURE — 74011000250 HC RX REV CODE- 250: Performed by: HOSPITALIST

## 2022-09-06 PROCEDURE — 96361 HYDRATE IV INFUSION ADD-ON: CPT

## 2022-09-06 PROCEDURE — 99233 SBSQ HOSP IP/OBS HIGH 50: CPT | Performed by: INTERNAL MEDICINE

## 2022-09-06 PROCEDURE — 93306 TTE W/DOPPLER COMPLETE: CPT

## 2022-09-06 PROCEDURE — 73620 X-RAY EXAM OF FOOT: CPT

## 2022-09-06 PROCEDURE — 82803 BLOOD GASES ANY COMBINATION: CPT

## 2022-09-06 PROCEDURE — 96374 THER/PROPH/DIAG INJ IV PUSH: CPT

## 2022-09-06 PROCEDURE — 82728 ASSAY OF FERRITIN: CPT

## 2022-09-06 PROCEDURE — 84145 PROCALCITONIN (PCT): CPT

## 2022-09-06 PROCEDURE — 86738 MYCOPLASMA ANTIBODY: CPT

## 2022-09-06 PROCEDURE — 74011250637 HC RX REV CODE- 250/637: Performed by: HOSPITALIST

## 2022-09-06 PROCEDURE — 84443 ASSAY THYROID STIM HORMONE: CPT

## 2022-09-06 PROCEDURE — 85025 COMPLETE CBC W/AUTO DIFF WBC: CPT

## 2022-09-06 PROCEDURE — 36415 COLL VENOUS BLD VENIPUNCTURE: CPT

## 2022-09-06 PROCEDURE — 73600 X-RAY EXAM OF ANKLE: CPT

## 2022-09-06 PROCEDURE — 74011250636 HC RX REV CODE- 250/636: Performed by: PHYSICIAN ASSISTANT

## 2022-09-06 PROCEDURE — 74011250637 HC RX REV CODE- 250/637: Performed by: INTERNAL MEDICINE

## 2022-09-06 PROCEDURE — 86140 C-REACTIVE PROTEIN: CPT

## 2022-09-06 PROCEDURE — 83605 ASSAY OF LACTIC ACID: CPT

## 2022-09-06 PROCEDURE — 83615 LACTATE (LD) (LDH) ENZYME: CPT

## 2022-09-06 PROCEDURE — 36600 WITHDRAWAL OF ARTERIAL BLOOD: CPT

## 2022-09-06 PROCEDURE — XW0DXM6 INTRODUCTION OF BARICITINIB INTO MOUTH AND PHARYNX, EXTERNAL APPROACH, NEW TECHNOLOGY GROUP 6: ICD-10-PCS | Performed by: INTERNAL MEDICINE

## 2022-09-06 PROCEDURE — 80069 RENAL FUNCTION PANEL: CPT

## 2022-09-06 PROCEDURE — 80053 COMPREHEN METABOLIC PANEL: CPT

## 2022-09-06 PROCEDURE — 74011000258 HC RX REV CODE- 258: Performed by: PHYSICIAN ASSISTANT

## 2022-09-06 PROCEDURE — 65610000006 HC RM INTENSIVE CARE

## 2022-09-06 PROCEDURE — 71045 X-RAY EXAM CHEST 1 VIEW: CPT

## 2022-09-06 PROCEDURE — 74011250636 HC RX REV CODE- 250/636: Performed by: INTERNAL MEDICINE

## 2022-09-06 RX ADMIN — SODIUM CHLORIDE, PRESERVATIVE FREE 10 ML: 5 INJECTION INTRAVENOUS at 14:35

## 2022-09-06 RX ADMIN — BARICITINIB 2 MG: 2 TABLET, FILM COATED ORAL at 09:05

## 2022-09-06 RX ADMIN — PALIPERIDONE 3 MG: 3 TABLET, EXTENDED RELEASE ORAL at 09:05

## 2022-09-06 RX ADMIN — DEXAMETHASONE SODIUM PHOSPHATE 4 MG: 4 INJECTION, SOLUTION INTRA-ARTICULAR; INTRALESIONAL; INTRAMUSCULAR; INTRAVENOUS; SOFT TISSUE at 09:06

## 2022-09-06 RX ADMIN — ATORVASTATIN CALCIUM 40 MG: 40 TABLET, FILM COATED ORAL at 09:05

## 2022-09-06 RX ADMIN — POLYETHYLENE GLYCOL 3350 17 G: 17 POWDER, FOR SOLUTION ORAL at 09:05

## 2022-09-06 RX ADMIN — DEXAMETHASONE SODIUM PHOSPHATE 4 MG: 4 INJECTION, SOLUTION INTRA-ARTICULAR; INTRALESIONAL; INTRAMUSCULAR; INTRAVENOUS; SOFT TISSUE at 20:37

## 2022-09-06 RX ADMIN — TRAZODONE HYDROCHLORIDE 50 MG: 50 TABLET ORAL at 20:38

## 2022-09-06 RX ADMIN — Medication 4 MCG/MIN: at 12:02

## 2022-09-06 RX ADMIN — GABAPENTIN 100 MG: 100 CAPSULE ORAL at 09:05

## 2022-09-06 RX ADMIN — PIPERACILLIN AND TAZOBACTAM 3.38 G: 3; .375 INJECTION, POWDER, FOR SOLUTION INTRAVENOUS at 00:14

## 2022-09-06 RX ADMIN — PIPERACILLIN AND TAZOBACTAM 3.38 G: 3; .375 INJECTION, POWDER, FOR SOLUTION INTRAVENOUS at 06:24

## 2022-09-06 RX ADMIN — ASPIRIN 81 MG: 81 TABLET, COATED ORAL at 18:08

## 2022-09-06 RX ADMIN — AZITHROMYCIN DIHYDRATE 500 MG: 500 INJECTION, POWDER, LYOPHILIZED, FOR SOLUTION INTRAVENOUS at 18:09

## 2022-09-06 RX ADMIN — PIPERACILLIN AND TAZOBACTAM 3.38 G: 3; .375 INJECTION, POWDER, FOR SOLUTION INTRAVENOUS at 14:35

## 2022-09-06 RX ADMIN — PIPERACILLIN AND TAZOBACTAM 3.38 G: 3; .375 INJECTION, POWDER, FOR SOLUTION INTRAVENOUS at 22:45

## 2022-09-06 RX ADMIN — GABAPENTIN 100 MG: 100 CAPSULE ORAL at 20:37

## 2022-09-06 RX ADMIN — FAMOTIDINE 20 MG: 20 TABLET ORAL at 09:05

## 2022-09-06 RX ADMIN — SODIUM CHLORIDE, PRESERVATIVE FREE 5 ML: 5 INJECTION INTRAVENOUS at 20:37

## 2022-09-06 RX ADMIN — SODIUM CHLORIDE, PRESERVATIVE FREE 10 ML: 5 INJECTION INTRAVENOUS at 05:10

## 2022-09-06 NOTE — PROGRESS NOTES
Progress Note    Patient: Emir Narayanan MRN: [id number]  SSN: [id number]    YOB: 1950  Age: 70 y.o. Sex: female      Admit Date: 9/5/2022    LOS: 1 day     Subjective:   Patient followed for severe sepsis with suspected UTI and Covid-19. She is in the ICU with respiratory failure, now on nasal cannula. Blood cultures negative so far and urine culture pending. She is afebrile with elevated WBC, CRP, procal and LDH. Currently on Zosyn, Zithromax, Dexamethasone and Baricitinib. CXR today still showing clear lungs. Objective:     Vitals:    09/06/22 0700 09/06/22 0800 09/06/22 0824 09/06/22 0900   BP: (!) 105/54 (!) 127/56  107/60   Pulse: (!) 45 (!) 55  (!) 52   Resp: 12 10  12   Temp:  98.8 °F (37.1 °C)     SpO2: 100% 100% 100% 100%   Weight:    135 lb 12.9 oz (61.6 kg)   Height:            Intake and Output:  Current Shift: No intake/output data recorded. Last three shifts: 09/04 1901 - 09/06 0700  In: 3575.8 [P.O.:60; I.V.:3515.8]  Out: 3919 [Urine:1550]    Physical Exam:   Vitals and nursing note reviewed. Constitutional:       General: She is in acute distress. Appearance: She is ill-appearing. HENT:      Head: Normocephalic and atraumatic. Right Ear: External ear normal.      Left Ear: External ear normal.      Nose:      Comments:  Nasal cannula 3 L/min  Eyes:      Pupils: Pupils are equal, round, and reactive to light. Cardiovascular:      Rate and Rhythm: Regular rhythm. Tachycardia present. Heart sounds: No murmur heard. Pulmonary:      Effort: No respiratory distress. Breath sounds: Rhonchi present. Abdominal:      General: Bowel sounds are normal. There is no distension. Palpations: Abdomen is soft. Tenderness: There is no abdominal tenderness. Genitourinary:     Comments: External urinary device  Musculoskeletal:      Cervical back: Neck supple. Right lower leg: No edema. Left lower leg: No edema.       Comments: Right calf/foot in hard cast   Skin:     Findings: No rash. Neurological: nonfocal, no confusion  Psychiatric:  normal behavior     Lab/Data Review:     WBC 27,300    CRP 24.10  Procal 18.50 <6.82      Ferritin Pending    Blood cultures (9/5) No growth 1 day  Urine culture (9/5) in process     CXR (9/6) Lungs are grossly clear    Assessment:     Active Problems:    Shortness of breath (9/5/2022)      COVID-19 (9/5/2022)      Hematuria (9/5/2022)      Sepsis ([address] (9/5/2022)       Severe sepsis with fever, tachycardia, tachypnea, leukocytosis, elevated lactic acid, CRP and procal  UTI with marked pyuria, urine culture pending, Day #2 IV Zosyn  3. Covid-19 infection  4. Acute hypoxic respiratory failure on BIPAP  5. Cardiomyopathy  6. Left ankle fracture, casted  7. Distended gallbladder     Comment:  I think that her elevated CRP and procal reflect UTI and elevated LDH reflects her Covid-19 infection. Increase in WBC likely related Dexamethasone. Plan:   Continue IV Zosyn   Continue Dexamethasone  Continue Azithromycin and Bariticinib IV  Follow-up blood and urine cultures  In am, repeat CBC, procal , CRP, LDH, ferritin  Follow-up  Mycoplasma IgM and urine for Legionella antigen  7.   Watch for QT prolongation (DDI with Azithromycin and Invega)       Signed By: India Stoner MD     September 6, 2022

## 2022-09-06 NOTE — PROGRESS NOTES
Comprehensive Nutrition Assessment    Type and Reason for Visit: Initial, Positive nutrition screen (MST 2)    Nutrition Recommendations/Plan:   Continue current PO diet  Encourage PO intakes, document as % in I/O     Malnutrition Assessment:  Malnutrition Status:  Insufficient data (09/06/22 1539)    Context:  Acute illness     Findings of the 6 clinical characteristics of malnutrition:   Energy Intake:  No significant decrease in energy intake  Weight Loss:  Unable to assess     Body Fat Loss:  Unable to assess,     Muscle Mass Loss:  Unable to assess,    Fluid Accumulation:  No significant fluid accumulation,        Nutrition Assessment:    Admitted for confusion, +UTI and COVID-19. Nutrition assessment triggered due to MST >2, lost weight recently without trying but unsure amount. No recent wts in EMR to assess. Per EMR, pt with poor appetite however intakes >75% per RN today. No nutrition interventions warranted at this time, continue to monitor. Labs: H/H 10.8/34.1, Bun 23, Cr 1.24, -231, Alb 2.4. Meds: Azithromycin, baricitinib, dexamethasone, pepcid, levophed at 5 mcg/min, zosyn, daily miralax, trazadone. Nutrition Related Findings:    Unable to complete NFPE 2/2 COVID-19 precautions. No signs of wasting per visual assessment. No hx dysphagia. No N/V/D/C, last BM 9/6, formed. No edema. Wound Type: Pressure injury (coccyx)      Current Nutrition Intake & Therapies:  Average Meal Intake: %  Average Supplement Intake: None ordered  ADULT DIET Regular    Anthropometric Measures:  Height: 5' 2.01\" (157.5 cm)  Ideal Body Weight (IBW): 110 lbs (50 kg)     Current Body Wt:  61.6 kg (135 lb 12.9 oz) (9/6), 123.5 % IBW.  Bed scale  Current BMI (kg/m2): 24.8  Usual Body Weight:  (geeta)     Weight Adjustment: No adjustment                 BMI Category: Normal weight (BMI 22.0-24.9) age over 72  Wt Readings from Last 10 Encounters:   09/06/22 61.6 kg (135 lb 12.9 oz)   11/25/19 82.6 kg (182 lb) 09/16/19 73.5 kg (162 lb)   08/14/19 77.8 kg (171 lb 8 oz)   07/02/19 84.4 kg (186 lb)   06/11/19 87.5 kg (193 lb)   04/16/19 90.3 kg (199 lb)   04/09/19 89.8 kg (197 lb 14.4 oz)   01/08/19 87.1 kg (192 lb)   01/06/19 96.5 kg (212 lb 11.9 oz)   Wt loss of 21kg x3 years, no more recent wts to assess.      Estimated Daily Nutrient Needs:  Energy Requirements Based On: Kcal/kg  Weight Used for Energy Requirements: Current  Energy (kcal/day): 1663kcals (25kcals/kg)  Weight Used for Protein Requirements: Current  Protein (g/day): 74g (1.2g/kg)  Method Used for Fluid Requirements: 1 ml/kcal  Fluid (ml/day): 1663kcals    Nutrition Diagnosis:   No nutrition diagnosis at this time     Nutrition Interventions:   Food and/or Nutrient Delivery: Continue current diet  Nutrition Education/Counseling: No recommendations at this time  Coordination of Nutrition Care: Continue to monitor while inpatient  Plan of Care discussed with: rn    Goals:     Goals: Meet at least 75% of estimated needs, within 7 days, other (specify)  Specify Other Goals: wt maintenance within +/-0.5kg in 7 days    Nutrition Monitoring and Evaluation:   Behavioral-Environmental Outcomes: None identified  Food/Nutrient Intake Outcomes: Diet advancement/tolerance, Food and nutrient intake  Physical Signs/Symptoms Outcomes: Weight, Skin    Discharge Planning:    No discharge needs at this time    [employer]: Ext [phone], or via [employer]

## 2022-09-06 NOTE — PROGRESS NOTES
Reason for Admission:   Sepsis ([address] Hematuria, COVID-19                    RUR Score:  15% Moderate                PCP: First and Last name:   Anoop Zamudio MD     Name of Practice:    Are you a current patient: Yes/No:    Approximate date of last visit: Aug 2022   Can you participate in a virtual visit if needed:     Do you (patient/family) have any concerns for transition/discharge? Plan for utilizing home health:       Current Advanced Directive/Advance Care Plan:  Full Code      Healthcare Decision Maker:   Click here to complete [address] including selection of the Healthcare Decision Maker Relationship (ie \"Primary\")              Transition of Care Plan:          Patient is a long term care resident at [facility] facility to [facility]. Patient was transferred to [facility] d/t patient tested positive for Covid. Long term care to [facility] on June 7th, 2022. Requires moderate assist w/ADL's & IADL's. Home O2. Admissions to inform writer of home O2 settings. Spoke w/admissions coordinator (Marlane Castleman) and informed the patient gives her own consents; and the only person listed in her chart is Ministerio @ [phone] or [phone]. Outbound call to Ministerio @ [phone]. Identified self, role, and nature of the call. Megan Praneeth confirmed she knows the patient; although not related and hasn't spoken in over two years. \"Please don't call me about her. \"  Acknowledged understanding. Care Management Interventions  PCP Verified by CM:  (Aug 2022)  Mode of Transport at Discharge:  Other (see comment) (Transport)  Transition of Care Consult (CM Consult): Discharge Planning  Discharge Durable Medical Equipment: No  Support Systems: [place]  Discharge Location  Patient Expects to be Discharged to[de-identified] [facility], MSW

## 2022-09-06 NOTE — PROGRESS NOTES
Problem: Risk for Spread of Infection  Goal: Prevent transmission of infectious organism to others  Description: Prevent the transmission of infectious organisms to other patients, staff members, and visitors.   Outcome: Progressing Towards Goal     Problem: Airway Clearance - Ineffective  Goal: Achieve or maintain patent airway  Outcome: Progressing Towards Goal     Problem: Gas Exchange - Impaired  Goal: Absence of hypoxia  Outcome: Progressing Towards Goal     Problem: Nutrition Deficits  Goal: Optimize nutrtional status  Outcome: Progressing Towards Goal

## 2022-09-06 NOTE — PROGRESS NOTES
Hospitalist Progress Note         Starr Mendoza MD          Daily Progress Note: 9/6/2022      Subjective: The patient is seen for follow  up. 22-year-old female admitted on 9/5/2022 with a history of depression\mood disorder, left breast cancer with surgery and radiation, essential hypertension, hyperlipidemia who underwent right ankle surgery and currently has a cast in place who presented from [address] [place] [address]. She presented with shortness of breath and hypoxia and was found to have a urine consistent with a urinary tract infection as well as sepsis with a white count of 15,000. Unfortunately she has declined even further and now is in acute respiratory distress with hypoxia requiring high flow oxygen and is hypotensive and Levophed has been ordered. Patient to be transferred to the intensive care unit. Lactic acidosis is worsening even after the fluid bolus of 30 mL/kg. CT scan of the abdomen pelvis pending. Repeat stat chest x-ray pending patient has a previous history of severe systolic heart failure with an EF of 21 to 25% with left ventricular global hypokinesis and grade 3 diastolic dysfunction. CTA of the chest was performed and no pulmonary emboli was noted. There was mild patchy groundglass opacities in the left lung base although not enough to explain her ongoing hypoxia. The gallbladder was also distended without enough visualization of the gallbladder to diagnose acute cholecystitis  ----  Patient seen in follow-up. Awake alert and oriented x4. Offers no complaints. Remains on low-dose Levophed. COVID-19 positive and on IV antibiotics and IV Decadron.     Problem List:  Problem List as of 9/6/2022 Date Reviewed: 9/5/2022            Codes Class Noted - Resolved    Shortness of breath ICD-10-CM: R06.02  ICD-9-CM: 786.05  9/5/2022 - Present        COVID-19 ICD-10-CM: U07.1  ICD-9-CM: 079.89  9/5/2022 - Present        Hematuria ICD-10-CM: R31.9  ICD-9-CM: 599.70  9/5/2022 - Present        Sepsis ([place] [address].) ICD-10-CM: A41.9  ICD-9-CM: 038.9, 995.91  9/5/2022 - Present        Delusional disorder ([place] [address].) ICD-10-CM: F22  ICD-9-CM: 297.1  8/14/2019 - Present        Acute CHF (congestive heart failure) ([facility] ICD-10-CM: I50.9  ICD-9-CM: 428.0  8/11/2019 - Present        Failure to thrive (0-17) ICD-10-CM: R62.51  ICD-9-CM: 783.41  8/8/2019 - Present        Atypical glandular cells of undetermined significance (BASILIO) on cervical Pap smear ICD-10-CM: R87.619  ICD-9-CM: 795.00  11/26/2018 - Present        Severe obesity with body mass index (BMI) of 35.0 to 39.9 with serious comorbidity ([facility] ICD-10-CM: E66.01  ICD-9-CM: 278.01  7/3/2018 - Present        Hx of carcinoma in situ of breast ICD-10-CM: Z86.000  ICD-9-CM: V13.89  4/9/2018 - Present        Personality disorder ([facility] ICD-10-CM: F60.9  ICD-9-CM: 301.9  3/2/2017 - Present        Hyperlipidemia ICD-10-CM: E78.5  ICD-9-CM: 272.4  12/2/2015 - Present        Chronic lower back pain ICD-10-CM: M54.50, G89.29  ICD-9-CM: 724.2, 338.29  11/19/2015 - Present        Depression with anxiety ICD-10-CM: F41.8  ICD-9-CM: 300.4  9/11/2012 - Present        Arthritis of knee, right ICD-10-CM: M17.11  ICD-9-CM: 716.96  2/20/2012 - Present        RESOLVED: Breast pain ICD-10-CM: N64.4  ICD-9-CM: 611.71  7/22/2016 - 7/3/2018           Medications reviewed  Current Facility-Administered Medications   Medication Dose Route Frequency    famotidine (PEPCID) tablet 20 mg  20 mg Oral DAILY    aspirin delayed-release tablet 81 mg  81 mg Oral QPM    [Held by provider] metoprolol succinate (TOPROL-XL) XL tablet 25 mg  25 mg Oral DAILY    polyethylene glycol (MIRALAX) packet 17 g  17 g Oral DAILY    gabapentin (NEURONTIN) capsule 100 mg  100 mg Oral BID    atorvastatin (LIPITOR) tablet 40 mg  40 mg Oral DAILY    traZODone (DESYREL) tablet 50 mg  50 mg Oral QHS    sodium chloride (NS) flush 5-40 mL  5-40 mL IntraVENous Q8H sodium chloride (NS) flush 5-40 mL  5-40 mL IntraVENous PRN    acetaminophen (TYLENOL) tablet 650 mg  650 mg Oral Q6H PRN    Or    acetaminophen (TYLENOL) suppository 650 mg  650 mg Rectal Q6H PRN    ondansetron (ZOFRAN ODT) tablet 4 mg  4 mg Oral Q6H PRN    Or    ondansetron (ZOFRAN) injection 4 mg  4 mg IntraVENous Q6H PRN    paliperidone (INVEGA) SR tablet 3 mg  3 mg Oral DAILY    NOREPINephrine (LEVOPHED) 8 mg in 0.9% NS 250ml infusion  0.5-30 mcg/min IntraVENous TITRATE    piperacillin-tazobactam (ZOSYN) 3.375 g in 0.9% sodium chloride (MBP/ADV) 100 mL MBP  3.375 g IntraVENous Q8H    dexamethasone (DECADRON) 4 mg/mL injection 4 mg  4 mg IntraVENous Q12H    baricitinib (OLUMIANT) tablet 2 mg  2 mg Oral DAILY    azithromycin (ZITHROMAX) 500 mg in 0.9% sodium chloride 250 mL ([id number])  500 mg IntraVENous Q24H       Review of Systems:   A comprehensive review of systems was negative except for that written in the HPI. Objective:   Physical Exam:     Visit Vitals  /64 (BP 1 Location: Right upper arm, BP Patient Position: At rest)   Pulse 72   Temp 98.8 °F (37.1 °C)   Resp 14   Ht 5' 2\" (1.575 m)   Wt 61.6 kg (135 lb 12.9 oz)   SpO2 100%   BMI 24.84 kg/m²    O2 Flow Rate (L/min): 3 l/min O2 Device: Nasal cannula    Temp (24hrs), Av.3 °F (37.4 °C), Min:98.8 °F (37.1 °C), Max:100.4 °F (38 °C)    No intake/output data recorded.  1901 -  0700  In: 3575.8 [P.O.:60; I.V.:3515.8]  Out:  [[facility]:9355]    General:  Alert, cooperative, no distress, appears stated age. Lungs:   Clear to auscultation bilaterally. Chest wall:  No tenderness or deformity. Heart:  Regular rate and rhythm, S1, S2 normal, no murmur, click, rub or gallop. Abdomen:   Soft, non-tender. Bowel sounds normal. No masses,  No organomegaly. Extremities: Extremities normal, atraumatic, no cyanosis or edema. Pulses: 2+ and symmetric all extremities.    Skin: Skin color, texture, turgor normal. No rashes or lesions Neurologic: CNII-XII intact.  No gross sensory or motor deficits     Data Review:       Recent Days:  Recent Labs     09/06/22  0420 09/05/22  0034   WBC 27.3* 15.2*   HGB 10.8* 11.4*   HCT 34.1* 35.7    348     Recent Labs     09/06/22  0420 09/05/22  0034     141 133*   K 3.8  3.8 4.7   *  109* 102   CO2 22  21 25   *  231* 200*   BUN 23*  24* 13   CREA 1.24*  1.27* 1.37*   CA 8.7  8.7 9.6   PHOS 4.0  --    ALB 2.4*  2.4* 2.6*   TBILI 0.5 0.5   ALT 14 15     Recent Labs     09/06/22  0423 09/05/22  1215   PH 7.45 7.47*   PCO2 31* 33*   PO2 82 110*   HCO3 21* 23   FIO2 35 85       24 Hour Results:  Recent Results (from the past 24 hour(s))   RENAL FUNCTION PANEL    Collection Time: 09/06/22  4:20 AM   Result Value Ref Range    Sodium 142 136 - 145 mmol/L    Potassium 3.8 3.5 - 5.1 mmol/L    Chloride 109 (H) 97 - 108 mmol/L    CO2 22 21 - 32 mmol/L    Anion gap 11 5 - 15 mmol/L    Glucose 231 (H) 65 - 100 mg/dL    BUN 23 (H) 6 - 20 mg/dL    Creatinine 1.24 (H) 0.55 - 1.02 mg/dL    BUN/Creatinine ratio 19 12 - 20      GFR est AA 52 (L) >60 ml/min/1.73m2    GFR est non-AA 43 (L) >60 ml/min/1.73m2    Calcium 8.7 8.5 - 10.1 mg/dL    Phosphorus 4.0 2.6 - 4.7 mg/dL    Albumin 2.4 (L) 3.5 - 5.0 g/dL   TSH 3RD GENERATION    Collection Time: 09/06/22  4:20 AM   Result Value Ref Range    TSH 1.37 0.36 - 3.74 uIU/mL   CBC WITH AUTOMATED DIFF    Collection Time: 09/06/22  4:20 AM   Result Value Ref Range    WBC 27.3 (H) 3.6 - 11.0 K/uL    RBC 3.87 3.80 - 5.20 M/uL    HGB 10.8 (L) 11.5 - 16.0 g/dL    HCT 34.1 (L) 35.0 - 47.0 %    MCV 88.1 80.0 - 99.0 FL    MCH 27.9 26.0 - 34.0 PG    MCHC 31.7 30.0 - 36.5 g/dL    RDW 18.4 (H) 11.5 - 14.5 %    PLATELET 505 015 - 854 K/uL    MPV 10.3 8.9 - 12.9 FL    NRBC 0.0 0.0  WBC    ABSOLUTE NRBC 0.00 0.00 - 0.01 K/uL    NEUTROPHILS 92 (H) 32 - 75 %    LYMPHOCYTES 6 (L) 12 - 49 %    MONOCYTES 1 (L) 5 - 13 %    EOSINOPHILS 0 0 - 7 %    BASOPHILS 0 0 - 1 %    IMMATURE GRANULOCYTES 1 (H) 0 - 0.5 %    ABS. NEUTROPHILS 25.0 (H) 1.8 - 8.0 K/UL    ABS. LYMPHOCYTES 1.6 0.8 - 3.5 K/UL    ABS. MONOCYTES 0.4 0.0 - 1.0 K/UL    ABS. EOSINOPHILS 0.0 0.0 - 0.4 K/UL    ABS. BASOPHILS 0.0 0.0 - 0.1 K/UL    ABS. IMM. GRANS. 0.2 (H) 0.00 - 0.04 K/UL    DF AUTOMATED     METABOLIC PANEL, COMPREHENSIVE    Collection Time: 09/06/22  4:20 AM   Result Value Ref Range    Sodium 141 136 - 145 mmol/L    Potassium 3.8 3.5 - 5.1 mmol/L    Chloride 109 (H) 97 - 108 mmol/L    CO2 21 21 - 32 mmol/L    Anion gap 11 5 - 15 mmol/L    Glucose 231 (H) 65 - 100 mg/dL    BUN 24 (H) 6 - 20 mg/dL    Creatinine 1.27 (H) 0.55 - 1.02 mg/dL    BUN/Creatinine ratio 19 12 - 20      GFR est AA 50 (L) >60 ml/min/1.73m2    GFR est non-AA 41 (L) >60 ml/min/1.73m2    Calcium 8.7 8.5 - 10.1 mg/dL    Bilirubin, total 0.5 0.2 - 1.0 mg/dL    AST (SGOT) 29 15 - 37 U/L    ALT (SGPT) 14 12 - 78 U/L    Alk.  phosphatase 84 45 - 117 U/L    Protein, total 6.0 (L) 6.4 - 8.2 g/dL    Albumin 2.4 (L) 3.5 - 5.0 g/dL    Globulin 3.6 2.0 - 4.0 g/dL    A-G Ratio 0.7 (L) 1.1 - 2.2     LD    Collection Time: 09/06/22  4:20 AM   Result Value Ref Range     (H) 81 - 246 U/L   FERRITIN    Collection Time: 09/06/22  4:20 AM   Result Value Ref Range    Ferritin 194 8 - 252 ng/mL   C REACTIVE PROTEIN, QT    Collection Time: 09/06/22  4:20 AM   Result Value Ref Range    C-Reactive protein 24.10 (H) 0.00 - 0.60 mg/dL   PROCALCITONIN    Collection Time: 09/06/22  4:20 AM   Result Value Ref Range    Procalcitonin 18.50 (H) 0 ng/mL   LACTIC ACID    Collection Time: 09/06/22  4:20 AM   Result Value Ref Range    Lactic acid 1.6 0.4 - 2.0 mmol/L   MYCOPLASMA AB, IGM    Collection Time: 09/06/22  4:20 AM   Result Value Ref Range    Mycoplasma Ab, IgM Reactive (A) NONREACTIVE     BLOOD GAS, ARTERIAL    Collection Time: 09/06/22  4:23 AM   Result Value Ref Range    pH 7.45 7.35 - 7.45      PCO2 31 (L) 35 - 45 mmHg    PO2 82 80 - 100 mmHg    O2 SATURATION 96 95 - 99 %    BICARBONATE 21 (L) 22 - 26 mmol/L    BASE DEFICIT 2.1 mmol/L    O2 METHOD BiPAP      FIO2 35 %    MODE BiPAP      SET RATE 14      IPAP/PIP 16      PEEP/CPAP 6.0      Sample source Arterial      SITE Right Radial      VITA'S TEST YES      Carboxy-Hgb 0.3 (L) 1 - 2 %    Methemoglobin 0.3 0 - 1.4 %    Oxyhemoglobin 95.0 95 - 99 %    Performed by [facility]     TEMPERATURE 98.9     ECHO ADULT COMPLETE    Collection Time: 09/06/22  9:20 AM   Result Value Ref Range    LV EDV A4C 72 mL    LV ESV A4C 39 mL    IVSd 0.9 0.6 - 0.9 cm    LVIDd 4.5 3.9 - 5.3 cm    LVIDs 3.2 cm    LVOT Diameter 2.0 cm    LVOT Mean Gradient 1 mmHg    LVOT VTI 18.8 cm    LVOT Peak Velocity 0.8 m/s    LVOT Peak Gradient 2 mmHg    LVPWd 0.8 0.6 - 0.9 cm    LV E' Lateral Velocity 6 cm/s    LV E' Septal Velocity 5 cm/s    LV Ejection Fraction A4C 46 %    LVOT Area 3.1 cm2    LVOT SV 59.0 ml    LA Major [place] 4.8 cm    LA Area 4C 14.1 cm2    LA Diameter 3.7 cm    AV Mean Gradient 12 mmHg    AV VTI 57.4 cm    AV Mean Velocity 1.6 m/s    AV Peak Velocity 2.4 m/s    AV Peak Gradient 23 mmHg    AV Area by VTI 1.0 cm2    AV Area by Peak Velocity 1.0 cm2    Aortic Root 2.6 cm    Ascending Aorta 2.6 cm    Descending Aorta 1.9 cm    MR Peak Velocity 2.8 m/s    MR Peak Gradient 31 mmHg    MV Max Velocity 1.1 m/s    MV Peak Gradient 5 mmHg    MV E Wave Deceleration Time 190.0 ms    MV A Velocity 0.94 m/s    MV E Velocity 0.53 m/s    MV Mean Gradient 1 mmHg    MV VTI 33.1 cm    MV Mean Velocity 0.5 m/s    MV Area by VTI 1.8 cm2    Est. RA Pressure 3 mmHg    TR Max Velocity 2.32 m/s    TR Peak Gradient 22 mmHg    TAPSE 1.7 1.7 cm    Fractional Shortening 2D 29 28 - 44 %    LV ESV Index A4C 24 mL/m2    LV EDV Index A4C 44 mL/m2    LVIDd Index 2.78 cm/m2    LVIDs Index 1.98 cm/m2    LV RWT Ratio 0.36     LV Mass 2D 123.1 67 - 162 g    LV Mass 2D Index 76.0 43 - 95 g/m2    MV E/A 0.56     E/E' Ratio (Averaged) 9.72     E/E' Lateral 8.83 E/E' Septal 10.60     LVOT Stroke Volume Index 36.4 mL/m2    LA Size Index 2.28 cm/m2    LA/AO Root Ratio 1.42     Ao Root Index 1.60 cm/m2    Ascending Aorta Index 1.60 cm/m2    AV Velocity Ratio 0.33     LVOT:AV VTI Index 0.33     ROBY/BSA VTI 0.6 cm2/m2    ROBY/BSA Peak Velocity 0.6 cm2/m2    MV:LVOT VTI Index 1.76     RVSP 25 mmHg    Descending Aorta Index 1.17 cm/m2    LV Ejection Fraction A2C 54 %           Assessment/     Severe sepsis with septic shock    -Continue IV antibiotics    -Wean off vasopressors as tolerated      COVID-19 with pneumonitis    -Continue IV Decadron    -Strict contact airborne precautions    -Continue baricitinib as per infectious disease    Acute cystitis      -Follow-up urine and blood cultures    Chronic systolic heart failure     -Prior ejection fraction of 21%     -Follow-up repeat 2D echocardiogram    Acute kidney injury     -Most likely secondary to dehydration and sepsis     -Monitor urine output and kidney functions closely     -Avoid nephrotoxic medications      Plan:  Continue supportive care  Wean off vasopressors  Plan for transfer out of the ICU once off vasopressors  Overall clinically improving      Care Plan discussed with: Patient/Family    Total time spent with patient: 30 minutes.     Evelina Faulkner MD

## 2022-09-06 NOTE — PROGRESS NOTES
Pulmonary Progress Note      NAME: Aimee Poole   :  1950  MRM:  [id number]    Date/Time: 2022  11:27 AM         Subjective:     Patient seen and examined. Discussed with the RN. She is currently awake and alert. On 3 L oxygen. Taken off BiPAP. On Levophed at 4 mics and weaning down. She has a pure wick catheter and has made good urine output. The patient tells me that she has been a lifelong non-smoker. She has been vaccinated x3 for coronavirus. Never had COVID-19 before. Past Medical History reviewed and unchanged from Admission History and Physical       Objective:     Physical Exam     Vitals:      Last 24hrs VS reviewed since prior progress note. Most recent are:    Visit Vitals  /64 (BP 1 Location: Right upper arm, BP Patient Position: At rest)   Pulse 72   Temp 98.8 °F (37.1 °C)   Resp 14   Ht 5' 2\" (1.575 m)   Wt 61.6 kg (135 lb 12.9 oz)   SpO2 100%   BMI 24.84 kg/m²     SpO2 Readings from Last 6 Encounters:   22 100%   19 97%   19 98%   19 97%   19 99%   19 98%    O2 Flow Rate (L/min): 3 l/min     Intake/Output Summary (Last 24 hours) at 2022 1127  Last data filed at 2022 9913  Gross per 24 hour   Intake 1262.77 ml   Output 1550 ml   Net -287.23 ml      Exam:       This is an elderly female who is currently no in respiratory distress. She is on nasal cannula O2 3 L. Off BiPAP. Prior to BiPAP she was on high flow nasal cannula oxygen at 55 L with 70% FiO2. HEENT examination shows pupils are equal and reactive to light. No significant pallor. Neck is supple and trachea central.  No JVD or lymphadenopathy. Chest symmetrical equal and fair air entry bilaterally. She does have few basilar bibasilar crackles. Otherwise mostly clear. Heart: Regular rate and rhythm. Normal sinus on the monitor. Abdomen: Soft, nontender, no visceromegaly. Bowel sounds audible. Extremities: Has hard cast in her right lower leg.   No significant pitting edema is noted. Neuro: No focal motor deficit    XR CHEST PORT   Final Result   1. Lungs are grossly clear. XR CHEST PORT   Final Result   1. Left-sided atelectasis, no acute abnormality      CT ABD PELV WO CONT   Final Result      1. Patchy nodular bilateral basilar airspace disease likely infectious or   inflammatory. Given nodularity recommend follow-up in 2-3 months postpartum. Treatment to exclude underlying mass lesion      XR CHEST PORT   Final Result   No change. CTA CHEST W OR W WO CONT   Final Result      1. Limited study as above. No evidence of pulmonary embolus. 2.  No acute cardiopulmonary process. Mild patchy groundglass opacity in the   left lung base may represent early infection or inflammatory process. 3.  The gallbladder is distended and not well evaluated on this study. Correlate   for signs and symptoms of acute cholecystitis         CT HEAD WO CONT   Final Result   No acute intracranial abnormality. XR CHEST SNGL V   Final Result   No acute cardiopulmonary process.       XR CHEST PORT    (Results Pending)   XR CHEST PORT    (Results Pending)   XR CHEST PORT    (Results Pending)       Lab Data Reviewed: (see below)      Medications:  Current Facility-Administered Medications   Medication Dose Route Frequency    famotidine (PEPCID) tablet 20 mg  20 mg Oral DAILY    aspirin delayed-release tablet 81 mg  81 mg Oral QPM    [Held by provider] metoprolol succinate (TOPROL-XL) XL tablet 25 mg  25 mg Oral DAILY    polyethylene glycol (MIRALAX) packet 17 g  17 g Oral DAILY    gabapentin (NEURONTIN) capsule 100 mg  100 mg Oral BID    atorvastatin (LIPITOR) tablet 40 mg  40 mg Oral DAILY    traZODone (DESYREL) tablet 50 mg  50 mg Oral QHS    sodium chloride (NS) flush 5-40 mL  5-40 mL IntraVENous Q8H    sodium chloride (NS) flush 5-40 mL  5-40 mL IntraVENous PRN    acetaminophen (TYLENOL) tablet 650 mg  650 mg Oral Q6H PRN    Or    acetaminophen (TYLENOL) suppository 650 mg  650 mg Rectal Q6H PRN    ondansetron (ZOFRAN ODT) tablet 4 mg  4 mg Oral Q6H PRN    Or    ondansetron (ZOFRAN) injection 4 mg  4 mg IntraVENous Q6H PRN    paliperidone (INVEGA) SR tablet 3 mg  3 mg Oral DAILY    NOREPINephrine (LEVOPHED) 8 mg in 0.9% NS 250ml infusion  0.5-30 mcg/min IntraVENous TITRATE    piperacillin-tazobactam (ZOSYN) 3.375 g in 0.9% sodium chloride (MBP/ADV) 100 mL MBP  3.375 g IntraVENous Q8H    dexamethasone (DECADRON) 4 mg/mL injection 4 mg  4 mg IntraVENous Q12H    baricitinib (OLUMIANT) tablet 2 mg  2 mg Oral DAILY    azithromycin (ZITHROMAX) 500 mg in 0.9% sodium chloride 250 mL ([id number])  500 mg IntraVENous Q24H       ______________________________________________________________________      Lab Review:     Recent Labs     09/06/22  0420 09/05/22  0034   WBC 27.3* 15.2*   HGB 10.8* 11.4*   HCT 34.1* 35.7    348     Recent Labs     09/06/22  0420 09/05/22  0034     141 133*   K 3.8  3.8 4.7   *  109* 102   CO2 22  21 25   *  231* 200*   BUN 23*  24* 13   CREA 1.24*  1.27* 1.37*   CA 8.7  8.7 9.6   PHOS 4.0  --    ALB 2.4*  2.4* 2.6*   ALT 14 15     No components found for: [place]  Recent Labs     09/06/22 0423 09/05/22  1215   PH 7.45 7.47*   PCO2 31* 33*   PO2 82 110*   HCO3 21* 23   FIO2 35 85     No results for input(s): INR, INREXT, INREXT in the last 72 hours. Other pertinent lab:          Assessment & Plan:      Ms. Francia Kline 70years old  female who is known to have history of depression/mood disorder, left breast cancer diagnosed 1995 status postsurgery and radiation treatment. History of hypertension and hyperlipidemia. Apparently she had right ankle surgery done in June 2022 and was placed in cast.  He was getting her rehab in the [address] [place] [address]. She was found to be hypoxic there along with shortness of breath. Noted with COVID-19 infection.   On evaluation in ER she was found to be significantly hypoxic and septic. She was noted to have elevated WBC count with urine showing a number of WBCs. COVID test is positive again in the ER. She dropped her oxygen saturation in the ER placed on high flow nasal cannula oxygen at 70% with 55 L of flow. She was given 3 L of normal saline in the ER for diagnosis of sepsis. Transferred to ICU where she was noted to be tachypneic and placed on BiPAP. It is noted that she in 2019 had echocardiogram done which showed cardiomyopathy with ejection fraction of around 21-25%. Plan:   Acute hypoxic respiratory failure  Initially it resulted because of COVID-19 infection. Now patient has developed pulm edema because of 3 L of fluid that was given for sepsis in the ER. As mentioned above patient has ejection fraction of around 21 to 25% as per echocardiogram done in 2019  Patient was on high flow nasal cannula oxygen at 70% FiO2 with 55 L flow. Subsequently she was on BiPAP 16/6 with 35% O2. Blood gases this morning on BiPAP showed a 7.4 5/31/1982. Chest x-ray personally reviewed. Grossly clear lungs. CT of the chest did not show any pulmonary embolism. Mild groundglass in the left lower lobe. She tested positive for COVID-19. However it appears that her respiratory distress was mostly from fluid overload. She was diuresed and has made good urine output. Currently on 3 L oxygen. We will use BiPAP only as needed. On 4 mics of Levophed. Weaning down. 2. COVID-19 infection:  Patient got started on systemic steroids. Currently on dexamethasone 4 mg IV every 12 hourly, azithromycin IV Zosyn for possible UTI and also started on baricitinib as per infectious disease consultation. She has been a lifelong non-smoker. Has been vaccinated x3. I do not feel that the COVID-19 infection is contributing to respiratory distress. 3.  UTI:  Has elevated WBC count and urine. She is already on IV Zosyn  4.   Cardiomyopathy:  She has known ejection fraction of 21 to 25% in 2019. Will get 2D echocardiogram done to further evaluate her left ventricular systolic dysfunction  She will need evaluation by cardiology service as well. Will benefit from dobutamine if EF is found to be low again. Patient's condition is tenuous.        Management discussed with covering nurse  More than 50 minutes were spent in patient's evaluation, review of lab data, imaging studies decision making     Agatha Cheung MD

## 2022-09-06 NOTE — PROGRESS NOTES
Problem: Risk for Spread of Infection  Goal: Prevent transmission of infectious organism to others  Description: Prevent the transmission of infectious organisms to other patients, staff members, and visitors. Outcome: Progressing Towards Goal     Problem: Airway Clearance - Ineffective  Goal: Achieve or maintain patent airway  Outcome: Progressing Towards Goal     Problem: Gas Exchange - Impaired  Goal: Absence of hypoxia  Outcome: Progressing Towards Goal  Goal: Promote optimal lung function  Outcome: Progressing Towards Goal     Problem: Breathing Pattern - Ineffective  Goal: Ability to achieve and maintain a regular respiratory rate  Outcome: Progressing Towards Goal     Problem: Body Temperature -  Risk of, Imbalanced  Goal: Ability to maintain a body temperature within defined limits  Outcome: Progressing Towards Goal  Goal: Will regain or maintain usual level of consciousness  Outcome: Progressing Towards Goal  Goal: Complications related to the disease process, condition or treatment will be avoided or minimized  Outcome: Progressing Towards Goal     Problem: Risk for Fluid Volume Deficit  Goal: Maintain normal heart rhythm  Outcome: Progressing Towards Goal     Problem: Pressure Injury - Risk of  Goal: *Prevention of pressure injury  Description: Document Satish Scale and appropriate interventions in the flowsheet.   Outcome: Progressing Towards Goal  Note: Pressure Injury Interventions:  Sensory Interventions: Float heels, Keep linens dry and wrinkle-free, Minimize linen layers, Maintain/enhance activity level, Pressure redistribution bed/mattress (bed type)    Moisture Interventions: Internal/External urinary devices, Minimize layers, Moisture barrier, Maintain skin hydration (lotion/cream)    Activity Interventions: Pressure redistribution bed/mattress(bed type)    Mobility Interventions: Float heels, Pressure redistribution bed/mattress (bed type)    Nutrition Interventions: Document food/fluid/supplement intake    Friction and Shear Interventions: Minimize layers, Foam dressings/transparent film/skin sealants, Apply protective barrier, creams and emollients                Problem: Falls - Risk of  Goal: *Absence of Falls  Description: Document John Fall Risk and appropriate interventions in the flowsheet.   Outcome: Progressing Towards Goal  Note: Fall Risk Interventions:       Mentation Interventions: Adequate sleep, hydration, pain control, Bed/chair exit alarm, Evaluate medications/consider consulting pharmacy         Elimination Interventions: Call light in reach, Bed/chair exit alarm

## 2022-09-06 NOTE — CONSULTS
ORTHOPEDIC CONSULT    Patient: Beulah Berg MRN: [id number]  SSN: [id number]    YOB: 1950  Age: 70 y.o. Sex: female      Subjective:      Beulah Berg is a 70 y.o. female who is being seen in orthopedic consultation for evaluation of a cast of her right lower extremity. The patient is status post ORIF of her right ankle performed in June 2022 by Dr. Ajay Duarte. The patient has had postop follow-up with one of his partners. Short leg cast was placed in her last postop visit. Since that time the patient has been at [facility]. She was brought into the hospital on this admission because of shortness of breath and hypoxia. She was also found to have a urinary tract infection and possible urosepsis. She has no complaints of pain of her right lower extremity. The patient states she has been maintaining her nonweightbearing and is only been transferring and ambulating by wheelchair at the facility. She denies any recent falls. She denies any other musculoskeletal complaints at this time. Past Medical History:   Diagnosis Date    Arthritis     GENERALIZED IN JOINTS.     Breast cancer ([facility] [address]     Left Breast Cancer 1995    Cancer [facility])     left breast - surgery/radiation    Chronic kidney disease     Chronic pain     Back pain    Depression     Mood Swings, anxiety    GERD (gastroesophageal reflux disease)     Hiatal Hernia    Headache     Heart failure (HCC)     Hypercholesterolemia     Hypertension     CONTROLLED BY MEDS., Pt states she is no liong er on BP med's    Ill-defined condition     Neuropathy bilat feet    Irregular heart beat     Other ill-defined conditions(799.89)     MIGRAINES    Other ill-defined conditions(799.89)     neuropathy of lower legs and feet    Other ill-defined conditions(799.89)     increased cholesterol    Other ill-defined conditions(799.89)     bronchitis    Psychotic disorder (HCC)     S/P radiation therapy     1995 Left Breast Unspecified adverse effect of anesthesia     HEART PALPITATION. Past Surgical History:   Procedure Laterality Date    HX BREAST LUMPECTOMY Left     1995 - POSITIVE    HX ORTHOPAEDIC  2006    LEFT KNEE ARTHROSCOPY    HX ORTHOPAEDIC  4/2010    RIGHT KNEE ARTHROSCOPY, concrete nail placed    HX ORTHOPAEDIC      Hammertoe , doesnt know which foot    HX ORTHOPAEDIC      Small growth removed from between toes but doesnt know which foot    [place] BREAST SURGERY PROCEDURE UNLISTED  7/1995    DCIS /BIOPSIES MULTIPLE. [place] BREAST SURGERY PROCEDURE UNLISTED  1995    LUMPECTOMY WITH RADIATION INSITU      Family History   Problem Relation Age of Onset    Cancer Mother         BLADDER CA    Heart Disease Father     Lung Disease Father      Social History     Tobacco Use    Smoking status: Never    Smokeless tobacco: Never   Substance Use Topics    Alcohol use: No      Prior to Admission medications    Medication Sig Start Date End Date Taking? Authorizing Provider   gabapentin (NEURONTIN) 100 mg capsule Take 100 mg by mouth two (2) times a day. 7/3/22  Yes Provider, Historical   atorvastatin (LIPITOR) 40 mg tablet Take 40 mg by mouth daily. 8/1/22  Yes Provider, Historical   traZODone (DESYREL) 50 mg tablet Take 50 mg by mouth nightly. 7/3/22  Yes Provider, Historical   paliperidone (INVEGA) 3 mg SR tablet Take 1 tablet by mouth daily. 12/16/21  Yes Allocca, Maria C Flow, NP   famotidine (PEPCID) 20 mg tablet Take 1 Tab by mouth daily. Indications: heartburn 8/23/19  Yes Jocelin Busby NP   aspirin delayed-release 81 mg tablet Take 1 Tab by mouth every evening. Indications: prevention of transient ischemic attack 8/23/19  Yes Jocelin Busby NP   metoprolol succinate (TOPROL-XL) 25 mg XL tablet Take 0.5 Tabs by mouth daily. Indications: high blood pressure 8/24/19  Yes Jocelin Busby NP   polyethylene glycol (MIRALAX) 17 gram packet Take 1 Packet by mouth daily.  Indications: constipation 8/24/19  Yes Qi Jocelin SAN NP       Allergies   Allergen Reactions    Esomeprazole Other (comments)    Aspirin Other (comments)     Gastric irritation    Epinephrine Palpitations     \"Makes my heart  ' shudder'. \" Allergic to epinephrine with novocain. Plain epinephrine is ok. Ibuprofen Other (comments)     Gastric irritation    Novocain [Procaine] Palpitations    Nsaids (Non-Steroidal Anti-Inflammatory Drug) Other (comments)     GASTRIC DISTRESS. Review of Systems:  Review of Systems   Constitutional: Negative. HENT: Negative. Eyes: Negative. Respiratory:  Positive for shortness of breath. Cardiovascular: Negative. Gastrointestinal: Negative. Genitourinary: Negative. Musculoskeletal: Negative. Skin: Negative. Neurological: Negative. Endo/Heme/Allergies: Negative. Psychiatric/Behavioral: Negative.          Objective:     Current Facility-Administered Medications   Medication Dose Route Frequency    famotidine (PEPCID) tablet 20 mg  20 mg Oral DAILY    aspirin delayed-release tablet 81 mg  81 mg Oral QPM    [Held by provider] metoprolol succinate (TOPROL-XL) XL tablet 25 mg  25 mg Oral DAILY    polyethylene glycol (MIRALAX) packet 17 g  17 g Oral DAILY    gabapentin (NEURONTIN) capsule 100 mg  100 mg Oral BID    atorvastatin (LIPITOR) tablet 40 mg  40 mg Oral DAILY    traZODone (DESYREL) tablet 50 mg  50 mg Oral QHS    sodium chloride (NS) flush 5-40 mL  5-40 mL IntraVENous Q8H    sodium chloride (NS) flush 5-40 mL  5-40 mL IntraVENous PRN    acetaminophen (TYLENOL) tablet 650 mg  650 mg Oral Q6H PRN    Or    acetaminophen (TYLENOL) suppository 650 mg  650 mg Rectal Q6H PRN    ondansetron (ZOFRAN ODT) tablet 4 mg  4 mg Oral Q6H PRN    Or    ondansetron (ZOFRAN) injection 4 mg  4 mg IntraVENous Q6H PRN    paliperidone (INVEGA) SR tablet 3 mg  3 mg Oral DAILY    NOREPINephrine (LEVOPHED) 8 mg in 0.9% NS 250ml infusion  0.5-30 mcg/min IntraVENous TITRATE    piperacillin-tazobactam (ZOSYN) 3.375 g in 0.9% sodium chloride (MBP/ADV) 100 mL MBP  3.375 g IntraVENous Q8H    dexamethasone (DECADRON) 4 mg/mL injection 4 mg  4 mg IntraVENous Q12H    baricitinib (OLUMIANT) tablet 2 mg  2 mg Oral DAILY    azithromycin (ZITHROMAX) 500 mg in 0.9% sodium chloride 250 mL ([id number])  500 mg IntraVENous Q24H      Vitals:    09/06/22 1000 09/06/22 1100 09/06/22 1200 09/06/22 1300   BP: (!) 126/59 106/64 (!) 91/52 (!) 96/54   Pulse: 76 72 (!) 56 79   Resp: 19 14 14 13   Temp:   98.8 °F (37.1 °C)    SpO2: 100% 100% 100% 100%   Weight:       Height:            Alert and oriented x3, No apparent distress    Physical Exam:  Right lower extremity: There is a well fitted short leg cast in good position alignment. Cast is well-maintained. There is good range of motion of the toes of her right foot. DP pulse is palpable. Cap refill is 2 seconds. Right lower extremity peers neurovascularly intact. Labs:  CBC:  Recent Labs     09/06/22  0420 09/05/22  0034   WBC 27.3* 15.2*   RBC 3.87 4.06   HGB 10.8* 11.4*   HCT 34.1* 35.7   MCV 88.1 87.9   RDW 18.4* 18.0*    348     CHEMISTRIES:  Recent Labs     09/06/22  0420 09/05/22  0034     141 133*   K 3.8  3.8 4.7   *  109* 102   CO2 22  21 25   BUN 23*  24* 13   CREA 1.24*  1.27* 1.37*   CA 8.7  8.7 9.6   PHOS 4.0  --    PT/INR:No results for input(s): INR, INREXT in the last 72 hours. No lab exists for component: PROTIME  APTT:No results for input(s): APTT in the last 72 hours. LIVER PROFILE:  Recent Labs     09/06/22  0420 09/05/22  0034   AST 29 28   ALT 14 15       IMAGING:  No pertinent imaging studies available.   Assessment/Plan:     Hospital Problems  Date Reviewed: 9/5/2022            Codes Class Noted POA    Shortness of breath ICD-10-CM: R06.02  ICD-9-CM: 786.05  9/5/2022 Yes        COVID-19 ICD-10-CM: U07.1  ICD-9-CM: 079.89  9/5/2022 Yes        Hematuria ICD-10-CM: R31.9  ICD-9-CM: 599.70  9/5/2022 Yes        Sepsis ([facility] [address] ICD-10-CM: A41.9  ICD-9-CM: 038.9, 995.91  9/5/2022 Unknown       Status post ORIF right ankle  I will order new x-rays of her right ankle and foot  Anticipate the patient will need approximately 4 months to allow fracture to heal from date of surgery. Continue to maintain nonweightbearing of right lower extremity. Patient can follow-up with Dr. Radha Francisco as outpatient when she is discharged. This patient was examined direct consult Dr. Radha Francisco. Thank you for the courtesy of this consult.     Signed By: Nicolette Palomarse PA-C     September 6, 2022

## 2022-09-07 ENCOUNTER — APPOINTMENT (OUTPATIENT)
Dept: GENERAL RADIOLOGY | Age: 72
DRG: 871 | End: 2022-09-07
Attending: INTERNAL MEDICINE
Payer: MEDICARE

## 2022-09-07 LAB
ALBUMIN SERPL-MCNC: 1.6 G/DL (ref 3.5–5)
ALBUMIN/GLOB SERPL: 0.5 {RATIO} (ref 1.1–2.2)
ALP SERPL-CCNC: 59 U/L (ref 45–117)
ALT SERPL-CCNC: 10 U/L (ref 12–78)
ANION GAP SERPL CALC-SCNC: 8 MMOL/L (ref 5–15)
AST SERPL W P-5'-P-CCNC: 19 U/L (ref 15–37)
BACTERIA SPEC CULT: NORMAL
BASOPHILS # BLD: 0 K/UL (ref 0–0.1)
BASOPHILS NFR BLD: 0 % (ref 0–1)
BILIRUB SERPL-MCNC: 0.2 MG/DL (ref 0.2–1)
BUN SERPL-MCNC: 23 MG/DL (ref 6–20)
BUN/CREAT SERPL: 25 (ref 12–20)
CA-I BLD-MCNC: 7 MG/DL (ref 8.5–10.1)
CHLORIDE SERPL-SCNC: 114 MMOL/L (ref 97–108)
CO2 SERPL-SCNC: 19 MMOL/L (ref 21–32)
CREAT SERPL-MCNC: 0.92 MG/DL (ref 0.55–1.02)
CRP SERPL-MCNC: 9.97 MG/DL (ref 0–0.6)
DIFFERENTIAL METHOD BLD: ABNORMAL
EOSINOPHIL # BLD: 0 K/UL (ref 0–0.4)
EOSINOPHIL NFR BLD: 0 % (ref 0–7)
ERYTHROCYTE [DISTWIDTH] IN BLOOD BY AUTOMATED COUNT: 18.4 % (ref 11.5–14.5)
GLOBULIN SER CALC-MCNC: 3.2 G/DL (ref 2–4)
GLUCOSE SERPL-MCNC: 192 MG/DL (ref 65–100)
HCT VFR BLD AUTO: 32.6 % (ref 35–47)
HGB BLD-MCNC: 10.1 G/DL (ref 11.5–16)
IMM GRANULOCYTES # BLD AUTO: 0.1 K/UL (ref 0–0.04)
IMM GRANULOCYTES NFR BLD AUTO: 0 % (ref 0–0.5)
L PNEUMO1 AG UR QL IA: NEGATIVE
LDH SERPL L TO P-CCNC: 228 U/L (ref 81–246)
LYMPHOCYTES # BLD: 1.6 K/UL (ref 0.8–3.5)
LYMPHOCYTES NFR BLD: 10 % (ref 12–49)
MCH RBC QN AUTO: 27.6 PG (ref 26–34)
MCHC RBC AUTO-ENTMCNC: 31 G/DL (ref 30–36.5)
MCV RBC AUTO: 89.1 FL (ref 80–99)
MONOCYTES # BLD: 0.5 K/UL (ref 0–1)
MONOCYTES NFR BLD: 3 % (ref 5–13)
NEUTS SEG # BLD: 13.5 K/UL (ref 1.8–8)
NEUTS SEG NFR BLD: 87 % (ref 32–75)
NRBC # BLD: 0 K/UL (ref 0–0.01)
NRBC BLD-RTO: 0 PER 100 WBC
PLATELET # BLD AUTO: 277 K/UL (ref 150–400)
PMV BLD AUTO: 10.4 FL (ref 8.9–12.9)
POTASSIUM SERPL-SCNC: 3.2 MMOL/L (ref 3.5–5.1)
PROCALCITONIN SERPL-MCNC: 12.83 NG/ML
PROT SERPL-MCNC: 4.8 G/DL (ref 6.4–8.2)
RBC # BLD AUTO: 3.66 M/UL (ref 3.8–5.2)
SODIUM SERPL-SCNC: 141 MMOL/L (ref 136–145)
SPECIAL REQUESTS,SREQ: NORMAL
SPECIMEN SOURCE: NORMAL
WBC # BLD AUTO: 15.6 K/UL (ref 3.6–11)

## 2022-09-07 PROCEDURE — 74011250636 HC RX REV CODE- 250/636: Performed by: PHYSICIAN ASSISTANT

## 2022-09-07 PROCEDURE — 74011250636 HC RX REV CODE- 250/636: Performed by: INTERNAL MEDICINE

## 2022-09-07 PROCEDURE — 74011000250 HC RX REV CODE- 250: Performed by: HOSPITALIST

## 2022-09-07 PROCEDURE — 83615 LACTATE (LD) (LDH) ENZYME: CPT

## 2022-09-07 PROCEDURE — 36415 COLL VENOUS BLD VENIPUNCTURE: CPT

## 2022-09-07 PROCEDURE — 80053 COMPREHEN METABOLIC PANEL: CPT

## 2022-09-07 PROCEDURE — 85025 COMPLETE CBC W/AUTO DIFF WBC: CPT

## 2022-09-07 PROCEDURE — 74011250637 HC RX REV CODE- 250/637: Performed by: INTERNAL MEDICINE

## 2022-09-07 PROCEDURE — 71045 X-RAY EXAM CHEST 1 VIEW: CPT

## 2022-09-07 PROCEDURE — 74011000258 HC RX REV CODE- 258: Performed by: PHYSICIAN ASSISTANT

## 2022-09-07 PROCEDURE — 99232 SBSQ HOSP IP/OBS MODERATE 35: CPT | Performed by: INTERNAL MEDICINE

## 2022-09-07 PROCEDURE — 65270000029 HC RM PRIVATE

## 2022-09-07 PROCEDURE — 74011250637 HC RX REV CODE- 250/637: Performed by: HOSPITALIST

## 2022-09-07 PROCEDURE — 84145 PROCALCITONIN (PCT): CPT

## 2022-09-07 PROCEDURE — 86140 C-REACTIVE PROTEIN: CPT

## 2022-09-07 RX ADMIN — PALIPERIDONE 3 MG: 3 TABLET, EXTENDED RELEASE ORAL at 08:02

## 2022-09-07 RX ADMIN — TRAZODONE HYDROCHLORIDE 50 MG: 50 TABLET ORAL at 21:25

## 2022-09-07 RX ADMIN — AZITHROMYCIN DIHYDRATE 500 MG: 500 INJECTION, POWDER, LYOPHILIZED, FOR SOLUTION INTRAVENOUS at 19:49

## 2022-09-07 RX ADMIN — FAMOTIDINE 20 MG: 20 TABLET ORAL at 08:02

## 2022-09-07 RX ADMIN — BARICITINIB 2 MG: 2 TABLET, FILM COATED ORAL at 08:02

## 2022-09-07 RX ADMIN — GABAPENTIN 100 MG: 100 CAPSULE ORAL at 08:02

## 2022-09-07 RX ADMIN — ATORVASTATIN CALCIUM 40 MG: 40 TABLET, FILM COATED ORAL at 08:02

## 2022-09-07 RX ADMIN — SODIUM CHLORIDE, PRESERVATIVE FREE 5 ML: 5 INJECTION INTRAVENOUS at 19:56

## 2022-09-07 RX ADMIN — PIPERACILLIN AND TAZOBACTAM 3.38 G: 3; .375 INJECTION, POWDER, FOR SOLUTION INTRAVENOUS at 08:03

## 2022-09-07 RX ADMIN — SODIUM CHLORIDE, PRESERVATIVE FREE 10 ML: 5 INJECTION INTRAVENOUS at 14:18

## 2022-09-07 RX ADMIN — POLYETHYLENE GLYCOL 3350 17 G: 17 POWDER, FOR SOLUTION ORAL at 08:02

## 2022-09-07 RX ADMIN — DEXAMETHASONE SODIUM PHOSPHATE 4 MG: 4 INJECTION, SOLUTION INTRA-ARTICULAR; INTRALESIONAL; INTRAMUSCULAR; INTRAVENOUS; SOFT TISSUE at 19:49

## 2022-09-07 RX ADMIN — GABAPENTIN 100 MG: 100 CAPSULE ORAL at 19:50

## 2022-09-07 RX ADMIN — PIPERACILLIN AND TAZOBACTAM 3.38 G: 3; .375 INJECTION, POWDER, FOR SOLUTION INTRAVENOUS at 21:24

## 2022-09-07 RX ADMIN — Medication: at 21:24

## 2022-09-07 RX ADMIN — DEXAMETHASONE SODIUM PHOSPHATE 4 MG: 4 INJECTION, SOLUTION INTRA-ARTICULAR; INTRALESIONAL; INTRAMUSCULAR; INTRAVENOUS; SOFT TISSUE at 08:02

## 2022-09-07 RX ADMIN — PIPERACILLIN AND TAZOBACTAM 3.38 G: 3; .375 INJECTION, POWDER, FOR SOLUTION INTRAVENOUS at 14:18

## 2022-09-07 NOTE — PROGRESS NOTES
Hospitalist Progress Note         Kandice Man MD          Daily Progress Note: 9/7/2022      Subjective: The patient is seen for follow  up. 70-year-old female admitted on 9/5/2022 with a history of depression\mood disorder, left breast cancer with surgery and radiation, essential hypertension, hyperlipidemia who underwent right ankle surgery and currently has a cast in place who presented from [facility] care facility. She presented with shortness of breath and hypoxia and was found to have a urine consistent with a urinary tract infection as well as sepsis with a white count of 15,000. Unfortunately she has declined even further and now is in acute respiratory distress with hypoxia requiring high flow oxygen and is hypotensive and Levophed has been ordered. Patient to be transferred to the intensive care unit. Lactic acidosis is worsening even after the fluid bolus of 30 mL/kg. CT scan of the abdomen pelvis pending. Repeat stat chest x-ray pending patient has a previous history of severe systolic heart failure with an EF of 21 to 25% with left ventricular global hypokinesis and grade 3 diastolic dysfunction. CTA of the chest was performed and no pulmonary emboli was noted. There was mild patchy groundglass opacities in the left lung base although not enough to explain her ongoing hypoxia. The gallbladder was also distended without enough visualization of the gallbladder to diagnose acute cholecystitis  ----  Patient seen in follow-up. Awake alert and oriented x4. Offers no complaints. Remains on low-dose Levophed. COVID-19 positive and on IV antibiotics and IV Decadron.     Problem List:  Problem List as of 9/7/2022 Date Reviewed: 9/5/2022            Codes Class Noted - Resolved    Shortness of breath ICD-10-CM: R06.02  ICD-9-CM: 786.05  9/5/2022 - Present        COVID-19 ICD-10-CM: U07.1  ICD-9-CM: 079.89  9/5/2022 - Present        Hematuria ICD-10-CM: R31.9  ICD-9-CM: 599.70  9/5/2022 - Present        Sepsis ([place] [address] ICD-10-CM: A41.9  ICD-9-CM: 038.9, 995.91  9/5/2022 - Present        Delusional disorder ([place] [address] ICD-10-CM: F22  ICD-9-CM: 297.1  8/14/2019 - Present        Acute CHF (congestive heart failure) ([facility] ICD-10-CM: I50.9  ICD-9-CM: 428.0  8/11/2019 - Present        Failure to thrive (0-17) ICD-10-CM: R62.51  ICD-9-CM: 783.41  8/8/2019 - Present        Atypical glandular cells of undetermined significance (BASILIO) on cervical Pap smear ICD-10-CM: R87.619  ICD-9-CM: 795.00  11/26/2018 - Present        Severe obesity with body mass index (BMI) of 35.0 to 39.9 with serious comorbidity ([facility] ICD-10-CM: E66.01  ICD-9-CM: 278.01  7/3/2018 - Present        Hx of carcinoma in situ of breast ICD-10-CM: Z86.000  ICD-9-CM: V13.89  4/9/2018 - Present        Personality disorder ([facility] ICD-10-CM: F60.9  ICD-9-CM: 301.9  3/2/2017 - Present        Hyperlipidemia ICD-10-CM: E78.5  ICD-9-CM: 272.4  12/2/2015 - Present        Chronic lower back pain ICD-10-CM: M54.50, G89.29  ICD-9-CM: 724.2, 338.29  11/19/2015 - Present        Depression with anxiety ICD-10-CM: F41.8  ICD-9-CM: 300.4  9/11/2012 - Present        Arthritis of knee, right ICD-10-CM: M17.11  ICD-9-CM: 716.96  2/20/2012 - Present        RESOLVED: Breast pain ICD-10-CM: N64.4  ICD-9-CM: 611.71  7/22/2016 - 7/3/2018         Medications reviewed  Current Facility-Administered Medications   Medication Dose Route Frequency    famotidine (PEPCID) tablet 20 mg  20 mg Oral DAILY    aspirin delayed-release tablet 81 mg  81 mg Oral QPM    [Held by provider] metoprolol succinate (TOPROL-XL) XL tablet 25 mg  25 mg Oral DAILY    polyethylene glycol (MIRALAX) packet 17 g  17 g Oral DAILY    gabapentin (NEURONTIN) capsule 100 mg  100 mg Oral BID    atorvastatin (LIPITOR) tablet 40 mg  40 mg Oral DAILY    traZODone (DESYREL) tablet 50 mg  50 mg Oral QHS    sodium chloride (NS) flush 5-40 mL  5-40 mL IntraVENous Q8H sodium chloride (NS) flush 5-40 mL  5-40 mL IntraVENous PRN    acetaminophen (TYLENOL) tablet 650 mg  650 mg Oral Q6H PRN    Or    acetaminophen (TYLENOL) suppository 650 mg  650 mg Rectal Q6H PRN    ondansetron (ZOFRAN ODT) tablet 4 mg  4 mg Oral Q6H PRN    Or    ondansetron (ZOFRAN) injection 4 mg  4 mg IntraVENous Q6H PRN    paliperidone (INVEGA) SR tablet 3 mg  3 mg Oral DAILY    NOREPINephrine (LEVOPHED) 8 mg in 0.9% NS 250ml infusion  0.5-30 mcg/min IntraVENous TITRATE    piperacillin-tazobactam (ZOSYN) 3.375 g in 0.9% sodium chloride (MBP/ADV) 100 mL MBP  3.375 g IntraVENous Q8H    dexamethasone (DECADRON) 4 mg/mL injection 4 mg  4 mg IntraVENous Q12H    baricitinib (OLUMIANT) tablet 2 mg  2 mg Oral DAILY    azithromycin (ZITHROMAX) 500 mg in 0.9% sodium chloride 250 mL ([id number])  500 mg IntraVENous Q24H       Review of Systems:   A comprehensive review of systems was negative except for that written in the HPI. Objective:   Physical Exam:     Visit Vitals  /64 (BP 1 Location: Right upper arm, BP Patient Position: At rest)   Pulse 80   Temp 97.8 °F (36.6 °C)   Resp 13   Ht 5' 2.01\" (1.575 m)   Wt 61.6 kg (135 lb 12.9 oz)   SpO2 98%   BMI 24.83 kg/m²    O2 Flow Rate (L/min): 1 l/min O2 Device: None (Room air)    Temp (24hrs), Av.2 °F (36.8 °C), Min:97.7 °F (36.5 °C), Max:98.8 °F (37.1 °C)    No intake/output data recorded.  1901 -  0700  In: 662.8 [P.O.:60; I.V.:602.8]  Out: 200 [Urine:200]    General:  Alert, cooperative, no distress, appears stated age. Lungs:   Clear to auscultation bilaterally. Chest wall:  No tenderness or deformity. Heart:  Regular rate and rhythm, S1, S2 normal, no murmur, click, rub or gallop. Abdomen:   Soft, non-tender. Bowel sounds normal. No masses,  No organomegaly. Extremities: Extremities normal, atraumatic, no cyanosis or edema. Pulses: 2+ and symmetric all extremities.    Skin: Skin color, texture, turgor normal. No rashes or lesions Neurologic: CNII-XII intact. No gross sensory or motor deficits     Data Review:       Recent Days:  Recent Labs     09/07/22  0420 09/06/22  0420 09/05/22  0034   WBC 15.6* 27.3* 15.2*   HGB 10.1* 10.8* 11.4*   HCT 32.6* 34.1* 35.7    307 348       Recent Labs     09/07/22  0420 09/06/22  0420 09/05/22  0034    142  141 133*   K 3.2* 3.8  3.8 4.7   * 109*  109* 102   CO2 19* 22  21 25   * 231*  231* 200*   BUN 23* 23*  24* 13   CREA 0.92 1.24*  1.27* 1.37*   CA 7.0* 8.7  8.7 9.6   PHOS  --  4.0  --    ALB 1.6* 2.4*  2.4* 2.6*   TBILI 0.2 0.5 0.5   ALT 10* 14 15       Recent Labs     09/06/22  0423 09/05/22  1215   PH 7.45 7.47*   PCO2 31* 33*   PO2 82 110*   HCO3 21* 23   FIO2 35 85         24 Hour Results:  Recent Results (from the past 24 hour(s))   CBC WITH AUTOMATED DIFF    Collection Time: 09/07/22  4:20 AM   Result Value Ref Range    WBC 15.6 (H) 3.6 - 11.0 K/uL    RBC 3.66 (L) 3.80 - 5.20 M/uL    HGB 10.1 (L) 11.5 - 16.0 g/dL    HCT 32.6 (L) 35.0 - 47.0 %    MCV 89.1 80.0 - 99.0 FL    MCH 27.6 26.0 - 34.0 PG    MCHC 31.0 30.0 - 36.5 g/dL    RDW 18.4 (H) 11.5 - 14.5 %    PLATELET 732 955 - 790 K/uL    MPV 10.4 8.9 - 12.9 FL    NRBC 0.0 0.0  WBC    ABSOLUTE NRBC 0.00 0.00 - 0.01 K/uL    NEUTROPHILS 87 (H) 32 - 75 %    LYMPHOCYTES 10 (L) 12 - 49 %    MONOCYTES 3 (L) 5 - 13 %    EOSINOPHILS 0 0 - 7 %    BASOPHILS 0 0 - 1 %    IMMATURE GRANULOCYTES 0 0 - 0.5 %    ABS. NEUTROPHILS 13.5 (H) 1.8 - 8.0 K/UL    ABS. LYMPHOCYTES 1.6 0.8 - 3.5 K/UL    ABS. MONOCYTES 0.5 0.0 - 1.0 K/UL    ABS. EOSINOPHILS 0.0 0.0 - 0.4 K/UL    ABS. BASOPHILS 0.0 0.0 - 0.1 K/UL    ABS. IMM.  GRANS. 0.1 (H) 0.00 - 0.04 K/UL    DF AUTOMATED     METABOLIC PANEL, COMPREHENSIVE    Collection Time: 09/07/22  4:20 AM   Result Value Ref Range    Sodium 141 136 - 145 mmol/L    Potassium 3.2 (L) 3.5 - 5.1 mmol/L    Chloride 114 (H) 97 - 108 mmol/L    CO2 19 (L) 21 - 32 mmol/L    Anion gap 8 5 - 15 mmol/L Glucose 192 (H) 65 - 100 mg/dL    BUN 23 (H) 6 - 20 mg/dL    Creatinine 0.92 0.55 - 1.02 mg/dL    BUN/Creatinine ratio 25 (H) 12 - 20      GFR est AA >60 >60 ml/min/1.73m2    GFR est non-AA >60 >60 ml/min/1.73m2    Calcium 7.0 (L) 8.5 - 10.1 mg/dL    Bilirubin, total 0.2 0.2 - 1.0 mg/dL    AST (SGOT) 19 15 - 37 U/L    ALT (SGPT) 10 (L) 12 - 78 U/L    Alk. phosphatase 59 45 - 117 U/L    Protein, total 4.8 (L) 6.4 - 8.2 g/dL    Albumin 1.6 (L) 3.5 - 5.0 g/dL    Globulin 3.2 2.0 - 4.0 g/dL    A-G Ratio 0.5 (L) 1.1 - 2.2     C REACTIVE PROTEIN, QT    Collection Time: 09/07/22  4:20 AM   Result Value Ref Range    C-Reactive protein 9.97 (H) 0.00 - 0.60 mg/dL   PROCALCITONIN    Collection Time: 09/07/22  4:20 AM   Result Value Ref Range    Procalcitonin 12.83 (H) 0 ng/mL   LD    Collection Time: 09/07/22  4:20 AM   Result Value Ref Range     81 - 246 U/L           Assessment/     Severe sepsis with septic shock    -Continue IV antibiotics    -Wean off vasopressors as tolerated      COVID-19 with pneumonitis    -Continue IV Decadron    -Strict contact airborne precautions    -Continue baricitinib as per infectious disease    Acute cystitis      -Follow-up urine and blood cultures    Chronic systolic heart failure     -Prior ejection fraction of 21%     -Follow-up repeat 2D echocardiogram    Acute kidney injury     -Most likely secondary to dehydration and sepsis     -Monitor urine output and kidney functions closely     -Avoid nephrotoxic medications      Plan:  Continue supportive care  Wean off vasopressors  Plan for transfer out of the ICU once off vasopressors  Overall clinically improving      Care Plan discussed with: Patient/Family    Total time spent with patient: 30 minutes.     Allison Cabral MD

## 2022-09-07 NOTE — PROGRESS NOTES
Progress Note    Patient: Sophy Payton MRN: [id number]  SSN: [id number]    YOB: 1950  Age: 70 y.o. Sex: female      Admit Date: 9/5/2022    LOS: 2 days     Subjective:   Patient followed for severe sepsis with suspected UTI and Covid-19. She has been transferred out of the ICU. Blood cultures negative so far and urine culture negative. Mycoplasma IgM positive. She is afebrile with decreasing WBC, CRP, procal and normal LDH and ferritin. Currently on Zosyn, Zithromax, Dexamethasone and Baricitinib. CXR today still showing clear lungs. Objective:     Vitals:    09/07/22 0530 09/07/22 0600 09/07/22 0630 09/07/22 0700   BP: (!) 118/55 (!) 93/55 (!) 101/58    Pulse: (!) 56 (!) 56 (!) 51    Resp: 14 13 13    Temp:    97.7 °F (36.5 °C)   SpO2: 98% 96% 96%    Weight:       Height:            Intake and Output:  Current Shift: No intake/output data recorded. Last three shifts: 09/05 1901 - 09/07 0700  In: 662.8 [P.O.:60; I.V.:602.8]  Out: 200 [Urine:200]    Physical Exam:   Vitals and nursing note reviewed. Constitutional:       General: She is in acute distress. Appearance: She is ill-appearing. HENT:      Head: Normocephalic and atraumatic. Right Ear: External ear normal.      Left Ear: External ear normal.      Nose:      Comments:  room air  Eyes:      Pupils: Pupils are equal, round, and reactive to light. Cardiovascular:      Rate and Rhythm: Regular rhythm. Tachycardia present. Heart sounds: No murmur heard. Pulmonary:      Effort: No respiratory distress. Breath sounds: Rhonchi present. Abdominal:      General: Bowel sounds are normal. There is no distension. Palpations: Abdomen is soft. Tenderness: There is no abdominal tenderness. Genitourinary:     Comments: External urinary device  Musculoskeletal:      Cervical back: Neck supple. Right lower leg: No edema. Left lower leg: No edema.       Comments: Right calf/foot in hard cast Skin:     Findings: No rash. Neurological: nonfocal, no confusion  Psychiatric:  normal behavior     Lab/Data Review:     WBC 15,600    CRP 9.97 <24.10  Procal 12.83 <18.50 <6.82     <247  Ferritin 194     Mycoplasma IgM Positive  Urine Legionella antigen Negative    Blood cultures (9/5) No growth 2 days  Urine culture (9/5) No growth FINAL     CXR (9/7) No evidence of acute cardiopulmonary process. Assessment:     Active Problems:    Shortness of breath (9/5/2022)      COVID-19 (9/5/2022)      Hematuria (9/5/2022)      Sepsis ([address] (9/5/2022)     Severe sepsis with fever, tachycardia, tachypnea, leukocytosis, elevated lactic acid, CRP and procal, resolving  UTI with marked pyuria, urine culture pending, Day #3 IV Zosyn  3. Covid-19 infection, Day #3 Dexamethasone/Azithromycin, #2 Baricitinib  4. Presumptive Mycoplasma pneumonia, LLL, with positive IgM serology, Day #3 IV Azithromycin  5. Acute hypoxic respiratory failure, resolved, on room air  6. Cardiomyopathy  7. Right ankle fracture, casted  8. Distended gallbladder     Comment:  I think that her main disease process is UTI. Early de-escalation of Baricitinib and Dexamethasone seems appropriate since she is now on room air with normal CXR, normal LDH and ferritin. Diagnosis of Mycoplasma pneumonia is tenuous but credible given LLL changes on CT Chest along with positive IgM serology. Plan:   Discontinue IV Zosyn since urine culture negative  Consider stopping Dexamethasone and Baricitinib  Continue Azithromycin for Mycoplasma pneumonia  Follow-up blood cultures  In am, repeat CBC, procal , CRP  6.   Watch for QT prolongation (DDI with Azithromycin and Invega)       Signed By: Thelma Resendiz MD     September 7, 2022

## 2022-09-07 NOTE — PROGRESS NOTES
Report given to RN, Tamiko Grace. Per Tamiko Grace, she is getting report for another RN and will provide her with my report. Pt will be moving to room 412.

## 2022-09-07 NOTE — WOUND CARE
IP WOUND CONSULT    Aidee Trujillo  MEDICAL RECORD NUMBER:  [id number]  AGE: 70 y.o. GENDER: female  : 1950  TODAY'S DATE:  2022    GENERAL     [] Follow-up   [x] New Consult    Aidee Trujillo is a 70 y.o. female referred by:   [x] Physician  [] Nursing  [] Other:         PAST MEDICAL HISTORY    Past Medical History:   Diagnosis Date    Arthritis     GENERALIZED IN JOINTS. Breast cancer ([facility] [address]     Left Breast Cancer     Cancer [facility])     left breast - surgery/radiation    Chronic kidney disease     Chronic pain     Back pain    Depression     Mood Swings, anxiety    GERD (gastroesophageal reflux disease)     Hiatal Hernia    Headache     Heart failure (HCC)     Hypercholesterolemia     Hypertension     CONTROLLED BY MEDS., Pt states she is no liong er on BP med's    Ill-defined condition     Neuropathy bilat feet    Irregular heart beat     Other ill-defined conditions(799.89)     MIGRAINES    Other ill-defined conditions(799.89)     neuropathy of lower legs and feet    Other ill-defined conditions(799.89)     increased cholesterol    Other ill-defined conditions(799.89)     bronchitis    Psychotic disorder (HCC)     S/P radiation therapy      Left Breast    Unspecified adverse effect of anesthesia     HEART PALPITATION. PAST SURGICAL HISTORY    Past Surgical History:   Procedure Laterality Date    HX BREAST LUMPECTOMY Left      - POSITIVE    HX ORTHOPAEDIC      LEFT KNEE ARTHROSCOPY    HX ORTHOPAEDIC  2010    RIGHT KNEE ARTHROSCOPY, concrete nail placed    HX ORTHOPAEDIC      Hammertoe , doesnt know which foot    HX ORTHOPAEDIC      Small growth removed from between toes but doesnt know which foot    MI BREAST SURGERY PROCEDURE UNLISTED  1995    DCIS /BIOPSIES MULTIPLE.     MI BREAST SURGERY PROCEDURE UNLISTED      LUMPECTOMY WITH RADIATION INSITU       FAMILY HISTORY    Family History   Problem Relation Age of Onset    Cancer Mother         BLADDER CA    Heart Disease Father Lung Disease Father          ALLERGIES    Allergies   Allergen Reactions    Esomeprazole Other (comments)    Aspirin Other (comments)     Gastric irritation    Epinephrine Palpitations     \"Makes my heart  ' shudder'. \" Allergic to epinephrine with novocain. Plain epinephrine is ok. Ibuprofen Other (comments)     Gastric irritation    Novocain [Procaine] Palpitations    Nsaids (Non-Steroidal Anti-Inflammatory Drug) Other (comments)     GASTRIC DISTRESS. MEDICATIONS    No current facility-administered medications on file prior to encounter. Current Outpatient Medications on File Prior to Encounter   Medication Sig Dispense Refill    gabapentin (NEURONTIN) 100 mg capsule Take 100 mg by mouth two (2) times a day. atorvastatin (LIPITOR) 40 mg tablet Take 40 mg by mouth daily. traZODone (DESYREL) 50 mg tablet Take 50 mg by mouth nightly. paliperidone (INVEGA) 3 mg SR tablet Take 1 tablet by mouth daily. 90 Tablet 1    famotidine (PEPCID) 20 mg tablet Take 1 Tab by mouth daily. Indications: heartburn 30 Tab 0    aspirin delayed-release 81 mg tablet Take 1 Tab by mouth every evening. Indications: prevention of transient ischemic attack 30 Tab 0    metoprolol succinate (TOPROL-XL) 25 mg XL tablet Take 0.5 Tabs by mouth daily. Indications: high blood pressure 15 Tab 0    polyethylene glycol (MIRALAX) 17 gram packet Take 1 Packet by mouth daily.  Indications: constipation 30 Packet 0         [email or web address] Vitals  BP (!) 108/56 (BP 1 Location: Right upper arm, BP Patient Position: At rest)   Pulse 73   Temp 96.8 °F (36 °C)   Resp 14   Ht 5' 2.01\" (1.575 m)   Wt 61.6 kg (135 lb 12.9 oz)   SpO2 99%   BMI 24.83 kg/m²       ASSESSMENT     Wound Identification & Type: Stage 2 PI to coccyx with MASD with possible fungal infection  Dressing change: applied zinc paste  Verbal consent for picture: Yes    Contributing Factors: poor glucose control, decreased mobility, shear force, incontinence of stool, and incontinence of urine    Wound Coccyx Posterior pressure sore 09/05/22 (Active)   Wound Image   09/07/22 1252   Wound Etiology Other (Comment) 09/07/22 1252   Dressing Status New dressing applied 09/07/22 1252   Cleansed Other (Comment) 09/07/22 1252   Dressing/Treatment Zinc paste 09/07/22 1252   Dressing Change Due 09/07/22 09/07/22 1252   Wound Length (cm) 1.2 cm 09/07/22 1252   Wound Width (cm) 0.3 cm 09/07/22 1252   Wound Depth (cm) 0.2 cm 09/07/22 1252   Wound Surface Area (cm^2) 0.36 cm^2 09/07/22 1252   Wound Volume (cm^3) 0.072 cm^3 09/07/22 1252   Wound Assessment Pink/red;Erythema 09/07/22 1252   Drainage Amount None 09/07/22 1252   Wound Odor None 09/07/22 1252   Nette-Wound/Incision Assessment Maceration;Fragile 09/07/22 1252   Edges Undefined edges 09/07/22 1252   Wound Thickness Description Partial thickness 09/07/22 1252   Number of days: 2          PLAN     Skin Care & Pressure Relief Recommendations  Minimize layers of linen  Turn/reposition approximately every 2 hours  Pillow wedges  Manage incontinence   Promote continence; Skin Protective lotion/cream to buttocks and sacrum daily and as needed with incontinence care  Offload heels pillows    Satish 13  Blood Glucose: 192 on 9/7/22                             Albumin: 1.6 on 9/7/22  WBCs: 15.6 on 9/7/22    Nutritionist Consulted: Yes   Nutrition Status: TBD    Support Surface: [place]. Patient will benefit from a pressure reduction surface when transfers to med-surg unit. Physician/Provider notified:   Recommendations: Stage 2 PI noted to coccyx with MASD and erythematous rash with satellite lesions. Patient experiencing multiple loose stools. Apply Desitin/Nystatin topical cream to buttocks and gluteal cleft / folds for fungal rash, skin protectant, and wound healing. Once loose stools are resolved, patient will benefit from foam dressing to sacrum/coccyx. Patient is able to help turn self and remains on sides well.   Ensure patient is turned q2h at 30 degree angle to offload sacrum and buttocks. Maintain the PureWick to manage  incontinence. Float heels with 1-2 pillows while in bed for offloading of the heels. Maintain HOB at 30 degrees or less, if not contraindicated, to reduce pressure to buttocks and sacrum. Raise foot of bed to help prevent friction and shear injury from sliding down in the bed. Will continue to follow weekly.       Discharge Wound Care Needs: TBD    Teaching completed with:   [] Patient           [] Family member       [] Caregiver          [] Nursing  [] Other    Patient/Caregiver Teaching:  Level of patient/caregiver understanding able to:   [] Indicates understanding       [] Needs reinforcement  [] Unsuccessful      [] Verbal Understanding  [] Demonstrated understanding       [] No evidence of learning  [] Refused teaching         [] N/A       Electronically signed by Marilee Kyle RN on 9/7/2022 at 1:03 PM

## 2022-09-07 NOTE — PROGRESS NOTES
Bedside and Verbal shift change report given to    Ender Serna RN (oncoming nurse) by Darryle So (offgoing nurse). Report included the following information medication, patient condition, and shift plan and discharge plan of action.

## 2022-09-08 ENCOUNTER — APPOINTMENT (OUTPATIENT)
Dept: GENERAL RADIOLOGY | Age: 72
DRG: 871 | End: 2022-09-08
Attending: INTERNAL MEDICINE
Payer: MEDICARE

## 2022-09-08 LAB
ALBUMIN SERPL-MCNC: 2 G/DL (ref 3.5–5)
ALBUMIN/GLOB SERPL: 0.7 {RATIO} (ref 1.1–2.2)
ALP SERPL-CCNC: 64 U/L (ref 45–117)
ALT SERPL-CCNC: 12 U/L (ref 12–78)
ANION GAP SERPL CALC-SCNC: 5 MMOL/L (ref 5–15)
AST SERPL W P-5'-P-CCNC: 22 U/L (ref 15–37)
BASOPHILS # BLD: 0 K/UL (ref 0–0.1)
BASOPHILS NFR BLD: 0 % (ref 0–1)
BILIRUB SERPL-MCNC: 0.3 MG/DL (ref 0.2–1)
BUN SERPL-MCNC: 20 MG/DL (ref 6–20)
BUN/CREAT SERPL: 24 (ref 12–20)
CA-I BLD-MCNC: 8.6 MG/DL (ref 8.5–10.1)
CHLORIDE SERPL-SCNC: 113 MMOL/L (ref 97–108)
CO2 SERPL-SCNC: 26 MMOL/L (ref 21–32)
CREAT SERPL-MCNC: 0.82 MG/DL (ref 0.55–1.02)
CRP SERPL-MCNC: 3.92 MG/DL (ref 0–0.6)
DIFFERENTIAL METHOD BLD: ABNORMAL
EOSINOPHIL # BLD: 0 K/UL (ref 0–0.4)
EOSINOPHIL NFR BLD: 0 % (ref 0–7)
ERYTHROCYTE [DISTWIDTH] IN BLOOD BY AUTOMATED COUNT: 18.5 % (ref 11.5–14.5)
GLOBULIN SER CALC-MCNC: 3 G/DL (ref 2–4)
GLUCOSE SERPL-MCNC: 124 MG/DL (ref 65–100)
HCT VFR BLD AUTO: 29.1 % (ref 35–47)
HGB BLD-MCNC: 9 G/DL (ref 11.5–16)
IMM GRANULOCYTES # BLD AUTO: 0.1 K/UL (ref 0–0.04)
IMM GRANULOCYTES NFR BLD AUTO: 0 % (ref 0–0.5)
LYMPHOCYTES # BLD: 1.8 K/UL (ref 0.8–3.5)
LYMPHOCYTES NFR BLD: 16 % (ref 12–49)
MCH RBC QN AUTO: 27.5 PG (ref 26–34)
MCHC RBC AUTO-ENTMCNC: 30.9 G/DL (ref 30–36.5)
MCV RBC AUTO: 89 FL (ref 80–99)
MONOCYTES # BLD: 0.7 K/UL (ref 0–1)
MONOCYTES NFR BLD: 6 % (ref 5–13)
NEUTS SEG # BLD: 8.7 K/UL (ref 1.8–8)
NEUTS SEG NFR BLD: 78 % (ref 32–75)
NRBC # BLD: 0 K/UL (ref 0–0.01)
NRBC BLD-RTO: 0 PER 100 WBC
PLATELET # BLD AUTO: 239 K/UL (ref 150–400)
PMV BLD AUTO: 9.7 FL (ref 8.9–12.9)
POTASSIUM SERPL-SCNC: 3.8 MMOL/L (ref 3.5–5.1)
PROCALCITONIN SERPL-MCNC: 4.2 NG/ML
PROT SERPL-MCNC: 5 G/DL (ref 6.4–8.2)
RBC # BLD AUTO: 3.27 M/UL (ref 3.8–5.2)
SODIUM SERPL-SCNC: 144 MMOL/L (ref 136–145)
WBC # BLD AUTO: 11.2 K/UL (ref 3.6–11)

## 2022-09-08 PROCEDURE — 74011250636 HC RX REV CODE- 250/636: Performed by: STUDENT IN AN ORGANIZED HEALTH CARE EDUCATION/TRAINING PROGRAM

## 2022-09-08 PROCEDURE — 74011250637 HC RX REV CODE- 250/637: Performed by: INTERNAL MEDICINE

## 2022-09-08 PROCEDURE — 86140 C-REACTIVE PROTEIN: CPT

## 2022-09-08 PROCEDURE — 84145 PROCALCITONIN (PCT): CPT

## 2022-09-08 PROCEDURE — 36415 COLL VENOUS BLD VENIPUNCTURE: CPT

## 2022-09-08 PROCEDURE — 74011250636 HC RX REV CODE- 250/636: Performed by: INTERNAL MEDICINE

## 2022-09-08 PROCEDURE — 94761 N-INVAS EAR/PLS OXIMETRY MLT: CPT

## 2022-09-08 PROCEDURE — 74011250637 HC RX REV CODE- 250/637: Performed by: HOSPITALIST

## 2022-09-08 PROCEDURE — 65270000029 HC RM PRIVATE

## 2022-09-08 PROCEDURE — 71045 X-RAY EXAM CHEST 1 VIEW: CPT

## 2022-09-08 PROCEDURE — 85025 COMPLETE CBC W/AUTO DIFF WBC: CPT

## 2022-09-08 PROCEDURE — 80053 COMPREHEN METABOLIC PANEL: CPT

## 2022-09-08 PROCEDURE — 99232 SBSQ HOSP IP/OBS MODERATE 35: CPT | Performed by: INTERNAL MEDICINE

## 2022-09-08 PROCEDURE — 74011000250 HC RX REV CODE- 250: Performed by: HOSPITALIST

## 2022-09-08 RX ORDER — GUAIFENESIN 100 MG/5ML
100 SOLUTION ORAL
Status: DISCONTINUED | OUTPATIENT
Start: 2022-09-08 | End: 2022-09-13 | Stop reason: HOSPADM

## 2022-09-08 RX ORDER — POTASSIUM CHLORIDE 7.45 MG/ML
10 INJECTION INTRAVENOUS
Status: COMPLETED | OUTPATIENT
Start: 2022-09-08 | End: 2022-09-08

## 2022-09-08 RX ORDER — AZITHROMYCIN 500 MG/1
500 TABLET, FILM COATED ORAL DAILY
Status: COMPLETED | OUTPATIENT
Start: 2022-09-08 | End: 2022-09-09

## 2022-09-08 RX ORDER — POTASSIUM CHLORIDE 7.45 MG/ML
10 INJECTION INTRAVENOUS
Status: DISCONTINUED | OUTPATIENT
Start: 2022-09-08 | End: 2022-09-08 | Stop reason: SDUPTHER

## 2022-09-08 RX ADMIN — POTASSIUM CHLORIDE 10 MEQ: 7.46 INJECTION, SOLUTION INTRAVENOUS at 12:22

## 2022-09-08 RX ADMIN — SODIUM CHLORIDE, PRESERVATIVE FREE 10 ML: 5 INJECTION INTRAVENOUS at 21:51

## 2022-09-08 RX ADMIN — Medication: at 21:50

## 2022-09-08 RX ADMIN — TRAZODONE HYDROCHLORIDE 50 MG: 50 TABLET ORAL at 21:50

## 2022-09-08 RX ADMIN — POTASSIUM CHLORIDE 10 MEQ: 7.46 INJECTION, SOLUTION INTRAVENOUS at 10:56

## 2022-09-08 RX ADMIN — PALIPERIDONE 3 MG: 3 TABLET, EXTENDED RELEASE ORAL at 11:04

## 2022-09-08 RX ADMIN — SODIUM CHLORIDE, PRESERVATIVE FREE 5 ML: 5 INJECTION INTRAVENOUS at 06:27

## 2022-09-08 RX ADMIN — SODIUM CHLORIDE, PRESERVATIVE FREE 10 ML: 5 INJECTION INTRAVENOUS at 15:31

## 2022-09-08 RX ADMIN — BARICITINIB 2 MG: 2 TABLET, FILM COATED ORAL at 08:58

## 2022-09-08 RX ADMIN — ASPIRIN 81 MG: 81 TABLET, COATED ORAL at 18:32

## 2022-09-08 RX ADMIN — FAMOTIDINE 20 MG: 20 TABLET ORAL at 10:52

## 2022-09-08 RX ADMIN — GABAPENTIN 100 MG: 100 CAPSULE ORAL at 21:50

## 2022-09-08 RX ADMIN — AZITHROMYCIN MONOHYDRATE 500 MG: 500 TABLET ORAL at 18:32

## 2022-09-08 RX ADMIN — DEXAMETHASONE SODIUM PHOSPHATE 4 MG: 4 INJECTION, SOLUTION INTRA-ARTICULAR; INTRALESIONAL; INTRAMUSCULAR; INTRAVENOUS; SOFT TISSUE at 21:50

## 2022-09-08 RX ADMIN — GABAPENTIN 100 MG: 100 CAPSULE ORAL at 10:53

## 2022-09-08 RX ADMIN — Medication: at 10:55

## 2022-09-08 RX ADMIN — ATORVASTATIN CALCIUM 40 MG: 40 TABLET, FILM COATED ORAL at 10:52

## 2022-09-08 RX ADMIN — DEXAMETHASONE SODIUM PHOSPHATE 4 MG: 4 INJECTION, SOLUTION INTRA-ARTICULAR; INTRALESIONAL; INTRAMUSCULAR; INTRAVENOUS; SOFT TISSUE at 10:53

## 2022-09-08 NOTE — PROGRESS NOTES
DC Plan: [address] [facility] vs [facility]    Per ICU CM note, pt is a long term resident of [address] , however pt was transferred to [facility], the sister facility due to pt being positive for COVID. Cm sent updates via [employer].

## 2022-09-08 NOTE — ROUTINE PROCESS
Bedside shift change report given to [address] [place] [address] (oncoming nurse) by Vidya Suh RN (offgoing nurse). Report included the following information SBAR, Intake/Output, MAR, and Recent Results.

## 2022-09-08 NOTE — PROGRESS NOTES
Hospitalist Progress Note    Subjective:   Daily Progress Note: 9/8/2022 11:26 AM    Hospital Course:  Maciel Stanley  is a 44-year-old female admitted on 9/5/2022 with a history of depression/mood disorder, left breast cancer with surgery and radiation, essential hypertension, hyperlipidemia who underwent right ankle surgery and currently has a cast in place who presented from [address] [place] [address]. She presented with shortness of breath and hypoxia and was found to have a urine consistent with a urinary tract infection as well as sepsis with a white count of 15,000. Unfortunately, patient  went into acute respiratory distress with hypoxia requiring high flow oxygen. Found to be Covid positive. Transferred to ICU. dexamethasone IV, azithromycin, IV Zosyn and baricitinib. Also hypotensive requiring Levophed for pressure support. Lactic acidosis is worsening even after the fluid bolus of 30 mL/kg. CT scan of the abdomen pelvis showing patchy nodular bilateral basilar airspace disease likely infectious . Patient has previous history of severe systolic heart failure with an EF of 21 to 25% with left ventricular global hypokinesis and grade 3 diastolic dysfunction. CTA of the chest was performed and no pulmonary emboli was noted. There was mild patchy groundglass opacities in the left lung base although not enough to explain her ongoing hypoxia. The gallbladder was also distended without enough visualization of the gallbladder to diagnose acute cholecystitis. Weaned to  room air. Transferred  out of ICU. Repeat chest x-ray showing clear lungs. Decreasing WBC, CRP, procal and normal LDH and ferritin. Subjective:    Patient seen and examined at bedside. Currently on room air. SOB improving.      Current Facility-Administered Medications   Medication Dose Route Frequency    azithromycin (ZITHROMAX) tablet 500 mg  500 mg Oral DAILY    potassium chloride 10 mEq in 100 ml IVPB  10 mEq IntraVENous Q1H desitin/nystatin (1:1) topical compound   Topical BID    famotidine (PEPCID) tablet 20 mg  20 mg Oral DAILY    aspirin delayed-release tablet 81 mg  81 mg Oral QPM    [Held by provider] metoprolol succinate (TOPROL-XL) XL tablet 25 mg  25 mg Oral DAILY    polyethylene glycol (MIRALAX) packet 17 g  17 g Oral DAILY    gabapentin (NEURONTIN) capsule 100 mg  100 mg Oral BID    atorvastatin (LIPITOR) tablet 40 mg  40 mg Oral DAILY    traZODone (DESYREL) tablet 50 mg  50 mg Oral QHS    sodium chloride (NS) flush 5-40 mL  5-40 mL IntraVENous Q8H    sodium chloride (NS) flush 5-40 mL  5-40 mL IntraVENous PRN    acetaminophen (TYLENOL) tablet 650 mg  650 mg Oral Q6H PRN    Or    acetaminophen (TYLENOL) suppository 650 mg  650 mg Rectal Q6H PRN    ondansetron (ZOFRAN ODT) tablet 4 mg  4 mg Oral Q6H PRN    Or    ondansetron (ZOFRAN) injection 4 mg  4 mg IntraVENous Q6H PRN    paliperidone (INVEGA) SR tablet 3 mg  3 mg Oral DAILY    NOREPINephrine (LEVOPHED) 8 mg in 0.9% NS 250ml infusion  0.5-30 mcg/min IntraVENous TITRATE    dexamethasone (DECADRON) 4 mg/mL injection 4 mg  4 mg IntraVENous Q12H        Review of Systems  Constitutional: No fevers, No chills, No sweats, No fatigue, No Weakness  Eyes: No redness  Ears, nose, mouth, throat, and face: No nasal congestion, No sore throat, No voice change  Respiratory: No Shortness of Breath, No cough, No wheezing  Cardiovascular: No chest pain, No palpitations, No extremity edema  Gastrointestinal: No nausea, No vomiting, No diarrhea, No abdominal pain  Genitourinary: No frequency, No dysuria, No hematuria  Integument/breast: No skin lesion(s)   Neurological: No Confusion, No headaches, No dizziness      Objective:     Visit Vitals  /75 (BP 1 Location: Right upper arm, BP Patient Position: At rest;Lying)   Pulse (!) 109   Temp 97.7 °F (36.5 °C)   Resp 22   Ht 5' 2.01\" (1.575 m)   Wt 61.6 kg (135 lb 12.9 oz)   SpO2 (!) 62%   BMI 24.83 kg/m²    O2 Flow Rate (L/min): 1 l/min O2 Device: None (Room air)    Temp (24hrs), Av.9 °F (36.6 °C), Min:97.4 °F (36.3 °C), Max:98.4 °F (36.9 °C)      No intake/output data recorded.  1901 -  0700  In: -   Out: 150 [Urine:150]    PHYSICAL EXAM:  Constitutional: No acute distress  Skin: Extremities and face reveal no rashes. HEENT: Sclerae anicteric. PERRL. No oral ulcers. The neck is supple and no masses. Cardiovascular: Regular rate and rhythm. +S1/S2. No murmur or gallop. Respiratory:  Clear breath sounds bilaterally with no wheezes, rales, or rhonchi. On room air. GI: Abdomen nondistended, soft, and nontender. Normal active bowel sounds. Rectal: Deferred   Musculoskeletal: No pitting edema of the lower legs. Able to move all ext  Neurological:  Patient is alert and oriented x3. Cranial nerves II-XII grossly intact  Psychiatric: Mood appears appropriate       Data Review    Recent Results (from the past 24 hour(s))   CBC WITH AUTOMATED DIFF    Collection Time: 22  9:22 AM   Result Value Ref Range    WBC 11.2 (H) 3.6 - 11.0 K/uL    RBC 3.27 (L) 3.80 - 5.20 M/uL    HGB 9.0 (L) 11.5 - 16.0 g/dL    HCT 29.1 (L) 35.0 - 47.0 %    MCV 89.0 80.0 - 99.0 FL    MCH 27.5 26.0 - 34.0 PG    MCHC 30.9 30.0 - 36.5 g/dL    RDW 18.5 (H) 11.5 - 14.5 %    PLATELET 959 446 - 356 K/uL    MPV 9.7 8.9 - 12.9 FL    NRBC 0.0 0.0  WBC    ABSOLUTE NRBC 0.00 0.00 - 0.01 K/uL    NEUTROPHILS 78 (H) 32 - 75 %    LYMPHOCYTES 16 12 - 49 %    MONOCYTES 6 5 - 13 %    EOSINOPHILS 0 0 - 7 %    BASOPHILS 0 0 - 1 %    IMMATURE GRANULOCYTES 0 0 - 0.5 %    ABS. NEUTROPHILS 8.7 (H) 1.8 - 8.0 K/UL    ABS. LYMPHOCYTES 1.8 0.8 - 3.5 K/UL    ABS. MONOCYTES 0.7 0.0 - 1.0 K/UL    ABS. EOSINOPHILS 0.0 0.0 - 0.4 K/UL    ABS. BASOPHILS 0.0 0.0 - 0.1 K/UL    ABS. IMM.  GRANS. 0.1 (H) 0.00 - 0.04 K/UL    DF AUTOMATED     METABOLIC PANEL, COMPREHENSIVE    Collection Time: 22  9:22 AM   Result Value Ref Range    Sodium 144 136 - 145 mmol/L    Potassium 3.8 3.5 - 5.1 mmol/L    Chloride 113 (H) 97 - 108 mmol/L    CO2 26 21 - 32 mmol/L    Anion gap 5 5 - 15 mmol/L    Glucose 124 (H) 65 - 100 mg/dL    BUN 20 6 - 20 mg/dL    Creatinine 0.82 0.55 - 1.02 mg/dL    BUN/Creatinine ratio 24 (H) 12 - 20      GFR est AA >60 >60 ml/min/1.73m2    GFR est non-AA >60 >60 ml/min/1.73m2    Calcium 8.6 8.5 - 10.1 mg/dL    Bilirubin, total 0.3 0.2 - 1.0 mg/dL    AST (SGOT) 22 15 - 37 U/L    ALT (SGPT) 12 12 - 78 U/L    Alk. phosphatase 64 45 - 117 U/L    Protein, total 5.0 (L) 6.4 - 8.2 g/dL    Albumin 2.0 (L) 3.5 - 5.0 g/dL    Globulin 3.0 2.0 - 4.0 g/dL    A-G Ratio 0.7 (L) 1.1 - 2.2     C REACTIVE PROTEIN, QT    Collection Time: 09/08/22  9:22 AM   Result Value Ref Range    C-Reactive protein 3.92 (H) 0.00 - 0.60 mg/dL   PROCALCITONIN    Collection Time: 09/08/22  9:22 AM   Result Value Ref Range    Procalcitonin 4.20 (H) 0 ng/mL       XR CHEST PORT   Final Result   1. No evidence of an acute cardiopulmonary process. XR CHEST PORT   Final Result   No evidence of acute cardiopulmonary process. XR ANKLE RT AP/LAT   Final Result   Postoperative findings as described      XR FOOT RT AP/LAT   Final Result   Postoperative findings as described      XR CHEST PORT   Final Result   1. Lungs are grossly clear. XR CHEST PORT   Final Result   1. Left-sided atelectasis, no acute abnormality      CT ABD PELV WO CONT   Final Result      1. Patchy nodular bilateral basilar airspace disease likely infectious or   inflammatory. Given nodularity recommend follow-up in 2-3 months postpartum. Treatment to exclude underlying mass lesion      XR CHEST PORT   Final Result   No change. CTA CHEST W OR W WO CONT   Final Result      1. Limited study as above. No evidence of pulmonary embolus. 2.  No acute cardiopulmonary process. Mild patchy groundglass opacity in the   left lung base may represent early infection or inflammatory process.       3.  The gallbladder is distended and not well evaluated on this study. Correlate   for signs and symptoms of acute cholecystitis         CT HEAD WO CONT   Final Result   No acute intracranial abnormality. XR CHEST SNGL V   Final Result   No acute cardiopulmonary process. XR CHEST PORT    (Results Pending)   XR CHEST PORT    (Results Pending)   XR CHEST PORT    (Results Pending)       Active Problems:    Shortness of breath (9/5/2022)      COVID-19 (9/5/2022)      Hematuria (9/5/2022)      Sepsis ([address] (9/5/2022)        Assessment/Plan:     Severe sepsis with septic shock   - Continue IV antibiotics   - Wean off vasopressors as tolerated      2. Acute hypoxic respiratory failure   3. COVID-19 with pneumonitis  - Continue IV Decadron  - Baricitinib discontinued    - Strict contact airborne precautions  - Continue Azithromycin for Mycoplasma pneumonia  - ID following  - On room air      4. Acute cystitis   - Urine culture showing no growth     5. Chronic systolic heart failure   - Prior ejection fraction of 21%   - Follow-up repeat 2D echocardiogram     6. Acute kidney injury  - Most likely secondary to dehydration and sepsis  - Monitor urine output and kidney functions closely  - Avoid nephrotoxic medications      DVT Prophylaxis: SCDs  Code Status: Full  POA:    Discharge Barriers:    - PT/OT   - ID clearance   - Walking test to assess O2 requirements     Care Plan discussed with: patient and nursing     Total time spent with patient: >35 minutes.

## 2022-09-08 NOTE — PROGRESS NOTES
Bedside and Verbal shift change report given to  Samra Moody RN (oncoming nurse) by Tod Bruner (offgoing nurse). Report included the following information medication, patient condition, and shift plan and discharge plan of action.

## 2022-09-08 NOTE — PROGRESS NOTES
Progress Note    Patient: Denia Rodarte MRN: [id number]  SSN: [id number]    YOB: 1950  Age: 70 y.o. Sex: female      Admit Date: 9/5/2022    LOS: 3 days     Subjective:   Patient followed for severe sepsis with suspected UTI and Covid-19. She has been transferred out of the ICU. Blood cultures negative so far and urine culture negative. Mycoplasma IgM positive. She is afebrile with decreasing WBC, CRP, procal and normal LDH and ferritin. Currently on Zosyn, Zithromax, Dexamethasone and Baricitinib. CXR today still showing clear lungs. Objective:     Vitals:    09/08/22 0000 09/08/22 0400 09/08/22 0919 09/08/22 0944   BP:    128/75   Pulse: 76 (!) 56  (!) 109   Resp:    22   Temp:    97.7 °F (36.5 °C)   SpO2:   97% (!) 62%   Weight:       Height:            Intake and Output:  Current Shift: No intake/output data recorded. Last three shifts: 09/06 1901 - 09/08 0700  In: -   Out: 150 [Urine:150]    Physical Exam:   Vitals and nursing note reviewed. Constitutional:       General: She is in acute distress. Appearance: She is ill-appearing. HENT:      Head: Normocephalic and atraumatic. Right Ear: External ear normal.      Left Ear: External ear normal.      Nose:      Comments:  room air  Eyes:      Pupils: Pupils are equal, round, and reactive to light. Cardiovascular:      Rate and Rhythm: Regular rhythm. Tachycardia present. Heart sounds: No murmur heard. Pulmonary:      Effort: No respiratory distress. Breath sounds: Rhonchi present. Abdominal:      General: Bowel sounds are normal. There is no distension. Palpations: Abdomen is soft. Tenderness: There is no abdominal tenderness. Genitourinary:     Comments: External urinary device  Musculoskeletal:      Cervical back: Neck supple. Right lower leg: No edema. Left lower leg: No edema. Comments: Right calf/foot in hard cast   Skin:     Findings: No rash.    Neurological: nonfocal, no confusion  Psychiatric:  normal behavior     Lab/Data Review:     WBC 11,200    CRP 3.92 < 9.97 <24.10  Procal 12.83 <18.50 <6.82     <247  Ferritin 194     Mycoplasma IgM Positive  Urine Legionella antigen Negative    Blood cultures (9/5) No growth 2 days  Urine culture (9/5) No growth FINAL     CXR (9/8) No evidence of acute cardiopulmonary process. Assessment:     Active Problems:    Shortness of breath (9/5/2022)      COVID-19 (9/5/2022)      Hematuria (9/5/2022)      Sepsis ([facility] [address] (9/5/2022)     Severe sepsis with fever, tachycardia, tachypnea, leukocytosis, elevated lactic acid, CRP and procal, resolving  UTI with marked pyuria, urine culture negative  Covid-19 infection, Day #4 Dexamethasone, #3 Baricitinib  4. Presumptive Mycoplasma pneumonia, LLL, with positive IgM serology, Day #4 IV Azithromycin  5. Acute hypoxic respiratory failure, resolved, on room air  6. Cardiomyopathy  7. Right ankle fracture, casted  8. Distended gallbladder     Comment:  Now treating Mycoplasma pneu    Plan:   Continue Azithromycin for Mycoplasma pneumonia  Discontinue Baricitinib  Consider stopping Dexamethasone   Follow-up blood cultures  In am, repeat CBC, procal , CRP  6.   Watch for QT prolongation (DDI with Azithromycin and Invega)       Signed By: Jorge Jain MD     September 8, 2022

## 2022-09-09 ENCOUNTER — APPOINTMENT (OUTPATIENT)
Dept: GENERAL RADIOLOGY | Age: 72
DRG: 871 | End: 2022-09-09
Attending: INTERNAL MEDICINE
Payer: MEDICARE

## 2022-09-09 PROCEDURE — 94761 N-INVAS EAR/PLS OXIMETRY MLT: CPT

## 2022-09-09 PROCEDURE — 71045 X-RAY EXAM CHEST 1 VIEW: CPT

## 2022-09-09 PROCEDURE — 74011250636 HC RX REV CODE- 250/636: Performed by: INTERNAL MEDICINE

## 2022-09-09 PROCEDURE — 74011250637 HC RX REV CODE- 250/637: Performed by: STUDENT IN AN ORGANIZED HEALTH CARE EDUCATION/TRAINING PROGRAM

## 2022-09-09 PROCEDURE — 99232 SBSQ HOSP IP/OBS MODERATE 35: CPT | Performed by: INTERNAL MEDICINE

## 2022-09-09 PROCEDURE — 65270000029 HC RM PRIVATE

## 2022-09-09 PROCEDURE — 74011000250 HC RX REV CODE- 250: Performed by: HOSPITALIST

## 2022-09-09 PROCEDURE — 74011250637 HC RX REV CODE- 250/637: Performed by: HOSPITALIST

## 2022-09-09 PROCEDURE — 74011250637 HC RX REV CODE- 250/637: Performed by: INTERNAL MEDICINE

## 2022-09-09 RX ORDER — GUAIFENESIN 600 MG/1
600 TABLET, EXTENDED RELEASE ORAL EVERY 12 HOURS
Status: DISCONTINUED | OUTPATIENT
Start: 2022-09-09 | End: 2022-09-12

## 2022-09-09 RX ORDER — AZITHROMYCIN 500 MG/1
250 TABLET, FILM COATED ORAL DAILY
Status: COMPLETED | OUTPATIENT
Start: 2022-09-10 | End: 2022-09-11

## 2022-09-09 RX ADMIN — SODIUM CHLORIDE, PRESERVATIVE FREE 10 ML: 5 INJECTION INTRAVENOUS at 14:20

## 2022-09-09 RX ADMIN — POLYETHYLENE GLYCOL 3350 17 G: 17 POWDER, FOR SOLUTION ORAL at 09:56

## 2022-09-09 RX ADMIN — TRAZODONE HYDROCHLORIDE 50 MG: 50 TABLET ORAL at 21:24

## 2022-09-09 RX ADMIN — DEXAMETHASONE SODIUM PHOSPHATE 4 MG: 4 INJECTION, SOLUTION INTRA-ARTICULAR; INTRALESIONAL; INTRAMUSCULAR; INTRAVENOUS; SOFT TISSUE at 09:55

## 2022-09-09 RX ADMIN — AZITHROMYCIN MONOHYDRATE 500 MG: 500 TABLET ORAL at 09:55

## 2022-09-09 RX ADMIN — FAMOTIDINE 20 MG: 20 TABLET ORAL at 09:55

## 2022-09-09 RX ADMIN — PALIPERIDONE 3 MG: 3 TABLET, EXTENDED RELEASE ORAL at 09:55

## 2022-09-09 RX ADMIN — GUAIFENESIN 600 MG: 600 TABLET, EXTENDED RELEASE ORAL at 14:16

## 2022-09-09 RX ADMIN — GUAIFENESIN 600 MG: 600 TABLET, EXTENDED RELEASE ORAL at 21:28

## 2022-09-09 RX ADMIN — GABAPENTIN 100 MG: 100 CAPSULE ORAL at 09:55

## 2022-09-09 RX ADMIN — Medication: at 21:25

## 2022-09-09 RX ADMIN — SODIUM CHLORIDE, PRESERVATIVE FREE 10 ML: 5 INJECTION INTRAVENOUS at 21:25

## 2022-09-09 RX ADMIN — GABAPENTIN 100 MG: 100 CAPSULE ORAL at 21:24

## 2022-09-09 RX ADMIN — SODIUM CHLORIDE, PRESERVATIVE FREE 10 ML: 5 INJECTION INTRAVENOUS at 05:45

## 2022-09-09 RX ADMIN — ATORVASTATIN CALCIUM 40 MG: 40 TABLET, FILM COATED ORAL at 09:55

## 2022-09-09 RX ADMIN — DEXAMETHASONE SODIUM PHOSPHATE 4 MG: 4 INJECTION, SOLUTION INTRA-ARTICULAR; INTRALESIONAL; INTRAMUSCULAR; INTRAVENOUS; SOFT TISSUE at 21:24

## 2022-09-09 RX ADMIN — ASPIRIN 81 MG: 81 TABLET, COATED ORAL at 18:01

## 2022-09-09 RX ADMIN — Medication: at 09:54

## 2022-09-09 NOTE — PROGRESS NOTES
[facility]  Progress Note     Jean Pierre Carbajal Patient Status:  Inpatient    1957 MRN [id number]   [facility] 6NE-A Attending Kulwinder Camp MD   Hosp Day # 5 PCP JENN Black   Consulted by dr. Finesse Suarez for med [facility] Bedside and Verbal shift change report given to Brooklyn Burnette RN (oncoming nurse) by Jessika Crisostomo (offgoing nurse). Report included the following information medication, patient condition, and shift plan and discharge plan of action. --   --    TP 6.5 6.5  --   --   --        Estimated Creatinine Clearance: 110.4 mL/min (A) (based on SCr of 0.68 mg/dL (L)). No results for input(s): PTP, INR in the last 168 hours.     Recent Labs   Lab 10/03/19  0419   TROP <0.045            Imaging:

## 2022-09-09 NOTE — PROGRESS NOTES
Hospitalist Progress Note    Subjective:   Daily Progress Note: 9/9/2022 11:26 AM    Hospital Course:  Lyric Lopez  is a 70-year-old female admitted on 9/5/2022 with a history of depression/mood disorder, left breast cancer with surgery and radiation, essential hypertension, hyperlipidemia who underwent right ankle surgery and currently has a cast in place who presented from [address] [place] [address]. She presented with shortness of breath and hypoxia and was found to have a urine consistent with a urinary tract infection as well as sepsis with a white count of 15,000. Unfortunately, patient  went into acute respiratory distress with hypoxia requiring high flow oxygen. Found to be Covid positive. Transferred to ICU. dexamethasone IV, azithromycin, IV Zosyn and baricitinib. Also hypotensive requiring Levophed for pressure support. Lactic acidosis is worsening even after the fluid bolus of 30 mL/kg. CT scan of the abdomen pelvis showing patchy nodular bilateral basilar airspace disease likely infectious . Patient has previous history of severe systolic heart failure with an EF of 21 to 25% with left ventricular global hypokinesis and grade 3 diastolic dysfunction. CTA of the chest was performed and no pulmonary emboli was noted. There was mild patchy groundglass opacities in the left lung base although not enough to explain her ongoing hypoxia. The gallbladder was also distended without enough visualization of the gallbladder to diagnose acute cholecystitis. Weaned to  room air. Transferred  out of ICU. Repeat chest x-ray showing clear lungs. Decreasing WBC, CRP, procal and normal LDH and ferritin. Subjective:    Patient seen and examined at bedside. Currently on room air. Patient states she has difficulty expectorating sputum. Also states she has not been up out of bed since admission.     Current Facility-Administered Medications   Medication Dose Route Frequency    [START ON 9/10/2022] azithromycin (ZITHROMAX) tablet 250 mg  250 mg Oral DAILY    guaiFENesin (ROBITUSSIN) 100 mg/5 mL oral liquid 100 mg  100 mg Oral Q4H PRN    desitin/nystatin (1:1) topical compound   Topical BID    famotidine (PEPCID) tablet 20 mg  20 mg Oral DAILY    aspirin delayed-release tablet 81 mg  81 mg Oral QPM    [Held by provider] metoprolol succinate (TOPROL-XL) XL tablet 25 mg  25 mg Oral DAILY    polyethylene glycol (MIRALAX) packet 17 g  17 g Oral DAILY    gabapentin (NEURONTIN) capsule 100 mg  100 mg Oral BID    atorvastatin (LIPITOR) tablet 40 mg  40 mg Oral DAILY    traZODone (DESYREL) tablet 50 mg  50 mg Oral QHS    sodium chloride (NS) flush 5-40 mL  5-40 mL IntraVENous Q8H    sodium chloride (NS) flush 5-40 mL  5-40 mL IntraVENous PRN    acetaminophen (TYLENOL) tablet 650 mg  650 mg Oral Q6H PRN    Or    acetaminophen (TYLENOL) suppository 650 mg  650 mg Rectal Q6H PRN    ondansetron (ZOFRAN ODT) tablet 4 mg  4 mg Oral Q6H PRN    Or    ondansetron (ZOFRAN) injection 4 mg  4 mg IntraVENous Q6H PRN    paliperidone (INVEGA) SR tablet 3 mg  3 mg Oral DAILY    NOREPINephrine (LEVOPHED) 8 mg in 0.9% NS 250ml infusion  0.5-30 mcg/min IntraVENous TITRATE    dexamethasone (DECADRON) 4 mg/mL injection 4 mg  4 mg IntraVENous Q12H        Review of Systems  Constitutional: No fevers, No chills, No sweats, No fatigue, + Weakness  Eyes: No redness  Ears, nose, mouth, throat, and face: No nasal congestion, No sore throat, No voice change  Respiratory: No Shortness of Breath, No cough, No wheezing  Cardiovascular: No chest pain, No palpitations, No extremity edema  Gastrointestinal: No nausea, No vomiting, No diarrhea, No abdominal pain  Genitourinary: No frequency, No dysuria, No hematuria  Integument/breast: No skin lesion(s)   Neurological: No Confusion, No headaches, No dizziness      Objective:     Visit Vitals  BP (!) 149/82 (BP 1 Location: Right upper arm, BP Patient Position: Lying; At rest)   Pulse 72   Temp 97.6 °F (36.4 °C)   Resp 18   Ht 5' 2.01\" (1.575 m)   Wt 61.6 kg (135 lb 12.9 oz)   SpO2 97%   BMI 24.83 kg/m²    O2 Flow Rate (L/min): 1 l/min O2 Device: None (Room air)    Temp (24hrs), Av.9 °F (36.6 °C), Min:97.6 °F (36.4 °C), Max:98.3 °F (36.8 °C)      No intake/output data recorded. No intake/output data recorded. PHYSICAL EXAM:  Constitutional: Ill-appearing female. No acute distress  Skin: Generalized pallor. Extremities and face reveal no rashes. HEENT: Sclerae anicteric. PERRL. No oral ulcers. The neck is supple and no masses. Cardiovascular: Regular rate and rhythm. +S1/S2. No murmur or gallop. Respiratory:  Clear breath sounds bilaterally with no wheezes, rales, or rhonchi. On room air. GI: Abdomen nondistended, soft, and nontender. Normal active bowel sounds. Rectal: Deferred   Musculoskeletal: No pitting edema of the lower legs. Able to move all ext  Neurological:  Patient is alert and oriented x3. Generalized weakness. Cranial nerves II-XII grossly intact  Psychiatric: Mood appears appropriate       Data Review    No results found for this or any previous visit (from the past 24 hour(s)). XR CHEST PORT   Final Result   No acute findings. XR CHEST PORT   Final Result   1. No evidence of an acute cardiopulmonary process. XR CHEST PORT   Final Result   No evidence of acute cardiopulmonary process. XR ANKLE RT AP/LAT   Final Result   Postoperative findings as described      XR FOOT RT AP/LAT   Final Result   Postoperative findings as described      XR CHEST PORT   Final Result   1. Lungs are grossly clear. XR CHEST PORT   Final Result   1. Left-sided atelectasis, no acute abnormality      CT ABD PELV WO CONT   Final Result      1. Patchy nodular bilateral basilar airspace disease likely infectious or   inflammatory. Given nodularity recommend follow-up in 2-3 months postpartum.    Treatment to exclude underlying mass lesion      XR CHEST PORT   Final Result   No change. CTA CHEST W OR W WO CONT   Final Result      1. Limited study as above. No evidence of pulmonary embolus. 2.  No acute cardiopulmonary process. Mild patchy groundglass opacity in the   left lung base may represent early infection or inflammatory process. 3.  The gallbladder is distended and not well evaluated on this study. Correlate   for signs and symptoms of acute cholecystitis         CT HEAD WO CONT   Final Result   No acute intracranial abnormality. XR CHEST SNGL V   Final Result   No acute cardiopulmonary process. XR CHEST PORT    (Results Pending)   XR CHEST PORT    (Results Pending)   XR CHEST PORT    (Results Pending)       Active Problems:    Shortness of breath (9/5/2022)      COVID-19 (9/5/2022)      Hematuria (9/5/2022)      Sepsis ([facility] [address] (9/5/2022)      Assessment/Plan:     Severe sepsis with septic shock   - Continue on Zithromax  - Weaned off vasopressors   - Transferred out of ICU  - Resolved      2. Acute hypoxic respiratory failure   3. COVID-19 with pneumonitis  - Continue IV Decadron  - Baricitinib discontinued    - Strict contact airborne precautions  - Continue Azithromycin for Mycoplasma pneumonia  - Mucinex to assist with expectoration  - ID following  - On room air   - Incentive spirometer use encouraged      4. Acute cystitis   - Urine culture showing no growth     5. Chronic systolic heart failure   - Prior ejection fraction of 21%   - Echo (09/06/22) LVEF 45-50%, moderate hypokinesis of apical anterior, apical inferior and apical septal and severe hypokinesis of the apex     6.  Acute kidney injury  - Most likely secondary to dehydration and sepsis  - Monitor urine output and kidney functions closely  - Avoid nephrotoxic medications  - Resolved    DVT Prophylaxis: SCDs  Code Status: Full  POA:    Discharge Barriers:   -PT/OT   -Walking test to assess O2 requirements     Care Plan discussed with: patient and nursing     Total time spent with patient: >35 minutes.

## 2022-09-09 NOTE — PROGRESS NOTES
Discussed with the nursing staff. Chart reviewed. Patient has been on room air. Voices no respiratory complaints. Chest x-ray x2 shows no acute process. However CT of the chest done on 9/5/2022 showed mild groundglass left lower lobe. She was accidentally found to be positive for COVID-19. Also tested positive for mycoplasma. Dexamethasone can be discontinued. She is off baricitinib. Finish azithromycin as per ID. Apparently presumtive UTI as well. Urine cultures were negative. Patient does not appear to have any active pulm issues. We will sign off. Thank you.

## 2022-09-09 NOTE — PROGRESS NOTES
Problem: Risk for Spread of Infection  Goal: Prevent transmission of infectious organism to others  Description: Prevent the transmission of infectious organisms to other patients, staff members, and visitors. Outcome: Progressing Towards Goal     Problem: Patient Education:  Go to Education Activity  Goal: Patient/Family Education  Outcome: Progressing Towards Goal     Problem: Airway Clearance - Ineffective  Goal: Achieve or maintain patent airway  Outcome: Progressing Towards Goal     Problem: Gas Exchange - Impaired  Goal: Absence of hypoxia  Outcome: Progressing Towards Goal  Goal: Promote optimal lung function  Outcome: Progressing Towards Goal     Problem: Breathing Pattern - Ineffective  Goal: Ability to achieve and maintain a regular respiratory rate  Outcome: Progressing Towards Goal     Problem:  Body Temperature -  Risk of, Imbalanced  Goal: Ability to maintain a body temperature within defined limits  Outcome: Progressing Towards Goal  Goal: Will regain or maintain usual level of consciousness  Outcome: Progressing Towards Goal  Goal: Complications related to the disease process, condition or treatment will be avoided or minimized  Outcome: Progressing Towards Goal     Problem: Isolation Precautions - Risk of Spread of Infection  Goal: Prevent transmission of infectious organism to others  Outcome: Progressing Towards Goal     Problem: Nutrition Deficits  Goal: Optimize nutrtional status  Outcome: Progressing Towards Goal     Problem: Risk for Fluid Volume Deficit  Goal: Maintain normal heart rhythm  Outcome: Progressing Towards Goal  Goal: Maintain absence of muscle cramping  Outcome: Progressing Towards Goal  Goal: Maintain normal serum potassium, sodium, calcium, phosphorus, and pH  Outcome: Progressing Towards Goal     Problem: Loneliness or Risk for Loneliness  Goal: Demonstrate positive use of time alone when socialization is not possible  Outcome: Progressing Towards Goal     Problem: Fatigue  Goal: Verbalize increase energy and improved vitality  Outcome: Progressing Towards Goal     Problem: Patient Education: Go to Patient Education Activity  Goal: Patient/Family Education  Outcome: Progressing Towards Goal     Problem: Pressure Injury - Risk of  Goal: *Prevention of pressure injury  Description: Document Satish Scale and appropriate interventions in the flowsheet. Outcome: Progressing Towards Goal  Note: Pressure Injury Interventions:  Sensory Interventions: Minimize linen layers    Moisture Interventions: Absorbent underpads    Activity Interventions: PT/OT evaluation    Mobility Interventions: PT/OT evaluation    Nutrition Interventions: Document food/fluid/supplement intake    Friction and Shear Interventions: Minimize layers                Problem: Patient Education: Go to Patient Education Activity  Goal: Patient/Family Education  Outcome: Progressing Towards Goal     Problem: Falls - Risk of  Goal: *Absence of Falls  Description: Document John Fall Risk and appropriate interventions in the flowsheet. Outcome: Progressing Towards Goal     Problem: Patient Education: Go to Patient Education Activity  Goal: Patient/Family Education  Outcome: Progressing Towards Goal     Problem: Discharge Planning  Goal: *Discharge to safe environment  Outcome: Progressing Towards Goal  Goal: *Knowledge of medication management  Outcome: Progressing Towards Goal  Goal: *Knowledge of discharge instructions  Outcome: Progressing Towards Goal     Problem: Patient Education: Go to Patient Education Activity  Goal: Patient/Family Education  Outcome: Progressing Towards Goal     Problem: Impaired Skin Integrity/Pressure Injury Treatment  Goal: *Improvement of Existing Pressure Injury  Outcome: Progressing Towards Goal  Goal: *Prevention of pressure injury  Description: Document Satish Scale and appropriate interventions in the flowsheet.   Outcome: Progressing Towards Goal     Problem: Patient Education: Go to Patient Education Activity  Goal: Patient/Family Education  Outcome: Progressing Towards Goal

## 2022-09-09 NOTE — PROGRESS NOTES
DC Plan: [facility] Radha Duty pending)    Pt will not be returning to [facility]. She will be returning to [facility]. Michelle indicated they have started Andrew Hendricks.

## 2022-09-09 NOTE — PROGRESS NOTES
Problem: Risk for Spread of Infection  Goal: Prevent transmission of infectious organism to others  Description: Prevent the transmission of infectious organisms to other patients, staff members, and visitors. 9/8/2022 2157 by Alecia De La Rosa RN  Outcome: Progressing Towards Goal  9/8/2022 2148 by Alecia De La Rosa RN  Outcome: Progressing Towards Goal     Problem: Patient Education:  Go to Education Activity  Goal: Patient/Family Education  9/8/2022 2157 by Alecia De La Rosa RN  Outcome: Progressing Towards Goal  9/8/2022 2148 by Alecia De La Rosa RN  Outcome: Progressing Towards Goal     Problem: Airway Clearance - Ineffective  Goal: Achieve or maintain patent airway  9/8/2022 2157 by Alecia De La Rosa RN  Outcome: Progressing Towards Goal  9/8/2022 2148 by Alecia De La Rosa RN  Outcome: Progressing Towards Goal     Problem: Gas Exchange - Impaired  Goal: Absence of hypoxia  9/8/2022 2157 by Alecia De La Rosa RN  Outcome: Progressing Towards Goal  9/8/2022 2148 by Alecia De La Rosa RN  Outcome: Progressing Towards Goal  Goal: Promote optimal lung function  9/8/2022 2157 by Alecia De La Rosa RN  Outcome: Progressing Towards Goal  9/8/2022 2148 by Alecia De La Rosa RN  Outcome: Progressing Towards Goal     Problem: Breathing Pattern - Ineffective  Goal: Ability to achieve and maintain a regular respiratory rate  9/8/2022 2157 by Alecia De La Rosa RN  Outcome: Progressing Towards Goal  9/8/2022 2148 by Alecia De La Rosa RN  Outcome: Progressing Towards Goal     Problem:  Body Temperature -  Risk of, Imbalanced  Goal: Ability to maintain a body temperature within defined limits  9/8/2022 2157 by Alecia De La Rosa RN  Outcome: Progressing Towards Goal  9/8/2022 2148 by Alecia De La Rosa RN  Outcome: Progressing Towards Goal  Goal: Will regain or maintain usual level of consciousness  9/8/2022 2157 by Alecia De La Rosa RN  Outcome: Progressing Towards Goal  9/8/2022 2148 by Eudelia Jaksch, RN  Outcome: Progressing Towards Goal  Goal: Complications related to the disease process, condition or treatment will be avoided or minimized  9/8/2022 2157 by Kellee Price RN  Outcome: Progressing Towards Goal  9/8/2022 2148 by Kellee Price RN  Outcome: Progressing Towards Goal     Problem: Isolation Precautions - Risk of Spread of Infection  Goal: Prevent transmission of infectious organism to others  9/8/2022 2157 by Kellee Price RN  Outcome: Progressing Towards Goal  9/8/2022 2148 by Kellee Price RN  Outcome: Progressing Towards Goal     Problem: Nutrition Deficits  Goal: Optimize nutrtional status  9/8/2022 2157 by Kellee Price RN  Outcome: Progressing Towards Goal  9/8/2022 2148 by Kellee Price RN  Outcome: Progressing Towards Goal     Problem: Risk for Fluid Volume Deficit  Goal: Maintain normal heart rhythm  9/8/2022 2157 by Kellee Price RN  Outcome: Progressing Towards Goal  9/8/2022 2148 by Kellee Price RN  Outcome: Progressing Towards Goal  Goal: Maintain absence of muscle cramping  9/8/2022 2157 by Kellee Price RN  Outcome: Progressing Towards Goal  9/8/2022 2148 by Kellee Price RN  Outcome: Progressing Towards Goal  Goal: Maintain normal serum potassium, sodium, calcium, phosphorus, and pH  9/8/2022 2157 by Kellee Price RN  Outcome: Progressing Towards Goal  9/8/2022 2148 by Kellee Price RN  Outcome: Progressing Towards Goal     Problem: Loneliness or Risk for Loneliness  Goal: Demonstrate positive use of time alone when socialization is not possible  9/8/2022 2157 by Kellee Price RN  Outcome: Progressing Towards Goal  9/8/2022 2148 by Kellee Price RN  Outcome: Progressing Towards Goal     Problem: Fatigue  Goal: Verbalize increase energy and improved vitality  9/8/2022 2157 by Kellee Price RN  Outcome: Progressing Towards Goal  9/8/2022 2148 by Sindhu Aj Wing Chantale RN  Outcome: Progressing Towards Goal     Problem: Patient Education: Go to Patient Education Activity  Goal: Patient/Family Education  9/8/2022 2157 by Chayo Reed RN  Outcome: Progressing Towards Goal  9/8/2022 2148 by Chayo Reed RN  Outcome: Progressing Towards Goal     Problem: Pressure Injury - Risk of  Goal: *Prevention of pressure injury  Description: Document Satish Scale and appropriate interventions in the flowsheet. 9/8/2022 2157 by Chayo Reed RN  Outcome: Progressing Towards Goal  Note: Pressure Injury Interventions:  Sensory Interventions: Minimize linen layers    Moisture Interventions: Absorbent underpads    Activity Interventions: PT/OT evaluation    Mobility Interventions: PT/OT evaluation    Nutrition Interventions: Document food/fluid/supplement intake    Friction and Shear Interventions: Minimize layers             9/8/2022 2148 by Chayo Reed RN  Outcome: Progressing Towards Goal  Note: Pressure Injury Interventions:  Sensory Interventions: Minimize linen layers    Moisture Interventions: Absorbent underpads    Activity Interventions: PT/OT evaluation    Mobility Interventions: PT/OT evaluation    Nutrition Interventions: Document food/fluid/supplement intake    Friction and Shear Interventions: Minimize layers                Problem: Patient Education: Go to Patient Education Activity  Goal: Patient/Family Education  9/8/2022 2157 by Chayo Reed RN  Outcome: Progressing Towards Goal  9/8/2022 2148 by Chayo Reed RN  Outcome: Progressing Towards Goal     Problem: Falls - Risk of  Goal: *Absence of Falls  Description: Document Lacy Gong Fall Risk and appropriate interventions in the flowsheet.   9/8/2022 2157 by Chayo Reed RN  Outcome: Progressing Towards Goal  Note: Fall Risk Interventions:       Mentation Interventions: Update white board    Medication Interventions: Patient to call before getting OOB    Elimination Interventions: Call light in reach           9/8/2022 2148 by Ayo Chase RN  Outcome: Progressing Towards Goal     Problem: Patient Education: Go to Patient Education Activity  Goal: Patient/Family Education  9/8/2022 2157 by Ayo Chase RN  Outcome: Progressing Towards Goal  9/8/2022 2148 by Ayo Chase RN  Outcome: Progressing Towards Goal     Problem: Discharge Planning  Goal: *Discharge to safe environment  9/8/2022 2157 by Ayo Chase RN  Outcome: Progressing Towards Goal  9/8/2022 2148 by Ayo Chase RN  Outcome: Progressing Towards Goal  Goal: *Knowledge of medication management  9/8/2022 2157 by Ayo Chase RN  Outcome: Progressing Towards Goal  9/8/2022 2148 by Ayo Chase RN  Outcome: Progressing Towards Goal  Goal: *Knowledge of discharge instructions  9/8/2022 2157 by Ayo Chase RN  Outcome: Progressing Towards Goal  9/8/2022 2148 by Ayo Chase RN  Outcome: Progressing Towards Goal     Problem: Patient Education: Go to Patient Education Activity  Goal: Patient/Family Education  9/8/2022 2157 by Ayo Chase RN  Outcome: Progressing Towards Goal  9/8/2022 2148 by Ayo Chase RN  Outcome: Progressing Towards Goal     Problem: Impaired Skin Integrity/Pressure Injury Treatment  Goal: *Improvement of Existing Pressure Injury  9/8/2022 2157 by Ayo Chase RN  Outcome: Progressing Towards Goal  9/8/2022 2148 by Ayo Chase RN  Outcome: Progressing Towards Goal  Goal: *Prevention of pressure injury  Description: Document Satish Scale and appropriate interventions in the flowsheet.   9/8/2022 2157 by Ayo Chase RN  Outcome: Progressing Towards Goal  Note: Pressure Injury Interventions:  Sensory Interventions: Minimize linen layers    Moisture Interventions: Absorbent underpads    Activity Interventions: PT/OT evaluation    Mobility Interventions: PT/OT evaluation    Nutrition Interventions: Document food/fluid/supplement intake    Friction and Shear Interventions: Minimize layers             9/8/2022 2148 by Valdez Sandoval RN  Outcome: Progressing Towards Goal     Problem: Patient Education: Go to Patient Education Activity  Goal: Patient/Family Education  9/8/2022 2157 by Valdez Sandoval RN  Outcome: Progressing Towards Goal  9/8/2022 2148 by Valdez Sandoval RN  Outcome: Progressing Towards Goal

## 2022-09-09 NOTE — PROGRESS NOTES
Pt is on room air and is 98% at rest  Could not ambulate patient , she has a cast/boot on foot and states she is wheelchair bound for activities

## 2022-09-09 NOTE — PROGRESS NOTES
Progress Note    Patient: Jorge Taylor MRN: [id number]  SSN: [id number]    YOB: 1950  Age: 70 y.o. Sex: female      Admit Date: 9/5/2022    LOS: 4 days     Subjective:   Patient followed for severe sepsis with Covid-19 and Mycoplasma pneumonia. She remains on room air and CXR is unremarkable. Still on Zithromax and Dexamethasone. Objective:     Vitals:    09/09/22 0346 09/09/22 0400 09/09/22 0859 09/09/22 0907   BP: 137/81   (!) 149/82   Pulse: 69 70  72   Resp: 18   18   Temp: 98 °F (36.7 °C)   97.6 °F (36.4 °C)   SpO2: 97%  98% 97%   Weight:       Height:            Intake and Output:  Current Shift: No intake/output data recorded. Last three shifts: No intake/output data recorded. Physical Exam:   Vitals and nursing note reviewed. Constitutional:       General: She is in acute distress. Appearance: She is ill-appearing. HENT:      Head: Normocephalic and atraumatic. Right Ear: External ear normal.      Left Ear: External ear normal.      Nose:      Comments:  room air  Eyes:      Pupils: Pupils are equal, round, and reactive to light. Cardiovascular:      Rate and Rhythm: Regular rhythm. Heart sounds: No murmur heard. Pulmonary:      Effort: No respiratory distress. Breath sounds: Rhonchi present. Abdominal:      General: Bowel sounds are normal. There is no distension. Palpations: Abdomen is soft. Tenderness: There is no abdominal tenderness. Genitourinary:     Comments: External urinary device  Musculoskeletal:      Cervical back: Neck supple. Right lower leg: No edema. Left lower leg: No edema. Comments: Right calf/foot in hard cast   Skin:     Findings: No rash.    Neurological: nonfocal, no confusion  Psychiatric:  normal behavior     Lab/Data Review:     WBC 11,200    CRP 3.92 < 9.97 <24.10  Procal 4.20 <12.83 <18.50 <6.82     <247  Ferritin 194     Mycoplasma IgM Positive  Urine Legionella antigen Negative    Blood cultures (9/5) No growth 4 days  Urine culture (9/5) No growth FINAL     CXR (9/9) No acute findings     Assessment:     Active Problems:    Shortness of breath (9/5/2022)      COVID-19 (9/5/2022)      Hematuria (9/5/2022)      Sepsis ([address] (9/5/2022)     Severe sepsis with fever, tachycardia, tachypnea, leukocytosis, elevated lactic acid, CRP and procal, resolving  UTI with marked pyuria, urine culture negative  Covid-19 infection, Day #5 Dexamethasone, #3 Baricitinib  4. Presumptive Mycoplasma pneumonia, LLL, with positive IgM serology, Day #5 IV Azithromycin  5. Acute hypoxic respiratory failure, resolved, on room air  6. Cardiomyopathy  7. Right ankle fracture, casted  8. Distended gallbladder     Comment:  Now treating Mycoplasma pneumonia. CRP and procal decreasing. Plan:   Continue Azithromycin for Mycoplasma pneumonia, 2 more days  Consider stopping Dexamethasone   Follow-up blood cultures  In am, repeat CBC, procal , CRP  6.   Watch for QT prolongation (DDI with Azithromycin and Invega)       Signed By: Hayley Bray MD     September 9, 2022

## 2022-09-10 ENCOUNTER — APPOINTMENT (OUTPATIENT)
Dept: GENERAL RADIOLOGY | Age: 72
DRG: 871 | End: 2022-09-10
Attending: INTERNAL MEDICINE
Payer: MEDICARE

## 2022-09-10 PROBLEM — J96.01 ACUTE RESPIRATORY FAILURE WITH HYPOXIA (HCC): Status: ACTIVE | Noted: 2022-09-10

## 2022-09-10 PROBLEM — N17.9 AKI (ACUTE KIDNEY INJURY) (HCC): Status: ACTIVE | Noted: 2022-09-10

## 2022-09-10 PROBLEM — N30.00 ACUTE CYSTITIS: Status: ACTIVE | Noted: 2022-09-10

## 2022-09-10 PROBLEM — J15.7 MYCOPLASMA PNEUMONIA: Status: ACTIVE | Noted: 2022-09-10

## 2022-09-10 LAB
ALBUMIN SERPL-MCNC: 2 G/DL (ref 3.5–5)
ALBUMIN/GLOB SERPL: 0.7 {RATIO} (ref 1.1–2.2)
ALP SERPL-CCNC: 74 U/L (ref 45–117)
ALT SERPL-CCNC: 13 U/L (ref 12–78)
ANION GAP SERPL CALC-SCNC: 6 MMOL/L (ref 5–15)
AST SERPL W P-5'-P-CCNC: 11 U/L (ref 15–37)
BASOPHILS # BLD: 0 K/UL (ref 0–0.1)
BASOPHILS NFR BLD: 0 % (ref 0–1)
BILIRUB SERPL-MCNC: 0.2 MG/DL (ref 0.2–1)
BUN SERPL-MCNC: 18 MG/DL (ref 6–20)
BUN/CREAT SERPL: 27 (ref 12–20)
CA-I BLD-MCNC: 8.5 MG/DL (ref 8.5–10.1)
CHLORIDE SERPL-SCNC: 111 MMOL/L (ref 97–108)
CO2 SERPL-SCNC: 23 MMOL/L (ref 21–32)
CREAT SERPL-MCNC: 0.67 MG/DL (ref 0.55–1.02)
DIFFERENTIAL METHOD BLD: ABNORMAL
EOSINOPHIL # BLD: 0 K/UL (ref 0–0.4)
EOSINOPHIL NFR BLD: 0 % (ref 0–7)
ERYTHROCYTE [DISTWIDTH] IN BLOOD BY AUTOMATED COUNT: 17.8 % (ref 11.5–14.5)
GLOBULIN SER CALC-MCNC: 2.9 G/DL (ref 2–4)
GLUCOSE SERPL-MCNC: 157 MG/DL (ref 65–100)
HCT VFR BLD AUTO: 28.6 % (ref 35–47)
HGB BLD-MCNC: 9.1 G/DL (ref 11.5–16)
IMM GRANULOCYTES # BLD AUTO: 0.1 K/UL (ref 0–0.04)
IMM GRANULOCYTES NFR BLD AUTO: 1 % (ref 0–0.5)
LYMPHOCYTES # BLD: 1.5 K/UL (ref 0.8–3.5)
LYMPHOCYTES NFR BLD: 18 % (ref 12–49)
MCH RBC QN AUTO: 28 PG (ref 26–34)
MCHC RBC AUTO-ENTMCNC: 31.8 G/DL (ref 30–36.5)
MCV RBC AUTO: 88 FL (ref 80–99)
MONOCYTES # BLD: 0.5 K/UL (ref 0–1)
MONOCYTES NFR BLD: 6 % (ref 5–13)
NEUTS SEG # BLD: 6.2 K/UL (ref 1.8–8)
NEUTS SEG NFR BLD: 75 % (ref 32–75)
NRBC # BLD: 0 K/UL (ref 0–0.01)
NRBC BLD-RTO: 0 PER 100 WBC
PLATELET # BLD AUTO: 268 K/UL (ref 150–400)
PMV BLD AUTO: 10.1 FL (ref 8.9–12.9)
POTASSIUM SERPL-SCNC: 4.4 MMOL/L (ref 3.5–5.1)
PROT SERPL-MCNC: 4.9 G/DL (ref 6.4–8.2)
RBC # BLD AUTO: 3.25 M/UL (ref 3.8–5.2)
SODIUM SERPL-SCNC: 140 MMOL/L (ref 136–145)
WBC # BLD AUTO: 8.3 K/UL (ref 3.6–11)

## 2022-09-10 PROCEDURE — 36415 COLL VENOUS BLD VENIPUNCTURE: CPT

## 2022-09-10 PROCEDURE — 74011250637 HC RX REV CODE- 250/637: Performed by: STUDENT IN AN ORGANIZED HEALTH CARE EDUCATION/TRAINING PROGRAM

## 2022-09-10 PROCEDURE — 85025 COMPLETE CBC W/AUTO DIFF WBC: CPT

## 2022-09-10 PROCEDURE — 74011250636 HC RX REV CODE- 250/636: Performed by: INTERNAL MEDICINE

## 2022-09-10 PROCEDURE — 74011250637 HC RX REV CODE- 250/637: Performed by: HOSPITALIST

## 2022-09-10 PROCEDURE — 65270000029 HC RM PRIVATE

## 2022-09-10 PROCEDURE — 80053 COMPREHEN METABOLIC PANEL: CPT

## 2022-09-10 PROCEDURE — 71045 X-RAY EXAM CHEST 1 VIEW: CPT

## 2022-09-10 PROCEDURE — 74011000250 HC RX REV CODE- 250: Performed by: HOSPITALIST

## 2022-09-10 PROCEDURE — 74011250637 HC RX REV CODE- 250/637: Performed by: INTERNAL MEDICINE

## 2022-09-10 RX ORDER — GUAIFENESIN 600 MG/1
600 TABLET, EXTENDED RELEASE ORAL EVERY 12 HOURS
Qty: 20 TABLET | Refills: 0 | Status: SHIPPED | OUTPATIENT
Start: 2022-09-10 | End: 2022-09-13

## 2022-09-10 RX ADMIN — DEXAMETHASONE SODIUM PHOSPHATE 4 MG: 4 INJECTION, SOLUTION INTRA-ARTICULAR; INTRALESIONAL; INTRAMUSCULAR; INTRAVENOUS; SOFT TISSUE at 09:07

## 2022-09-10 RX ADMIN — PALIPERIDONE 3 MG: 3 TABLET, EXTENDED RELEASE ORAL at 09:06

## 2022-09-10 RX ADMIN — SODIUM CHLORIDE, PRESERVATIVE FREE 10 ML: 5 INJECTION INTRAVENOUS at 06:35

## 2022-09-10 RX ADMIN — ASPIRIN 81 MG: 81 TABLET, COATED ORAL at 18:42

## 2022-09-10 RX ADMIN — GUAIFENESIN 600 MG: 600 TABLET, EXTENDED RELEASE ORAL at 09:07

## 2022-09-10 RX ADMIN — POLYETHYLENE GLYCOL 3350 17 G: 17 POWDER, FOR SOLUTION ORAL at 09:07

## 2022-09-10 RX ADMIN — FAMOTIDINE 20 MG: 20 TABLET ORAL at 09:07

## 2022-09-10 RX ADMIN — GABAPENTIN 100 MG: 100 CAPSULE ORAL at 09:06

## 2022-09-10 RX ADMIN — AZITHROMYCIN MONOHYDRATE 250 MG: 500 TABLET ORAL at 09:06

## 2022-09-10 RX ADMIN — Medication: at 09:08

## 2022-09-10 RX ADMIN — ATORVASTATIN CALCIUM 40 MG: 40 TABLET, FILM COATED ORAL at 09:07

## 2022-09-10 RX ADMIN — SODIUM CHLORIDE, PRESERVATIVE FREE 10 ML: 5 INJECTION INTRAVENOUS at 14:14

## 2022-09-10 NOTE — PROGRESS NOTES
Bedside and Verbal shift change report given to [address] (oncoming nurse) by Linda To LPN (offgoing nurse). Report included the following information SBAR, Kardex, Procedure Summary, Intake/Output, MAR, Accordion, and Recent Results.

## 2022-09-10 NOTE — PROGRESS NOTES
Chart Reviewed: As previously noted, DC Plan: [facility] Varun pending)     Pt will not be returning [facility] [place] [facility] [place] [facility] [place] [facility].

## 2022-09-10 NOTE — DISCHARGE SUMMARY
Hospitalist Discharge Summary     Patient ID:    Adonis Schulte  [id number]  70 y.o.  1950    Admit date: 9/5/2022    Discharge date : 9/10/2022    Chronic Diagnoses:    Problem List as of 9/10/2022 Date Reviewed: 9/5/2022            Codes Class Noted - Resolved    Acute respiratory failure with hypoxia [facility]) ICD-10-CM: J96.01  ICD-9-CM: 518.81  9/10/2022 - Present        Mycoplasma pneumonia ICD-10-CM: J15.7  ICD-9-CM: 483.0  9/10/2022 - Present        Acute cystitis ICD-10-CM: N30.00  ICD-9-CM: 595.0  9/10/2022 - Present        ANISH (acute kidney injury) ([facility] ICD-10-CM: N17.9  ICD-9-CM: 584.9  9/10/2022 - Present        COVID-19 ICD-10-CM: U07.1  ICD-9-CM: 079.89  9/5/2022 - Present        Hematuria ICD-10-CM: R31.9  ICD-9-CM: 599.70  9/5/2022 - Present        * (Principal) Sepsis ([facility] ICD-10-CM: A41.9  ICD-9-CM: 038.9, 995.91  9/5/2022 - Present        Delusional disorder ([facility] ICD-10-CM: F22  ICD-9-CM: 297.1  8/14/2019 - Present        Acute CHF (congestive heart failure) ([facility] ICD-10-CM: I50.9  ICD-9-CM: 428.0  8/11/2019 - Present        Failure to thrive (0-17) ICD-10-CM: R62.51  ICD-9-CM: 783.41  8/8/2019 - Present        Atypical glandular cells of undetermined significance (BASILIO) on cervical Pap smear ICD-10-CM: R87.619  ICD-9-CM: 795.00  11/26/2018 - Present        Severe obesity with body mass index (BMI) of 35.0 to 39.9 with serious comorbidity ([facility] ICD-10-CM: E66.01  ICD-9-CM: 278.01  7/3/2018 - Present        Hx of carcinoma in situ of breast ICD-10-CM: Z86.000  ICD-9-CM: V13.89  4/9/2018 - Present        Personality disorder ([facility] ICD-10-CM: F60.9  ICD-9-CM: 301.9  3/2/2017 - Present        Hyperlipidemia ICD-10-CM: E78.5  ICD-9-CM: 272.4  12/2/2015 - Present        Chronic lower back pain ICD-10-CM: M54.50, G89.29  ICD-9-CM: 724.2, 338.29  11/19/2015 - Present        Depression with anxiety ICD-10-CM: F41.8  ICD-9-CM: 300.4  9/11/2012 - Present        Arthritis of knee, right ICD-10-CM: M17.11  ICD-9-CM: 716.96  2/20/2012 - Present        RESOLVED: Breast pain ICD-10-CM: N64.4  ICD-9-CM: 611.71  7/22/2016 - 7/3/2018       22    Final Diagnoses:   Principal Problem:    Sepsis ([facility] (9/5/2022)    Active Problems:    COVID-19 (9/5/2022)      Hematuria (9/5/2022)      Acute respiratory failure with hypoxia ([facility] (9/10/2022)      Mycoplasma pneumonia (9/10/2022)      Acute cystitis (9/10/2022)      ANISH (acute kidney injury) ([facility] (9/10/2022)      Hospital Course:   Huy Mahajan  is a 29-year-old female admitted on 9/5/2022 with a history of depression/mood disorder, left breast cancer with surgery and radiation, essential hypertension, hyperlipidemia who underwent right ankle surgery and currently has a cast in place who presented from [address] [place] [address]. She presented with shortness of breath and hypoxia and was found to have a urine consistent with a urinary tract infection as well as sepsis with a white count of 15,000. Unfortunately, patient  went into acute respiratory distress with hypoxia requiring high flow oxygen. Found to be Covid positive. Transferred to ICU. dexamethasone IV, azithromycin, IV Zosyn and baricitinib. Also hypotensive requiring Levophed for pressure support. Lactic acidosis is worsening even after the fluid bolus of 30 mL/kg. CT scan of the abdomen pelvis showing patchy nodular bilateral basilar airspace disease likely infectious . Patient has previous history of severe systolic heart failure with an EF of 21 to 25% with left ventricular global hypokinesis and grade 3 diastolic dysfunction. CTA of the chest was performed and no pulmonary emboli was noted. There was mild patchy groundglass opacities in the left lung base although not enough to explain her ongoing hypoxia. The gallbladder was also distended without enough visualization of the gallbladder to diagnose acute cholecystitis. Transferred  out of ICU. Weaned to  room air. Repeat chest x-ray showing clear lungs. Decreasing WBC, CRP, procal and normal LDH and ferritin. Cleared for discharge from Pulmonary perspective. Patient unable to return to [facility]. Pending authorization at [facility]. Medically stable for discharge. Discharge Medications:   Current Discharge Medication List        START taking these medications    Details   guaiFENesin ER (MUCINEX) 600 mg ER tablet Take 1 Tablet by mouth every twelve (12) hours for 10 days. Qty: 20 Tablet, Refills: 0  Start date: 9/10/2022, End date: 9/20/2022           CONTINUE these medications which have NOT CHANGED    Details   gabapentin (NEURONTIN) 100 mg capsule Take 100 mg by mouth two (2) times a day. atorvastatin (LIPITOR) 40 mg tablet Take 40 mg by mouth daily. traZODone (DESYREL) 50 mg tablet Take 50 mg by mouth nightly. paliperidone (INVEGA) 3 mg SR tablet Take 1 tablet by mouth daily. Qty: 90 Tablet, Refills: 1      famotidine (PEPCID) 20 mg tablet Take 1 Tab by mouth daily. Indications: heartburn  Qty: 30 Tab, Refills: 0      aspirin delayed-release 81 mg tablet Take 1 Tab by mouth every evening. Indications: prevention of transient ischemic attack  Qty: 30 Tab, Refills: 0      metoprolol succinate (TOPROL-XL) 25 mg XL tablet Take 0.5 Tabs by mouth daily. Indications: high blood pressure  Qty: 15 Tab, Refills: 0      polyethylene glycol (MIRALAX) 17 gram packet Take 1 Packet by mouth daily. Indications: constipation  Qty: 30 Packet, Refills: 0               Follow up Care:    1. Radha Manrique MD in 1-2 weeks.       Follow-up Information       Follow up With Specialties Details Why Contact Info    Radha Manrique, [address]  [address] [address] [phone]      Jack Whitley MD Pulmonary Disease Schedule an appointment as soon as possible for a visit in 2 week(s) Hospital follow-up COVID, mycoplasma pneumonia, and hypoxia [address] [facility] [address]      [address] Emergency Medicine Follow up As needed, If symptoms worsen Cortney Marcano  [phone]              Patient Follow Up Instructions: Activity: Activity as tolerated, PT/OT per SNF  Diet:  Low fat, Low cholesterol  Wound care: None required    Condition at Discharge:  Stable  __________________________________________________________________    Disposition  [place] [facility]  ____________________________________________________________________    Code Status:  Full Code  ___________________________________________________________________    Discharge Exam:  Patient seen and examined by me on discharge day. Pertinent Findings:    Gen:    Chronically ill-appearing female. Not in distress  Chest: Clear lungs. No wheezing, rhonchi, or rales. On room air. CVS:   Regular rate and rhythm. +S1/S2. No murmur or gallop. No edema  Abd:  Soft, not distended, not tender. Active bowel sounds. Musk: Right lower extremity boot (non-weightbearing status per ortho)  Neuro:  Alert and oriented x3. CN II-XII grossly intact. Psych: Mood and affect appropriate     CONSULTATIONS: Pulmonary/Intensive care, ID, and Orthopedic Surgery    Significant Diagnostic Studies:   Recent Results (from the past 24 hour(s))   CBC WITH AUTOMATED DIFF    Collection Time: 09/10/22  7:32 AM   Result Value Ref Range    WBC 8.3 3.6 - 11.0 K/uL    RBC 3.25 (L) 3.80 - 5.20 M/uL    HGB 9.1 (L) 11.5 - 16.0 g/dL    HCT 28.6 (L) 35.0 - 47.0 %    MCV 88.0 80.0 - 99.0 FL    MCH 28.0 26.0 - 34.0 PG    MCHC 31.8 30.0 - 36.5 g/dL    RDW 17.8 (H) 11.5 - 14.5 %    PLATELET 021 774 - 307 K/uL    MPV 10.1 8.9 - 12.9 FL    NRBC 0.0 0.0  WBC    ABSOLUTE NRBC 0.00 0.00 - 0.01 K/uL    NEUTROPHILS 75 32 - 75 %    LYMPHOCYTES 18 12 - 49 %    MONOCYTES 6 5 - 13 %    EOSINOPHILS 0 0 - 7 %    BASOPHILS 0 0 - 1 %    IMMATURE GRANULOCYTES 1 (H) 0 - 0.5 %    ABS.  NEUTROPHILS 6.2 1.8 - 8.0 K/UL    ABS. LYMPHOCYTES 1.5 0.8 - 3.5 K/UL    ABS. MONOCYTES 0.5 0.0 - 1.0 K/UL    ABS. EOSINOPHILS 0.0 0.0 - 0.4 K/UL    ABS. BASOPHILS 0.0 0.0 - 0.1 K/UL    ABS. IMM. GRANS. 0.1 (H) 0.00 - 0.04 K/UL    DF AUTOMATED     METABOLIC PANEL, COMPREHENSIVE    Collection Time: 09/10/22  7:32 AM   Result Value Ref Range    Sodium 140 136 - 145 mmol/L    Potassium 4.4 3.5 - 5.1 mmol/L    Chloride 111 (H) 97 - 108 mmol/L    CO2 23 21 - 32 mmol/L    Anion gap 6 5 - 15 mmol/L    Glucose 157 (H) 65 - 100 mg/dL    BUN 18 6 - 20 mg/dL    Creatinine 0.67 0.55 - 1.02 mg/dL    BUN/Creatinine ratio 27 (H) 12 - 20      GFR est AA >60 >60 ml/min/1.73m2    GFR est non-AA >60 >60 ml/min/1.73m2    Calcium 8.5 8.5 - 10.1 mg/dL    Bilirubin, total 0.2 0.2 - 1.0 mg/dL    AST (SGOT) 11 (L) 15 - 37 U/L    ALT (SGPT) 13 12 - 78 U/L    Alk. phosphatase 74 45 - 117 U/L    Protein, total 4.9 (L) 6.4 - 8.2 g/dL    Albumin 2.0 (L) 3.5 - 5.0 g/dL    Globulin 2.9 2.0 - 4.0 g/dL    A-G Ratio 0.7 (L) 1.1 - 2.2       XR CHEST PORT   Final Result   No evidence of acute cardiopulmonary process. XR CHEST PORT   Final Result   No acute findings. XR CHEST PORT   Final Result   1. No evidence of an acute cardiopulmonary process. XR CHEST PORT   Final Result   No evidence of acute cardiopulmonary process. XR ANKLE RT AP/LAT   Final Result   Postoperative findings as described      XR FOOT RT AP/LAT   Final Result   Postoperative findings as described      XR CHEST PORT   Final Result   1. Lungs are grossly clear. XR CHEST PORT   Final Result   1. Left-sided atelectasis, no acute abnormality      CT ABD PELV WO CONT   Final Result      1. Patchy nodular bilateral basilar airspace disease likely infectious or   inflammatory. Given nodularity recommend follow-up in 2-3 months postpartum. Treatment to exclude underlying mass lesion      XR CHEST PORT   Final Result   No change.       CTA CHEST W OR W WO CONT   Final Result 1.  Limited study as above. No evidence of pulmonary embolus. 2.  No acute cardiopulmonary process. Mild patchy groundglass opacity in the   left lung base may represent early infection or inflammatory process. 3.  The gallbladder is distended and not well evaluated on this study. Correlate   for signs and symptoms of acute cholecystitis         CT HEAD WO CONT   Final Result   No acute intracranial abnormality. XR CHEST SNGL V   Final Result   No acute cardiopulmonary process.       XR CHEST PORT    (Results Pending)   XR CHEST PORT    (Results Pending)   XR CHEST PORT    (Results Pending)           Signed:  Krissy Looney PA-C  9/10/2022  10:37 AM

## 2022-09-11 ENCOUNTER — APPOINTMENT (OUTPATIENT)
Dept: GENERAL RADIOLOGY | Age: 72
DRG: 871 | End: 2022-09-11
Attending: INTERNAL MEDICINE
Payer: MEDICARE

## 2022-09-11 LAB
ALBUMIN SERPL-MCNC: 2.4 G/DL (ref 3.5–5)
ALBUMIN/GLOB SERPL: 0.7 {RATIO} (ref 1.1–2.2)
ALP SERPL-CCNC: 91 U/L (ref 45–117)
ALT SERPL-CCNC: 17 U/L (ref 12–78)
ANION GAP SERPL CALC-SCNC: 9 MMOL/L (ref 5–15)
AST SERPL W P-5'-P-CCNC: 13 U/L (ref 15–37)
BACTERIA SPEC CULT: NORMAL
BASOPHILS # BLD: 0 K/UL (ref 0–0.1)
BASOPHILS NFR BLD: 0 % (ref 0–1)
BILIRUB SERPL-MCNC: 0.2 MG/DL (ref 0.2–1)
BUN SERPL-MCNC: 18 MG/DL (ref 6–20)
BUN/CREAT SERPL: 20 (ref 12–20)
CA-I BLD-MCNC: 8.7 MG/DL (ref 8.5–10.1)
CHLORIDE SERPL-SCNC: 107 MMOL/L (ref 97–108)
CO2 SERPL-SCNC: 23 MMOL/L (ref 21–32)
CREAT SERPL-MCNC: 0.92 MG/DL (ref 0.55–1.02)
DIFFERENTIAL METHOD BLD: ABNORMAL
EOSINOPHIL # BLD: 0 K/UL (ref 0–0.4)
EOSINOPHIL NFR BLD: 0 % (ref 0–7)
ERYTHROCYTE [DISTWIDTH] IN BLOOD BY AUTOMATED COUNT: 18 % (ref 11.5–14.5)
GLOBULIN SER CALC-MCNC: 3.3 G/DL (ref 2–4)
GLUCOSE SERPL-MCNC: 172 MG/DL (ref 65–100)
HCT VFR BLD AUTO: 33.7 % (ref 35–47)
HGB BLD-MCNC: 10.7 G/DL (ref 11.5–16)
IMM GRANULOCYTES # BLD AUTO: 0.2 K/UL (ref 0–0.04)
IMM GRANULOCYTES NFR BLD AUTO: 2 % (ref 0–0.5)
LYMPHOCYTES # BLD: 1.5 K/UL (ref 0.8–3.5)
LYMPHOCYTES NFR BLD: 12 % (ref 12–49)
MCH RBC QN AUTO: 27.7 PG (ref 26–34)
MCHC RBC AUTO-ENTMCNC: 31.8 G/DL (ref 30–36.5)
MCV RBC AUTO: 87.3 FL (ref 80–99)
MONOCYTES # BLD: 0.6 K/UL (ref 0–1)
MONOCYTES NFR BLD: 5 % (ref 5–13)
NEUTS SEG # BLD: 10.3 K/UL (ref 1.8–8)
NEUTS SEG NFR BLD: 81 % (ref 32–75)
NRBC # BLD: 0 K/UL (ref 0–0.01)
NRBC BLD-RTO: 0 PER 100 WBC
PLATELET # BLD AUTO: 349 K/UL (ref 150–400)
PMV BLD AUTO: 10 FL (ref 8.9–12.9)
POTASSIUM SERPL-SCNC: 4.2 MMOL/L (ref 3.5–5.1)
PROT SERPL-MCNC: 5.7 G/DL (ref 6.4–8.2)
RBC # BLD AUTO: 3.86 M/UL (ref 3.8–5.2)
SODIUM SERPL-SCNC: 139 MMOL/L (ref 136–145)
SPECIAL REQUESTS,SREQ: NORMAL
WBC # BLD AUTO: 12.6 K/UL (ref 3.6–11)

## 2022-09-11 PROCEDURE — 74011000250 HC RX REV CODE- 250: Performed by: HOSPITALIST

## 2022-09-11 PROCEDURE — 71045 X-RAY EXAM CHEST 1 VIEW: CPT

## 2022-09-11 PROCEDURE — 74011250636 HC RX REV CODE- 250/636: Performed by: INTERNAL MEDICINE

## 2022-09-11 PROCEDURE — 74011250637 HC RX REV CODE- 250/637: Performed by: HOSPITALIST

## 2022-09-11 PROCEDURE — 97161 PT EVAL LOW COMPLEX 20 MIN: CPT

## 2022-09-11 PROCEDURE — 97530 THERAPEUTIC ACTIVITIES: CPT

## 2022-09-11 PROCEDURE — 65270000029 HC RM PRIVATE

## 2022-09-11 PROCEDURE — 74011250637 HC RX REV CODE- 250/637: Performed by: INTERNAL MEDICINE

## 2022-09-11 PROCEDURE — 74011250637 HC RX REV CODE- 250/637: Performed by: STUDENT IN AN ORGANIZED HEALTH CARE EDUCATION/TRAINING PROGRAM

## 2022-09-11 PROCEDURE — 80053 COMPREHEN METABOLIC PANEL: CPT

## 2022-09-11 PROCEDURE — 36415 COLL VENOUS BLD VENIPUNCTURE: CPT

## 2022-09-11 PROCEDURE — 85025 COMPLETE CBC W/AUTO DIFF WBC: CPT

## 2022-09-11 RX ADMIN — POLYETHYLENE GLYCOL 3350 17 G: 17 POWDER, FOR SOLUTION ORAL at 10:13

## 2022-09-11 RX ADMIN — SODIUM CHLORIDE, PRESERVATIVE FREE 10 ML: 5 INJECTION INTRAVENOUS at 16:06

## 2022-09-11 RX ADMIN — Medication: at 22:01

## 2022-09-11 RX ADMIN — Medication: at 10:13

## 2022-09-11 RX ADMIN — ASPIRIN 81 MG: 81 TABLET, COATED ORAL at 18:36

## 2022-09-11 RX ADMIN — SODIUM CHLORIDE, PRESERVATIVE FREE 10 ML: 5 INJECTION INTRAVENOUS at 06:17

## 2022-09-11 RX ADMIN — GUAIFENESIN 600 MG: 600 TABLET, EXTENDED RELEASE ORAL at 10:12

## 2022-09-11 RX ADMIN — GUAIFENESIN 600 MG: 600 TABLET, EXTENDED RELEASE ORAL at 00:32

## 2022-09-11 RX ADMIN — AZITHROMYCIN MONOHYDRATE 250 MG: 500 TABLET ORAL at 10:12

## 2022-09-11 RX ADMIN — DEXAMETHASONE SODIUM PHOSPHATE 4 MG: 4 INJECTION, SOLUTION INTRA-ARTICULAR; INTRALESIONAL; INTRAMUSCULAR; INTRAVENOUS; SOFT TISSUE at 10:13

## 2022-09-11 RX ADMIN — SODIUM CHLORIDE, PRESERVATIVE FREE 10 ML: 5 INJECTION INTRAVENOUS at 00:33

## 2022-09-11 RX ADMIN — SODIUM CHLORIDE, PRESERVATIVE FREE 10 ML: 5 INJECTION INTRAVENOUS at 21:52

## 2022-09-11 RX ADMIN — DEXAMETHASONE SODIUM PHOSPHATE 4 MG: 4 INJECTION, SOLUTION INTRA-ARTICULAR; INTRALESIONAL; INTRAMUSCULAR; INTRAVENOUS; SOFT TISSUE at 00:32

## 2022-09-11 RX ADMIN — GABAPENTIN 100 MG: 100 CAPSULE ORAL at 10:12

## 2022-09-11 RX ADMIN — TRAZODONE HYDROCHLORIDE 50 MG: 50 TABLET ORAL at 21:52

## 2022-09-11 RX ADMIN — GUAIFENESIN 600 MG: 600 TABLET, EXTENDED RELEASE ORAL at 21:52

## 2022-09-11 RX ADMIN — GABAPENTIN 100 MG: 100 CAPSULE ORAL at 00:32

## 2022-09-11 RX ADMIN — Medication: at 00:33

## 2022-09-11 RX ADMIN — GABAPENTIN 100 MG: 100 CAPSULE ORAL at 21:52

## 2022-09-11 RX ADMIN — ATORVASTATIN CALCIUM 40 MG: 40 TABLET, FILM COATED ORAL at 10:12

## 2022-09-11 RX ADMIN — TRAZODONE HYDROCHLORIDE 50 MG: 50 TABLET ORAL at 00:32

## 2022-09-11 RX ADMIN — PALIPERIDONE 3 MG: 3 TABLET, EXTENDED RELEASE ORAL at 10:12

## 2022-09-11 RX ADMIN — FAMOTIDINE 20 MG: 20 TABLET ORAL at 10:12

## 2022-09-11 NOTE — DISCHARGE SUMMARY
Hospitalist Discharge Summary     Patient ID:    Idalia Rivera  [id number]  70 y.o.  1950    Admit date: 9/5/2022    Discharge date : 9/11/2022    Chronic Diagnoses:    Problem List as of 9/11/2022 Date Reviewed: 9/5/2022            Codes Class Noted - Resolved    Acute respiratory failure with hypoxia [facility]) ICD-10-CM: J96.01  ICD-9-CM: 518.81  9/10/2022 - Present        Mycoplasma pneumonia ICD-10-CM: J15.7  ICD-9-CM: 483.0  9/10/2022 - Present        Acute cystitis ICD-10-CM: N30.00  ICD-9-CM: 595.0  9/10/2022 - Present        ANISH (acute kidney injury) ([facility] ICD-10-CM: N17.9  ICD-9-CM: 584.9  9/10/2022 - Present        COVID-19 ICD-10-CM: U07.1  ICD-9-CM: 079.89  9/5/2022 - Present        Hematuria ICD-10-CM: R31.9  ICD-9-CM: 599.70  9/5/2022 - Present        * (Principal) Sepsis ([facility] ICD-10-CM: A41.9  ICD-9-CM: 038.9, 995.91  9/5/2022 - Present        Delusional disorder ([facility] ICD-10-CM: F22  ICD-9-CM: 297.1  8/14/2019 - Present        Acute CHF (congestive heart failure) ([facility] ICD-10-CM: I50.9  ICD-9-CM: 428.0  8/11/2019 - Present        Failure to thrive (0-17) ICD-10-CM: R62.51  ICD-9-CM: 783.41  8/8/2019 - Present        Atypical glandular cells of undetermined significance (BASILIO) on cervical Pap smear ICD-10-CM: R87.619  ICD-9-CM: 795.00  11/26/2018 - Present        Severe obesity with body mass index (BMI) of 35.0 to 39.9 with serious comorbidity ([facility] ICD-10-CM: E66.01  ICD-9-CM: 278.01  7/3/2018 - Present        Hx of carcinoma in situ of breast ICD-10-CM: Z86.000  ICD-9-CM: V13.89  4/9/2018 - Present        Personality disorder ([facility] ICD-10-CM: F60.9  ICD-9-CM: 301.9  3/2/2017 - Present        Hyperlipidemia ICD-10-CM: E78.5  ICD-9-CM: 272.4  12/2/2015 - Present        Chronic lower back pain ICD-10-CM: M54.50, G89.29  ICD-9-CM: 724.2, 338.29  11/19/2015 - Present        Depression with anxiety ICD-10-CM: F41.8  ICD-9-CM: 300.4  9/11/2012 - Present        Arthritis of knee, right ICD-10-CM: M17.11  ICD-9-CM: 716.96  2/20/2012 - Present        RESOLVED: Breast pain ICD-10-CM: N64.4  ICD-9-CM: 611.71  7/22/2016 - 7/3/2018       22    Final Diagnoses:   Principal Problem:    Sepsis ([facility] (9/5/2022)    Active Problems:    COVID-19 (9/5/2022)      Hematuria (9/5/2022)      Acute respiratory failure with hypoxia ([facility] (9/10/2022)      Mycoplasma pneumonia (9/10/2022)      Acute cystitis (9/10/2022)      ANISH (acute kidney injury) ([facility] (9/10/2022)    Hospital Course:   Beka Tarango  is a 70-year-old female admitted on 9/5/2022 with a history of depression/mood disorder, left breast cancer with surgery and radiation, essential hypertension, hyperlipidemia who underwent right ankle surgery and currently has a cast in place who presented from [address] [place] [address]. She presented with shortness of breath and hypoxia and was found to have a urine consistent with a urinary tract infection as well as sepsis with a white count of 15,000. Unfortunately, patient  went into acute respiratory distress with hypoxia requiring high flow oxygen. Found to be Covid positive. Transferred to ICU. dexamethasone IV, azithromycin, IV Zosyn and baricitinib. Also hypotensive requiring Levophed for pressure support. Lactic acidosis is worsening even after the fluid bolus of 30 mL/kg. CT scan of the abdomen pelvis showing patchy nodular bilateral basilar airspace disease likely infectious . Patient has previous history of severe systolic heart failure with an EF of 21 to 25% with left ventricular global hypokinesis and grade 3 diastolic dysfunction. CTA of the chest was performed and no pulmonary emboli was noted. There was mild patchy groundglass opacities in the left lung base although not enough to explain her ongoing hypoxia. The gallbladder was also distended without enough visualization of the gallbladder to diagnose acute cholecystitis. Transferred  out of ICU. Weaned to  room air.  Repeat chest x-ray showing clear lungs. Decreasing WBC, CRP, procal and normal LDH and ferritin. Cleared for discharge from Pulmonary perspective. Patient unable to return to [facility]. Pending authorization at [facility]. Medically stable for discharge. 09/11 Patient seen and examined at bedside. Cough improving. She continues to report generalized fatigue and weakness. Remains medically stable for discharge. Discharge Medications:   Current Discharge Medication List        START taking these medications    Details   guaiFENesin ER (MUCINEX) 600 mg ER tablet Take 1 Tablet by mouth every twelve (12) hours for 10 days. Qty: 20 Tablet, Refills: 0  Start date: 9/10/2022, End date: 9/20/2022           CONTINUE these medications which have NOT CHANGED    Details   gabapentin (NEURONTIN) 100 mg capsule Take 100 mg by mouth two (2) times a day. atorvastatin (LIPITOR) 40 mg tablet Take 40 mg by mouth daily. traZODone (DESYREL) 50 mg tablet Take 50 mg by mouth nightly. paliperidone (INVEGA) 3 mg SR tablet Take 1 tablet by mouth daily. Qty: 90 Tablet, Refills: 1      famotidine (PEPCID) 20 mg tablet Take 1 Tab by mouth daily. Indications: heartburn  Qty: 30 Tab, Refills: 0      aspirin delayed-release 81 mg tablet Take 1 Tab by mouth every evening. Indications: prevention of transient ischemic attack  Qty: 30 Tab, Refills: 0      metoprolol succinate (TOPROL-XL) 25 mg XL tablet Take 0.5 Tabs by mouth daily. Indications: high blood pressure  Qty: 15 Tab, Refills: 0      polyethylene glycol (MIRALAX) 17 gram packet Take 1 Packet by mouth daily. Indications: constipation  Qty: 30 Packet, Refills: 0               Follow up Care:    1. Mac Salinas MD in 1-2 weeks.       Follow-up Information       Follow up With Specialties Details Why Contact Info    Mac Salinas, [address]  [address] [address] [phone]      Mani Walter MD Pulmonary Disease Schedule an appointment as soon as possible for a visit in 2 week(s) Hospital follow-up COVID, mycoplasma pneumonia, and hypoxia [address]  [phone]      [address] [facility] [address] [facility] Emergency Medicine Follow up As needed, If symptoms worsen [address]   [phone]              Patient Follow Up Instructions: Activity: Activity as tolerated, PT/OT per SNF  Diet:  Low fat, Low cholesterol  Wound care: None required    Condition at Discharge:  Stable  __________________________________________________________________    Disposition  [place] [facility]  ____________________________________________________________________    Code Status:  Full Code  ___________________________________________________________________    Discharge Exam:  Patient seen and examined by me on discharge day. Pertinent Findings:    Gen:    Chronically ill-appearing female. Not in distress  Chest: Clear lungs. No wheezing, rhonchi, or rales. On room air. CVS:   Regular rate and rhythm. +S1/S2. No murmur or gallop. No edema  Abd:  Soft, not distended, not tender. Active bowel sounds. Musk: Right lower extremity boot (non-weightbearing status per ortho)  Neuro:  Alert and oriented x3. CN II-XII grossly intact. Psych: Mood and affect appropriate     CONSULTATIONS: Pulmonary/Intensive care, ID, and Orthopedic Surgery    Significant Diagnostic Studies:   No results found for this or any previous visit (from the past 24 hour(s)). XR CHEST PORT   Final Result   No evidence of acute cardiopulmonary process. XR CHEST PORT   Final Result   No acute findings. XR CHEST PORT   Final Result   1. No evidence of an acute cardiopulmonary process. XR CHEST PORT   Final Result   No evidence of acute cardiopulmonary process.          XR ANKLE RT AP/LAT   Final Result   Postoperative findings as described      XR FOOT RT AP/LAT   Final Result   Postoperative findings as described      XR CHEST PORT Final Result   1. Lungs are grossly clear. XR CHEST PORT   Final Result   1. Left-sided atelectasis, no acute abnormality      CT ABD PELV WO CONT   Final Result      1. Patchy nodular bilateral basilar airspace disease likely infectious or   inflammatory. Given nodularity recommend follow-up in 2-3 months postpartum. Treatment to exclude underlying mass lesion      XR CHEST PORT   Final Result   No change. CTA CHEST W OR W WO CONT   Final Result      1. Limited study as above. No evidence of pulmonary embolus. 2.  No acute cardiopulmonary process. Mild patchy groundglass opacity in the   left lung base may represent early infection or inflammatory process. 3.  The gallbladder is distended and not well evaluated on this study. Correlate   for signs and symptoms of acute cholecystitis         CT HEAD WO CONT   Final Result   No acute intracranial abnormality. XR CHEST SNGL V   Final Result   No acute cardiopulmonary process.       XR CHEST PORT    (Results Pending)   XR CHEST PORT    (Results Pending)   XR CHEST PORT    (Results Pending)   XR CHEST PORT    (Results Pending)           Signed:  Anastasia Rodriguez PA-C  9/11/2022  10:37 AM

## 2022-09-11 NOTE — PROGRESS NOTES
PHYSICAL THERAPY EVALUATION  Patient: Dm Navarro (87 y.o. female)  Date: 9/11/2022  Primary Diagnosis: Sepsis ([facility] [A41.9]  Hematuria [R31.9]  COVID-19 [U07.1]       Precautions: Fall, standard     ASSESSMENT  Pt is a 70 y.o. female admitted on 9/5 /2022 with history of depression/mood disorder, left breast cancer 1995 with surgery and radiation, hypertension and hyperlipidemia recently had right ankle surgery with cast since June 2022 and presents to the emergency room from [address] [place] [address] as they found her with significant shortness of breath with hypoxia. She was recently positive for COVID-19. Per ED physician she talk to him on arrival, when I seen her she is not sleep, resting comfortably on oxygen nasal cannula. When I try to wake her up but she open eyes but unable to stay awake. No respiratory distress is noted. Initially due to shortness of breath and hypoxia plus tachycardia ordered for a CTA of the chest, which came back negative for pulmonary embolism. No evidence of pneumonia. But lab work with signs of sepsis with hematuria and elevated WBC count. Suspected of urinary tract infection even though negative bacteria admitted after starting antibiotics. Pt was laying in bed upon PT arrival, first reluctant to work with PT but then agreeable to evaluation. Pt A&O x 4. PLOF: Ind ADLs/IADLs, without an AD for mobility, 1 falls in the last 3 months. Patient stated she lives alone in an apartment on the first floor and prior to ankle fx she was independent with ADLs no device        Based on the objective data described, the patient presents with generalized weakness, impaired functional mobility, impaired amb, impaired balance, and decreased endurance.  Pt performed Rolling: Contact guard assistance, Supine to Sit: Contact guard assistance, Sit to Stand: Contact guard assistance x 2 from edge of bed with RW and verbal cues to keep R LE NWB, Pt standing tolerated poor <5-10 sec reports feeling weak, then performed Sit to Supine: Stand-by assistance. Pt did fair-poor with session today with encouraged for mobility. Pt will benefit from continued skilled PT to address above deficits and return to PLOF. Current PT DC recommendation SNF due to decreased in functional mobility . Current Level of Function Impacting Discharge (mobility/balance): Ax1    Other factors to consider for discharge: PMH, NWB R LE      PLAN :  Recommendations and Planned Interventions: bed mobility training, transfer training, gait training, and therapeutic exercises      Recommend with staff: Ax1     Frequency/Duration: Patient will be followed by physical therapy:  2-3x/week to address goals. Recommendation for discharge: (in order for the patient to meet his/her long term goals)  Patrick Grady    This discharge recommendation:  Has been made in collaboration with the attending provider and/or case management    IF patient discharges home will need the following DME: to be determined (TBD)         SUBJECTIVE:   Patient stated I'm cod, I don't think I will be able to do anything, I'm too weak.     OBJECTIVE DATA SUMMARY:   HISTORY:    Past Medical History:   Diagnosis Date    Arthritis     GENERALIZED IN JOINTS.     Breast cancer ([address]     Left Breast Cancer 1995    Cancer [facility])     left breast - surgery/radiation    Chronic kidney disease     Chronic pain     Back pain    Depression     Mood Swings, anxiety    GERD (gastroesophageal reflux disease)     Hiatal Hernia    Headache     Heart failure (HCC)     Hypercholesterolemia     Hypertension     CONTROLLED BY MEDS., Pt states she is no liong er on BP med's    Ill-defined condition     Neuropathy bilat feet    Irregular heart beat     Other ill-defined conditions(799.89)     MIGRAINES    Other ill-defined conditions(799.89)     neuropathy of lower legs and feet    Other ill-defined conditions(799.89)     increased cholesterol    Other ill-defined conditions(799.89)     bronchitis    Psychotic disorder ([facility]     S/P radiation therapy     1995 Left Breast    Unspecified adverse effect of anesthesia     HEART PALPITATION. Past Surgical History:   Procedure Laterality Date    HX BREAST LUMPECTOMY Left     1995 - POSITIVE    HX ORTHOPAEDIC  2006    LEFT KNEE ARTHROSCOPY    HX ORTHOPAEDIC  4/2010    RIGHT KNEE ARTHROSCOPY, concrete nail placed    HX ORTHOPAEDIC      Hammertoe , doesnt know which foot    HX ORTHOPAEDIC      Small growth removed from between toes but doesnt know which foot    VA BREAST SURGERY PROCEDURE UNLISTED  7/1995    DCIS /BIOPSIES MULTIPLE. VA BREAST SURGERY PROCEDURE UNLISTED  1995    LUMPECTOMY WITH RADIATION INSITU       Home Situation  Home Environment: Private residence (first floor set up)  One/Two Story Residence: One story  Living Alone: Yes  Support Systems: [facility]  Patient Expects to be Discharged to[de-identified] Skilled nursing facility  Current DME Used/Available at Home: None    EXAMINATION/PRESENTATION/DECISION MAKING:   Critical Behavior:  Neurologic State: Alert  Orientation Level: Oriented X4  Cognition: Follows commands     Hearing: Auditory  Auditory Impairment: None    Range Of Motion:  AROM: Generally decreased, functional           PROM: Generally decreased, functional           Strength:    Strength: Generally decreased, functional           Vision:      Functional Mobility:  Bed Mobility:  Rolling: Contact guard assistance  Supine to Sit: Contact guard assistance  Sit to Supine: Stand-by assistance  Scooting: Stand-by assistance  Transfers:  Sit to Stand: Contact guard assistance  Stand to Sit: Contact guard assistance                   Balance:   Sitting: Intact; Without support  Standing: Impaired; With support  Standing - Static: Fair  Ambulation/Gait Training:   Unable to ambulate at this time             Right Side Weight Bearing: Non-weight bearing (cast)                 Therapeutic Exercises:   Patient educated on supine/seated there-ex    Functional Measure:  [facility] Mobility Inpatient Short Form  How much difficulty does the patient currently have. .. Unable A Lot A Little None   1. Turning over in bed (including adjusting bedclothes, sheets and blankets)? [] 1   [] 2   [] 3   [x] 4   2. Sitting down on and standing up from a chair with arms ( e.g., wheelchair, bedside commode, etc.)   [] 1   [] 2   [x] 3   [] 4   3. Moving from lying on back to sitting on the side of the bed? [] 1   [] 2   [x] 3   [] 4          How much help from another person does the patient currently need. .. Total A Lot A Little None   4. Moving to and from a bed to a chair (including a wheelchair)? [] 1   [] 2   [x] 3   [] 4   5. Need to walk in hospital room? [] 1   [x] 2   [] 3   [] 4   6. Climbing 3-5 steps with a railing? [] 1   [x] 2   [] 3   [] 4   © , Trustees of [employer], under license to [employer]. All rights reserved     Score:  Initial: 17 Most Recent: X (Date: 22 )   Interpretation of Tool:  Represents activities that are increasingly more difficult (i.e. Bed mobility, Transfers, Gait).   Score 24 23 22-20 19-15 14-10 9-7 6   Modifier CH CI CJ CK CL CM CN         Physical Therapy Evaluation Charge Determination   History Examination Presentation Decision-Making   MEDIUM  Complexity : 1-2 comorbidities / personal factors will impact the outcome/ POC  LOW Complexity : 1-2 Standardized tests and measures addressing body structure, function, activity limitation and / or participation in recreation  LOW Complexity : Stable, uncomplicated  Other outcome measures [facility] 6        Based on the above components, the patient evaluation is determined to be of the following complexity level: LOW     Pain Ratin/10 LBP    Activity Tolerance:   Fair    After treatment patient left in no apparent distress:   Bed locked and in lowest position Supine in bed, Call bell within reach, and Bed / chair alarm activated     GOALS:  Pt stated goal: \" I want to be able to move around\"  Pt will be I with LE HEP in 7 days. Pt will perform bed mobility with Mod I in 7 days. Pt will perform transfers with csv  in 7 days. Pt will hop 5-10 feet with LRAD safely with min A in 7 days. Pt will demonstrate improvement in standing balance from CG to sv in 7 days. COMMUNICATION/EDUCATION:   The patients plan of care was discussed with: Physical therapist and Registered nurse. Fall prevention education was provided and the patient/caregiver indicated understanding. and Patient understands intent and goals of therapy, but is neutral about his/her participation.         Thank you for this referral.  Pita Cooley, PT, DPT   Time Calculation: 23 mins

## 2022-09-11 NOTE — PROGRESS NOTES
Problem: Risk for Spread of Infection  Goal: Prevent transmission of infectious organism to others  Description: Prevent the transmission of infectious organisms to other patients, staff members, and visitors. 9/10/2022 2047 by Talita Dickinson RN  Outcome: Progressing Towards Goal  9/10/2022 2046 by Talita Dickinson RN  Outcome: Progressing Towards Goal     Problem: Patient Education:  Go to Education Activity  Goal: Patient/Family Education  9/10/2022 2047 by Talita Dickinson RN  Outcome: Progressing Towards Goal  9/10/2022 2046 by Talita Dickinson RN  Outcome: Progressing Towards Goal     Problem: Airway Clearance - Ineffective  Goal: Achieve or maintain patent airway  9/10/2022 2047 by Talita Dickinson RN  Outcome: Progressing Towards Goal  9/10/2022 2046 by Talita Dickinson RN  Outcome: Progressing Towards Goal     Problem: Gas Exchange - Impaired  Goal: Absence of hypoxia  9/10/2022 2047 by Talita Dickinson RN  Outcome: Progressing Towards Goal  9/10/2022 2046 by Talita Dickinson RN  Outcome: Progressing Towards Goal  Goal: Promote optimal lung function  9/10/2022 2047 by Talita Dickinson RN  Outcome: Progressing Towards Goal  9/10/2022 2046 by Talita Dickinson RN  Outcome: Progressing Towards Goal     Problem: Breathing Pattern - Ineffective  Goal: Ability to achieve and maintain a regular respiratory rate  9/10/2022 2047 by Talita Dickinson RN  Outcome: Progressing Towards Goal  9/10/2022 2046 by Talita Dickinson RN  Outcome: Progressing Towards Goal     Problem:  Body Temperature -  Risk of, Imbalanced  Goal: Ability to maintain a body temperature within defined limits  9/10/2022 2047 by Talita Dickinson RN  Outcome: Progressing Towards Goal  9/10/2022 2046 by Talita Dickinson RN  Outcome: Progressing Towards Goal  Goal: Will regain or maintain usual level of consciousness  9/10/2022 2047 by Talita Dickinson RN  Outcome: Progressing Towards Goal  9/10/2022 2046 by Tiffanie Haney RN  Outcome: Progressing Towards Goal  Goal: Complications related to the disease process, condition or treatment will be avoided or minimized  9/10/2022 2047 by Tiffanie Haney RN  Outcome: Progressing Towards Goal  9/10/2022 2046 by Tiffanie Haney RN  Outcome: Progressing Towards Goal     Problem: Isolation Precautions - Risk of Spread of Infection  Goal: Prevent transmission of infectious organism to others  9/10/2022 2047 by Tiffanie Haney RN  Outcome: Progressing Towards Goal  9/10/2022 2046 by Tiffanie Haney RN  Outcome: Progressing Towards Goal     Problem: Nutrition Deficits  Goal: Optimize nutrtional status  9/10/2022 2047 by Tiffanie Haney RN  Outcome: Progressing Towards Goal  9/10/2022 2046 by Tiffanie Haney RN  Outcome: Progressing Towards Goal     Problem: Risk for Fluid Volume Deficit  Goal: Maintain normal heart rhythm  9/10/2022 2047 by Tiffanie Haney RN  Outcome: Progressing Towards Goal  9/10/2022 2046 by Tiffanie Haney RN  Outcome: Progressing Towards Goal  Goal: Maintain absence of muscle cramping  9/10/2022 2047 by Tiffanie Haney RN  Outcome: Progressing Towards Goal  9/10/2022 2046 by Tiffanie Haney RN  Outcome: Progressing Towards Goal  Goal: Maintain normal serum potassium, sodium, calcium, phosphorus, and pH  9/10/2022 2047 by Tiffanie Haney RN  Outcome: Progressing Towards Goal  9/10/2022 2046 by Tiffanie Haney RN  Outcome: Progressing Towards Goal     Problem: Loneliness or Risk for Loneliness  Goal: Demonstrate positive use of time alone when socialization is not possible  9/10/2022 2047 by Tiffanie Haney RN  Outcome: Progressing Towards Goal  9/10/2022 2046 by Tiffanie Haney RN  Outcome: Progressing Towards Goal     Problem: Fatigue  Goal: Verbalize increase energy and improved vitality  9/10/2022 2047 by Tiffanie Haney RN  Outcome: Progressing Towards Goal  9/10/2022 2046 by Arlet Perdomo RN  Outcome: Progressing Towards Goal     Problem: Patient Education: Go to Patient Education Activity  Goal: Patient/Family Education  9/10/2022 2047 by Arlet Perdomo RN  Outcome: Progressing Towards Goal  9/10/2022 2046 by Arlet Perdomo RN  Outcome: Progressing Towards Goal     Problem: Pressure Injury - Risk of  Goal: *Prevention of pressure injury  Description: Document Satish Scale and appropriate interventions in the flowsheet.   9/10/2022 2047 by Arlet Perdomo RN  Outcome: Progressing Towards Goal  Note: Pressure Injury Interventions:  Sensory Interventions: Assess changes in LOC    Moisture Interventions: Apply protective barrier, creams and emollients, Check for incontinence Q2 hours and as needed    Activity Interventions: Assess need for specialty bed    Mobility Interventions: PT/OT evaluation    Nutrition Interventions: Document food/fluid/supplement intake, Offer support with meals,snacks and hydration    Friction and Shear Interventions: Minimize layers             9/10/2022 2046 by Arlet Perdomo RN  Outcome: Progressing Towards Goal  Note: Pressure Injury Interventions:  Sensory Interventions: Assess changes in LOC    Moisture Interventions: Apply protective barrier, creams and emollients, Check for incontinence Q2 hours and as needed    Activity Interventions: Assess need for specialty bed    Mobility Interventions: PT/OT evaluation    Nutrition Interventions: Document food/fluid/supplement intake, Offer support with meals,snacks and hydration    Friction and Shear Interventions: Minimize layers                Problem: Patient Education: Go to Patient Education Activity  Goal: Patient/Family Education  9/10/2022 2047 by Arlet Perdomo RN  Outcome: Progressing Towards Goal  9/10/2022 2046 by Arlet Perdomo RN  Outcome: Progressing Towards Goal     Problem: Falls - Risk of  Goal: *Absence of Falls  Description: Document Negra Jean Fall Risk and appropriate interventions in the flowsheet.   9/10/2022 2047 by Yusra Hyatt RN  Outcome: Progressing Towards Goal  9/10/2022 2046 by Yusra Hyatt RN  Outcome: Progressing Towards Goal  Note: Fall Risk Interventions:  Mobility Interventions: Bed/chair exit alarm    Mentation Interventions: Bed/chair exit alarm    Medication Interventions: Bed/chair exit alarm    Elimination Interventions: Bed/chair exit alarm, Call light in reach              Problem: Patient Education: Go to Patient Education Activity  Goal: Patient/Family Education  9/10/2022 2047 by Yusra Hyatt RN  Outcome: Progressing Towards Goal  9/10/2022 2046 by Yusra Hyatt RN  Outcome: Progressing Towards Goal     Problem: Discharge Planning  Goal: *Discharge to safe environment  9/10/2022 2047 by Yusra Hyatt RN  Outcome: Progressing Towards Goal  9/10/2022 2046 by Yusra Hyatt RN  Outcome: Progressing Towards Goal  Goal: *Knowledge of medication management  9/10/2022 2047 by Yusra Hyatt RN  Outcome: Progressing Towards Goal  9/10/2022 2046 by Yusra Hyatt RN  Outcome: Progressing Towards Goal  Goal: *Knowledge of discharge instructions  9/10/2022 2047 by Yusra Hyatt RN  Outcome: Progressing Towards Goal  9/10/2022 2046 by Yusra Hyatt RN  Outcome: Progressing Towards Goal     Problem: Patient Education: Go to Patient Education Activity  Goal: Patient/Family Education  9/10/2022 2047 by Yusra Hyatt RN  Outcome: Progressing Towards Goal  9/10/2022 2046 by Yusra Hyatt RN  Outcome: Progressing Towards Goal     Problem: Impaired Skin Integrity/Pressure Injury Treatment  Goal: *Improvement of Existing Pressure Injury  9/10/2022 2047 by Yusra Hyatt RN  Outcome: Progressing Towards Goal  9/10/2022 2046 by Yusra Hyatt RN  Outcome: Progressing Towards Goal  Goal: *Prevention of pressure injury  Description: Document Satish Scale and appropriate interventions in the flowsheet.   9/10/2022 2047 by Marky Penn RN  Outcome: Progressing Towards Goal  9/10/2022 2046 by Marky Penn RN  Outcome: Progressing Towards Goal  Note: Pressure Injury Interventions:  Sensory Interventions: Assess changes in LOC    Moisture Interventions: Apply protective barrier, creams and emollients, Check for incontinence Q2 hours and as needed    Activity Interventions: Assess need for specialty bed    Mobility Interventions: PT/OT evaluation    Nutrition Interventions: Document food/fluid/supplement intake, Offer support with meals,snacks and hydration    Friction and Shear Interventions: Minimize layers                Problem: Patient Education: Go to Patient Education Activity  Goal: Patient/Family Education  9/10/2022 2047 by Marky Penn RN  Outcome: Progressing Towards Goal  9/10/2022 2046 by Marky Penn RN  Outcome: Progressing Towards Goal

## 2022-09-11 NOTE — PROGRESS NOTES
Bedside and Verbal shift change report given to Addison Bruner RN (oncoming nurse) by Heather Rogers LPN (offgoing nurse). Report included the following information SBAR, Kardex, Procedure Summary, Intake/Output, MAR, Accordion, and Recent Results.

## 2022-09-12 ENCOUNTER — APPOINTMENT (OUTPATIENT)
Dept: GENERAL RADIOLOGY | Age: 72
DRG: 871 | End: 2022-09-12
Attending: INTERNAL MEDICINE
Payer: MEDICARE

## 2022-09-12 PROCEDURE — 74011250637 HC RX REV CODE- 250/637: Performed by: STUDENT IN AN ORGANIZED HEALTH CARE EDUCATION/TRAINING PROGRAM

## 2022-09-12 PROCEDURE — 36415 COLL VENOUS BLD VENIPUNCTURE: CPT

## 2022-09-12 PROCEDURE — 74011000250 HC RX REV CODE- 250: Performed by: HOSPITALIST

## 2022-09-12 PROCEDURE — 71045 X-RAY EXAM CHEST 1 VIEW: CPT

## 2022-09-12 PROCEDURE — 74011250637 HC RX REV CODE- 250/637: Performed by: HOSPITALIST

## 2022-09-12 PROCEDURE — 99232 SBSQ HOSP IP/OBS MODERATE 35: CPT | Performed by: INTERNAL MEDICINE

## 2022-09-12 PROCEDURE — 65270000029 HC RM PRIVATE

## 2022-09-12 PROCEDURE — 97530 THERAPEUTIC ACTIVITIES: CPT

## 2022-09-12 PROCEDURE — 74011250636 HC RX REV CODE- 250/636: Performed by: INTERNAL MEDICINE

## 2022-09-12 PROCEDURE — 97165 OT EVAL LOW COMPLEX 30 MIN: CPT

## 2022-09-12 RX ORDER — HYDRALAZINE HYDROCHLORIDE 20 MG/ML
10 INJECTION INTRAMUSCULAR; INTRAVENOUS
Status: DISCONTINUED | OUTPATIENT
Start: 2022-09-12 | End: 2022-09-13 | Stop reason: HOSPADM

## 2022-09-12 RX ORDER — GUAIFENESIN 600 MG/1
1200 TABLET, EXTENDED RELEASE ORAL EVERY 12 HOURS
Status: DISCONTINUED | OUTPATIENT
Start: 2022-09-12 | End: 2022-09-13 | Stop reason: HOSPADM

## 2022-09-12 RX ADMIN — DEXAMETHASONE SODIUM PHOSPHATE 4 MG: 4 INJECTION, SOLUTION INTRA-ARTICULAR; INTRALESIONAL; INTRAMUSCULAR; INTRAVENOUS; SOFT TISSUE at 21:32

## 2022-09-12 RX ADMIN — Medication: at 21:39

## 2022-09-12 RX ADMIN — DEXAMETHASONE SODIUM PHOSPHATE 4 MG: 4 INJECTION, SOLUTION INTRA-ARTICULAR; INTRALESIONAL; INTRAMUSCULAR; INTRAVENOUS; SOFT TISSUE at 08:37

## 2022-09-12 RX ADMIN — DEXAMETHASONE SODIUM PHOSPHATE 4 MG: 4 INJECTION, SOLUTION INTRA-ARTICULAR; INTRALESIONAL; INTRAMUSCULAR; INTRAVENOUS; SOFT TISSUE at 01:31

## 2022-09-12 RX ADMIN — ASPIRIN 81 MG: 81 TABLET, COATED ORAL at 17:01

## 2022-09-12 RX ADMIN — GABAPENTIN 100 MG: 100 CAPSULE ORAL at 21:32

## 2022-09-12 RX ADMIN — ATORVASTATIN CALCIUM 40 MG: 40 TABLET, FILM COATED ORAL at 08:37

## 2022-09-12 RX ADMIN — Medication: at 08:38

## 2022-09-12 RX ADMIN — GUAIFENESIN 600 MG: 600 TABLET, EXTENDED RELEASE ORAL at 08:37

## 2022-09-12 RX ADMIN — PALIPERIDONE 3 MG: 3 TABLET, EXTENDED RELEASE ORAL at 08:37

## 2022-09-12 RX ADMIN — SODIUM CHLORIDE, PRESERVATIVE FREE 10 ML: 5 INJECTION INTRAVENOUS at 21:32

## 2022-09-12 RX ADMIN — GUAIFENESIN 1200 MG: 600 TABLET, EXTENDED RELEASE ORAL at 21:32

## 2022-09-12 RX ADMIN — SODIUM CHLORIDE, PRESERVATIVE FREE 10 ML: 5 INJECTION INTRAVENOUS at 06:24

## 2022-09-12 RX ADMIN — TRAZODONE HYDROCHLORIDE 50 MG: 50 TABLET ORAL at 21:32

## 2022-09-12 RX ADMIN — GABAPENTIN 100 MG: 100 CAPSULE ORAL at 08:37

## 2022-09-12 RX ADMIN — FAMOTIDINE 20 MG: 20 TABLET ORAL at 08:37

## 2022-09-12 RX ADMIN — POLYETHYLENE GLYCOL 3350 17 G: 17 POWDER, FOR SOLUTION ORAL at 08:37

## 2022-09-12 NOTE — PROGRESS NOTES
OT note uploaded via [employer] to [facility]. Cm asked them to start auth.

## 2022-09-12 NOTE — PROGRESS NOTES
Progress Note    Patient: Justina Gregorio MRN: [id number]  SSN: [id number]    YOB: 1950  Age: 70 y.o. Sex: female      Admit Date: 9/5/2022    LOS: 7 days     Subjective:   Patient followed for severe sepsis with Covid-19 and Mycoplasma pneumonia. She remains on room air and CXR is unremarkable. Still on Dexamethasone. She is afebrile with WBC 12,600. Patient resting comfortably with no complaints but remains confused. She is pending authorization to [facility]. Objective:     Vitals:    09/11/22 2045 09/12/22 0000 09/12/22 0415 09/12/22 1011   BP:    (!) 159/92   Pulse: (!) 118 96 77 77   Resp:    14   Temp:    97.7 °F (36.5 °C)   SpO2:       Weight:       Height:            Intake and Output:  Current Shift: No intake/output data recorded. Last three shifts: 09/10 1901 - 09/12 0700  In: 320 [P.O.:320]  Out: -     Physical Exam:   Vitals and nursing note reviewed. Constitutional:       General: She is in acute distress. Appearance: She is ill-appearing. HENT:      Head: Normocephalic and atraumatic. Right Ear: External ear normal.      Left Ear: External ear normal.      Nose:      Comments:  room air  Eyes:      Pupils: Pupils are equal, round, and reactive to light. Cardiovascular:      Rate and Rhythm: Regular rhythm. Heart sounds: No murmur heard. Pulmonary:      Effort: No respiratory distress. Breath sounds: Rhonchi present. Abdominal:      General: Bowel sounds are normal. There is no distension. Palpations: Abdomen is soft. Tenderness: There is no abdominal tenderness. Genitourinary:     Comments: External urinary device with greenish urine  Musculoskeletal:      Cervical back: Neck supple. Right lower leg: No edema. Left lower leg: No edema. Comments: Right calf/foot in hard cast   Skin:     Findings: No rash.    Neurological: nonfocal, no confusion  Psychiatric:  normal behavior     Lab/Data Review:     WBC 12,600    CRP 3.92 < 9.97 <24.10  Procal 4.20 <12.83 <18.50 <6.82     <247  Ferritin 194     Mycoplasma IgM Positive  Urine Legionella antigen Negative    Blood cultures (9/5) No growth FINAL  Urine culture (9/5) No growth FINAL     CXR (9/12) No acute cardiopulmonary abnormality     Assessment:     Principal Problem:    Sepsis ([facility] (9/5/2022)    Active Problems:    COVID-19 (9/5/2022)      Hematuria (9/5/2022)      Acute respiratory failure with hypoxia (HCC) (9/10/2022)      Mycoplasma pneumonia (9/10/2022)      Acute cystitis (9/10/2022)      ANISH (acute kidney injury) ([facility] (9/10/2022)     Severe sepsis with fever, tachycardia, tachypnea, leukocytosis, elevated lactic acid, CRP and procal, resolving  UTI with marked pyuria, urine culture negative  Covid-19 infection, Day #8 Dexamethasone, status post Baricitinib  4. Presumptive Mycoplasma pneumonia, LLL, with positive IgM serology, status post IV Azithromycin  5. Acute hypoxic respiratory failure, resolved, on room air  6. Cardiomyopathy  7. Right ankle fracture, casted  8. Distended gallbladder     Comment:  Mycoplasma pneumonia has been treated.       Plan:   No further antibiotic recommendations  Consider stopping Dexamethasone   In am, repeat CBC, procal , CRP  Cleared for discharge from ID standpoint      Signed By: Toan Bob MD     September 12, 2022

## 2022-09-12 NOTE — PROGRESS NOTES
Patient with discharge order. Need OT note so they can start auth at facility. Also facility is requesting updated PT note. Informed team in IDRs. Waiting for notes to send so we can get auth. Plans are to go to [address] [place] [address].

## 2022-09-12 NOTE — PROGRESS NOTES
Problem: Risk for Spread of Infection  Goal: Prevent transmission of infectious organism to others  Description: Prevent the transmission of infectious organisms to other patients, staff members, and visitors. Outcome: Progressing Towards Goal     Problem: Patient Education:  Go to Education Activity  Goal: Patient/Family Education  Outcome: Progressing Towards Goal     Problem: Airway Clearance - Ineffective  Goal: Achieve or maintain patent airway  Outcome: Progressing Towards Goal     Problem: Gas Exchange - Impaired  Goal: Absence of hypoxia  Outcome: Progressing Towards Goal  Goal: Promote optimal lung function  Outcome: Progressing Towards Goal     Problem: Breathing Pattern - Ineffective  Goal: Ability to achieve and maintain a regular respiratory rate  Outcome: Progressing Towards Goal     Problem:  Body Temperature -  Risk of, Imbalanced  Goal: Ability to maintain a body temperature within defined limits  Outcome: Progressing Towards Goal  Goal: Will regain or maintain usual level of consciousness  Outcome: Progressing Towards Goal  Goal: Complications related to the disease process, condition or treatment will be avoided or minimized  Outcome: Progressing Towards Goal     Problem: Isolation Precautions - Risk of Spread of Infection  Goal: Prevent transmission of infectious organism to others  Outcome: Progressing Towards Goal     Problem: Nutrition Deficits  Goal: Optimize nutrtional status  Outcome: Progressing Towards Goal     Problem: Risk for Fluid Volume Deficit  Goal: Maintain normal heart rhythm  Outcome: Progressing Towards Goal  Goal: Maintain absence of muscle cramping  Outcome: Progressing Towards Goal  Goal: Maintain normal serum potassium, sodium, calcium, phosphorus, and pH  Outcome: Progressing Towards Goal     Problem: Loneliness or Risk for Loneliness  Goal: Demonstrate positive use of time alone when socialization is not possible  Outcome: Progressing Towards Goal     Problem: Fatigue  Goal: Verbalize increase energy and improved vitality  Outcome: Progressing Towards Goal     Problem: Patient Education: Go to Patient Education Activity  Goal: Patient/Family Education  Outcome: Progressing Towards Goal     Problem: Pressure Injury - Risk of  Goal: *Prevention of pressure injury  Description: Document Satish Scale and appropriate interventions in the flowsheet. Outcome: Progressing Towards Goal  Note: Pressure Injury Interventions:  Sensory Interventions: Assess changes in LOC, Assess need for specialty bed, Avoid rigorous massage over bony prominences, Maintain/enhance activity level, Minimize linen layers, Monitor skin under medical devices    Moisture Interventions: Apply protective barrier, creams and emollients, Moisture barrier    Activity Interventions: PT/OT evaluation    Mobility Interventions: PT/OT evaluation, HOB 30 degrees or less    Nutrition Interventions: Offer support with meals,snacks and hydration    Friction and Shear Interventions: Apply protective barrier, creams and emollients, Feet elevated on foot rest, HOB 30 degrees or less, Transferring/repositioning devices                Problem: Patient Education: Go to Patient Education Activity  Goal: Patient/Family Education  Outcome: Progressing Towards Goal     Problem: Falls - Risk of  Goal: *Absence of Falls  Description: Document John Fall Risk and appropriate interventions in the flowsheet.   Outcome: Progressing Towards Goal     Problem: Patient Education: Go to Patient Education Activity  Goal: Patient/Family Education  Outcome: Progressing Towards Goal     Problem: Discharge Planning  Goal: *Discharge to safe environment  Outcome: Progressing Towards Goal  Goal: *Knowledge of medication management  Outcome: Progressing Towards Goal  Goal: *Knowledge of discharge instructions  Outcome: Progressing Towards Goal     Problem: Patient Education: Go to Patient Education Activity  Goal: Patient/Family Education  Outcome: Progressing Towards Goal     Problem: Impaired Skin Integrity/Pressure Injury Treatment  Goal: *Improvement of Existing Pressure Injury  Outcome: Progressing Towards Goal  Goal: *Prevention of pressure injury  Description: Document Satish Scale and appropriate interventions in the flowsheet.   Outcome: Progressing Towards Goal     Problem: Patient Education: Go to Patient Education Activity  Goal: Patient/Family Education  Outcome: Progressing Towards Goal     Problem: Patient Education: Go to Patient Education Activity  Goal: Patient/Family Education  Outcome: Progressing Towards Goal

## 2022-09-12 NOTE — PROGRESS NOTES
Hospitalist Progress Note    Subjective:   Daily Progress Note: 9/12/2022 8:43 AM    Patient resting comfortably in no acute distress. Current Facility-Administered Medications   Medication Dose Route Frequency    guaiFENesin ER (MUCINEX) tablet 600 mg  600 mg Oral Q12H    guaiFENesin (ROBITUSSIN) 100 mg/5 mL oral liquid 100 mg  100 mg Oral Q4H PRN    desitin/nystatin (1:1) topical compound   Topical BID    famotidine (PEPCID) tablet 20 mg  20 mg Oral DAILY    aspirin delayed-release tablet 81 mg  81 mg Oral QPM    [Held by provider] metoprolol succinate (TOPROL-XL) XL tablet 25 mg  25 mg Oral DAILY    polyethylene glycol (MIRALAX) packet 17 g  17 g Oral DAILY    gabapentin (NEURONTIN) capsule 100 mg  100 mg Oral BID    atorvastatin (LIPITOR) tablet 40 mg  40 mg Oral DAILY    traZODone (DESYREL) tablet 50 mg  50 mg Oral QHS    sodium chloride (NS) flush 5-40 mL  5-40 mL IntraVENous Q8H    sodium chloride (NS) flush 5-40 mL  5-40 mL IntraVENous PRN    acetaminophen (TYLENOL) tablet 650 mg  650 mg Oral Q6H PRN    Or    acetaminophen (TYLENOL) suppository 650 mg  650 mg Rectal Q6H PRN    ondansetron (ZOFRAN ODT) tablet 4 mg  4 mg Oral Q6H PRN    Or    ondansetron (ZOFRAN) injection 4 mg  4 mg IntraVENous Q6H PRN    paliperidone (INVEGA) SR tablet 3 mg  3 mg Oral DAILY    NOREPINephrine (LEVOPHED) 8 mg in 0.9% NS 250ml infusion  0.5-30 mcg/min IntraVENous TITRATE    dexamethasone (DECADRON) 4 mg/mL injection 4 mg  4 mg IntraVENous Q12H        Review of Systems  Review of Systems   Constitutional: Negative. Respiratory: Negative. Cardiovascular: Negative. Gastrointestinal: Negative. All other systems reviewed and are negative.          Objective:     Visit Vitals  BP (!) 159/92 (BP 1 Location: Right upper arm, BP Patient Position: At rest)   Pulse 77   Temp 97.7 °F (36.5 °C)   Resp 20   Ht 5' 2.01\" (1.575 m)   Wt 62.6 kg (138 lb 0.1 oz)   SpO2 97%   BMI 25.23 kg/m²    O2 Flow Rate (L/min): 1 l/min O2 Device: None (Room air)    Temp (24hrs), Av.9 °F (36.6 °C), Min:97.7 °F (36.5 °C), Max:98.1 °F (36.7 °C)      No intake/output data recorded. 09/10 1901 -  0700  In: 320 [P.O.:320]  Out: -     XR CHEST PORT   Final Result      No acute process on portable chest. No change. XR CHEST PORT   Final Result   No evidence of acute cardiopulmonary process. XR CHEST PORT   Final Result   No acute findings. XR CHEST PORT   Final Result   1. No evidence of an acute cardiopulmonary process. XR CHEST PORT   Final Result   No evidence of acute cardiopulmonary process. XR ANKLE RT AP/LAT   Final Result   Postoperative findings as described      XR FOOT RT AP/LAT   Final Result   Postoperative findings as described      XR CHEST PORT   Final Result   1. Lungs are grossly clear. XR CHEST PORT   Final Result   1. Left-sided atelectasis, no acute abnormality      CT ABD PELV WO CONT   Final Result      1. Patchy nodular bilateral basilar airspace disease likely infectious or   inflammatory. Given nodularity recommend follow-up in 2-3 months postpartum. Treatment to exclude underlying mass lesion      XR CHEST PORT   Final Result   No change. CTA CHEST W OR W WO CONT   Final Result      1. Limited study as above. No evidence of pulmonary embolus. 2.  No acute cardiopulmonary process. Mild patchy groundglass opacity in the   left lung base may represent early infection or inflammatory process. 3.  The gallbladder is distended and not well evaluated on this study. Correlate   for signs and symptoms of acute cholecystitis         CT HEAD WO CONT   Final Result   No acute intracranial abnormality. XR CHEST SNGL V   Final Result   No acute cardiopulmonary process.       XR CHEST PORT    (Results Pending)   XR CHEST PORT    (Results Pending)   XR CHEST PORT    (Results Pending)   XR CHEST PORT    (Results Pending)        PHYSICAL EXAM:    Physical Exam  Vitals and nursing note reviewed. Constitutional:       Comments: Chronically ill-appearing female   HENT:      Head: Normocephalic and atraumatic. Cardiovascular:      Rate and Rhythm: Normal rate and regular rhythm. Pulmonary:      Effort: No respiratory distress. Breath sounds: No wheezing. Abdominal:      General: Bowel sounds are normal. There is no distension. Palpations: Abdomen is soft. Tenderness: There is no abdominal tenderness. Genitourinary:     Comments: No Gordillo present  Musculoskeletal:      Right lower leg: No edema. Left lower leg: No edema. Skin:     General: Skin is warm. Neurological:      Mental Status: She is alert and oriented to person, place, and time. Psychiatric:         Mood and Affect: Mood normal.        Data Review    Recent Results (from the past 24 hour(s))   CBC WITH AUTOMATED DIFF    Collection Time: 09/11/22 10:59 AM   Result Value Ref Range    WBC 12.6 (H) 3.6 - 11.0 K/uL    RBC 3.86 3.80 - 5.20 M/uL    HGB 10.7 (L) 11.5 - 16.0 g/dL    HCT 33.7 (L) 35.0 - 47.0 %    MCV 87.3 80.0 - 99.0 FL    MCH 27.7 26.0 - 34.0 PG    MCHC 31.8 30.0 - 36.5 g/dL    RDW 18.0 (H) 11.5 - 14.5 %    PLATELET 810 901 - 661 K/uL    MPV 10.0 8.9 - 12.9 FL    NRBC 0.0 0.0  WBC    ABSOLUTE NRBC 0.00 0.00 - 0.01 K/uL    NEUTROPHILS 81 (H) 32 - 75 %    LYMPHOCYTES 12 12 - 49 %    MONOCYTES 5 5 - 13 %    EOSINOPHILS 0 0 - 7 %    BASOPHILS 0 0 - 1 %    IMMATURE GRANULOCYTES 2 (H) 0 - 0.5 %    ABS. NEUTROPHILS 10.3 (H) 1.8 - 8.0 K/UL    ABS. LYMPHOCYTES 1.5 0.8 - 3.5 K/UL    ABS. MONOCYTES 0.6 0.0 - 1.0 K/UL    ABS. EOSINOPHILS 0.0 0.0 - 0.4 K/UL    ABS. BASOPHILS 0.0 0.0 - 0.1 K/UL    ABS. IMM.  GRANS. 0.2 (H) 0.00 - 0.04 K/UL    DF AUTOMATED     METABOLIC PANEL, COMPREHENSIVE    Collection Time: 09/11/22 10:59 AM   Result Value Ref Range    Sodium 139 136 - 145 mmol/L    Potassium 4.2 3.5 - 5.1 mmol/L    Chloride 107 97 - 108 mmol/L    CO2 23 21 - 32 mmol/L    Anion gap 9 5 - 15 mmol/L    Glucose 172 (H) 65 - 100 mg/dL    BUN 18 6 - 20 mg/dL    Creatinine 0.92 0.55 - 1.02 mg/dL    BUN/Creatinine ratio 20 12 - 20      GFR est AA >60 >60 ml/min/1.73m2    GFR est non-AA >60 >60 ml/min/1.73m2    Calcium 8.7 8.5 - 10.1 mg/dL    Bilirubin, total 0.2 0.2 - 1.0 mg/dL    AST (SGOT) 13 (L) 15 - 37 U/L    ALT (SGPT) 17 12 - 78 U/L    Alk. phosphatase 91 45 - 117 U/L    Protein, total 5.7 (L) 6.4 - 8.2 g/dL    Albumin 2.4 (L) 3.5 - 5.0 g/dL    Globulin 3.3 2.0 - 4.0 g/dL    A-G Ratio 0.7 (L) 1.1 - 2.2          Assessment/Plan:     Principal Problem:    Sepsis ([facility] [address] (9/5/2022)    Active Problems:    COVID-19 (9/5/2022)      Hematuria (9/5/2022)      Acute respiratory failure with hypoxia (HCC) (9/10/2022)      Mycoplasma pneumonia (9/10/2022)      Acute cystitis (9/10/2022)      ANISH (acute kidney injury) ([facility] (9/10/2022)        Hospital Course:    Sarina Cardenas is a 71-year-old female with a PMH of depression/mood disorder, left breast cancer with surgery and radiation, hypertension, and HFrEF who presented with shortness of breath. In ED hypoxic and hypotensive. CXR showed no acute cardiopulmonary process. CT of the head showed no acute intracranial abnormality. CTA of the chest negative for pulmonary emboli with mild patchy groundglass opacities in the left lung base, and distended gallbladder. CT scan of the abdomen pelvis showing patchy nodular bilateral basilar airspace disease likely infectious. Initial labs significant for WBC of 15, d-dimer of 3.76, creatinine of 1.37, glucose of 200, lactic of 2.4, procalcitonin of 6.82, and UA consistent with UTI. ABG with respiratory acidosis. Covid positive. Patient admitted for further management. Patient started on levophed, dexamethasone IV, azithromycin, IV Zosyn and baricitinib, BiPaP, and transferred to ICU. ID and pulmonology consulted. Legionella antigen negative. Urine culture negative. Mycoplasma AB IgM reactive.  ECHO with EF of 45 to 50% moderate hypokinesis of apical anterior, apical inferior, and apical septal severe hypokinesis of the apex and normal diastolic function with small pericardial effusion present. Patient weaned off of vasopressors, oxygen supplementation, and transferred out of ICU on 9/7. Repeat chest x-ray showing clear lungs. Decreasing WBC, CRP, procal and normal LDH and ferritin. Cleared for discharge from pulmonary perspective. Patient unable to return to [facility]. Pending authorization at [facility]. Medically stable for discharge. Labs pending. Septic shock - resolved  Status post vasopressors and azithromycin  9/5 blood: no growth    Acute hypoxic respiratory failure  Currently on room air, previously on BiPaP  Pulmonology following    COVID-19 with pneumonitis  Status post baricitinib and azithromycin  Continue on IV Decadron   ID following    Mycoplasma pneumonia LLL  Completed 7 day course of azithromycin    Urinary tract infection  9/5 urine: no growth    ANISH  Likely secondary to dehydration and sepsis  Resolved  Avoid nephrotoxic medications    HFrEF without exacerbation  ECHO with EF of 45 to 50% moderate hypokinesis of apical anterior, apical inferior, and apical septal severe hypokinesis of the apex and normal diastolic function with small pericardial effusion present    Debility  PT/OT recommending SNF    DVT Prophylaxis: SCDs  GI Prophylaxis: tolerating po diet  Discharge and disposition barriers: medically clear     Care Plan discussed with: Patient/Family and Nurse    Total time spent with patient: 35 minutes.

## 2022-09-12 NOTE — PROGRESS NOTES
OCCUPATIONAL THERAPY EVALUATION  Patient: Lamont Sheets (16 y.o. female)  Date: 9/12/2022  Primary Diagnosis: Sepsis ([facility] [address] [A41.9]  Hematuria [R31.9]  COVID-19 [U07.1]       Precautions: fall risk, NWB RLE, COVID (+), Ax2 for tolerance       ASSESSMENT  Pt is a 71 y/o F with PMH of arthritis, migraines, neuropathy, HLD, HTN, L breast CA, chronic back pain, GERD, depression and anxiety, CKD, and heart failure presenting to [facility] with c/o SOB, admitted 09/05/22  and being treated for SOB with hypoxia, hematuria, SIRS, benign essential HTN, recent hx of R ankle fracture s/p ORIF, and depression/anxiety. Pt is NWB RLE. Pt received semi-supine in bed upon arrival, AXO x4, and agreeable to OT evaluation at this time. Pt was recently residing at [facility] for rehab s/p RLE ORIF. At SNF, pt was able to transfer to the wheelchair and complete ADLs with mod I. Prior to SNF stay, pt reports she pt lived alone in a first floor apartment, was IND with ADLs and IND using no AD for mobility at [facility]. Pt has had 1 fall in the past 3 months. Based on current observations, pt presents with deficits in generalized strength/AROM, balance, functional activity tolerance, and coordination impacting overall performance of ADLs and functional transfers/mobility. Pt currently req's min A for rolling and sup>sit. Pt unable to successfully sit EOB d/t reports of dizziness and lightheadedness. Pt's HR noted at ~126-130 during mobility so pt returned supine with min A. Pt's HR returned to ~110 BPM while supine. Pt req'd mod A to scoot toward HOB with pt's HR increasing to ~113-120 while scooting toward EOB. Further mobility deferred d/t pt demo'ing decreased activity tolerance with reported dizziness at this time. Face washing completed while semi-supine with set up A to hand pt a wet wash cloth. Overall, pt tolerates session fair.  Pt would benefit from continued skilled OT services to address current impairments and improve IND and safety with self cares and functional transfers/mobility. Recommend discharge to SNF once medically appropriate. Other factors to consider for discharge: home support, PLOF, severity of deficits     Patient will benefit from skilled therapy intervention to address the above noted impairments. PLAN :  Recommendations and Planned Interventions: self care training, functional mobility training, therapeutic exercise, balance training, therapeutic activities, endurance activities, and patient education    Frequency/Duration: Patient will be followed by occupational therapy:  2-3x/week to address goals. Recommendation for discharge: (in order for the patient to meet his/her long term goals)  Patrick Grady    This discharge recommendation:  Has been made in collaboration with the attending provider and/or case management    IF patient discharges home will need the following DME: TBD       SUBJECTIVE:   Patient stated We can try but I don't know how much I can do.     OBJECTIVE DATA SUMMARY:   HISTORY:   Past Medical History:   Diagnosis Date    Arthritis     GENERALIZED IN JOINTS.     Breast cancer ([address]     Left Breast Cancer 1995    Cancer [facility])     left breast - surgery/radiation    Chronic kidney disease     Chronic pain     Back pain    Depression     Mood Swings, anxiety    GERD (gastroesophageal reflux disease)     Hiatal Hernia    Headache     Heart failure (HCC)     Hypercholesterolemia     Hypertension     CONTROLLED BY MEDS., Pt states she is no liong er on BP med's    Ill-defined condition     Neuropathy bilat feet    Irregular heart beat     Other ill-defined conditions(799.89)     MIGRAINES    Other ill-defined conditions(799.89)     neuropathy of lower legs and feet    Other ill-defined conditions(799.89)     increased cholesterol    Other ill-defined conditions(799.89)     bronchitis    Psychotic disorder (HCC)     S/P radiation therapy     1995 Left Breast    Unspecified adverse effect of anesthesia     HEART PALPITATION. Past Surgical History:   Procedure Laterality Date    HX BREAST LUMPECTOMY Left     1995 - POSITIVE    HX ORTHOPAEDIC  2006    LEFT KNEE ARTHROSCOPY    HX ORTHOPAEDIC  4/2010    RIGHT KNEE ARTHROSCOPY, concrete nail placed    HX ORTHOPAEDIC      Olvin , doesnt know which foot    HX ORTHOPAEDIC      Small growth removed from between toes but doesnt know which foot    NE BREAST SURGERY PROCEDURE UNLISTED  7/1995    DCIS /BIOPSIES MULTIPLE. NE BREAST SURGERY PROCEDURE UNLISTED  1995    LUMPECTOMY WITH RADIATION INSITU       Expanded or extensive additional review of patient history:     Home Situation  Home Environment: Skilled nursing facility  # Steps to Enter: 0  One/Two Story Residence: One story  Living Alone: Yes  Support Systems: [place]  Patient Expects to be Discharged to[de-identified] Skilled nursing facility  Current DME Used/Available at Home: Wheelchair, Walker, rolling    Hand dominance: Right    EXAMINATION OF PERFORMANCE DEFICITS:  Cognitive/Behavioral Status:  Neurologic State: Alert  Orientation Level: Oriented X4  Cognition: Follows commands;Decreased attention/concentration        Safety/Judgement: Decreased awareness of environment    Hearing: Auditory  Auditory Impairment: None    Range of Motion:  AROM: Generally decreased, functional    Strength:  Strength: Generally decreased, functional    Coordination:  Fine Motor Skills-Upper: Left Impaired;Right Impaired    Gross Motor Skills-Upper: Left Impaired;Right Impaired    Balance:  Sitting: Impaired; With support  Sitting - Static: Poor (constant support)  Sitting - Dynamic: Poor (constant support)  Standing:  (XIANG)    Functional Mobility and Transfers for ADLs:  Bed Mobility:  Rolling: Minimum assistance  Supine to Sit: Minimum assistance  Sit to Supine: Minimum assistance  Scooting: Moderate assistance ([facility])    ADL Intervention and task modifications:  Grooming  Position Performed:  Other (comment) (Semi-supine)  Washing Face: Set-up    Cognitive Retraining  Safety/Judgement: Decreased awareness of environment    Therapeutic Exercise:  Pt would benefit from UE HEP initiated at next session. Functional Measure:    [address] [place] [address] AM-PACTM \"6 Clicks\"                                                       Daily Activity Inpatient Short Form  How much help from another person does the patient currently need. .. Total; A Lot A Little None   1. Putting on and taking off regular lower body clothing? []  1 [x]  2 []  3 []  4   2. Bathing (including washing, rinsing, drying)? []  1 [x]  2 []  3 []  4   3. Toileting, which includes using toilet, bedpan or urinal? [] 1 [x]  2 []  3 []  4   4. Putting on and taking off regular upper body clothing? []  1 [x]  2 []  3 []  4   5. Taking care of personal grooming such as brushing teeth? []  1 []  2 [x]  3 []  4   6. Eating meals? []  1 []  2 []  3 [x]  4   © 2007, Trustees of [address] [place] [address], under license to [employer]. All rights reserved     Score: 15/24     Interpretation of Tool:  Represents clinically-significant functional categories (i.e. Activities of daily living).   Percentage of Impairment CH    0%   CI    1-19% CJ    20-39% CK    40-59% CL    60-79% CM    80-99% CN     100%   [facility]  Score 6-24 24 23 20-22 15-19 10-14 7-9 6         Occupational Therapy Evaluation Charge Determination   History Examination Decision-Making   LOW Complexity : Brief history review  LOW Complexity : 1-3 performance deficits relating to physical, cognitive , or psychosocial skils that result in activity limitations and / or participation restrictions  LOW Complexity : No comorbidities that affect functional and no verbal or physical assistance needed to complete eval tasks       Based on the above components, the patient evaluation is determined to be of the following complexity level: LOW   Pain Rating:  Pt did not report    Activity Tolerance:   Poor and requires frequent rest breaks  Please refer to the flowsheet for vital signs taken during this treatment. After treatment patient left in no apparent distress:    Supine in bed, Call bell within reach, Bed / chair alarm activated, and Side rails x 3    COMMUNICATION/EDUCATION:   The patients plan of care was discussed with: Registered nurse and Certified nursing assistant/patient care technician. Patient/family agree to work toward stated goals and plan of care. This patients plan of care is appropriate for delegation to [facility]. Thank you for this referral.  Imer Rehman OT  Time Calculation: 23 mins    Problem: Self Care Deficits Care Plan (Adult)  Goal: *Acute Goals and Plan of Care (Insert Text)  Description: Patient stated goal: Get stronger and go home. 1. Pt will be mod I sup <> sit in prep for EOB ADLs  2. Pt will be mod I grooming sitting EOB/sitting at sink  3. Pt will be mod I LE dressing sitting EOB/long sit  4. Pt will be mod I sit <>  prep for toileting LRAD  5. Pt will be mod I toileting/toilet transfer/cloth mgmt LRAD  6.  Pt will be IND following UE HEP in prep for self care tasks      Outcome: Not Met

## 2022-09-13 ENCOUNTER — APPOINTMENT (OUTPATIENT)
Dept: GENERAL RADIOLOGY | Age: 72
DRG: 871 | End: 2022-09-13
Attending: INTERNAL MEDICINE
Payer: MEDICARE

## 2022-09-13 VITALS
SYSTOLIC BLOOD PRESSURE: 147 MMHG | TEMPERATURE: 98.4 F | OXYGEN SATURATION: 96 % | BODY MASS INDEX: 25.52 KG/M2 | RESPIRATION RATE: 20 BRPM | DIASTOLIC BLOOD PRESSURE: 90 MMHG | HEIGHT: 62 IN | WEIGHT: 138.67 LBS | HEART RATE: 107 BPM

## 2022-09-13 LAB
ALBUMIN SERPL-MCNC: 2.5 G/DL (ref 3.5–5)
ALBUMIN/GLOB SERPL: 0.7 {RATIO} (ref 1.1–2.2)
ALP SERPL-CCNC: 99 U/L (ref 45–117)
ALT SERPL-CCNC: 22 U/L (ref 12–78)
ANION GAP SERPL CALC-SCNC: 6 MMOL/L (ref 5–15)
AST SERPL W P-5'-P-CCNC: 14 U/L (ref 15–37)
BASOPHILS # BLD: 0 K/UL (ref 0–0.1)
BASOPHILS NFR BLD: 0 % (ref 0–1)
BILIRUB SERPL-MCNC: 0.3 MG/DL (ref 0.2–1)
BUN SERPL-MCNC: 24 MG/DL (ref 6–20)
BUN/CREAT SERPL: 27 (ref 12–20)
CA-I BLD-MCNC: 9 MG/DL (ref 8.5–10.1)
CHLORIDE SERPL-SCNC: 109 MMOL/L (ref 97–108)
CO2 SERPL-SCNC: 24 MMOL/L (ref 21–32)
CREAT SERPL-MCNC: 0.89 MG/DL (ref 0.55–1.02)
CRP SERPL-MCNC: <0.29 MG/DL (ref 0–0.6)
DIFFERENTIAL METHOD BLD: ABNORMAL
EOSINOPHIL # BLD: 0 K/UL (ref 0–0.4)
EOSINOPHIL NFR BLD: 0 % (ref 0–7)
ERYTHROCYTE [DISTWIDTH] IN BLOOD BY AUTOMATED COUNT: 18.6 % (ref 11.5–14.5)
GLOBULIN SER CALC-MCNC: 3.4 G/DL (ref 2–4)
GLUCOSE SERPL-MCNC: 137 MG/DL (ref 65–100)
HCT VFR BLD AUTO: 33.9 % (ref 35–47)
HGB BLD-MCNC: 10.7 G/DL (ref 11.5–16)
IMM GRANULOCYTES # BLD AUTO: 0.1 K/UL (ref 0–0.04)
IMM GRANULOCYTES NFR BLD AUTO: 1 % (ref 0–0.5)
LYMPHOCYTES # BLD: 1.6 K/UL (ref 0.8–3.5)
LYMPHOCYTES NFR BLD: 13 % (ref 12–49)
MCH RBC QN AUTO: 27.9 PG (ref 26–34)
MCHC RBC AUTO-ENTMCNC: 31.6 G/DL (ref 30–36.5)
MCV RBC AUTO: 88.5 FL (ref 80–99)
MONOCYTES # BLD: 0.7 K/UL (ref 0–1)
MONOCYTES NFR BLD: 6 % (ref 5–13)
NEUTS SEG # BLD: 9.6 K/UL (ref 1.8–8)
NEUTS SEG NFR BLD: 80 % (ref 32–75)
NRBC # BLD: 0 K/UL (ref 0–0.01)
NRBC BLD-RTO: 0 PER 100 WBC
PLATELET # BLD AUTO: 398 K/UL (ref 150–400)
PMV BLD AUTO: 9.7 FL (ref 8.9–12.9)
POTASSIUM SERPL-SCNC: 4.4 MMOL/L (ref 3.5–5.1)
PROCALCITONIN SERPL-MCNC: <0.05 NG/ML
PROT SERPL-MCNC: 5.9 G/DL (ref 6.4–8.2)
RBC # BLD AUTO: 3.83 M/UL (ref 3.8–5.2)
SODIUM SERPL-SCNC: 139 MMOL/L (ref 136–145)
WBC # BLD AUTO: 12 K/UL (ref 3.6–11)

## 2022-09-13 PROCEDURE — 84145 PROCALCITONIN (PCT): CPT

## 2022-09-13 PROCEDURE — 99232 SBSQ HOSP IP/OBS MODERATE 35: CPT | Performed by: INTERNAL MEDICINE

## 2022-09-13 PROCEDURE — 74011250636 HC RX REV CODE- 250/636: Performed by: INTERNAL MEDICINE

## 2022-09-13 PROCEDURE — 71045 X-RAY EXAM CHEST 1 VIEW: CPT

## 2022-09-13 PROCEDURE — 86140 C-REACTIVE PROTEIN: CPT

## 2022-09-13 PROCEDURE — 36415 COLL VENOUS BLD VENIPUNCTURE: CPT

## 2022-09-13 PROCEDURE — 74011250637 HC RX REV CODE- 250/637: Performed by: HOSPITALIST

## 2022-09-13 PROCEDURE — 74011250637 HC RX REV CODE- 250/637: Performed by: STUDENT IN AN ORGANIZED HEALTH CARE EDUCATION/TRAINING PROGRAM

## 2022-09-13 PROCEDURE — 74011000250 HC RX REV CODE- 250: Performed by: HOSPITALIST

## 2022-09-13 PROCEDURE — 80053 COMPREHEN METABOLIC PANEL: CPT

## 2022-09-13 PROCEDURE — 85025 COMPLETE CBC W/AUTO DIFF WBC: CPT

## 2022-09-13 RX ORDER — METOPROLOL SUCCINATE 25 MG/1
12.5 TABLET, EXTENDED RELEASE ORAL DAILY
Status: DISCONTINUED | OUTPATIENT
Start: 2022-09-13 | End: 2022-09-13 | Stop reason: HOSPADM

## 2022-09-13 RX ADMIN — GUAIFENESIN 1200 MG: 600 TABLET, EXTENDED RELEASE ORAL at 08:46

## 2022-09-13 RX ADMIN — DEXAMETHASONE SODIUM PHOSPHATE 4 MG: 4 INJECTION, SOLUTION INTRA-ARTICULAR; INTRALESIONAL; INTRAMUSCULAR; INTRAVENOUS; SOFT TISSUE at 08:46

## 2022-09-13 RX ADMIN — PALIPERIDONE 3 MG: 3 TABLET, EXTENDED RELEASE ORAL at 08:46

## 2022-09-13 RX ADMIN — Medication: at 08:47

## 2022-09-13 RX ADMIN — FAMOTIDINE 20 MG: 20 TABLET ORAL at 08:46

## 2022-09-13 RX ADMIN — GABAPENTIN 100 MG: 100 CAPSULE ORAL at 08:46

## 2022-09-13 RX ADMIN — SODIUM CHLORIDE, PRESERVATIVE FREE 10 ML: 5 INJECTION INTRAVENOUS at 05:44

## 2022-09-13 RX ADMIN — SODIUM CHLORIDE, PRESERVATIVE FREE 10 ML: 5 INJECTION INTRAVENOUS at 08:47

## 2022-09-13 RX ADMIN — POLYETHYLENE GLYCOL 3350 17 G: 17 POWDER, FOR SOLUTION ORAL at 08:46

## 2022-09-13 RX ADMIN — ATORVASTATIN CALCIUM 40 MG: 40 TABLET, FILM COATED ORAL at 08:46

## 2022-09-13 RX ADMIN — METOPROLOL SUCCINATE 12.5 MG: 25 TABLET, EXTENDED RELEASE ORAL at 13:40

## 2022-09-13 RX ADMIN — SODIUM CHLORIDE, PRESERVATIVE FREE 10 ML: 5 INJECTION INTRAVENOUS at 13:41

## 2022-09-13 NOTE — PROGRESS NOTES
DC Plan: [facility]       Room# 109A       Report: [phone]       Transportation: shellye Elena received Jesus Ramirez. Cm met with pt at the bedside and notified her of dc to [facility] today. Medicare pt has received, reviewed, and signed 2nd IM letter informing them of their right to appeal the discharge. Signed copied has been placed on pt bedside chart. Discharge plan of care/case management plan validated with provider's discharge order.

## 2022-09-13 NOTE — DISCHARGE SUMMARY
Admit date: 9/5/2022   Admitting Provider: Trena Mills MD    Discharge date: 9/13/2022  Discharging Provider: CARLOS Liriano      * Admission Diagnoses: Sepsis ([facility] [A41.9]  Hematuria [R31.9]  COVID-19 [U07.1]    * Discharge Diagnoses:    Hospital Problems as of 9/13/2022 Date Reviewed: 9/5/2022            Codes Class Noted - Resolved POA    Acute respiratory failure with hypoxia (HCC) ICD-10-CM: J96.01  ICD-9-CM: 518.81  9/10/2022 - Present Unknown        Mycoplasma pneumonia ICD-10-CM: J15.7  ICD-9-CM: 483.0  9/10/2022 - Present Unknown        Acute cystitis ICD-10-CM: N30.00  ICD-9-CM: 595.0  9/10/2022 - Present Unknown        ANISH (acute kidney injury) ([facility] ICD-10-CM: N17.9  ICD-9-CM: 584.9  9/10/2022 - Present Unknown        COVID-19 ICD-10-CM: U07.1  ICD-9-CM: 079.89  9/5/2022 - Present Yes        Hematuria ICD-10-CM: R31.9  ICD-9-CM: 599.70  9/5/2022 - Present Yes        * (Principal) Sepsis ([facility] ICD-10-CM: A41.9  ICD-9-CM: 038.9, 995.91  9/5/2022 - Present Unknown           * Hospital Course:     Jenae Lee is a 72-year-old female with a PMH of depression/mood disorder, left breast cancer with surgery and radiation, hypertension, and HFrEF who presented with shortness of breath. In ED hypoxic and hypotensive. CXR showed no acute cardiopulmonary process. CT of the head showed no acute intracranial abnormality. CTA of the chest negative for pulmonary emboli with mild patchy groundglass opacities in the left lung base, and distended gallbladder. CT scan of the abdomen pelvis showing patchy nodular bilateral basilar airspace disease likely infectious. Initial labs significant for WBC of 15, d-dimer of 3.76, creatinine of 1.37, glucose of 200, lactic of 2.4, procalcitonin of 6.82, and UA consistent with UTI. ABG with respiratory acidosis. Covid positive. Patient admitted for further management.  Patient started on levophed, dexamethasone IV, azithromycin, IV Zosyn and baricitinib, BiPaP, and transferred to ICU. ID and pulmonology consulted. Legionella antigen negative. Urine culture negative. Mycoplasma AB IgM reactive. ECHO with EF of 45 to 50% moderate hypokinesis of apical anterior, apical inferior, and apical septal severe hypokinesis of the apex and normal diastolic function with small pericardial effusion present. Patient weaned off of vasopressors, oxygen supplementation, and transferred out of ICU on 9/7. Repeat chest x-ray showing clear lungs. Decreasing WBC, CRP, procal and normal LDH and ferritin. Cleared for discharge from pulmonary perspective. Patient unable to return to [facility]. Pending authorization at [facility]. Medically stable for discharge. * Procedures:   * No surgery found *      Consults:   Pulmonology, infectious disease, and orthopedic    Significant Diagnostic Studies: As discussed in hospital course    Discharge Exam:  Visit Vitals  BP (!) 147/90 (BP 1 Location: Right upper arm, BP Patient Position: Semi fowlers)   Pulse (!) 107 Comment: Per Tele   Temp 98.4 °F (36.9 °C)   Resp 20   Ht 5' 2.01\" (1.575 m)   Wt 62.9 kg (138 lb 10.7 oz)   SpO2 96%   BMI 25.35 kg/m²       PHYSICAL EXAM:  Vitals and nursing note reviewed. Constitutional:       Comments: Chronically ill-appearing female   HENT:      Head: Normocephalic and atraumatic. Cardiovascular:      Rate and Rhythm: Normal rate and regular rhythm. Pulmonary:      Effort: No respiratory distress. Breath sounds: No wheezing. Abdominal:      General: Bowel sounds are normal. There is no distension. Palpations: Abdomen is soft. Tenderness: There is no abdominal tenderness. Genitourinary:     Comments: No Gordillo present  Musculoskeletal:      Right lower leg: No edema. Left lower leg: No edema. Skin:     General: Skin is warm. Neurological:      Mental Status: She is alert and oriented to person, place, and time.    Psychiatric:         Mood and Affect: Mood normal.       * Discharge Condition: improved  * Disposition: [place] [facility] ([facility])    Discharge Medications:  Current Discharge Medication List        START taking these medications    Details   guaiFENesin ER (MUCINEX) 1,200 mg Ta12 ER tablet Take 1 Tablet by mouth every twelve (12) hours for 2 days. Qty: 4 Tablet, Refills: 0  Start date: 9/13/2022, End date: 9/15/2022           CONTINUE these medications which have NOT CHANGED    Details   gabapentin (NEURONTIN) 100 mg capsule Take 100 mg by mouth two (2) times a day. atorvastatin (LIPITOR) 40 mg tablet Take 40 mg by mouth daily. traZODone (DESYREL) 50 mg tablet Take 50 mg by mouth nightly. paliperidone (INVEGA) 3 mg SR tablet Take 1 tablet by mouth daily. Qty: 90 Tablet, Refills: 1      famotidine (PEPCID) 20 mg tablet Take 1 Tab by mouth daily. Indications: heartburn  Qty: 30 Tab, Refills: 0      aspirin delayed-release 81 mg tablet Take 1 Tab by mouth every evening. Indications: prevention of transient ischemic attack  Qty: 30 Tab, Refills: 0      metoprolol succinate (TOPROL-XL) 25 mg XL tablet Take 0.5 Tabs by mouth daily. Indications: high blood pressure  Qty: 15 Tab, Refills: 0      polyethylene glycol (MIRALAX) 17 gram packet Take 1 Packet by mouth daily. Indications: constipation  Qty: 30 Packet, Refills: 0             * Follow-up Care/Patient Instructions:   Activity: PT/OT Eval and Treat  Diet: Regular Diet  Wound Care: None needed    Follow-up Information       Follow up With Specialties Details Why Contact Janak Bhat MD Family Medicine Schedule an appointment as soon as possible for a visit in 2 week(s) Post-hospitalization follow-up [facility] [address]      Lliian Parekh MD Pulmonary Disease Schedule an appointment as soon as possible for a visit in 2 week(s) Hospital follow-up COVID, mycoplasma pneumonia, and hypoxia [address]      Rogelio Colbert MD Orthopedic Surgery, Sports Medicine Physician Schedule an appointment as soon as possible for a visit As needed [address]  [phone]              Discharge summary greater than 35 minutes spent with the patient performing discharge instructions, medication review and physical exam    Signed:  CARLOS Farias  9/13/2022  11:29 AM

## 2022-09-13 NOTE — PROGRESS NOTES
Bedside and Verbal shift change report given to Kishan Pelayo (oncoming nurse) by Fidel Romero (offgoing nurse). Report included the following information SBAR.

## 2022-09-13 NOTE — PROGRESS NOTES
Progress Note    Patient: Jorge Taylor MRN: [id number]  SSN: [id number]    YOB: 1950  Age: 70 y.o. Sex: female      Admit Date: 9/5/2022    LOS: 8 days     Subjective:   Patient followed for severe sepsis with Covid-19 and Mycoplasma pneumonia. She remains on room air and CXR is unremarkable. Still on Dexamethasone. She is afebrile with WBC 12,600. Patient resting comfortably with no complaints but remains confused. She is pending authorization to [facility]. Objective:     Vitals:    09/12/22 1953 09/12/22 2000 09/12/22 2059 09/13/22 0845   BP:  (!) 147/90     Pulse:  (!) 108 (!) 107    Resp:  20     Temp:  98.4 °F (36.9 °C)     SpO2: 98% 96%     Weight:    138 lb 10.7 oz (62.9 kg)   Height:            Intake and Output:  Current Shift: No intake/output data recorded. Last three shifts: No intake/output data recorded. Physical Exam:   Vitals and nursing note reviewed. Constitutional:       General: She is in acute distress. Appearance: She is ill-appearing. HENT:      Head: Normocephalic and atraumatic. Right Ear: External ear normal.      Left Ear: External ear normal.      Nose:      Comments:  Room air  Eyes:      Pupils: Pupils are equal, round, and reactive to light. Cardiovascular:      Rate and Rhythm: Regular rhythm. Heart sounds: No murmur heard. Pulmonary:      Effort: No respiratory distress. Breath sounds: Rhonchi present. Abdominal:      General: Bowel sounds are normal. There is no distension. Palpations: Abdomen is soft. Tenderness: There is no abdominal tenderness. Genitourinary:     Comments: External urinary device with greenish urine  Musculoskeletal:      Cervical back: Neck supple. Right lower leg: No edema. Left lower leg: No edema. Comments: Right calf/foot in hard cast   Skin:     Findings: No rash.    Neurological: nonfocal, no confusion  Psychiatric:  normal behavior     Lab/Data Review:     WBC 12,600    CRP 3.92 < 9.97 <24.10  Procal 4.20 <12.83 <18.50 <6.82     <247  Ferritin 194     Mycoplasma IgM Positive  Urine Legionella antigen Negative    Blood cultures (9/5) No growth FINAL  Urine culture (9/5) No growth FINAL     CXR (9/13) No acute cardiopulmonary abnormality     Assessment:     Principal Problem:    Sepsis ([facility] (9/5/2022)    Active Problems:    COVID-19 (9/5/2022)      Hematuria (9/5/2022)      Acute respiratory failure with hypoxia (HCC) (9/10/2022)      Mycoplasma pneumonia (9/10/2022)      Acute cystitis (9/10/2022)      ANISH (acute kidney injury) ([facility] (9/10/2022)     Severe sepsis with fever, tachycardia, tachypnea, leukocytosis, elevated lactic acid, CRP and procal, resolving  UTI with marked pyuria, urine culture negative  Covid-19 infection, Day #8 Dexamethasone, status post Baricitinib  4. Presumptive Mycoplasma pneumonia, LLL, with positive IgM serology, status post IV Azithromycin  5. Acute hypoxic respiratory failure, resolved, on room air  6. Cardiomyopathy  7. Right ankle fracture, casted  8. Distended gallbladder     Comment:  Mycoplasma pneumonia has been treated.       Plan:   No further antibiotic recommendations  Cleared for discharge from ID standpoint    Signed By: Tania Drew MD     September 13, 2022

## 2022-09-13 NOTE — PROGRESS NOTES
Handoff report called to 110 Lynn Sloan RN at [facility] who will be assuming care of this patient once admitted there.

## 2023-08-30 NOTE — MR AVS SNAPSHOT
[address] [facility] 
[phone] Patient: Jerry Ballard MRN: [id number] Visit Information Date & Time Provider Department Dept. Phone Encounter #  
 2018 11:40 AM Jericho GuzmanJuanita [phone] [id number] Follow-up Instructions Return in about 3 months (around 2018). Your Appointments 2018 11:40 AM  
ESTABLISHED PATIENT with Jericho Guzman MD  
[address] AT [facility]) Appt Note: fu appt; left vm to confirm, UPDATE ADDRESS; pt called back to confirm [facility] [address] [phone]  
[address] [place] [address] [address] [address]  
  
    
 2018 11:40 AM  
ESTABLISHED PATIENT with Duane Briggs MD  
[facility] [address] Appt Note: breast f/u; r/s from 10/10/17; per pcp, insurance is ppo; r/s from 10/17/17; lm to rs-mf; mailed h&p; .; .  
 [address] [facility], [address] [place] [address], [address] [address]  
[address], [address] [facility], [address] [facility] [address] [address] Upcoming Health Maintenance Date Due Hepatitis C Screening 1950 DTaP/Tdap/Td series (1 - Tdap) 1971 FOBT Q 1 YEAR AGE 50-75 2000 ZOSTER VACCINE AGE 60> 2010 GLAUCOMA SCREENING Q2Y 2015 Pneumococcal 65+ Low/Medium Risk (2 of 2 - PPSV23) 2016 MEDICARE YEARLY EXAM 2016 Influenza Age 5 to Adult 2017 BREAST CANCER SCRN MAMMOGRAM 2019 Allergies as of 2018  Review Complete On: 2018 By: Emily Molina LPN Severity Noted Reaction Type Reactions Epinephrine  2011   Systemic Palpitations \"Makes my heart  ' shudder'. \" Allergic to epinephrine with novocain. Plain epinephrine is ok. Goal Outcome Evaluation:   VSS. A&OX4 with delayed responses. Forgetful. Removes SP02 sensor frequently. Pt arrived from PACU out of restraints. Gauze and tegaderm to right side of head. Page placed to neurosurgery for clarification on post op orders. 02@ 2lpm. Incontinent of urine several times overnight. Call light in reach.    Novocain [Procaine]  05/01/2013    Palpitations Nsaids (Non-steroidal Anti-inflammatory Drug)  02/16/2012    Other (comments) GASTRIC DISTRESS. Pravastatin  12/02/2015    Myalgia Aspirin Low 07/27/2011   Side Effect Other (comments) Gastric irritation Ibuprofen Low 07/27/2011   Side Effect Other (comments) Gastric irritation Current Immunizations  Reviewed on 2/22/2012 Name Date Influenza Vaccine Whole 9/1/2011 ZZZ-RETIRED (DO NOT USE) Pneumococcal Vaccine (Unspecified Type) 9/1/2011 Not reviewed this visit You Were Diagnosed With   
  
 Codes Comments Depression with anxiety    -  Primary ICD-10-CM: F41.8 ICD-9-CM: 300.4 Personality disorder     ICD-10-CM: F60.9 ICD-9-CM: 301.9 Vitals BP Pulse Resp Height(growth percentile) Weight(growth percentile) BMI  
 133/79 (BP 1 Location: Left arm, BP Patient Position: Sitting) 94 18 5' 2\" (1.575 m) 186 lb (84.4 kg) 34.02 kg/m2 OB Status Smoking Status Postmenopausal Never Smoker Vitals History BMI and BSA Data Body Mass Index Body Surface Area 34.02 kg/m 2 1.92 m 2 Preferred Pharmacy Pharmacy Name Phone [facility]. [phone] Your Updated Medication List  
  
   
This list is accurate as of: 1/16/18 11:35 AM.  Always use your most recent med list.  
  
  
  
  
 ACETAMINOPHEN EXTRA STRENGTH 500 mg tablet Generic drug:  acetaminophen Take 1,500 mg by mouth two (2) times a day. ALLEGRA 180 mg tablet Generic drug:  fexofenadine Take 180 mg by mouth daily. AXERT 12.5 mg tablet Generic drug:  almotriptan Take 12.5 mg by mouth once as needed. CALCIUM 500 WITH D PO Take 2 Tabs by mouth daily. CeleBREX 100 mg capsule Generic drug:  celecoxib Take 100 mg by mouth two (2) times a day. clonazePAM 2 mg tablet Commonly known as:  Brittany Sepulveda  
 TAKE 1 TABLET BY MOUTH TWICE DAILY CONTOUR NEXT STRIPS strip Generic drug:  glucose blood VI test strips USE TO TEST BLOOD SUGAR BID CRANBERRY PO Take 2,000 mg by mouth daily. Triple strength. DEXILANT 60 mg Cpdb Generic drug:  Dexlansoprazole Take 60 mg by mouth daily. fluticasone 50 mcg/actuation nasal spray Commonly known as:  FLONASE  
  
 melatonin Tab tablet Take 10 mg by mouth nightly. [facility] Generic drug:  Lancets USE BID  
  
 MUCINEX DM PO Take 1 Tab by mouth two (2) times daily as needed. multivitamin, tx-iron-ca-min 9 mg iron-400 mcg Tab tablet Commonly known as:  THERA-M w/ IRON Take 1 Tab by mouth daily. OXcarbazepine 300 mg tablet Commonly known as:  TRILEPTAL  
TAKE 1 TABLET DAILY  Indications: mood stabilizer Potassium Gluconate 595 mg (99 mg) tablet Take 595 mg by mouth daily. rosuvastatin 10 mg tablet Commonly known as:  CRESTOR Take 1 Tab by mouth nightly. traMADol 50 mg tablet Commonly known as:  ULTRAM  
Take 50 mg by mouth every eight (8) hours as needed. traZODone 150 mg tablet Commonly known as:  DESYREL  
TAKE 1 TABLET NIGHTLY FOR MAJOR DEPRESSIVE DISORDER  
  
 venlafaxine- mg capsule Commonly known as:  EFFEXOR-XR  
TAKE 2 CAPSULE AM,  AND 1 PO at 6 PM  Indications: major depressive disorder VITAMIN B-12 PO Take 2,000 mcg by mouth daily. 2000MCG DAILY  
  
 VITAMIN C 500 mg tablet Generic drug:  ascorbic acid (vitamin C) Take 500 mg by mouth daily. VITAMIN D3 2,000 unit Tab Generic drug:  cholecalciferol (vitamin D3) Take 5,000 Units by mouth daily. Soft gel. VITAMIN K1  
by Does Not Apply route. VOLTAREN 1 % Gel Generic drug:  diclofenac Apply  to affected area daily as needed. zinc 50 mg Tab tablet Take 1 Tab by mouth daily. Follow-up Instructions Return in about 3 months (around 4/16/2018). Introducing [facility] & HEALTH SERVICES! [facility] introduces iWarda patient portal. Now you can access parts of your medical record, email your doctor's office, and request medication refills online. 1. In your internet browser, go to [email or web address] 2. Click on the First Time User? Click Here link in the Sign In box. You will see the New Member Sign Up page. 3. Enter your iWarda Access Code exactly as it appears below. You will not need to use this code after youve completed the sign-up process. If you do not sign up before the expiration date, you must request a new code. · iWarda Access Code: [email or web address] Expires: 1/29/2018 11:22 AM 
 
4. Enter the last four digits of your Social Security Number (xxxx) and Date of Birth (mm/dd/yyyy) as indicated and click Submit. You will be taken to the next sign-up page. 5. Create a iWarda ID. This will be your iWarda login ID and cannot be changed, so think of one that is secure and easy to remember. 6. Create a iWarda password. You can change your password at any time. 7. Enter your Password Reset Question and Answer. This can be used at a later time if you forget your password. 8. Enter your e-mail address. You will receive e-mail notification when new information is available in [address] 9. Click Sign Up. You can now view and download portions of your medical record. 10. Click the Download Summary menu link to download a portable copy of your medical information. If you have questions, please visit the Frequently Asked Questions section of the iWarda website. Remember, iWarda is NOT to be used for urgent needs. For medical emergencies, dial 911. Now available from your iPhone and Android! Please provide this summary of care documentation to your next provider. Your primary care clinician is listed as Horacio Dyer. If you have any questions after today's visit, please call [phone].

## 2024-10-16 NOTE — PROGRESS NOTES
Pulmonary Progress Note      NAME: Cecille Rios   :  1950  MRM:  [id number]    Date/Time: 2022  11:27 AM         Subjective:     Patient seen and examined. Discussed with the RN. She is currently awake and alert. She is on room air. She voices no complaints and feels better. Eating her lunch without any problem. Levophed was weaned off around 10 AM this morning. She has a pure wick catheter and has made good urine output. The patient tells me that she has been a lifelong non-smoker. She has been vaccinated x3 for coronavirus. Never had COVID-19 before. Past Medical History reviewed and unchanged from Admission History and Physical       Objective:     Physical Exam     Vitals:      Last 24hrs VS reviewed since prior progress note. Most recent are:    Visit Vitals  /69 (BP 1 Location: Right upper arm, BP Patient Position: At rest)   Pulse 84   Temp 96.8 °F (36 °C)   Resp 12   Ht 5' 2.01\" (1.575 m)   Wt 61.6 kg (135 lb 12.9 oz)   SpO2 100%   BMI 24.83 kg/m²     SpO2 Readings from Last 6 Encounters:   22 100%   19 97%   19 98%   19 97%   19 99%   19 98%    O2 Flow Rate (L/min): 1 l/min     Intake/Output Summary (Last 24 hours) at 2022 1426  Last data filed at 2022 5115  Gross per 24 hour   Intake --   Output 200 ml   Net -200 ml        Exam:       This is an elderly female who is currently no in respiratory distress. She is on room air. Yesterday she was on nasal cannula O2 3 L. HEENT examination shows pupils are equal and reactive to light. No significant pallor. Neck is supple and trachea central.  No JVD or lymphadenopathy. Chest symmetrical equal and fair air entry bilaterally. Lungs are clear to auscultation. Heart: Regular rate and rhythm. Normal sinus on the monitor. Abdomen: Soft, nontender, no visceromegaly. Bowel sounds audible. Extremities: Has hard cast in her right lower leg.   No significant pitting edema is noted.  Neuro: No focal motor deficit    XR CHEST PORT   Final Result   No evidence of acute cardiopulmonary process. XR ANKLE RT AP/LAT   Final Result   Postoperative findings as described      XR FOOT RT AP/LAT   Final Result   Postoperative findings as described      XR CHEST PORT   Final Result   1. Lungs are grossly clear. XR CHEST PORT   Final Result   1. Left-sided atelectasis, no acute abnormality      CT ABD PELV WO CONT   Final Result      1. Patchy nodular bilateral basilar airspace disease likely infectious or   inflammatory. Given nodularity recommend follow-up in 2-3 months postpartum. Treatment to exclude underlying mass lesion      XR CHEST PORT   Final Result   No change. CTA CHEST W OR W WO CONT   Final Result      1. Limited study as above. No evidence of pulmonary embolus. 2.  No acute cardiopulmonary process. Mild patchy groundglass opacity in the   left lung base may represent early infection or inflammatory process. 3.  The gallbladder is distended and not well evaluated on this study. Correlate   for signs and symptoms of acute cholecystitis         CT HEAD WO CONT   Final Result   No acute intracranial abnormality. XR CHEST SNGL V   Final Result   No acute cardiopulmonary process.       XR CHEST PORT    (Results Pending)   XR CHEST PORT    (Results Pending)   XR CHEST PORT    (Results Pending)       Lab Data Reviewed: (see below)      Medications:  Current Facility-Administered Medications   Medication Dose Route Frequency    desitin/nystatin (1:1) topical compound   Topical BID    famotidine (PEPCID) tablet 20 mg  20 mg Oral DAILY    aspirin delayed-release tablet 81 mg  81 mg Oral QPM    [Held by provider] metoprolol succinate (TOPROL-XL) XL tablet 25 mg  25 mg Oral DAILY    polyethylene glycol (MIRALAX) packet 17 g  17 g Oral DAILY    gabapentin (NEURONTIN) capsule 100 mg  100 mg Oral BID    atorvastatin (LIPITOR) tablet 40 mg  40 mg Oral DAILY traZODone (DESYREL) tablet 50 mg  50 mg Oral QHS    sodium chloride (NS) flush 5-40 mL  5-40 mL IntraVENous Q8H    sodium chloride (NS) flush 5-40 mL  5-40 mL IntraVENous PRN    acetaminophen (TYLENOL) tablet 650 mg  650 mg Oral Q6H PRN    Or    acetaminophen (TYLENOL) suppository 650 mg  650 mg Rectal Q6H PRN    ondansetron (ZOFRAN ODT) tablet 4 mg  4 mg Oral Q6H PRN    Or    ondansetron (ZOFRAN) injection 4 mg  4 mg IntraVENous Q6H PRN    paliperidone (INVEGA) SR tablet 3 mg  3 mg Oral DAILY    NOREPINephrine (LEVOPHED) 8 mg in 0.9% NS 250ml infusion  0.5-30 mcg/min IntraVENous TITRATE    piperacillin-tazobactam (ZOSYN) 3.375 g in 0.9% sodium chloride (MBP/ADV) 100 mL MBP  3.375 g IntraVENous Q8H    dexamethasone (DECADRON) 4 mg/mL injection 4 mg  4 mg IntraVENous Q12H    baricitinib (OLUMIANT) tablet 2 mg  2 mg Oral DAILY    azithromycin (ZITHROMAX) 500 mg in 0.9% sodium chloride 250 mL ([id number])  500 mg IntraVENous Q24H       ______________________________________________________________________      Lab Review:     Recent Labs     09/07/22 0420 09/06/22 0420 09/05/22  0034   WBC 15.6* 27.3* 15.2*   HGB 10.1* 10.8* 11.4*   HCT 32.6* 34.1* 35.7    307 348       Recent Labs     09/07/22 0420 09/06/22  0420 09/05/22  0034    142  141 133*   K 3.2* 3.8  3.8 4.7   * 109*  109* 102   CO2 19* 22  21 25   * 231*  231* 200*   BUN 23* 23*  24* 13   CREA 0.92 1.24*  1.27* 1.37*   CA 7.0* 8.7  8.7 9.6   PHOS  --  4.0  --    ALB 1.6* 2.4*  2.4* 2.6*   ALT 10* 14 15       No components found for: [place]  Recent Labs     09/06/22  0423 09/05/22  1215   PH 7.45 7.47*   PCO2 31* 33*   PO2 82 110*   HCO3 21* 23   FIO2 35 85       No results for input(s): INR, INREXT, INREXT, INREXT in the last 72 hours.     Other pertinent lab:          Assessment & Plan:      Ms. Vernadine Cogan 70years old  female who is known to have history of depression/mood disorder, left breast cancer diagnosed 1995 status postsurgery and radiation treatment. History of hypertension and hyperlipidemia. Apparently she had right ankle surgery done in June 2022 and was placed in cast.  He was getting her rehab in the [address] [place] [address]. She was found to be hypoxic there along with shortness of breath. Noted with COVID-19 infection. On evaluation in ER she was found to be significantly hypoxic and septic. She was noted to have elevated WBC count with urine showing a number of WBCs. COVID test is positive again in the ER. She dropped her oxygen saturation in the ER placed on high flow nasal cannula oxygen at 70% with 55 L of flow. She was given 3 L of normal saline in the ER for diagnosis of sepsis. Transferred to ICU where she was noted to be tachypneic and placed on BiPAP. It is noted that she in 2019 had echocardiogram done which showed cardiomyopathy with ejection fraction of around 21-25%. Plan:   Acute hypoxic respiratory failure  Initially it resulted because of COVID-19 infection. The patient developed pulm edema because of 3 L of fluid that was given for sepsis in the ER. As mentioned above patient has ejection fraction of around 21 to 25% as per echocardiogram done in 2019  Patient was on high flow nasal cannula oxygen at 70% FiO2 with 55 L flow. Subsequently she was on BiPAP 16/6 with 35% O2. Blood gases 9/6 morning on BiPAP showed a 7.4 5/31/1982. Chest x-ray personally reviewed. Grossly clear lungs. CT of the chest did not show any pulmonary embolism. Mild groundglass in the left lower lobe. She tested positive for COVID-19. However it appears that her respiratory distress was mostly from fluid overload. She was diuresed and has made good urine output. Currently room air. Yesterday she was on 3 L oxygen. Levophed has been weaned off. Chest x-ray repeated today shows no acute process.         2. COVID-19 infection:  Patient got started on systemic steroids. Currently on dexamethasone 4 mg IV every 12 hourly, azithromycin IV Zosyn for possible UTI and also started on baricitinib as per infectious disease consultation. She has been a lifelong non-smoker. Has been vaccinated x3. I do not feel that the COVID-19 infection is contributing to respiratory distress. 3.  UTI:  She is already on IV Zosyn  4. Cardiomyopathy:  She has known ejection fraction of 21 to 25% in 2019. Will get 2D echocardiogram done to further evaluate her left ventricular systolic dysfunction  She may need evaluation by cardiology service as well. Management discussed with covering nurse  More than 30 minutes were spent in patient's evaluation, review of lab data, imaging studies decision making. From pulmonary standpoint the patient can be transferred out of the ICU.      Jorgito Frazier MD 15

## (undated) DEVICE — SOLUTION IRRIG 3000ML 0.9% SOD CHL FLX CONT 0797208] ICU MEDICAL INC]

## (undated) DEVICE — DEVON™ KNEE AND BODY STRAP 60" X 3" (1.5 M X 7.6 CM): Brand: DEVON

## (undated) DEVICE — TUBE ST FLD AQUILEX OUTFLO --

## (undated) DEVICE — STERILE POLYISOPRENE POWDER-FREE SURGICAL GLOVES: Brand: PROTEXIS

## (undated) DEVICE — DEVICE TISS RETRV 3MMX25.25IN -- MYOSURE

## (undated) DEVICE — PAD SANIT NPKN 4IN GRD

## (undated) DEVICE — CYSTOSCOPY PACK: Brand: CONVERTORS

## (undated) DEVICE — INFECTION CONTROL KIT SYS

## (undated) DEVICE — APPLICATOR FBR TIP L6IN COT TIP WOOD SHFT SWAB 2000 PER CA

## (undated) DEVICE — TOWEL,OR,DSP,ST,BLUE,STD,2/PK,40PK/CS: Brand: MEDLINE

## (undated) DEVICE — TOWEL SURG W17XL27IN STD BLU COT NONFENESTRATED PREWASHED

## (undated) DEVICE — TUBE ST FLD CTRL AQUILEX INFLO --

## (undated) DEVICE — HANDLE LT SNAP ON ULT DURABLE LENS FOR TRUMPF ALC DISPOSABLE

## (undated) DEVICE — SET SEALS HYSTEROSCOPE DISP -- MYOSURE  EA=10

## (undated) DEVICE — NEEDLE SPNL 22GA L3.5IN BLK HUB S STL REG WALL FIT STYL W/

## (undated) DEVICE — GOWN,SIRUS,NONRNF,SETINSLV,XL,20/CS: Brand: MEDLINE

## (undated) DEVICE — MEDI-VAC NON-CONDUCTIVE SUCTION TUBING: Brand: CARDINAL HEALTH

## (undated) DEVICE — SOLUTION IV 1000ML 0.9% SOD CHL

## (undated) DEVICE — SOLUTION SCRB 4OZ 10% PVP I POVIDONE IOD TOP PAINT EXIDINE

## (undated) DEVICE — SPECIMEN SOCK - STANDARD: Brand: MEDI-VAC

## (undated) DEVICE — Device

## (undated) DEVICE — PREP PAD BNS: Brand: CONVERTORS

## (undated) DEVICE — 1200 GUARD II KIT W/5MM TUBE W/O VAC TUBE: Brand: GUARDIAN

## (undated) DEVICE — SYR 10ML LUER LOK 1/5ML GRAD --

## (undated) DEVICE — BAG COLLECTION FLD OR-TBL NS --

## (undated) DEVICE — CATH URETH INTMIT ROB 14FR FUN -- USE ITEM 179521

## (undated) DEVICE — PACK,LITHOTOMY,PK I: Brand: MEDLINE